# Patient Record
Sex: FEMALE | Race: WHITE | Employment: OTHER | URBAN - METROPOLITAN AREA
[De-identification: names, ages, dates, MRNs, and addresses within clinical notes are randomized per-mention and may not be internally consistent; named-entity substitution may affect disease eponyms.]

---

## 2017-01-06 ENCOUNTER — HOSPITAL ENCOUNTER (EMERGENCY)
Age: 82
Discharge: HOME | End: 2017-01-06
Attending: FAMILY MEDICINE

## 2017-01-06 DIAGNOSIS — R49.0 HOARSENESS OF VOICE: Primary | ICD-10-CM

## 2017-01-06 DIAGNOSIS — J06.9 ACUTE UPPER RESPIRATORY INFECTION: ICD-10-CM

## 2017-01-06 RX ORDER — LORATADINE 10 MG/1
10 TABLET ORAL DAILY
Qty: 15 TAB | Refills: 0 | Status: SHIPPED | OUTPATIENT
Start: 2017-01-06 | End: 2017-09-06

## 2017-01-06 NOTE — DISCHARGE INSTRUCTIONS
Hoarseness: Care Instructions  Your Care Instructions  Many things can cause your voice to become rough, raspy, or hard to hear. Having a cold or a sinus infection, talking too loudly or yelling, smoking, or breathing dry air can cause a hoarse voice. You also can have voice problems from pollution and allergies. Sometimes, acid from your stomach can back up into your throat--called acid reflux--and change your voice. In some cases, a problem with the voice box, or larynx, causes hoarseness. Rest and home care may be all you need if you have a hoarse voice. Follow-up care is a key part of your treatment and safety. Be sure to make and go to all appointments, and call your doctor if you are having problems. Its also a good idea to know your test results and keep a list of the medicines you take. How can you care for yourself at home? · Follow your doctor's advice about how much to talk. Use email or write notes when you can to rest your voice. · If your doctor prescribed antibiotics, take them as directed. Do not stop taking them just because you feel better. You need to take the full course of antibiotics. · Talk to your doctor about treatment for allergies if you do not already treat them. · Follow your treatment plan for acid reflux (if you have the condition): ¨ Take your medicines exactly as prescribed. Call your doctor if you think you are having a problem with your medicine. ¨ Limit or stop eating foods that make your acid reflux worse. These may include tomatoes, spicy foods, and chocolate. ¨ Limit or stop drinking alcohol and drinks that have caffeine, such as coffee, tea, and lizzie. ¨ Raise the head of your bed a few inches. Place a brick or a foam wedge under the mattress at the head of the bed. This will help keep stomach acid out of your throat at night. · When you do talk, do not whisper. It can be hard on your voice. · Use a vaporizer or humidifier to add moisture to your bedroom. Follow the directions for cleaning the machine. · Drink plenty of water to keep your throat moist. If you have kidney, heart, or liver disease and have to limit fluids, talk with your doctor before you increase the amount of fluids you drink. · Do not smoke. Smoking can make your voice raspy and can increase your risk of throat cancer. If you need help quitting, talk to your doctor about stop-smoking programs and medicines. These can increase your chances of quitting for good. · To keep your voice from getting hoarse in the future, try not to talk loudly or shout, such as at sports events. When should you call for help? Call your doctor now or seek immediate medical care if:  · You have trouble breathing. · You cough up blood. · You have trouble swallowing. Watch closely for changes in your health, and be sure to contact your doctor if:  · Your voice is still hoarse after several days of home care. · You have hoarseness for more than 3 weeks after getting over a cold or the flu. Where can you learn more? Go to http://radha-alex.info/. Enter P454 in the search box to learn more about \"Hoarseness: Care Instructions. \"  Current as of: July 29, 2016  Content Version: 11.1  © 7627-3460 [employer]. Care instructions adapted under license by [employer] (which disclaims liability or warranty for this information). If you have questions about a medical condition or this instruction, always ask your healthcare professional. [place] [address] any warranty or liability for your use of this information.

## 2017-01-06 NOTE — UC PROVIDER NOTE
Patient is a 80 y.o. female presenting with hoarse voice. The history is provided by the patient. Hoarse    This is a new problem. The current episode started 3 to 5 hours ago. The problem has not changed since onset. There has been no fever. Pertinent negatives include no congestion, no drooling, no ear discharge, no ear pain, no headaches, no plugged ear sensation, no shortness of breath, no stridor, no swollen glands, no trouble swallowing, no stiff neck and no cough. She has had no exposure to strep. She has tried nothing for the symptoms. Past Medical History   Diagnosis Date    HTN (hypertension) 5/27/2011    Hyperlipidemia 12/1/2011    Hypertension     OAB (overactive bladder) 2/15/2013    Other ill-defined conditions(799.89)      hyperlipidemia    Other ill-defined conditions(799.89)      bladder problems    Other ill-defined conditions(799.89)      back pain        Past Surgical History   Procedure Laterality Date    Hx appendectomy      Hx dilation and curettage       x 2    Hx tonsillectomy      Hx corneal transplant      Hx cataract removal           Family History   Problem Relation Age of Onset    Cancer Father      throat (smoker)    Heart Disease Mother      Age 52    Arthritis-osteo Paternal Aunt         Social History     Social History    Marital status:      Spouse name: N/A    Number of children: N/A    Years of education: N/A     Occupational History    Not on file. Social History Main Topics    Smoking status: Never Smoker    Smokeless tobacco: Not on file    Alcohol use No    Drug use: No    Sexual activity: Not on file     Other Topics Concern    Not on file     Social History Narrative                ALLERGIES: Codeine and Demerol (pf) [meperidine (pf)]    Review of Systems   HENT: Positive for hoarse voice. Negative for congestion, drooling, ear discharge, ear pain and trouble swallowing.     Respiratory: Negative for cough, shortness of breath and stridor. Neurological: Negative for headaches. There were no vitals filed for this visit. Physical Exam   Constitutional: No distress. HENT:   Right Ear: Tympanic membrane and ear canal normal.   Left Ear: Tympanic membrane and ear canal normal.   Nose: Nose normal.   Mouth/Throat: No oropharyngeal exudate, posterior oropharyngeal edema or posterior oropharyngeal erythema. Eyes: Conjunctivae are normal. Right eye exhibits no discharge. Left eye exhibits no discharge. Neck: Neck supple. Pulmonary/Chest: Effort normal and breath sounds normal. No respiratory distress. She has no wheezes. She has no rales. Lymphadenopathy:     She has no cervical adenopathy. Skin: No rash noted. Nursing note and vitals reviewed. MDM     Differential Diagnosis; Clinical Impression; Plan:     CLINICAL IMPRESSION:  Hoarseness of voice  (primary encounter diagnosis)  Acute upper respiratory infection      DDX    Plan:    Reassured.  No treatment  claritin 10 mg once with Flonase   Amount and/or Complexity of Data Reviewed:    Review and summarize past medical records:  Yes  Risk of Significant Complications, Morbidity, and/or Mortality:   Presenting problems:  Low  Management options:  Low      Procedures

## 2017-01-08 ENCOUNTER — HOSPITAL ENCOUNTER (EMERGENCY)
Age: 82
Discharge: HOME | End: 2017-01-08
Attending: EMERGENCY MEDICINE | Admitting: EMERGENCY MEDICINE
Payer: MEDICARE

## 2017-01-08 VITALS
SYSTOLIC BLOOD PRESSURE: 159 MMHG | HEIGHT: 69 IN | TEMPERATURE: 97.8 F | BODY MASS INDEX: 21.62 KG/M2 | DIASTOLIC BLOOD PRESSURE: 72 MMHG | HEART RATE: 66 BPM | WEIGHT: 146 LBS | OXYGEN SATURATION: 96 % | RESPIRATION RATE: 14 BRPM

## 2017-01-08 DIAGNOSIS — S09.90XA MINOR HEAD INJURY, INITIAL ENCOUNTER: Primary | ICD-10-CM

## 2017-01-08 DIAGNOSIS — S01.01XA SCALP LACERATION, INITIAL ENCOUNTER: ICD-10-CM
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
## 2017-01-08 DIAGNOSIS — S00.03XA SCALP HEMATOMA, INITIAL ENCOUNTER: ICD-10-CM

## 2017-01-08 PROCEDURE — 99283 EMERGENCY DEPT VISIT LOW MDM: CPT

## 2017-01-08 PROCEDURE — 74011000250 HC RX REV CODE- 250: Performed by: EMERGENCY MEDICINE

## 2017-01-08 PROCEDURE — 75810000293 HC SIMP/SUPERF WND  RPR

## 2017-01-08 PROCEDURE — 77030008460 HC STPLR SKN PRECIS 3M -A

## 2017-01-08 RX ORDER — BACITRACIN 500 UNIT/G
1 PACKET (EA) TOPICAL
Status: COMPLETED | OUTPATIENT
Start: 2017-01-08 | End: 2017-01-08

## 2017-01-08 RX ORDER — LIDOCAINE HYDROCHLORIDE AND EPINEPHRINE 10; 10 MG/ML; UG/ML
1.5 INJECTION, SOLUTION INFILTRATION; PERINEURAL ONCE
Status: COMPLETED | OUTPATIENT
Start: 2017-01-08 | End: 2017-01-08

## 2017-01-08 RX ADMIN — BACITRACIN ZINC 1 PACKET: 500 OINTMENT TOPICAL at 15:43

## 2017-01-08 RX ADMIN — LIDOCAINE HYDROCHLORIDE,EPINEPHRINE BITARTRATE 15 MG: 10; .01 INJECTION, SOLUTION INFILTRATION; PERINEURAL at 14:27

## 2017-01-08 NOTE — DISCHARGE INSTRUCTIONS
Head Injury: Care Instructions  Your Care Instructions  Most injuries to the head are minor. Bumps, cuts, and scrapes on the head and face usually heal well and can be treated the same as injuries to other parts of the body. Although it's rare, once in a while a more serious problem shows up after you are home. So it's good to be on the lookout for symptoms for a day or two. Follow-up care is a key part of your treatment and safety. Be sure to make and go to all appointments, and call your doctor if you are having problems. It's also a good idea to know your test results and keep a list of the medicines you take. How can you care for yourself at home? · Follow your doctor's instructions. He or she will tell you if you need someone to watch you closely for the next 24 hours or longer. · Take it easy for the next few days or more if you are not feeling well. · Ask your doctor when it's okay for you to go back to activities like driving a car, riding a bike, or operating machinery. When should you call for help? Call 911 anytime you think you may need emergency care. For example, call if:  · You have a seizure. · You passed out (lost consciousness). · You are confused or can't stay awake. Call your doctor now or seek immediate medical care if:  · You have new or worse vomiting. · You feel less alert. · You have new weakness or numbness in any part of your body. Watch closely for changes in your health, and be sure to contact your doctor if:  · You do not get better as expected. · You have new symptoms, such as headaches, trouble concentrating, or changes in mood. Where can you learn more? Go to http://radha-alex.info/. Enter O434 in the search box to learn more about \"Head Injury: Care Instructions. \"  Current as of: September 7, 2016  Content Version: 11.1  © 9207-9325 [employer].  Care instructions adapted under license by [email or web address] (which disclaims liability or warranty for this information). If you have questions about a medical condition or this instruction, always ask your healthcare professional. [address] any warranty or liability for your use of this information. Contusion: Care Instructions  Your Care Instructions  Contusion is the medical term for a bruise. It is the result of a direct blow or an impact, such as a fall. Contusions are common sports injuries. Most people think of a bruise as a black-and-blue spot. This happens when small blood vessels get torn and leak blood under the skin. But bones, muscles, and organs can also get bruised. This may damage deep tissues but not cause a bruise you can see. The doctor will do a physical exam to find the location of your contusion. You may also have tests to make sure you do not have a more serious injury, such as a broken bone or nerve damage. These may include X-rays or other imaging tests like a CT scan or MRI. Deep-tissue contusions may cause pain and swelling. But if there is no serious damage, they will often get better in a few weeks with home treatment. The doctor has checked you carefully, but problems can develop later. If you notice any problems or new symptoms, get medical treatment right away. Follow-up care is a key part of your treatment and safety. Be sure to make and go to all appointments, and call your doctor if you are having problems. It's also a good idea to know your test results and keep a list of the medicines you take. How can you care for yourself at home? · Put ice or a cold pack on the sore area for 10 to 20 minutes at a time to stop swelling. Put a thin cloth between the ice pack and your skin. · Be safe with medicines. Read and follow all instructions on the label. ¨ If the doctor gave you a prescription medicine for pain, take it as prescribed.   ¨ If you are not taking a prescription pain medicine, ask your doctor if you can take an over-the-counter medicine. · If you can, prop up the sore area on pillows as much as possible for the next few days. Try to keep the sore area above the level of your heart. When should you call for help? Call your doctor now or seek immediate medical care if:  · Your pain gets worse. · You have new or worse swelling. · You have tingling, weakness, or numbness in the area near the contusion. · The area near the contusion is cold or pale. Watch closely for changes in your health, and be sure to contact your doctor if:  · You do not get better as expected. Where can you learn more? Go to [employer]  Enter [id number] in the search box to learn more about \"Contusion: Care Instructions. \"   © 4203-2965 [employer]. Care instructions adapted under license by [employer] (which disclaims liability or warranty for this information). This care instruction is for use with your licensed healthcare professional. If you have questions about a medical condition or this instruction, always ask your healthcare professional. [address] any warranty or liability for your use of this information. Content Version: 27.7.998195; Current as of: May 22, 2015             Cuts Closed With Staples: Care Instructions  Your Care Instructions  A cut can happen anywhere on your body. The doctor used staples to close the cut. Staples easily and quickly close a cut, which helps the cut heal.  Sometimes a cut can injure tendons, blood vessels, or nerves. If the cut went deep and through the skin, the doctor may have put in a layer of stitches below the staples. The deeper layer of stitches brings the deep part of the cut together. These stitches will dissolve and don't need to be removed. The staples in the upper layer are what you see on the cut. You may have a bandage. You will need to have the staples removed, usually in 7 to 14 days.   The doctor has checked you carefully, but problems can develop later. If you notice any problems or new symptoms, get medical treatment right away. Follow-up care is a key part of your treatment and safety. Be sure to make and go to all appointments, and call your doctor if you are having problems. It's also a good idea to know your test results and keep a list of the medicines you take. How can you care for yourself at home? · Keep the cut dry for the first 24 to 48 hours. After this, you can shower if your doctor okays it. Pat the cut dry. · Don't soak the cut, such as in a bathtub. Your doctor will tell you when it's safe to get the cut wet. · If your doctor told you how to care for your cut, follow your doctor's instructions. If you did not get instructions, follow this general advice:  ¨ After the first 24 to 48 hours, wash around the cut with clean water 2 times a day. Don't use hydrogen peroxide or alcohol, which can slow healing. ¨ You may cover the cut with a thin layer of petroleum jelly, such as Vaseline, and a nonstick bandage. ¨ Apply more petroleum jelly and replace the bandage as needed. · Avoid any activity that could cause your cut to reopen. · Do not remove the staples on your own. Your doctor will tell you when to come back to have the staples removed. · Take pain medicines exactly as directed. ¨ If the doctor gave you a prescription medicine for pain, take it as prescribed. ¨ If you are not taking a prescription pain medicine, ask your doctor if you can take an over-the-counter medicine. When should you call for help? Call your doctor now or seek immediate medical care if:  · You have new pain, or your pain gets worse. · The skin near the cut is cold or pale or changes color. · You have tingling, weakness, or numbness near the cut. · The cut starts to bleed, and blood soaks through the bandage. Oozing small amounts of blood is normal.  · You have trouble moving the area near the cut.   · You have symptoms of infection, such as:  ¨ Increased pain, swelling, warmth, or redness around the cut. ¨ Red streaks leading from the cut. ¨ Pus draining from the cut. ¨ A fever. Watch closely for changes in your health, and be sure to contact your doctor if:  · You do not get better as expected. Where can you learn more? Go to http://radha-alex.info/. Enter L446 in the search box to learn more about \"Cuts Closed With Staples: Care Instructions. \"  Current as of: May 27, 2016  Content Version: 11.1  © 1331-2515 [employer]. Care instructions adapted under license by [email or web address] (which disclaims liability or warranty for this information). If you have questions about a medical condition or this instruction, always ask your healthcare professional. [address] any warranty or liability for your use of this information.

## 2017-01-08 NOTE — ED TRIAGE NOTES
Triage Note:  Pt arrived via EMS from home. Pt was attempting to sheryl a bird out of her house when it flew in the [facility] and she tripped on the bathtub and hit her head on the back of the bathtub wall. Pt denies any LOC. Pt with small laceration to back of head.

## 2017-01-08 NOTE — ED PROVIDER NOTES
HPI Comments: 80 y.o. female with past medical history significant for hypertension, hyperlipidemia, and HTN  who presents via EMS for evaluation after GLF. Per Pt she was chasing a bird out of her house and tripped and fell on the edge of her bathtub. Pt states that she hit her head but denies any LOC. Bleeding now controlled to occipital laceration. Pt also denies any HA, nausea, vomiting, neck pain, trouble breathing, back pain, cp, numbness, weakness, or any other pain. PT states that she takes aspirin every day. Her tetanus is UTD. There are no other acute medical concerns at this time. SHX:  . Last tetanus 2010. Nonsmoker. PCP: Denver Grad, MD    Note written by Elza Giraldo, as dictated by Romain Burroughs MD 2:46 PM      The history is provided by the patient and the EMS personnel. No  was used. Past Medical History:   Diagnosis Date    HTN (hypertension) 5/27/2011    Hyperlipidemia 12/1/2011    Hypertension     OAB (overactive bladder) 2/15/2013    Other ill-defined conditions(799.89)      hyperlipidemia    Other ill-defined conditions(799.89)      bladder problems    Other ill-defined conditions(799.89)      back pain       Past Surgical History:   Procedure Laterality Date    Hx appendectomy      Hx dilation and curettage       x 2    Hx tonsillectomy      Hx corneal transplant      Hx cataract removal           Family History:   Problem Relation Age of Onset    Cancer Father      throat (smoker)    Heart Disease Mother      Age 52    Arthritis-osteo Paternal Aunt        Social History     Social History    Marital status:      Spouse name: N/A    Number of children: N/A    Years of education: N/A     Occupational History    Not on file.      Social History Main Topics    Smoking status: Never Smoker    Smokeless tobacco: Not on file    Alcohol use No    Drug use: No    Sexual activity: Not on file     Other Topics Concern    Not on file     Social History Narrative         ALLERGIES: Codeine and Demerol (pf) [meperidine (pf)]    Review of Systems   Respiratory: Negative for shortness of breath. Gastrointestinal: Negative for diarrhea, nausea and vomiting. Musculoskeletal: Negative for neck pain. Skin: Positive for wound. Neurological: Negative for weakness, numbness and headaches. All other systems reviewed and are negative. Vitals:    01/08/17 1410   BP: 159/72   Pulse: 66   Resp: 14   Temp: 97.8 °F (36.6 °C)   SpO2: 96%   Weight: 66.2 kg (146 lb)   Height: 5' 9\" (1.753 m)            Physical Exam   Constitutional: She is oriented to person, place, and time. She appears well-developed and well-nourished. HENT:   Head: Normocephalic. Mouth/Throat: Oropharynx is clear and moist. No oropharyngeal exudate. 2 cm hematoma with 1 cm mildly gaping laceration to occipital area. Minimally tender. Bleeding controlled. Eyes: Conjunctivae are normal. Pupils are equal, round, and reactive to light. Right eye exhibits no discharge. Left eye exhibits no discharge. No scleral icterus. Neck: Normal range of motion. Neck supple. nontender c-spine with painless full rom   Cardiovascular: Normal rate, regular rhythm and normal heart sounds. Exam reveals no gallop and no friction rub. No murmur heard. Pulmonary/Chest: Effort normal and breath sounds normal. No respiratory distress. She has no wheezes. She has no rales. Abdominal: Soft. Bowel sounds are normal. She exhibits no distension. There is no tenderness. There is no guarding. Musculoskeletal: Normal range of motion. She exhibits no edema or tenderness. Lymphadenopathy:     She has no cervical adenopathy. Neurological: She is alert and oriented to person, place, and time. No cranial nerve deficit. Coordination normal.   Skin: Skin is warm and dry. No rash noted. No pallor. Nursing note and vitals reviewed.        MDM  Number of Diagnoses or Management Options  Diagnosis management comments: 55-year-old female here status post ground level fall with mildly gaping laceration to her occipital area about 1 cm in length. Denies any loss of consciousness, neck pain, back pain, headache, nausea, vomiting, other pain. Last tetanus in 2010 and relatively minor lacerations to within 10 years and will not update her tetanus. Low clinical suspicion for clinically significant closed head injury such as intracranial hemorrhage. Patient without any closed head injury symptoms at this time. Have advised her of symptoms to return to the emergency room promptly should they occur including nausea, vomiting, headache, trouble walking, sleepiness or other symptoms of concern. will staple laceration. ED Course       Wound Repair  Date/Time: 1/8/2017 3:33 PM  Performed by: attendingPreparation: skin prepped with Shur-Clens  Time out: Immediately prior to the procedure a time out was called to verify the correct patient, procedure, equipment, staff and marking as appropriate. .  Location details: scalp  Wound length:2.5 cm or less  Anesthesia: local infiltration    Anesthesia:  Anesthesia: local infiltration  Local Anesthetic: lidocaine 1% with epinephrine   Anesthetic total: 2.5 mL  Foreign bodies: no foreign bodies  Irrigation solution: shur clens. Debridement: none  Skin closure: staples  Number of sutures: 2 (2 staples)  Approximation: close  Dressing: antibiotic ointment  Patient tolerance: Patient tolerated the procedure well with no immediate complications  My total time at bedside, performing this procedure was 1-15 minutes. 3:37 PM  Patient's results have been reviewed with them.   Patient and/or family have verbally conveyed their understanding and agreement of the patient's signs, symptoms, diagnosis, treatment and prognosis and additionally agree to follow up as recommended or return to the Emergency Room should their condition change prior to follow-up. Discharge instructions have also been provided to the patient with some educational information regarding their diagnosis as well a list of reasons why they would want to return to the ER prior to their follow-up appointment should their condition change.

## 2017-09-06 ENCOUNTER — APPOINTMENT (OUTPATIENT)
Dept: CT IMAGING | Age: 82
End: 2017-09-06
Attending: EMERGENCY MEDICINE
Payer: MEDICARE

## 2017-09-06 ENCOUNTER — HOSPITAL ENCOUNTER (EMERGENCY)
Age: 82
Discharge: HOME | End: 2017-09-07
Attending: EMERGENCY MEDICINE
Payer: MEDICARE

## 2017-09-06 DIAGNOSIS — R19.7 DIARRHEA, UNSPECIFIED TYPE: ICD-10-CM

## 2017-09-06 DIAGNOSIS — R10.84 ABDOMINAL PAIN, GENERALIZED: ICD-10-CM

## 2017-09-06 DIAGNOSIS — R11.2 NON-INTRACTABLE VOMITING WITH NAUSEA, UNSPECIFIED VOMITING TYPE: Primary | ICD-10-CM

## 2017-09-06 DIAGNOSIS — K57.30 SIGMOID DIVERTICULOSIS: ICD-10-CM

## 2017-09-06 LAB
ALBUMIN SERPL-MCNC: 3.8 G/DL (ref 3.5–5)
ALBUMIN/GLOB SERPL: 1.1 {RATIO} (ref 1.1–2.2)
ALP SERPL-CCNC: 89 U/L (ref 45–117)
ALT SERPL-CCNC: 23 U/L (ref 12–78)
ANION GAP SERPL CALC-SCNC: 8 MMOL/L (ref 5–15)
APPEARANCE UR: ABNORMAL
AST SERPL-CCNC: 29 U/L (ref 15–37)
BACTERIA URNS QL MICRO: NEGATIVE /HPF
BASOPHILS # BLD: 0 K/UL (ref 0–0.1)
BASOPHILS NFR BLD: 0 % (ref 0–1)
BILIRUB SERPL-MCNC: 0.7 MG/DL (ref 0.2–1)
BILIRUB UR QL: NEGATIVE
BUN SERPL-MCNC: 14 MG/DL (ref 6–20)
BUN/CREAT SERPL: 16 (ref 12–20)
CALCIUM SERPL-MCNC: 9 MG/DL (ref 8.5–10.1)
CHLORIDE SERPL-SCNC: 105 MMOL/L (ref 97–108)
CO2 SERPL-SCNC: 27 MMOL/L (ref 21–32)
COLOR UR: ABNORMAL
CREAT SERPL-MCNC: 0.89 MG/DL (ref 0.55–1.02)
EOSINOPHIL # BLD: 0.5 K/UL (ref 0–0.4)
EOSINOPHIL NFR BLD: 5 % (ref 0–7)
EPITH CASTS URNS QL MICRO: ABNORMAL /LPF
ERYTHROCYTE [DISTWIDTH] IN BLOOD BY AUTOMATED COUNT: 12.4 % (ref 11.5–14.5)
GLOBULIN SER CALC-MCNC: 3.5 G/DL (ref 2–4)
GLUCOSE SERPL-MCNC: 141 MG/DL (ref 65–100)
GLUCOSE UR STRIP.AUTO-MCNC: NEGATIVE MG/DL
HCT VFR BLD AUTO: 39.6 % (ref 35–47)
HGB BLD-MCNC: 13.4 G/DL (ref 11.5–16)
HGB UR QL STRIP: NEGATIVE
HYALINE CASTS URNS QL MICRO: ABNORMAL /LPF (ref 0–5)
KETONES UR QL STRIP.AUTO: NEGATIVE MG/DL
LACTATE SERPL-SCNC: 1.7 MMOL/L (ref 0.4–2)
LEUKOCYTE ESTERASE UR QL STRIP.AUTO: NEGATIVE
LYMPHOCYTES # BLD: 3.8 K/UL (ref 0.8–3.5)
LYMPHOCYTES NFR BLD: 41 % (ref 12–49)
MCH RBC QN AUTO: 30 PG (ref 26–34)
MCHC RBC AUTO-ENTMCNC: 33.8 G/DL (ref 30–36.5)
MCV RBC AUTO: 88.8 FL (ref 80–99)
MONOCYTES # BLD: 0.8 K/UL (ref 0–1)
MONOCYTES NFR BLD: 9 % (ref 5–13)
NEUTS SEG # BLD: 4.1 K/UL (ref 1.8–8)
NEUTS SEG NFR BLD: 45 % (ref 32–75)
NITRITE UR QL STRIP.AUTO: NEGATIVE
PH UR STRIP: 7.5 [PH] (ref 5–8)
PLATELET # BLD AUTO: 176 K/UL (ref 150–400)
POTASSIUM SERPL-SCNC: 3.2 MMOL/L (ref 3.5–5.1)
PROT SERPL-MCNC: 7.3 G/DL (ref 6.4–8.2)
PROT UR STRIP-MCNC: NEGATIVE MG/DL
RBC # BLD AUTO: 4.46 M/UL (ref 3.8–5.2)
RBC #/AREA URNS HPF: ABNORMAL /HPF (ref 0–5)
SODIUM SERPL-SCNC: 140 MMOL/L (ref 136–145)
SP GR UR REFRACTOMETRY: 1.01 (ref 1–1.03)
UA: UC IF INDICATED,UAUC: ABNORMAL
UROBILINOGEN UR QL STRIP.AUTO: 0.2 EU/DL (ref 0.2–1)
WBC # BLD AUTO: 9.2 K/UL (ref 3.6–11)
WBC URNS QL MICRO: ABNORMAL /HPF (ref 0–4)

## 2017-09-06 PROCEDURE — 74011250636 HC RX REV CODE- 250/636: Performed by: EMERGENCY MEDICINE

## 2017-09-06 PROCEDURE — 94762 N-INVAS EAR/PLS OXIMTRY CONT: CPT

## 2017-09-06 PROCEDURE — 96374 THER/PROPH/DIAG INJ IV PUSH: CPT

## 2017-09-06 PROCEDURE — 93005 ELECTROCARDIOGRAM TRACING: CPT

## 2017-09-06 PROCEDURE — 74177 CT ABD & PELVIS W/CONTRAST: CPT

## 2017-09-06 PROCEDURE — 83605 ASSAY OF LACTIC ACID: CPT | Performed by: EMERGENCY MEDICINE

## 2017-09-06 PROCEDURE — 74011636320 HC RX REV CODE- 636/320: Performed by: EMERGENCY MEDICINE

## 2017-09-06 PROCEDURE — 99285 EMERGENCY DEPT VISIT HI MDM: CPT

## 2017-09-06 PROCEDURE — 85025 COMPLETE CBC W/AUTO DIFF WBC: CPT | Performed by: EMERGENCY MEDICINE

## 2017-09-06 PROCEDURE — 81001 URINALYSIS AUTO W/SCOPE: CPT | Performed by: EMERGENCY MEDICINE

## 2017-09-06 PROCEDURE — 80053 COMPREHEN METABOLIC PANEL: CPT | Performed by: EMERGENCY MEDICINE

## 2017-09-06 PROCEDURE — 36415 COLL VENOUS BLD VENIPUNCTURE: CPT | Performed by: EMERGENCY MEDICINE

## 2017-09-06 PROCEDURE — 96361 HYDRATE IV INFUSION ADD-ON: CPT

## 2017-09-06 RX ORDER — ONDANSETRON 2 MG/ML
4 INJECTION INTRAMUSCULAR; INTRAVENOUS
Status: COMPLETED | OUTPATIENT
Start: 2017-09-06 | End: 2017-09-06

## 2017-09-06 RX ORDER — SODIUM CHLORIDE 9 MG/ML
50 INJECTION, SOLUTION INTRAVENOUS
Status: COMPLETED | OUTPATIENT
Start: 2017-09-06 | End: 2017-09-07

## 2017-09-06 RX ORDER — SODIUM CHLORIDE 0.9 % (FLUSH) 0.9 %
10 SYRINGE (ML) INJECTION
Status: COMPLETED | OUTPATIENT
Start: 2017-09-06 | End: 2017-09-06

## 2017-09-06 RX ADMIN — IOPAMIDOL 100 ML: 755 INJECTION, SOLUTION INTRAVENOUS at 23:49

## 2017-09-06 RX ADMIN — SODIUM CHLORIDE 50 ML/HR: 900 INJECTION, SOLUTION INTRAVENOUS at 23:49

## 2017-09-06 RX ADMIN — Medication 10 ML: at 23:49

## 2017-09-06 RX ADMIN — SODIUM CHLORIDE 1000 ML: 900 INJECTION, SOLUTION INTRAVENOUS at 22:08

## 2017-09-06 RX ADMIN — ONDANSETRON 4 MG: 2 INJECTION INTRAMUSCULAR; INTRAVENOUS at 22:06

## 2017-09-06 NOTE — LETTER
[facility] EMERGENCY DEPT 
[address] [place] [address] [address] [id number] [phone] Work/School Note Date: 9/6/2017 To Whom It May concern: 
 
Itzel Her was seen and treated today in the emergency room by the following provider(s): 
Attending Provider: Sneha García MD. And accompanied by her Daughter, Ms. Jeff Boykin. Please excuse Ms. Yap on 9/7/17 Sincerely, 
 
 
 
 
Sneha García MD

## 2017-09-07 VITALS
WEIGHT: 150 LBS | DIASTOLIC BLOOD PRESSURE: 55 MMHG | BODY MASS INDEX: 22.22 KG/M2 | TEMPERATURE: 97.5 F | RESPIRATION RATE: 20 BRPM | OXYGEN SATURATION: 94 % | SYSTOLIC BLOOD PRESSURE: 145 MMHG | HEIGHT: 69 IN | HEART RATE: 64 BPM

## 2017-09-07 RX ORDER — ONDANSETRON 4 MG/1
4 TABLET, ORALLY DISINTEGRATING ORAL
Qty: 10 TAB | Refills: 0 | Status: SHIPPED | OUTPATIENT
Start: 2017-09-07 | End: 2017-12-12 | Stop reason: ALTCHOICE

## 2017-09-07 NOTE — ED NOTES
Assumed care of patient. Patient placed in position of comfort. Call bell in reach. Patient arrives to ED via EMS after she became ill during dinner. She complains of nausea, vomiting, and diarrhea. She recently started Aricept and is concerned this is related to medication. BG was 143 en route, VSS.

## 2017-09-07 NOTE — ED NOTES
Patient assisted to bathroom at this time, patient voided 500 mL; urine sample obtained and sent to lab. Patient positioned comfortably back in bed, on monitor x3, call bell within reach.

## 2017-09-07 NOTE — ED NOTES
Patient assisted to bathroom at this time. Patient voided and placed back in bed, comfortable position, call bell within reach.

## 2017-09-07 NOTE — ED NOTES
Dr. Alma Saucedo at bedside to provide discharge paperwork. Vital signs stable. Pt in no apparent distress at this time. Mental status at baseline. Patient wheeled to waiting room with discharge paperwork in hand. Accompanied by RN and daughter.

## 2017-09-07 NOTE — ED PROVIDER NOTES
HPI Comments: Fidel Hood is a 80 y.o. female, pmhx HTN, who presents via EMS to the ED c/o sudden onset diffuse abd pain with associated nausea, vomiting, and diarrhea, while attempting to eat dinner this evening. Pt notes and intermittent hx of diarrhea over the last several weeks. She states she has had multiple episodes of dark colored stools, but notes she has been taking Pepto Bismol. Family reports the pt has been evaluated by her PCP, but was not treated for anything. She states she began taking Aricept, ~5 days ago, but expresses concern the medication is causing her current sxs. Pt notes she stopped taking the Aricept yesterday. EMS reports  en route to the ED. Pt denies any recent medications PTA this evening. She specifically denies any recent fevers, chills, CP, SOB, urinary sxs, hematochezia or headache. PCP: Jose Alfredo Sainz MD    PMHx: Significant for HTN, HLD  PSHx: Significant for appendicitis, D&C, tonsillectomy, corneal transplant  Social Hx: -tobacco, -EtOH, -Illicit Drugs    There are no other complaints, changes, or physical findings at this time. The history is provided by the patient and the EMS personnel.         Past Medical History:   Diagnosis Date    HTN (hypertension) 5/27/2011    Hyperlipidemia 12/1/2011    Hypertension     OAB (overactive bladder) 2/15/2013    Other ill-defined conditions     hyperlipidemia    Other ill-defined conditions     bladder problems    Other ill-defined conditions     back pain       Past Surgical History:   Procedure Laterality Date    HX APPENDECTOMY      HX CATARACT REMOVAL      HX CORNEAL TRANSPLANT      HX DILATION AND CURETTAGE      x 2    HX TONSILLECTOMY           Family History:   Problem Relation Age of Onset    Cancer Father      throat (smoker)    Heart Disease Mother      Age 52    Arthritis-osteo Paternal Aunt        Social History     Social History    Marital status:      Spouse name: N/A    Number of children: N/A    Years of education: N/A     Occupational History    Not on file. Social History Main Topics    Smoking status: Never Smoker    Smokeless tobacco: Never Used    Alcohol use No    Drug use: No    Sexual activity: Not on file     Other Topics Concern    Not on file     Social History Narrative         ALLERGIES: Codeine and Demerol (pf) [meperidine (pf)]    Review of Systems   Constitutional: Negative. Negative for fever. Eyes: Negative. Respiratory: Negative. Negative for shortness of breath. Cardiovascular: Negative for chest pain and palpitations. Gastrointestinal: Positive for abdominal pain, diarrhea, nausea and vomiting.        +melena  No hematochezia   Endocrine: Negative. Genitourinary: Negative. Negative for difficulty urinating, dysuria, frequency and hematuria. Musculoskeletal: Negative. Skin: Negative. Allergic/Immunologic: Negative. Neurological: Negative. Psychiatric/Behavioral: Negative for suicidal ideas. All other systems reviewed and are negative. Vitals:    09/06/17 2307 09/06/17 2308 09/06/17 2315 09/06/17 2330   BP: 158/59  146/63 135/59   Pulse:  68 61 63   Resp:  17 17 18   Temp:       SpO2:  97% 98% 98%   Weight:       Height:                Physical Exam   Constitutional: She is oriented to person, place, and time. She appears well-developed and well-nourished. No distress. HENT:   Head: Normocephalic and atraumatic. Nose: Nose normal.   Eyes: Conjunctivae and EOM are normal. No scleral icterus. Neck: Normal range of motion. No tracheal deviation present. Cardiovascular: Normal rate, regular rhythm, normal heart sounds and intact distal pulses. Exam reveals no friction rub. No murmur heard. Pulmonary/Chest: Effort normal and breath sounds normal. No stridor. No respiratory distress. She has no wheezes. She has no rales. Abdominal: Soft. Bowel sounds are normal. She exhibits no distension. There is no tenderness. There is no rebound. Musculoskeletal: Normal range of motion. She exhibits no tenderness. Neurological: She is alert and oriented to person, place, and time. No cranial nerve deficit. Skin: Skin is warm and dry. No rash noted. She is not diaphoretic. Psychiatric: She has a normal mood and affect. Her speech is normal and behavior is normal. Judgment and thought content normal. Cognition and memory are normal.   Nursing note and vitals reviewed. MDM  Number of Diagnoses or Management Options  Abdominal pain, generalized:   Diarrhea, unspecified type:   Non-intractable vomiting with nausea, unspecified vomiting type:   Sigmoid diverticulosis:   Diagnosis management comments: DDX:  Uti, constipation, obstruction, diverticulosis/litis, electrolyte abnormality    Plan:  Labs, ivf, antiemetic, ekg, ct scan    Impression:  N/v/d, diverticulosis       Amount and/or Complexity of Data Reviewed  Clinical lab tests: reviewed and ordered  Tests in the radiology section of CPT®: reviewed and ordered  Tests in the medicine section of CPT®: ordered and reviewed  Obtain history from someone other than the patient: yes (EMS)  Review and summarize past medical records: yes  Independent visualization of images, tracings, or specimens: yes    Patient Progress  Patient progress: stable        Procedures      I reviewed our electronic medical record system for any past medical records that were available that may contribute to the patients current condition, the nursing notes and and vital signs from today's visit    Nursing notes will be reviewed as they become available in realtime while the pt has been in the ED. Bre Frost MD      EKG interpretation 2100: sinus teresa, nl Axis, rate 52; , QRS 82, QTc 431; no acute ischemia; Bre Frost MD    I personally reviewed pt's imaging. Official read by radiology listed below. Bre Frost MD    PROGRESS NOTE:  10:38 PM  Pt reevaluated.  Pt states her nausea is improved following her dose of Zofran. Will continue to monitor. Written by Will Pepper, ED Scribe, as dictated by Barbie Dick MD     12:46 AM  Progress note:  Pt noted to be feeling better, ready for discharge. Discussed lab and imaging findings with pt and/or family. Will follow up as instructed. All questions have been answered, pt voiced understanding and agreement with plan. If narcotics were prescribed, pt was advised not to drive or operate heavy machinery. If abx were prescribed, pt advised that diarrhea and rash are possible side effects of the medications. Specific return precautions provided as well as instructions to return to the ED should sx worsen at any time. Barbie Dick MD    LABORATORY TESTS:  Recent Results (from the past 12 hour(s))   EKG, 12 LEAD, INITIAL    Collection Time: 09/06/17  9:00 PM   Result Value Ref Range    Ventricular Rate 52 BPM    Atrial Rate 52 BPM    P-R Interval 182 ms    QRS Duration 82 ms    Q-T Interval 464 ms    QTC Calculation (Bezet) 431 ms    Calculated P Axis 67 degrees    Calculated R Axis -2 degrees    Calculated T Axis 27 degrees    Diagnosis       Sinus bradycardia  Otherwise normal ECG  When compared with ECG of 12-AUG-2016 06:45,  No significant change was found     CBC WITH AUTOMATED DIFF    Collection Time: 09/06/17  9:08 PM   Result Value Ref Range    WBC 9.2 3.6 - 11.0 K/uL    RBC 4.46 3.80 - 5.20 M/uL    HGB 13.4 11.5 - 16.0 g/dL    HCT 39.6 35.0 - 47.0 %    MCV 88.8 80.0 - 99.0 FL    MCH 30.0 26.0 - 34.0 PG    MCHC 33.8 30.0 - 36.5 g/dL    RDW 12.4 11.5 - 14.5 %    PLATELET 508 537 - 356 K/uL    NEUTROPHILS 45 32 - 75 %    LYMPHOCYTES 41 12 - 49 %    MONOCYTES 9 5 - 13 %    EOSINOPHILS 5 0 - 7 %    BASOPHILS 0 0 - 1 %    ABS. NEUTROPHILS 4.1 1.8 - 8.0 K/UL    ABS. LYMPHOCYTES 3.8 (H) 0.8 - 3.5 K/UL    ABS. MONOCYTES 0.8 0.0 - 1.0 K/UL    ABS. EOSINOPHILS 0.5 (H) 0.0 - 0.4 K/UL    ABS.  BASOPHILS 0.0 0.0 - 0.1 K/UL   METABOLIC PANEL, COMPREHENSIVE    Collection Time: 09/06/17  9:08 PM   Result Value Ref Range    Sodium 140 136 - 145 mmol/L    Potassium 3.2 (L) 3.5 - 5.1 mmol/L    Chloride 105 97 - 108 mmol/L    CO2 27 21 - 32 mmol/L    Anion gap 8 5 - 15 mmol/L    Glucose 141 (H) 65 - 100 mg/dL    BUN 14 6 - 20 MG/DL    Creatinine 0.89 0.55 - 1.02 MG/DL    BUN/Creatinine ratio 16 12 - 20      GFR est AA >60 >60 ml/min/1.73m2    GFR est non-AA >60 >60 ml/min/1.73m2    Calcium 9.0 8.5 - 10.1 MG/DL    Bilirubin, total 0.7 0.2 - 1.0 MG/DL    ALT (SGPT) 23 12 - 78 U/L    AST (SGOT) 29 15 - 37 U/L    Alk. phosphatase 89 45 - 117 U/L    Protein, total 7.3 6.4 - 8.2 g/dL    Albumin 3.8 3.5 - 5.0 g/dL    Globulin 3.5 2.0 - 4.0 g/dL    A-G Ratio 1.1 1.1 - 2.2     URINALYSIS W/ REFLEX CULTURE    Collection Time: 09/06/17  9:08 PM   Result Value Ref Range    Color YELLOW/STRAW      Appearance CLOUDY (A) CLEAR      Specific gravity 1.011 1.003 - 1.030      pH (UA) 7.5 5.0 - 8.0      Protein NEGATIVE  NEG mg/dL    Glucose NEGATIVE  NEG mg/dL    Ketone NEGATIVE  NEG mg/dL    Bilirubin NEGATIVE  NEG      Blood NEGATIVE  NEG      Urobilinogen 0.2 0.2 - 1.0 EU/dL    Nitrites NEGATIVE  NEG      Leukocyte Esterase NEGATIVE  NEG      WBC 0-4 0 - 4 /hpf    RBC 0-5 0 - 5 /hpf    Epithelial cells FEW FEW /lpf    Bacteria NEGATIVE  NEG /hpf    UA:UC IF INDICATED CULTURE NOT INDICATED BY UA RESULT CNI      Hyaline cast 2-5 0 - 5 /lpf   LACTIC ACID    Collection Time: 09/06/17  9:23 PM   Result Value Ref Range    Lactic acid 1.7 0.4 - 2.0 MMOL/L       IMAGING RESULTS:     CT Results  (Last 48 hours)               09/07/17 0000  CT ABD PELV W CONT Final result    Impression:  IMPRESSION: No acute process in the abdomen and pelvis. Narrative:  CT ABDOMEN AND PELVIS WITH CONTRAST. 9/7/2017 12:00 AM        INDICATION: Nausea, vomiting, diarrhea, abdominal pain. COMPARISON: None.        TECHNIQUE: CT of the abdomen and pelvis was performed after the administration   of 100 cc IV Isovue-370. CT dose reduction was achieved through use of a   standardized protocol tailored for this examination and automatic exposure   control for dose modulation. FINDINGS:   Abdomen: Calcified granulomas in the right lower lobe, and numerous hepatic and   splenic calcifications, are evidence of old granulomatous disease. 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 Incidental note is made of a large   gallstone and a tiny nonobstructing right interpolar renal calculus. The heart   size is normal. The distal esophagus, stomach, duodenum, adrenals, and left   kidney are normal.       Pelvis: Sigmoid diverticulosis is mild. The small bowel, ileocecal junction,   colon, and bladder are otherwise normal. The appendix is not visualized; no   pericecal inflammatory process. No free air or fluid, and no abdominopelvic   lymphadenopathy. The patient is osteopenic. Moderate levoconvexity is centered around L1-L2. MEDICATIONS GIVEN:  Medications   sodium chloride 0.9 % bolus infusion 1,000 mL (1,000 mL IntraVENous New Bag 9/6/17 2208)   ondansetron (ZOFRAN) injection 4 mg (4 mg IntraVENous Given 9/6/17 2206)   sodium chloride (NS) flush 10 mL (10 mL IntraVENous Given 9/6/17 2349)   iopamidol (ISOVUE-370) 76 % injection 100 mL (100 mL IntraVENous Given 9/6/17 2349)   0.9% sodium chloride infusion (50 mL/hr IntraVENous New Bag 9/6/17 2349)       IMPRESSION:  1. Non-intractable vomiting with nausea, unspecified vomiting type    2. Abdominal pain, generalized    3. Diarrhea, unspecified type    4. Sigmoid diverticulosis        PLAN:  1. Current Discharge Medication List      START taking these medications    Details   ondansetron (ZOFRAN ODT) 4 mg disintegrating tablet Take 1 Tab by mouth every eight (8) hours as needed for Nausea. Qty: 10 Tab, Refills: 0           2.    Follow-up Information     Follow up With Details Comments Contact Jeanette Arevalo MD Schedule an appointment as soon as possible for a visit in 2 days  [address] [facility] [phone]          Return to ED if worse     DISCHARGE NOTE:  12:46 AM  The patient's results have been reviewed with family and/or caregiver. They verbally convey their understanding and agreement of the patient's signs, symptoms, diagnosis, treatment, and prognosis. They additionally agree to follow up as recommended in the discharge instructions or to return to the Emergency Room should the patient's condition change prior to their follow-up appointment. The family and/or caregiver verbally agrees with the care-plan and all of their questions have been answered. The discharge instructions have also been provided to the them along with educational information regarding the patient's diagnosis and a list of reasons why the patient would want to return to the ER prior to their follow-up appointment should their condition change. Written by Sheyla Pang, ROCÍO Scribe, as dictated by Bert Back MD.         This note is prepared by Sheyla Pang, acting as Scribe for MD Bert Gonzalez MD : The scribe's documentation has been prepared under my direction and personally reviewed by me in its entirety. I confirm that the note above accurately reflects all work, treatment, procedures, and medical decision making performed by me. This note will not be viewable in [address]

## 2017-09-07 NOTE — DISCHARGE INSTRUCTIONS
Abdominal Pain: Care Instructions  Your Care Instructions    Abdominal pain has many possible causes. Some aren't serious and get better on their own in a few days. Others need more testing and treatment. If your pain continues or gets worse, you need to be rechecked and may need more tests to find out what is wrong. You may need surgery to correct the problem. Don't ignore new symptoms, such as fever, nausea and vomiting, urination problems, pain that gets worse, and dizziness. These may be signs of a more serious problem. Your doctor may have recommended a follow-up visit in the next 8 to 12 hours. If you are not getting better, you may need more tests or treatment. The doctor has checked you carefully, but problems can develop later. If you notice any problems or new symptoms, get medical treatment right away. Follow-up care is a key part of your treatment and safety. Be sure to make and go to all appointments, and call your doctor if you are having problems. It's also a good idea to know your test results and keep a list of the medicines you take. How can you care for yourself at home? · Rest until you feel better. · To prevent dehydration, drink plenty of fluids, enough so that your urine is light yellow or clear like water. Choose water and other caffeine-free clear liquids until you feel better. If you have kidney, heart, or liver disease and have to limit fluids, talk with your doctor before you increase the amount of fluids you drink. · If your stomach is upset, eat mild foods, such as rice, dry toast or crackers, bananas, and applesauce. Try eating several small meals instead of two or three large ones. · Wait until 48 hours after all symptoms have gone away before you have spicy foods, alcohol, and drinks that contain caffeine. · Do not eat foods that are high in fat. · Avoid anti-inflammatory medicines such as aspirin, ibuprofen (Advil, Motrin), and naproxen (Aleve).  These can cause stomach upset. Talk to your doctor if you take daily aspirin for another health problem. When should you call for help? Call 911 anytime you think you may need emergency care. For example, call if:  · You passed out (lost consciousness). · You pass maroon or very bloody stools. · You vomit blood or what looks like coffee grounds. · You have new, severe belly pain. Call your doctor now or seek immediate medical care if:  · Your pain gets worse, especially if it becomes focused in one area of your belly. · You have a new or higher fever. · Your stools are black and look like tar, or they have streaks of blood. · You have unexpected vaginal bleeding. · You have symptoms of a urinary tract infection. These may include:  ¨ Pain when you urinate. ¨ Urinating more often than usual.  ¨ Blood in your urine. · You are dizzy or lightheaded, or you feel like you may faint. Watch closely for changes in your health, and be sure to contact your doctor if:  · You are not getting better after 1 day (24 hours). Where can you learn more? Go to http://radha"[employer]"alex.info/. Enter O053 in the search box to learn more about \"Abdominal Pain: Care Instructions. \"  Current as of: March 20, 2017  Content Version: 11.3  © 0915-3976 [employer]. Care instructions adapted under license by [employer] (which disclaims liability or warranty for this information). If you have questions about a medical condition or this instruction, always ask your healthcare professional. [place] [address] any warranty or liability for your use of this information. Diarrhea: Care Instructions  Your Care Instructions    Diarrhea is loose, watery stools (bowel movements). The exact cause is often hard to find. Sometimes diarrhea is your body's way of getting rid of what caused an upset stomach. Viruses, food poisoning, and many medicines can cause diarrhea.  Some people get diarrhea in response to emotional stress, anxiety, or certain foods. Almost everyone has diarrhea now and then. It usually isn't serious, and your stools will return to normal soon. The important thing to do is replace the fluids you have lost, so you can prevent dehydration. The doctor has checked you carefully, but problems can develop later. If you notice any problems or new symptoms, get medical treatment right away. Follow-up care is a key part of your treatment and safety. Be sure to make and go to all appointments, and call your doctor if you are having problems. It's also a good idea to know your test results and keep a list of the medicines you take. How can you care for yourself at home? · Watch for signs of dehydration, which means your body has lost too much water. Dehydration is a serious condition and should be treated right away. Signs of dehydration are:  ¨ Increasing thirst and dry eyes and mouth. ¨ Feeling faint or lightheaded. ¨ Darker urine, and a smaller amount of urine than normal.  · To prevent dehydration, drink plenty of fluids, enough so that your urine is light yellow or clear like water. Choose water and other caffeine-free clear liquids until you feel better. If you have kidney, heart, or liver disease and have to limit fluids, talk with your doctor before you increase the amount of fluids you drink. · Begin eating small amounts of mild foods the next day, if you feel like it. ¨ Try yogurt that has live cultures of Lactobacillus. (Check the label.)  ¨ Avoid spicy foods, fruits, alcohol, and caffeine until 48 hours after all symptoms are gone. ¨ Avoid chewing gum that contains sorbitol. ¨ Avoid dairy products (except for yogurt with Lactobacillus) while you have diarrhea and for 3 days after symptoms are gone. · The doctor may recommend that you take over-the-counter medicine, such as loperamide (Imodium), if you still have diarrhea after 6 hours.  Read and follow all instructions on the label. Do not use this medicine if you have bloody diarrhea, a high fever, or other signs of serious illness. Call your doctor if you think you are having a problem with your medicine. When should you call for help? Call 911 anytime you think you may need emergency care. For example, call if:  · You passed out (lost consciousness). · Your stools are maroon or very bloody. Call your doctor now or seek immediate medical care if:  · You are dizzy or lightheaded, or you feel like you may faint. · Your stools are black and look like tar, or they have streaks of blood. · You have new or worse belly pain. · You have symptoms of dehydration, such as:  ¨ Dry eyes and a dry mouth. ¨ Passing only a little dark urine. ¨ Feeling thirstier than usual.  · You have a new or higher fever. Watch closely for changes in your health, and be sure to contact your doctor if:  · Your diarrhea is getting worse. · You see pus in the diarrhea. · You are not getting better after 2 days (48 hours). Where can you learn more? Go to http://radha"[employer]alex.info/. Enter F109 in the search box to learn more about \"Diarrhea: Care Instructions. \"  Current as of: March 20, 2017  Content Version: 11.3  © 5234-0907 [employer]. Care instructions adapted under license by [employer] (which disclaims liability or warranty for this information). If you have questions about a medical condition or this instruction, always ask your healthcare professional. [place] [address] any warranty or liability for your use of this information. Nausea and Vomiting: Care Instructions  Your Care Instructions    When you are nauseated, you may feel weak and sweaty and notice a lot of saliva in your mouth. Nausea often leads to vomiting. Most of the time you do not need to worry about nausea and vomiting, but they can be signs of other illnesses.   Two common causes of nausea and vomiting are stomach flu and food poisoning. Nausea and vomiting from viral stomach flu will usually start to improve within 24 hours. Nausea and vomiting from food poisoning may last from 12 to 48 hours. The doctor has checked you carefully, but problems can develop later. If you notice any problems or new symptoms, get medical treatment right away. Follow-up care is a key part of your treatment and safety. Be sure to make and go to all appointments, and call your doctor if you are having problems. It's also a good idea to know your test results and keep a list of the medicines you take. How can you care for yourself at home? · To prevent dehydration, drink plenty of fluids, enough so that your urine is light yellow or clear like water. Choose water and other caffeine-free clear liquids until you feel better. If you have kidney, heart, or liver disease and have to limit fluids, talk with your doctor before you increase the amount of fluids you drink. · Rest in bed until you feel better. · When you are able to eat, try clear soups, mild foods, and liquids until all symptoms are gone for 12 to 48 hours. Other good choices include dry toast, crackers, cooked cereal, and gelatin dessert, such as Jell-O. When should you call for help? Call 911 anytime you think you may need emergency care. For example, call if:  · You passed out (lost consciousness). Call your doctor now or seek immediate medical care if:  · You have symptoms of dehydration, such as:  ¨ Dry eyes and a dry mouth. ¨ Passing only a little dark urine. ¨ Feeling thirstier than usual.  · You have new or worsening belly pain. · You have a new or higher fever. · You vomit blood or what looks like coffee grounds. Watch closely for changes in your health, and be sure to contact your doctor if:  · You have ongoing nausea and vomiting. · Your vomiting is getting worse. · Your vomiting lasts longer than 2 days. · You are not getting better as expected.   Where can you learn more? Go to http://radha-alex.info/. Enter 25 [id number] in the search box to learn more about \"Nausea and Vomiting: Care Instructions. \"  Current as of: March 20, 2017  Content Version: 11.3  © 4764-7036 [employer]. Care instructions adapted under license by "[employer] (which disclaims liability or warranty for this information). If you have questions about a medical condition or this instruction, always ask your healthcare professional. [address] any warranty or liability for your use of this information. Diverticulosis: Care Instructions  Your Care Instructions  In diverticulosis, pouches called diverticula form in the wall of the large intestine (colon). The pouches do not cause any pain or other symptoms. Most people who have diverticulosis do not know they have it. But the pouches sometimes bleed, and if they become infected, they can cause pain and other symptoms. When this happens, it is called diverticulitis. Diverticula form when pressure pushes the wall of the colon outward at certain weak points. A diet that is too low in fiber can cause diverticula. Follow-up care is a key part of your treatment and safety. Be sure to make and go to all appointments, and call your doctor if you are having problems. It's also a good idea to know your test results and keep a list of the medicines you take. How can you care for yourself at home? · Include fruits, leafy green vegetables, beans, and whole grains in your diet each day. These foods are high in fiber. · Take a fiber supplement, such as Citrucel or Metamucil, every day if needed. Read and follow all instructions on the label. · Drink plenty of fluids, enough so that your urine is light yellow or clear like water. If you have kidney, heart, or liver disease and have to limit fluids, talk with your doctor before you increase the amount of fluids you drink.   · Get at least 27 minutes of exercise on most days of the week. Walking is a good choice. You also may want to do other activities, such as running, swimming, cycling, or playing tennis or team sports. · Cut out foods that cause gas, pain, or other symptoms. When should you call for help? Call your doctor now or seek immediate medical care if:  · You have belly pain. · You pass maroon or very bloody stools. · You have a fever. · You have nausea and vomiting. · You have unusual changes in your bowel movements or abdominal swelling. · You have burning pain when you urinate. · You have abnormal vaginal discharge. · You have shoulder pain. · You have cramping pain that does not get better when you have a bowel movement or pass gas. · You pass gas or stool from your urethra while urinating. Watch closely for changes in your health, and be sure to contact your doctor if you have any problems. Where can you learn more? Go to http://radha-alex.info/. Enter H173 in the search box to learn more about \"Diverticulosis: Care Instructions. \"  Current as of: August 9, 2016  Content Version: 11.3  © 4734-5220 [employer] Care instructions adapted under license by [employer] (which disclaims liability or warranty for this information). If you have questions about a medical condition or this instruction, always ask your healthcare professional. [address] any warranty or liability for your use of this information.

## 2017-09-07 NOTE — ED TRIAGE NOTES
Patient arrives via EMS after she became ill during dinner. She complains of nausea, vomiting, and diarrhea. She recently started Aricept and is concerned this is related to medication. BG was 143 en route, VSS.

## 2017-09-08 LAB
ATRIAL RATE: 52 BPM
CALCULATED P AXIS, ECG09: 67 DEGREES
CALCULATED R AXIS, ECG10: -2 DEGREES
CALCULATED T AXIS, ECG11: 27 DEGREES
DIAGNOSIS, 93000: NORMAL
P-R INTERVAL, ECG05: 182 MS
Q-T INTERVAL, ECG07: 464 MS
QRS DURATION, ECG06: 82 MS
QTC CALCULATION (BEZET), ECG08: 431 MS
VENTRICULAR RATE, ECG03: 52 BPM

## 2017-09-13 ENCOUNTER — HOSPITAL ENCOUNTER (EMERGENCY)
Age: 82
Discharge: HOME | End: 2017-09-13
Attending: FAMILY MEDICINE

## 2017-09-13 VITALS
DIASTOLIC BLOOD PRESSURE: 69 MMHG | HEIGHT: 66 IN | RESPIRATION RATE: 20 BRPM | SYSTOLIC BLOOD PRESSURE: 161 MMHG | TEMPERATURE: 98.2 F | BODY MASS INDEX: 23.42 KG/M2 | WEIGHT: 145.7 LBS | OXYGEN SATURATION: 96 % | HEART RATE: 59 BPM

## 2017-09-13 DIAGNOSIS — K62.5 ANAL BLEEDING: Primary | ICD-10-CM

## 2017-09-13 LAB
BILIRUB UR QL: ABNORMAL
GLUCOSE UR QL STRIP.AUTO: NEGATIVE MG/DL
KETONES UR-MCNC: 15 MG/DL
LEUKOCYTE ESTERASE UR QL STRIP: ABNORMAL
NITRITE UR QL: NEGATIVE
PH UR: 7 [PH] (ref 5–8)
PROT UR QL: 30 MG/DL
RBC # UR STRIP: ABNORMAL /UL
SP GR UR: 1.02 (ref 1–1.03)
UROBILINOGEN UR QL: 0.2 EU/DL (ref 0.2–1)

## 2017-09-13 RX ORDER — HYDROCORTISONE 25 MG/G
CREAM TOPICAL
Qty: 30 G | Refills: 0 | Status: SHIPPED | OUTPATIENT
Start: 2017-09-13 | End: 2017-12-12 | Stop reason: ALTCHOICE

## 2017-09-13 NOTE — UC PROVIDER NOTE
Patient is a 80 y.o. female presenting with anal bleeding. The history is provided by the patient. Rectal Bleeding    This is a new problem. The current episode started 2 days ago. The stool is described as blood tinged and pellet like. Associated symptoms include diarrhea (had 2 weeks before) and constipation (x 2-3 days). Pertinent negatives include no abdominal pain, no dysuria, no abdominal distention, no nausea, no back pain and no vomiting. Treatments tried: manual disimpactant. Past Medical History:   Diagnosis Date    HTN (hypertension) 5/27/2011    Hyperlipidemia 12/1/2011    Hypertension     OAB (overactive bladder) 2/15/2013    Other ill-defined conditions     hyperlipidemia    Other ill-defined conditions     bladder problems    Other ill-defined conditions     back pain        Past Surgical History:   Procedure Laterality Date    HX APPENDECTOMY      HX CATARACT REMOVAL      HX CORNEAL TRANSPLANT      HX DILATION AND CURETTAGE      x 2    HX TONSILLECTOMY           Family History   Problem Relation Age of Onset    Cancer Father      throat (smoker)    Heart Disease Mother      Age 52    Arthritis-osteo Paternal Aunt         Social History     Social History    Marital status:      Spouse name: N/A    Number of children: N/A    Years of education: N/A     Occupational History    Not on file. Social History Main Topics    Smoking status: Never Smoker    Smokeless tobacco: Never Used    Alcohol use No    Drug use: No    Sexual activity: Not on file     Other Topics Concern    Not on file     Social History Narrative                ALLERGIES: Codeine and Demerol (pf) [meperidine (pf)]    Review of Systems   Gastrointestinal: Positive for anal bleeding, constipation (x 2-3 days), diarrhea (had 2 weeks before) and rectal pain. Negative for abdominal distention, abdominal pain, nausea and vomiting. Genitourinary: Negative for dysuria.    Musculoskeletal: Negative for back pain. All other systems reviewed and are negative. Vitals:    09/13/17 1110   BP: 161/69   Pulse: (!) 59   Resp: 20   Temp: 98.2 °F (36.8 °C)   SpO2: 96%   Weight: 66.1 kg (145 lb 11.2 oz)   Height: 5' 6\" (1.676 m)       Physical Exam   Constitutional: No distress. HENT:   Mouth/Throat: No oropharyngeal exudate. Eyes: No scleral icterus. Abdominal: Soft. Bowel sounds are normal. She exhibits no distension and no mass. There is no tenderness. There is no rebound and no guarding. Genitourinary: Rectal exam shows external hemorrhoid (residual skin tag). Rectal exam shows no internal hemorrhoid, no fissure, no mass, no tenderness and anal tone normal.   Skin: No rash noted. Nursing note and vitals reviewed. MDM     Differential Diagnosis; Clinical Impression; Plan:     CLINICAL IMPRESSION:  Anal bleeding  (primary encounter diagnosis)      DDX      Plan:    Reassured- sitz bath-    Use Anusol cream   Use colac e and miralax powder as needed  Amount and/or Complexity of Data Reviewed:    Review and summarize past medical records:  Yes  Risk of Significant Complications, Morbidity, and/or Mortality:   Presenting problems: Moderate  Management options:   Moderate  Progress:   Patient progress:  Stable      Procedures

## 2017-09-13 NOTE — DISCHARGE INSTRUCTIONS
Rectal Bleeding: Care Instructions  Your Care Instructions  Rectal bleeding in small amounts is common. You may see red spotting on toilet paper or drops of blood in the toilet. Rectal bleeding has many possible causes, from something as minor as hemorrhoids to something as serious as colon cancer. You may need more tests to find the cause of your bleeding. Follow-up care is a key part of your treatment and safety. Be sure to make and go to all appointments, and call your doctor if you are having problems. Its also a good idea to know your test results and keep a list of the medicines you take. How can you care for yourself at home? · Avoid aspirin and other nonsteroidal anti-inflammatory drugs (NSAIDs), such as ibuprofen (Advil, Motrin) and naproxen (Aleve). They can cause you to bleed more. Ask your doctor if you can take acetaminophen (Tylenol). Read and follow all instructions on the label. · Use a stool softener that contains bran or psyllium. You can save money by buying bran or psyllium (available in bulk at most health food stores) and sprinkling it on foods or stirring it into fruit juice. You can also use a product such as Metamucil or Citrucel. · Take your medicines exactly as directed. Call your doctor if you think you are having a problem with your medicine. When should you call for help? Call 911 anytime you think you may need emergency care. For example, call if:  · You passed out (lost consciousness). · You pass maroon or very bloody stools. · You vomit blood or what looks like coffee grounds. Call your doctor now or seek immediate medical care if:  · You have severe belly pain. · You have increased bleeding. · You are dizzy or lightheaded, or you feel like you may faint. · Your stools are black and tarlike. Watch closely for changes in your health, and be sure to contact your doctor if:  · You lose weight and do not know why. · You feel tired all the time.   · Your bleeding does not decrease after 2 days. Where can you learn more? Go to http://radha-alex.info/. Enter H828 in the search box to learn more about \"Rectal Bleeding: Care Instructions. \"  Current as of: August 9, 2016  Content Version: 11.3  © 4560-3627 [employer]. Care instructions adapted under license by [employer] (which disclaims liability or warranty for this information). If you have questions about a medical condition or this instruction, always ask your healthcare professional. [address] any warranty or liability for your use of this information.

## 2017-09-27 ENCOUNTER — OFFICE VISIT (OUTPATIENT)
Dept: FAMILY MEDICINE CLINIC | Age: 82
End: 2017-09-27

## 2017-09-27 VITALS
WEIGHT: 146.5 LBS | OXYGEN SATURATION: 96 % | BODY MASS INDEX: 23.54 KG/M2 | HEIGHT: 66 IN | RESPIRATION RATE: 19 BRPM | TEMPERATURE: 95.9 F | SYSTOLIC BLOOD PRESSURE: 144 MMHG | HEART RATE: 53 BPM | DIASTOLIC BLOOD PRESSURE: 68 MMHG

## 2017-09-27 DIAGNOSIS — Z00.00 MEDICARE ANNUAL WELLNESS VISIT, INITIAL: Primary | ICD-10-CM

## 2017-09-27 DIAGNOSIS — N32.81 OAB (OVERACTIVE BLADDER): ICD-10-CM

## 2017-09-27 DIAGNOSIS — K59.00 CONSTIPATION, UNSPECIFIED CONSTIPATION TYPE: ICD-10-CM

## 2017-09-27 DIAGNOSIS — R73.9 HIGH BLOOD SUGAR: ICD-10-CM

## 2017-09-27 DIAGNOSIS — I10 ESSENTIAL HYPERTENSION: ICD-10-CM

## 2017-09-27 DIAGNOSIS — Z23 ENCOUNTER FOR IMMUNIZATION: ICD-10-CM

## 2017-09-27 DIAGNOSIS — Z78.0 POSTMENOPAUSAL: ICD-10-CM

## 2017-09-27 DIAGNOSIS — E78.00 PURE HYPERCHOLESTEROLEMIA: ICD-10-CM

## 2017-09-27 NOTE — PROGRESS NOTES
Skylar Fishman is a 80 y.o. female  Chief Complaint   Patient presents with   [facility]    Annual Wellness Visit     1. Have you been to the ER, urgent care clinic since your last visit? Hospitalized since your last visit? Yes, Constipation 9/13/2017    2. Have you seen or consulted any other health care providers outside of the [address] [place] [address] since your last visit? Include any pap smears or colon screening.  No

## 2017-09-27 NOTE — ACP (ADVANCE CARE PLANNING)
Patient does not have an [address] [place] [address]. [facility]. I discussed the basics of Advanced Care Planning with patient, including what the document does, purpose of a health care proxy and how to execute ACP. Patient was given \"Your Right to Decide\" brochure, contact information for office nurse navigator and will schedule an appointment to discuss ACP.

## 2017-09-27 NOTE — MR AVS SNAPSHOT
Visit Information Date & Time Provider Department Dept. Phone Encounter #  
 9/27/2017  3:20 PM Ronny Abdullahi NP [facility] [phone] [id number] Your Appointments 10/4/2017  1:30 PM  
ESTABLISHED PATIENT with MD Verona Trevino Ace, [place], [address] ([facility]) Appt Note: 2 month f/u  
 [address] [facility] [address] [id number]  
[phone]  
  
   
 [address] [facility] [address] Upcoming Health Maintenance Date Due  
 MEDICARE YEARLY EXAM 7/3/1997 Pneumococcal 65+ Low/Medium Risk (2 of 2 - PPSV23) 5/27/2012 INFLUENZA AGE 9 TO ADULT 8/1/2017 GLAUCOMA SCREENING Q2Y 12/31/2017 DTaP/Tdap/Td series (2 - Td) 5/27/2020 Allergies as of 9/27/2017  Review Complete On: 9/27/2017 By: Nicole Almaraz LPN Severity Noted Reaction Type Reactions Codeine  11/23/2010    Nausea and Vomiting Demerol (Pf) [Meperidine (Pf)]  12/01/2011    Nausea and Vomiting Current Immunizations  Reviewed on 9/27/2017 Name Date Pneumococcal Conjugate (PCV-13) 9/27/2017 TDAP Vaccine 5/27/2010 ZZZ-RETIRED (DO NOT USE) Pneumococcal Vaccine (Unspecified Type) 5/27/2007 Reviewed by Rachelle Oneil LPN on 2/44/8457 at  5:45 PM  
You Were Diagnosed With   
  
 Codes Comments Medicare annual wellness visit, initial    -  Primary ICD-10-CM: Z00.00 ICD-9-CM: V70.0 Essential hypertension     ICD-10-CM: I10 
ICD-9-CM: 401.9 Pure hypercholesterolemia     ICD-10-CM: E78.00 ICD-9-CM: 272.0   
 OAB (overactive bladder)     ICD-10-CM: N32.81 ICD-9-CM: 596.51 High blood sugar     ICD-10-CM: R73.9 ICD-9-CM: 790.29 Postmenopausal     ICD-10-CM: Z78.0 ICD-9-CM: V49.81 Constipation, unspecified constipation type     ICD-10-CM: K59.00 ICD-9-CM: 564.00 Encounter for immunization     ICD-10-CM: P49 ICD-9-CM: V03.89 Vitals BP Pulse Temp Resp Height(growth percentile) Weight(growth percentile) 144/68 (BP 1 Location: Left arm, BP Patient Position: Sitting) (!) 53 95.9 °F (35.5 °C) (Oral) 19 5' 6\" (1.676 m) 146 lb 8 oz (66.5 kg) SpO2 BMI OB Status Smoking Status 96% 23.65 kg/m2 Postmenopausal Never Smoker Vitals History BMI and BSA Data Body Mass Index Body Surface Area  
 23.65 kg/m 2 1.76 m 2 Preferred Pharmacy Pharmacy Name Phone [id number] [address] [address] [address] [phone] Your Updated Medication List  
  
   
This list is accurate as of: 9/27/17  5:48 PM.  Always use your most recent med list. amLODIPine 10 mg tablet Commonly known as:  Christian Levi TAKE 1 TABLET BY MOUTH EVERY MORNING  
  
 aspirin 325 mg tablet Commonly known as:  ASPIRIN Take 325 mg by mouth daily. atorvastatin 20 mg tablet Commonly known as:  LIPITOR  
TAKE 1 TABLET BY MOUTH DAILY  
  
 benazepril 40 mg tablet Commonly known as:  LOTENSIN  
TAKE 1 TABLET BY MOUTH EVERY DAY  
  
 fluticasone 50 mcg/actuation nasal spray Commonly known as:  Star Serve 2 Sprays by Both Nostrils route daily. Glucosamine &Chondroit-MV-Min3 [id number] mg Tab Take  by mouth. hydrocortisone 2.5 % rectal cream  
Commonly known as:  ANUSOL-HC Insert  into rectum four (4) times daily as needed (anal pain/ bleeding). metoprolol tartrate 100 mg IR tablet Commonly known as:  LOPRESSOR  
TAKE 1/2 TABLET BY MOUTH TWICE DAILY  
  
 ondansetron 4 mg disintegrating tablet Commonly known as:  ZOFRAN ODT Take 1 Tab by mouth every eight (8) hours as needed for Nausea. pneumococcal 13 oscar conj dip 0.5 mL Syrg injection Commonly known as:  PREVNAR-13  
0.5 mL by IntraMUSCular route once for 1 dose. VITAMIN D3 1,000 unit tablet Generic drug:  cholecalciferol Take  by mouth daily. Prescriptions Sent to Pharmacy Refills  
 pneumococcal 13 oscar conj dip (PREVNAR-13) 0.5 mL syrg injection 0 Si.5 mL by IntraMUSCular route once for 1 dose. Class: Normal  
 Pharmacy: [address] [place] [address] , [address] [place] [address] Ph #: [phone] Route: IntraMUSCular We Performed the Following HEMOGLOBIN A1C WITH EAG [05985 CPT(R)] LIPID PANEL [77664 CPT(R)] MICROALBUMIN, UR, RAND W/ MICROALBUMIN/CREA RATIO [id number] CPT(R)] PNEUMOCOCCAL CONJ VACCINE 13 VALENT IM [id number] CPT(R)] [place] IMMUNIZ ADMIN,1 SINGLE/COMB VAC/TOXOID [id number] CPT(R)] To-Do List   
 2017 Imaging:  DEXA BONE DENSITY STUDY AXIAL Patient Instructions 1) Bowel regimen Drink at least 64 oz a day. Exercise regularly. Good fiber diet High-Fiber Diet: Care Instructions Your Care Instructions A high-fiber diet may help you relieve constipation and feel less bloated. Your doctor and dietitian will help you make a high-fiber eating plan based on your personal needs. The plan will include the things you like to eat. It will also make sure that you get 30 grams of fiber a day. Before you make changes to the way you eat, be sure to talk with your doctor or dietitian. Follow-up care is a key part of your treatment and safety. Be sure to make and go to all appointments, and call your doctor if you are having problems. It's also a good idea to know your test results and keep a list of the medicines you take. How can you care for yourself at home? · You can increase how much fiber you get if you eat more of certain foods. These foods include: ¨ Whole-grain breads and cereals. ¨ Fruits, such as pears, apples, and peaches. Eat the skins and peels if you can. ¨ Vegetables, such as broccoli, cabbage, spinach, carrots, asparagus, and squash. ¨ Starchy vegetables. These include potatoes with skins, kidney beans, and lima beans. · Take a fiber supplement every day if your doctor recommends it. Examples are Benefiber, Citrucel, FiberCon, and Metamucil. Ask your doctor how much to take. · Drink plenty of fluids, enough so that your urine is light yellow or clear like water. If you have kidney, heart, or liver disease and have to limit fluids, talk with your doctor before you increase the amount of fluids you drink. · Get some exercise every day. Exercise helps stool move through the colon. It also helps prevent constipation. · Keep a food diary. Try to notice and write down what foods cause gas, pain, or other symptoms. Then you can avoid these foods. Where can you learn more? Go to http://radha-alex.info/. Enter S393 in the search box to learn more about \"High-Fiber Diet: Care Instructions. \" Current as of: December 15, 2016 Content Version: 11.3 © 3294-5551 [employer]. Care instructions adapted under license by [employer] (which disclaims liability or warranty for this information). If you have questions about a medical condition or this instruction, always ask your healthcare professional. [place] [address] any warranty or liability for your use of this information. 2) Advanced Medical Care plan - please bring a copy to a [facility] facility so it can be scanned into our system 3) Shingles vaccine, Prevnar 13 vaccine 4) The following blood work was ordered today. Once the tests are completed, you will receive a letter, email or phone call with the results. If you have not heard from us within 10-14 days, please call the office for the results. Hemoglobin A1c is a 3 month average of your blood sugar and used as a marker of your diabetes control. A normal value is less than 5.7%. Total Cholesterol is the total number of cholesterol particles in your blood, which includes triglycerides, HDL and LDL.   A small amount of cholesterol is needed for the body's cells, hormones, and metabolism. Goal is less than 200. Triglycerides are the short term fats in your blood which are used for energy. Goal is less than 150. High Density Lipids (HDL) is the \"good\" cholesterol in your blood. HDL helps remove bad cholesterol from your blood. Goal is more than 40. Low Density Lipids (LDL) is the \"bad\" cholesterol in your blood. LDL can stick to your arteries, raising the risk for heart attack and stroke. Goal is less than 150 Urine Analysis for Microalbumin/creatinine ratio is a marker of the amount of protein in your urine. Certain health conditions, such as diabetes and hypertension, can injury kidney function. A normal value is less than 30. Schedule of Personalized Health Plan The best way to stay healthy is to live a healthy lifestyle. A healthy lifestyle includes regular exercise, eating a well-balanced diet, keeping a healthy weight and not smoking. Regular physical exams and screening tests are another important way to take care of yourself. Preventive exams provided by health care providers can find health problems early when treatment works best and can keep you from getting certain diseases or illnesses. Preventive services include exams, lab tests, screening tests, shots, and learning information to help you take care of your own health. The CDC recommends pneumonia vaccines for anyone 72 years and older. These vaccines are usually only needed once in a lifetime unless your healthcare provider decides differently. The 2 pneumonia shots available presently are PCV 13 (Prevnar 13) and PPSV23 (Pneumovax 23). Adults 72 years or older who have not previously received PCV13, should receive a dose of PCV13 first, followed 1 year later by a dose of PPSV23. All people over 65 should have a yearly flu vaccine. People over 65 are at medium to high risk for Hepatitis B.  Hepatitis B is transmitted through body fluids with a common source being sexual activity or IV drug use. Three shots are needed for complete protection. The CDC recommends the herpes zoster (shingles) vaccine for all adults 61 and older, regardless if a prior episode of zoster has been reported. In addition to your physical exam, some screening tests are recommended: 
 
Osteoporosis screening -There are no signs or symptoms of osteoporosis (weakening of bones). You might not know you have the disease until you break a bone. Thats why its so important to get a bone density test to measure your bone strength. Bone mass measurement is taken with a Dexa scan and is recommended every two years after 72years old or if you have certain risk factors, such as personal history of vertebral fracture or chronic steroid medication use. Diabetes Mellitus screening is recommended every year. This is a blood test, called a hemoglobin A1c, which measures the average blood sugar over a 3 month period. Glaucoma is an eye disease caused by high pressure in the eye. An eye exam is recommended every year. Cardiovascular screening tests that check your cholesterol and other blood fat (lipid) levels are recommended every five years or yearly if you are on medications for cardiovascular disease. Colorectal Cancer screening tests help to find pre-cancerous polyps (growths in the colon) so they can be removed before they turn into cancer. Screening tests are recommended starting at age 48 or earlier if you have a certain risk factors, such as a family history of colon cancer. Breast Cancer screening is done with a mammogram, a low dose x-ray that looks at breast tissue. It is recommended by the [address] that women 54 years and older get a mammogram every 2 years. After the age of 76, recommendations are based on life expectancy.  
 
Cervical Cancer screening is done by a PAP smear during a pelvic exam. The [employer] of Obstetricians and Gynecologists states that screening can be discontinued after 72years old if the person has had adequate negative prior screening results and no abnormal history (abnormal = CIN2 or higher level). Here is a list of your current Health Maintenance items including a date when each one is due next: 
Health Maintenance Topic Date Due  MEDICARE YEARLY EXAM  07/03/1997  Pneumococcal 65+ Low/Medium Risk (2 of 2 - PPSV23) 05/27/2012  INFLUENZA AGE 9 TO ADULT  08/01/2017  GLAUCOMA SCREENING Q2Y  12/31/2017  
 DTaP/Tdap/Td series (2 - Td) 05/27/2020  
 OSTEOPOROSIS SCREENING (DEXA)  Addressed  ZOSTER VACCINE AGE 60>  Completed You do not have an [address] on file. Introducing [facility]! Rossana Toro introduces inZair patient portal. Now you can access parts of your medical record, email your doctor's office, and request medication refills online. 1. In your internet browser, go to [email or web address] 2. Click on the First Time User? Click Here link in the Sign In box. You will see the New Member Sign Up page. 3. Enter your inZair Access Code exactly as it appears below. You will not need to use this code after youve completed the sign-up process. If you do not sign up before the expiration date, you must request a new code. · inZair Access Code: [email or web address] Expires: 11/14/2017  3:03 PM 
 
4. Enter the last four digits of your Social Security Number (xxxx) and Date of Birth (mm/dd/yyyy) as indicated and click Submit. You will be taken to the next sign-up page. 5. Create a [employer] ID. This will be your inZair login ID and cannot be changed, so think of one that is secure and easy to remember. 6. Create a [employer] password. You can change your password at any time. 7. Enter your Password Reset Question and Answer. This can be used at a later time if you forget your password. 8. Enter your e-mail address. You will receive e-mail notification when new information is available in [address] 9. Click Sign Up. You can now view and download portions of your medical record. 10. Click the Download Summary menu link to download a portable copy of your medical information. If you have questions, please visit the Frequently Asked Questions section of the [employer] website. Remember, [employer] is NOT to be used for urgent needs. For medical emergencies, dial 911. Now available from your iPhone and Android! Please provide this summary of care documentation to your next provider. Your primary care clinician is listed as Nikolai Ruiz. If you have any questions after today's visit, please call [phone].

## 2017-09-27 NOTE — PATIENT INSTRUCTIONS
1) Bowel regimen  Drink at least 64 oz a day. Exercise regularly. Good fiber diet      High-Fiber Diet: Care Instructions  Your Care Instructions  A high-fiber diet may help you relieve constipation and feel less bloated. Your doctor and dietitian will help you make a high-fiber eating plan based on your personal needs. The plan will include the things you like to eat. It will also make sure that you get 30 grams of fiber a day. Before you make changes to the way you eat, be sure to talk with your doctor or dietitian. Follow-up care is a key part of your treatment and safety. Be sure to make and go to all appointments, and call your doctor if you are having problems. It's also a good idea to know your test results and keep a list of the medicines you take. How can you care for yourself at home? · You can increase how much fiber you get if you eat more of certain foods. These foods include:  ¨ Whole-grain breads and cereals. ¨ Fruits, such as pears, apples, and peaches. Eat the skins and peels if you can. ¨ Vegetables, such as broccoli, cabbage, spinach, carrots, asparagus, and squash. ¨ Starchy vegetables. These include potatoes with skins, kidney beans, and lima beans. · Take a fiber supplement every day if your doctor recommends it. Examples are Benefiber, Citrucel, FiberCon, and Metamucil. Ask your doctor how much to take. · Drink plenty of fluids, enough so that your urine is light yellow or clear like water. If you have kidney, heart, or liver disease and have to limit fluids, talk with your doctor before you increase the amount of fluids you drink. · Get some exercise every day. Exercise helps stool move through the colon. It also helps prevent constipation. · Keep a food diary. Try to notice and write down what foods cause gas, pain, or other symptoms. Then you can avoid these foods. Where can you learn more? Go to http://bishop.info/.   Enter E472 in the search box to learn more about \"High-Fiber Diet: Care Instructions. \"  Current as of: December 15, 2016  Content Version: 11.3  © 2759-2119 [employer]. Care instructions adapted under license by [employer] (which disclaims liability or warranty for this information). If you have questions about a medical condition or this instruction, always ask your healthcare professional. [address] any warranty or liability for your use of this information. 2) Advanced Medical Care plan - please bring a copy to a [facility] so it can be scanned into our system    3) Shingles vaccine, Prevnar 13 vaccine  4) The following blood work was ordered today. Once the tests are completed, you will receive a letter, email or phone call with the results. If you have not heard from us within 10-14 days, please call the office for the results. Hemoglobin A1c is a 3 month average of your blood sugar and used as a marker of your diabetes control. A normal value is less than 5.7%. Total Cholesterol is the total number of cholesterol particles in your blood, which includes triglycerides, HDL and LDL. A small amount of cholesterol is needed for the body's cells, hormones, and metabolism. Goal is less than 200. Triglycerides are the short term fats in your blood which are used for energy. Goal is less than 150. High Density Lipids (HDL) is the \"good\" cholesterol in your blood. HDL helps remove bad cholesterol from your blood. Goal is more than 40. Low Density Lipids (LDL) is the \"bad\" cholesterol in your blood. LDL can stick to your arteries, raising the risk for heart attack and stroke. Goal is less than 150    Urine Analysis for Microalbumin/creatinine ratio is a marker of the amount of protein in your urine. Certain health conditions, such as diabetes and hypertension, can injury kidney function. A normal value is less than 30.         Schedule of Personalized Health Plan    The best way to stay healthy is to live a healthy lifestyle. A healthy lifestyle includes regular exercise, eating a well-balanced diet, keeping a healthy weight and not smoking. Regular physical exams and screening tests are another important way to take care of yourself. Preventive exams provided by health care providers can find health problems early when treatment works best and can keep you from getting certain diseases or illnesses. Preventive services include exams, lab tests, screening tests, shots, and learning information to help you take care of your own health. The CDC recommends pneumonia vaccines for anyone 72 years and older. These vaccines are usually only needed once in a lifetime unless your healthcare provider decides differently. The 2 pneumonia shots available presently are PCV 13 (Prevnar 13) and PPSV23 (Pneumovax 23). Adults 72 years or older who have not previously received PCV13, should receive a dose of PCV13 first, followed 1 year later by a dose of PPSV23. All people over 65 should have a yearly flu vaccine. People over 65 are at medium to high risk for Hepatitis B. Hepatitis B is transmitted through body fluids with a common source being sexual activity or IV drug use. Three shots are needed for complete protection. The CDC recommends the herpes zoster (shingles) vaccine for all adults 61 and older, regardless if a prior episode of zoster has been reported. In addition to your physical exam, some screening tests are recommended:    Osteoporosis screening -There are no signs or symptoms of osteoporosis (weakening of bones). You might not know you have the disease until you break a bone. Thats why its so important to get a bone density test to measure your bone strength.  Bone mass measurement is taken with a Dexa scan and is recommended every two years after 72years old or if you have certain risk factors, such as personal history of vertebral fracture or chronic steroid medication use. Diabetes Mellitus screening is recommended every year. This is a blood test, called a hemoglobin A1c, which measures the average blood sugar over a 3 month period. Glaucoma is an eye disease caused by high pressure in the eye. An eye exam is recommended every year. Cardiovascular screening tests that check your cholesterol and other blood fat (lipid) levels are recommended every five years or yearly if you are on medications for cardiovascular disease. Colorectal Cancer screening tests help to find pre-cancerous polyps (growths in the colon) so they can be removed before they turn into cancer. Screening tests are recommended starting at age 48 or earlier if you have a certain risk factors, such as a family history of colon cancer. Breast Cancer screening is done with a mammogram, a low dose x-ray that looks at breast tissue. It is recommended by the [address] that women 54 years and older get a mammogram every 2 years. After the age of 76, recommendations are based on life expectancy. Cervical Cancer screening is done by a PAP smear during a pelvic exam.  The [employer] College of Obstetricians and Gynecologists states that screening can be discontinued after 72years old if the person has had adequate negative prior screening results and no abnormal history (abnormal = CIN2 or higher level). Here is a list of your current Health Maintenance items including a date when each one is due next:  Health Maintenance   Topic Date Due    Pneumococcal 65+ Low/Medium Risk (2 of 2 - PPSV23) 05/27/2012    INFLUENZA AGE 9 TO ADULT  08/01/2017    GLAUCOMA SCREENING Q2Y  12/31/2017    MEDICARE YEARLY EXAM  09/28/2018    DTaP/Tdap/Td series (2 - Td) 05/27/2020    OSTEOPOROSIS SCREENING (DEXA)  Addressed    ZOSTER VACCINE AGE 60>  Completed       You do not have an [address] on file.

## 2017-09-27 NOTE — PROGRESS NOTES
Assessment/Plan:    1. Medicare annual wellness visit, initial  -discussed healthy living, diet and daily exercise; taking multivitamin  - labs today: lipids, microalbumin (CBC, CMP drawn during ED visit 9/13/17)  -shingles vaccine; Prevnar 13    2. Essential hypertension  -BP - 141/63;  current therapy working, no changes in meds  -Pt unsure if she is still taking HCTZ, will check at home and let us know    3. Pure hypercholesterolemia  - LIPID PANEL  - MICROALBUMIN, UR, RAND W/ MICROALBUMIN/CREA RATIO    4. OAB (overactive bladder)  -9/13/17 - urine analysis showed trace blood, leuks;  -pt had s/s at time, but not now. Discussed abx when symptomatic, but if no sx, usually not tx    5. High blood sugar  -CBC 9/13/17 glucose = 141  - HEMOGLOBIN A1C WITH EAG    6. Postmenopausal  - DEXA BONE DENSITY STUDY AXIAL; Future    7. Constipation, unspecified constipation type  -9/13/17 - POC fecal blood = negative  -increase daily fiber and water intake, daily exercise                      Date of visit: 9/27/2017       This is an Initial to [employer] Visit. This initial visit is performed after the first 12 months of effective date of Medicare Part B enrollment and patient has not had a Medicare Annual Wellness visit yet. Initial visit is only performed once. I have reviewed the patient's medical history in detail and updated the computerized patient record. Kinza Henry is a 80 y.o. female   History obtained from: spouse and the patient.   Patient lives: independent home    History     Patient Active Problem List   Diagnosis Code    HTN (hypertension) I10    Hyperlipidemia E78.5    OAB (overactive bladder) N32.81    Hypokalemia E87.6     Past Medical History:   Diagnosis Date    HTN (hypertension) 5/27/2011    Hyperlipidemia 12/1/2011    Hypertension     OAB (overactive bladder) 2/15/2013    Other ill-defined conditions     hyperlipidemia    Other ill-defined conditions     bladder problems    Other ill-defined conditions     back pain      Past Surgical History:   Procedure Laterality Date    HX APPENDECTOMY      HX CATARACT REMOVAL      HX CORNEAL TRANSPLANT      HX DILATION AND CURETTAGE      x 2    HX TONSILLECTOMY       Allergies   Allergen Reactions    Codeine Nausea and Vomiting    Demerol (Pf) [Meperidine (Pf)] Nausea and Vomiting     Current Outpatient Prescriptions   Medication Sig Dispense Refill    metoprolol tartrate (LOPRESSOR) 100 mg IR tablet TAKE 1/2 TABLET BY MOUTH TWICE DAILY 90 Tab 3    amLODIPine (NORVASC) 10 mg tablet TAKE 1 TABLET BY MOUTH EVERY MORNING 90 Tab 4    benazepril (LOTENSIN) 40 mg tablet TAKE 1 TABLET BY MOUTH EVERY DAY 90 Tab 3    atorvastatin (LIPITOR) 20 mg tablet TAKE 1 TABLET BY MOUTH DAILY 90 Tab 3    Glucosamine &Chondroit-MV-Min3 880-775-63-0.5 mg tab Take  by mouth.  cholecalciferol, vitamin d3, (VITAMIN D) 1,000 unit tablet Take  by mouth daily.  aspirin (ASPIRIN) 325 mg tablet Take 325 mg by mouth daily.  hydrocortisone (ANUSOL-HC) 2.5 % rectal cream Insert  into rectum four (4) times daily as needed (anal pain/ bleeding). 30 g 0    ondansetron (ZOFRAN ODT) 4 mg disintegrating tablet Take 1 Tab by mouth every eight (8) hours as needed for Nausea. 10 Tab 0    fluticasone (FLONASE) 50 mcg/actuation nasal spray 2 Sprays by Both Nostrils route daily. 1 Bottle 0     Family History   Problem Relation Age of Onset    Cancer Father      throat (smoker)    Heart Disease Mother      Age 52    Arthritis-osteo Paternal Aunt      Social History   Substance Use Topics    Smoking status: Never Smoker    Smokeless tobacco: Never Used    Alcohol use No       Specialists/Care Team   Shreyas Diana has established care with the following healthcare providers:  Patient Care Team:  Antoinette Menjivar NP as PCP - General (Nurse Practitioner)    Demographics   female  80 y.o.     Health Risk Assessment     General Health Questions -During the past 4 weeks: How would you rate your health in general? Good  How much have you been bothered by bodily pain? not  Has your physical and emotional health limited your social activities with family or friends? no    Emotional Health Questions   -Do you have a history of depression, anxiety, or emotional problems? no  -Over the past 2 weeks, have you felt down, depressed or hopeless? no  -Over the past 2 weeks, have you felt little interest or pleasure in doing things? no  PHQ over the last two weeks 9/27/2017   Little interest or pleasure in doing things Not at all   Feeling down, depressed or hopeless Not at all   Total Score PHQ 2 0     Hearing Loss   Have you noticed any hearing difficulties? Yes, slightly    Health Habits   Please describe your diet habits: overall healthy  Do you get 5 servings of fruits or vegetables daily? yes  Do you exercise regularly? no    Do you smoke? no     Quit Date:   PP/D:    Alcohol Risk Factor Screening:   Do you drink alcohol? none  On any occasion during the past 3 months, have you had more than 3 drinks containing alcohol? Do you average more than 7 drinks per week? Activities of Daily Living and Functional Status   -Do you need help with eating, walking, dressing, bathing, toileting, the phone, transportation, shopping, preparing meals, housework, laundry, or medications:  Yes - transportation  -Do you need help managing money? no  -In the past four weeks, was someone available to help you if you needed and wanted help with anything? yes  -Are you confident are you that you can control and manage most of your health problems? yes  -Have you been given information to help you keep track of your medications? yes  -How often do you have trouble taking your medications as prescribed? never    Fall Risk and Home Safety   Have you fallen 2 or more times in the past year?  No 1x  Have you been bothered by feeling dizzy when standing up? never  Does your home have good lighting, grab bars in the bathroom, handrails on the stairs, good lighting and no fall hazards such as throw rugs on floor? no  Do you have smoke detectors and check them regularly? yes  Do you have difficulties driving a car? no  Do you always fasten your seat belt when you are in a car? yes  Fall Risk Assessment:    Fall Risk Assessment, last 12 mths 9/27/2017   Able to walk? Yes   Fall in past 12 months? Yes   Fall with injury? Yes   Number of falls in past 12 months 1   Fall Risk Score 2       Abuse Screen   Patient is not abused    Evaluation of Cognitive Function   Mood/affect:  happy  Orientation: Person, Place and Time  Appearance: age appropriate  Family member/caregiver input: spouse  Physical Examination     Vitals:    09/27/17 1528 09/27/17 1541   BP: 141/63 144/68   Pulse: (!) 53    Resp: 19    Temp: 95.9 °F (35.5 °C)    TempSrc: Oral    SpO2: 96%    Weight: 146 lb 8 oz (66.5 kg)    Height: 5' 6\" (1.676 m)      Body mass index is 23.65 kg/(m^2). No exam data present  Was the patient's timed Up & Go test unsteady or longer than 30 seconds?  no    Advice/Referrals/Counseling (as indicated)   Education and counseling provided for any problems identified above: ACP, shingles,     Preventive Services     (Preventive care checklist to be included in patient instructions)  Discussed today Done Previously Not Needed    X   Glaucoma screening     X Colorectal cancer screening (colonscopy)     X Osteoporosis Screening (DEXA scan)     X Breast cancer Screening (Mammogram)     X Cervical cancer Screening (Pap smear)     X Prostate cancer Screening   X   Cardiovascular Screening (Lipid panel)   X   Diabetes screening test (Hgb A1c)     X Abdominal ultrasound for AAA     X Low-dose CT for lung cancer screening   X   Flu vaccine     X Hepatitis B vaccine (if at risk)   X 5/27/07  Pneumococcal 23  Prevnar 13    5/27/10  Tdap vaccine   X   Shingles vaccine     X Screening for Hepatitis C (b [id number]) Discussion of Advance Directive     Patient was offered the opportunity to discuss advance care planning:  yes     Does patient have an Advance Directive:  no   If no, did you provide information on Caring Connections? yes     HPI  -1 fall this year - chasing bird in house, fell in shower and hit head - needed staples in head  Noticing more forgetfulness    BM schedule change - no blood in stools, diarrhea came often, not real loose; diarrhea when urination  Will take a pepto bismol - possible gastroenteritis    9/13/17 - passed out - went to ED and was dehydrated; had some blood in stool/constipation    Exercise - not really, just working around the house    Takes a vit D supplement    Review of Systems:  - Constitutional Symptoms: no fevers, chills, weight loss  - Eyes: no blurry vision or double vision  - Cardiovascular: no chest pain or palpitations  - Respiratory: no cough or shortness of breath  - Gastrointestinal: no dysphagia or abdominal pain  - Musculoskeletal: no joint pains or weakness  - Integumentary: no rashes  - Neurological: no numbness, tingling, or headaches  - Psychiatric: no depression or anxiety    PAIN: No complaints of pain today. GENERAL: Pt is in no acute distress. Non-toxic. Well nourished. Well developed. Appropriately groomed. HEAD:  Normocephalic. Atraumatic. Non tender sinuses x 4. EYE: PERRLA. EOMs intact. Sclera anicteric without injection. No drainage or discharge. EARS: Hearing intact bilaterally. External ear canals normal without evidence of blood or swelling. Bilateral TM's intact, pearly grey with landmarks visible. No erythema or effusion. NOSE: Patent. Nasal turbinates pink. No polyps noted. No erythema. No discharge. MOUTH: mucous membranes pink and moist. Posterior pharynx normal with cobblestone appearance. No erythema, white exudate or obstruction. NECK: supple. Midline trachea. No carotid bruits noted bilaterally. No thyromegaly noted.   RESP: Breath sounds are symmetrical bilaterally. Unlabored without SOB. Speaking in full sentences. Clear to auscultation bilaterally anteriorly and posteriorly. No wheezes. No rales or rhonchi. CV: normal rate. Regular rhythm. S1, S2 audible. No murmur noted. No rubs, clicks or gallops noted. ABDOMEN: Flat without bulges or pulsations. Soft and nondistended. No tenderness on palpation. No masses or organomegaly. No rebound, rigidity or guarding. Bowel sounds normal x 4 quadrants. BACK: No visible deformities or curvature. Full ROM. No pain on palpation of the spinous processes in the cervical, thoracic, lumbar, sacral regions. No CVA tenderness. GYN: deferred  NEURO:  awake, alert and oriented to person, place, and time and event. Cranial nerves II through XII intact. Clear speech. Muscle strength is +5/5 x 4 extremities. Sensation is intact to light touch bilaterally. Steady gait. MUSC:  Intact x 4 extremities. Full ROM x 4 extremities. No pain with movement. HEME/LYMPH: peripheral pulses palpable 2+ x 4 extremities. No peripheral edema is noted. No cervical adenopathy noted. SKIN: Skin is warm and dry. Turgor is normal. No petechiae, no purpura, no rash. No cyanosis. No mottling, jaundice or pallor. Raised circular papule 2cm on right wrist at base of thumb; 3cm SK  midline back  PSYCH: appropriate behavior, dress and thought processes. Good eye contact. Clear and coherent speech. Full affect. Good insight.

## 2017-10-07 LAB
ALBUMIN/CREAT UR: 5.1 MG/G CREAT (ref 0–30)
CHOLEST SERPL-MCNC: 129 MG/DL (ref 100–199)
CREAT UR-MCNC: 83.7 MG/DL
EST. AVERAGE GLUCOSE BLD GHB EST-MCNC: 117 MG/DL
HBA1C MFR BLD: 5.7 % (ref 4.8–5.6)
HDLC SERPL-MCNC: 51 MG/DL
INTERPRETATION, 910389: NORMAL
LDLC SERPL CALC-MCNC: 64 MG/DL (ref 0–99)
MICROALBUMIN UR-MCNC: 4.3 UG/ML
TRIGL SERPL-MCNC: 72 MG/DL (ref 0–149)
VLDLC SERPL CALC-MCNC: 14 MG/DL (ref 5–40)

## 2017-10-10 NOTE — PROGRESS NOTES
Please call and let pt know her A1c is now in prediabetes range. Work on diet and exercise. All other lab values in normal range.   Letter sent with results

## 2017-10-11 ENCOUNTER — TELEPHONE (OUTPATIENT)
Dept: FAMILY MEDICINE CLINIC | Age: 82
End: 2017-10-11

## 2017-10-13 ENCOUNTER — HOSPITAL ENCOUNTER (OUTPATIENT)
Dept: MAMMOGRAPHY | Age: 82
Discharge: HOME | End: 2017-10-13
Attending: NURSE PRACTITIONER
Payer: MEDICARE

## 2017-10-13 DIAGNOSIS — Z00.00 MEDICARE ANNUAL WELLNESS VISIT, INITIAL: ICD-10-CM

## 2017-10-13 DIAGNOSIS — Z78.0 POSTMENOPAUSAL: ICD-10-CM

## 2017-10-13 PROCEDURE — 77080 DXA BONE DENSITY AXIAL: CPT

## 2017-10-15 NOTE — PROGRESS NOTES
P/c -DEXA shows osteopenia; VIt D 1000IU and Ca 1200mg daily recommended along with weight bearing exercise. Repeat scan in 2-3 years. Mail result letter.

## 2017-10-16 ENCOUNTER — TELEPHONE (OUTPATIENT)
Dept: FAMILY MEDICINE CLINIC | Age: 82
End: 2017-10-16

## 2017-10-16 NOTE — TELEPHONE ENCOUNTER
Spoke with patient and updated to results of bone density and supplements to take. She is aware she will be receiving a result note in the mail as well. Jay Smith

## 2017-11-21 RX ORDER — HYDROCHLOROTHIAZIDE 12.5 MG/1
12.5 CAPSULE ORAL DAILY
COMMUNITY
End: 2017-12-12 | Stop reason: ALTCHOICE

## 2017-12-07 ENCOUNTER — TELEPHONE (OUTPATIENT)
Dept: FAMILY MEDICINE CLINIC | Age: 82
End: 2017-12-07

## 2017-12-07 DIAGNOSIS — Z12.83 SCREENING FOR SKIN CANCER: Primary | ICD-10-CM

## 2017-12-08 NOTE — TELEPHONE ENCOUNTER
----- Message from Kimberly Course sent at 12/8/2017 11:39 AM EST -----  Regarding: AZRA Stoll/Telephone  Flash Avalos pt.'s daughter would like to discuss her mother's medications. The best contact number is [phone].

## 2017-12-12 ENCOUNTER — OFFICE VISIT (OUTPATIENT)
Dept: FAMILY MEDICINE CLINIC | Age: 82
End: 2017-12-12

## 2017-12-12 VITALS
TEMPERATURE: 97.6 F | WEIGHT: 143.2 LBS | HEART RATE: 53 BPM | RESPIRATION RATE: 18 BRPM | HEIGHT: 66 IN | BODY MASS INDEX: 23.01 KG/M2 | DIASTOLIC BLOOD PRESSURE: 64 MMHG | OXYGEN SATURATION: 85 % | SYSTOLIC BLOOD PRESSURE: 137 MMHG

## 2017-12-12 DIAGNOSIS — K62.5 RECTAL BLEEDING: Primary | ICD-10-CM

## 2017-12-12 NOTE — MR AVS SNAPSHOT
Visit Information Date & Time Provider Department Dept. Phone Encounter #  
 12/12/2017  2:00 PM Wanda Whitt NP [facility] [phone] Upcoming Health Maintenance Date Due Influenza Age 5 to Adult 8/1/2017 GLAUCOMA SCREENING Q2Y 12/31/2017 MEDICARE YEARLY EXAM 9/28/2018 DTaP/Tdap/Td series (2 - Td) 5/27/2020 Allergies as of 12/12/2017  Review Complete On: 12/12/2017 By: Rebecca King LPN Severity Noted Reaction Type Reactions Codeine  11/23/2010    Nausea and Vomiting Demerol (Pf) [Meperidine (Pf)]  12/01/2011    Nausea and Vomiting Current Immunizations  Reviewed on 9/27/2017 Name Date Pneumococcal Conjugate (PCV-13) 9/27/2017 TDAP Vaccine 5/27/2010 ZZZ-RETIRED (DO NOT USE) Pneumococcal Vaccine (Unspecified Type) 5/27/2007 Not reviewed this visit You Were Diagnosed With   
  
 Codes Comments Rectal bleeding    -  Primary ICD-10-CM: K62.5 ICD-9-CM: 569.3 Vitals BP Pulse Temp Resp Height(growth percentile) Weight(growth percentile)  
 137/64 (BP 1 Location: Right arm, BP Patient Position: Sitting) (!) 53 97.6 °F (36.4 °C) (Oral) 18 5' 6\" (1.676 m) 143 lb 3.2 oz (65 kg) SpO2 BMI OB Status Smoking Status (!) 85% 23.11 kg/m2 Postmenopausal Never Smoker BMI and BSA Data Body Mass Index Body Surface Area  
 23.11 kg/m 2 1.74 m 2 Preferred Pharmacy Pharmacy Name Phone [id number], [id number] [place] [address] [phone] Your Updated Medication List  
  
   
This list is accurate as of: 12/12/17  3:31 PM.  Always use your most recent med list. amLODIPine 10 mg tablet Commonly known as:  Cristina Kristyn TAKE 1 TABLET BY MOUTH EVERY MORNING  
  
 aspirin 325 mg tablet Commonly known as:  ASPIRIN Take 325 mg by mouth daily. atorvastatin 20 mg tablet Commonly known as:  LIPITOR TAKE 1 TABLET BY MOUTH DAILY  
  
 benazepril 40 mg tablet Commonly known as:  LOTENSIN  
TAKE 1 TABLET BY MOUTH EVERY DAY Glucosamine &Chondroit-MV-Min3 235-434-67-0.5 mg Tab Take  by mouth.  
  
 metoprolol tartrate 100 mg IR tablet Commonly known as:  LOPRESSOR  
TAKE 1/2 TABLET BY MOUTH TWICE DAILY  
  
 VITAMIN D3 1,000 unit tablet Generic drug:  cholecalciferol Take  by mouth daily. We Performed the Following REFERRAL TO GASTROENTEROLOGY [LQW35 Custom] Comments:  
 Please evaluate and treat for rectal bleeding Referral Information Referral ID Referred By Referred To  
  
 [id number] KYLIE, [address]   
   [facility] [address] [place], [address] [facility] Visits Status Start Date End Date 1 New Request 12/12/17 12/12/18 If your referral has a status of pending review or denied, additional information will be sent to support the outcome of this decision. Patient Instructions 1) Referral for gastroenterologist - Dr. Charles Mccormick - for evaluation of rectal bleeding. Continue to eat high fiber diet an drink at least 64 oz of water a day. Exercise after eating will also help with digestion. Introducing [facility]! Iván Acevedo introduces [employer] patient portal. Now you can access parts of your medical record, email your doctor's office, and request medication refills online. 1. In your internet browser, go to [email or web address] 2. Click on the First Time User? Click Here link in the Sign In box. You will see the New Member Sign Up page. 3. Enter your [employer] Access Code exactly as it appears below. You will not need to use this code after youve completed the sign-up process. If you do not sign up before the expiration date, you must request a new code. · [employer] Access Code: [email or web address] Expires: 3/12/2018  3:30 PM 
 
 4. Enter the last four digits of your Social Security Number (xxxx) and Date of Birth (mm/dd/yyyy) as indicated and click Submit. You will be taken to the next sign-up page. 5. Create a [employer] ID. This will be your [employer] login ID and cannot be changed, so think of one that is secure and easy to remember. 6. Create a [employer] password. You can change your password at any time. 7. Enter your Password Reset Question and Answer. This can be used at a later time if you forget your password. 8. Enter your e-mail address. You will receive e-mail notification when new information is available in [address] 9. Click Sign Up. You can now view and download portions of your medical record. 10. Click the Download Summary menu link to download a portable copy of your medical information. If you have questions, please visit the Frequently Asked Questions section of the [employer] website. Remember, [employer] is NOT to be used for urgent needs. For medical emergencies, dial 911. Now available from your iPhone and Android! Please provide this summary of care documentation to your next provider. Your primary care clinician is listed as Merlin Hunt. If you have any questions after today's visit, please call [phone].

## 2017-12-12 NOTE — PROGRESS NOTES
S: Dilcia Garcia is a 80 y.o. female who presents for medication check. Accompanied to clinic by  and daughter, Som Head. Assessment/Plan:  1. Rectal bleeding  -went to UC in 9/2017 for rectal bleeding; working on high fiber diet, still has blood on tp  -last colonoscopy more than 10 years   -discussed colonoscopy vs Cologard  -referral to Dr. Martínez Moore (GI)    2.  Medication Review  -went through meds and updated everything    RTC pending results of GI visit     HPI:  Medications:  Was on aricept for 1 week -1st week in Sept, but stopped taking it when she went to ED 9/6/17 for diarrhea, n/v (felt aricept caused the sx)  was given rx zofran -= she took for 30 days bc she didn't realize it was prn  Pt requesting Kellen Velazquez not be on her medication, explained pharmacy would let me know on renewal, or she can when she receives notice and I will refill    Rectal bleeding  9/13/17 when to  for rectal bleeding - told her to eat high fiber diet,  Drinking more water daily   Has seen some blood on tp  BM daily, softer than normal today  Went to [facility] Sept for rectal bleeding - told to increase fiber and hydrate  Nutrition: eating overall healthy   Hasn't had a colonoscopy for past 10 years - when she had one, she was told to come back in 10 years  Bleeding worries her  Discussed colonoscopy vs cologard - pt agrees to go to GI and will decide which test to have based on GI's recommendation    Review of Systems:  - Constitutional Symptoms: no fevers or chills  - Cardiovascular: no chest pain or palpitations  - Respiratory: no cough or shortness of breath  - Gastrointestinal: no dysphagia or abdominal pain  - Psychiatric: no depression or anxiety    PHQ over the last two weeks 12/12/2017   Little interest or pleasure in doing things Not at all   Feeling down, depressed or hopeless Not at all   Total Score PHQ 2 0       I reviewed the following:  Current Outpatient Prescriptions   Medication Sig Dispense Refill    benazepril (LOTENSIN) 40 mg tablet TAKE 1 TABLET BY MOUTH EVERY DAY 90 Tab 3    atorvastatin (LIPITOR) 20 mg tablet TAKE 1 TABLET BY MOUTH DAILY 90 Tab 3    metoprolol tartrate (LOPRESSOR) 100 mg IR tablet TAKE 1/2 TABLET BY MOUTH TWICE DAILY 90 Tab 3    amLODIPine (NORVASC) 10 mg tablet TAKE 1 TABLET BY MOUTH EVERY MORNING 90 Tab 4    Glucosamine &Chondroit-MV-Min3 231-905-71-0.5 mg tab Take  by mouth.  cholecalciferol, vitamin d3, (VITAMIN D) 1,000 unit tablet Take  by mouth daily.  aspirin (ASPIRIN) 325 mg tablet Take 325 mg by mouth daily. Past Medical History:   Diagnosis Date    HTN (hypertension) 5/27/2011    Hyperlipidemia 12/1/2011    Hypertension     OAB (overactive bladder) 2/15/2013    Other ill-defined conditions(799.89)     hyperlipidemia    Other ill-defined conditions(799.89)     bladder problems    Other ill-defined conditions(799.89)     back pain       Allergies   Allergen Reactions    Codeine Nausea and Vomiting    Demerol (Pf) [Meperidine (Pf)] Nausea and Vomiting       O: VS:   Visit Vitals    /64 (BP 1 Location: Right arm, BP Patient Position: Sitting)    Pulse (!) 53    Temp 97.6 °F (36.4 °C) (Oral)    Resp 18    Ht 5' 6\" (1.676 m)    Wt 143 lb 3.2 oz (65 kg)    SpO2 (!) 85%    BMI 23.11 kg/m2      Data Review:  9/6/17 ED notes (n/v, diarrhea)  9/13/17 UC notes (rectal bleeding)  O2 = 85%, pt denies sx of dizziness, lightheaded, fatigue, SOB; will have RN retake    GENERAL: Nkechi Piña is in no acute distress. Non-toxic. Well nourished. Well developed. Appropriately groomed. HEAD:  Normocephalic. Atraumatic. EYE: PERRLA. RESP: Breath sounds are symmetrical bilaterally. Unlabored without SOB. Speaking in full sentences. Clear to auscultation bilaterally anteriorly and posteriorly. No wheezes. No rales or rhonchi. CV: normal rate. Regular rhythm. S1, S2 audible. No murmur noted. No rubs, clicks or gallops noted.   ABDOMEN: Flat without bulges or pulsations. Soft and nondistended. No tenderness on palpation. No masses or organomegaly. No rebound, rigidity or guarding  PSYCH: appropriate behavior, dress and thought processes. Good eye contact. Clear and coherent speech. Full affect. Good insight.   _______________________________________________________________  I spent >30 minutes face to face with patient with >50% of time spent in counseling and coordinating care  Patient education was done. Advised on nutrition, physical activity, bowel health. Counseling included discussion of diagnosis, differentials, treatment options, prescribed treatment, warning signs and follow up. Medication risks/benefits,interactions and alternatives discussed with patient.      Patient verbalized understanding and agreed to plan of care. Patient was given an after visit summary which included current diagnoses, medications and vital signs.     Follow up with GI for rectal bleeding

## 2017-12-12 NOTE — PATIENT INSTRUCTIONS
1) Referral for gastroenterologist - Dr. Bunny Odell - for evaluation of rectal bleeding. Continue to eat high fiber diet an drink at least 64 oz of water a day. Exercise after eating will also help with digestion.

## 2017-12-12 NOTE — PROGRESS NOTES
Chief Complaint   Patient presents with    Medication Evaluation       Reviewed record in preparation for visit and have obtained necessary documentation. Identified pt with two pt identifiers(name and ). Chief Complaint   Patient presents with    Medication Evaluation       Vitals:    17 1409   BP: 137/64   Pulse: (!) 53   Resp: 18   Temp: 97.6 °F (36.4 °C)   TempSrc: Oral   SpO2: (!) 85%   Weight: 143 lb 3.2 oz (65 kg)   Height: 5' 6\" (1.676 m)   PainSc:   0 - No pain       Coordination of Care Questionnaire:  :     1) Have you been to an emergency room, urgent care clinic since your last visit? no   Hospitalized since your last visit? no             2) Have you seen or consulted any other health care providers outside of [address] [place] [address] since your last visit? no  (Include any pap smears or colon screenings in this section.)    3) In the event something were to happen to you and you were unable to speak on your behalf, do you have an Advance Directive/ Living Will in place stating your wishes? NO    Do you have an Advance Directive on file? no    4) Are you interested in receiving information on Advance Directives? NO    Patient is accompanied by daughter I have received verbal consent from Keiko Nguyen to discuss any/all medical information while they are present in the room.

## 2017-12-13 RX ORDER — ACETAMINOPHEN 160 MG/5ML
200 SUSPENSION, ORAL (FINAL DOSE FORM) ORAL DAILY
COMMUNITY
End: 2018-11-08 | Stop reason: SDUPTHER

## 2018-01-10 ENCOUNTER — PATIENT OUTREACH (OUTPATIENT)
Dept: FAMILY MEDICINE CLINIC | Age: 83
End: 2018-01-10

## 2018-01-11 ENCOUNTER — DOCUMENTATION ONLY (OUTPATIENT)
Dept: FAMILY MEDICINE CLINIC | Age: 83
End: 2018-01-11

## 2018-03-12 ENCOUNTER — HOSPITAL ENCOUNTER (OUTPATIENT)
Age: 83
Setting detail: OUTPATIENT SURGERY
Discharge: HOME | End: 2018-03-12
Attending: INTERNAL MEDICINE | Admitting: INTERNAL MEDICINE
Payer: MEDICARE

## 2018-03-12 ENCOUNTER — ANESTHESIA EVENT (OUTPATIENT)
Dept: ENDOSCOPY | Age: 83
End: 2018-03-12
Payer: MEDICARE

## 2018-03-12 ENCOUNTER — ANESTHESIA (OUTPATIENT)
Dept: ENDOSCOPY | Age: 83
End: 2018-03-12
Payer: MEDICARE

## 2018-03-12 VITALS
SYSTOLIC BLOOD PRESSURE: 146 MMHG | HEIGHT: 64 IN | RESPIRATION RATE: 11 BRPM | TEMPERATURE: 97.7 F | OXYGEN SATURATION: 100 % | HEART RATE: 63 BPM | BODY MASS INDEX: 23.81 KG/M2 | DIASTOLIC BLOOD PRESSURE: 67 MMHG | WEIGHT: 139.44 LBS

## 2018-03-12 PROCEDURE — 76060000031 HC ANESTHESIA FIRST 0.5 HR: Performed by: INTERNAL MEDICINE

## 2018-03-12 PROCEDURE — 74011000250 HC RX REV CODE- 250

## 2018-03-12 PROCEDURE — 74011250636 HC RX REV CODE- 250/636

## 2018-03-12 PROCEDURE — 76040000019: Performed by: INTERNAL MEDICINE

## 2018-03-12 PROCEDURE — 74011250636 HC RX REV CODE- 250/636: Performed by: INTERNAL MEDICINE

## 2018-03-12 RX ORDER — SODIUM CHLORIDE 9 MG/ML
75 INJECTION, SOLUTION INTRAVENOUS CONTINUOUS
Status: DISCONTINUED | OUTPATIENT
Start: 2018-03-12 | End: 2018-03-12 | Stop reason: HOSPADM

## 2018-03-12 RX ORDER — PROPOFOL 10 MG/ML
INJECTION, EMULSION INTRAVENOUS AS NEEDED
Status: DISCONTINUED | OUTPATIENT
Start: 2018-03-12 | End: 2018-03-12 | Stop reason: HOSPADM

## 2018-03-12 RX ORDER — FLUMAZENIL 0.1 MG/ML
0.2 INJECTION INTRAVENOUS
Status: DISCONTINUED | OUTPATIENT
Start: 2018-03-12 | End: 2018-03-12 | Stop reason: HOSPADM

## 2018-03-12 RX ORDER — EPINEPHRINE 0.1 MG/ML
1 INJECTION INTRACARDIAC; INTRAVENOUS
Status: DISCONTINUED | OUTPATIENT
Start: 2018-03-12 | End: 2018-03-12 | Stop reason: HOSPADM

## 2018-03-12 RX ORDER — LIDOCAINE HYDROCHLORIDE 20 MG/ML
INJECTION, SOLUTION EPIDURAL; INFILTRATION; INTRACAUDAL; PERINEURAL AS NEEDED
Status: DISCONTINUED | OUTPATIENT
Start: 2018-03-12 | End: 2018-03-12 | Stop reason: HOSPADM

## 2018-03-12 RX ORDER — MIDAZOLAM HYDROCHLORIDE 1 MG/ML
.25-5 INJECTION, SOLUTION INTRAMUSCULAR; INTRAVENOUS
Status: DISCONTINUED | OUTPATIENT
Start: 2018-03-12 | End: 2018-03-12 | Stop reason: HOSPADM

## 2018-03-12 RX ORDER — SODIUM CHLORIDE 0.9 % (FLUSH) 0.9 %
5-10 SYRINGE (ML) INJECTION EVERY 8 HOURS
Status: DISCONTINUED | OUTPATIENT
Start: 2018-03-12 | End: 2018-03-12 | Stop reason: HOSPADM

## 2018-03-12 RX ORDER — DEXTROMETHORPHAN/PSEUDOEPHED 2.5-7.5/.8
1.2 DROPS ORAL
Status: DISCONTINUED | OUTPATIENT
Start: 2018-03-12 | End: 2018-03-12 | Stop reason: HOSPADM

## 2018-03-12 RX ORDER — ATROPINE SULFATE 0.1 MG/ML
0.5 INJECTION INTRAVENOUS
Status: DISCONTINUED | OUTPATIENT
Start: 2018-03-12 | End: 2018-03-12 | Stop reason: HOSPADM

## 2018-03-12 RX ORDER — NALOXONE HYDROCHLORIDE 0.4 MG/ML
0.4 INJECTION, SOLUTION INTRAMUSCULAR; INTRAVENOUS; SUBCUTANEOUS
Status: DISCONTINUED | OUTPATIENT
Start: 2018-03-12 | End: 2018-03-12 | Stop reason: HOSPADM

## 2018-03-12 RX ORDER — FENTANYL CITRATE 50 UG/ML
25 INJECTION, SOLUTION INTRAMUSCULAR; INTRAVENOUS
Status: DISCONTINUED | OUTPATIENT
Start: 2018-03-12 | End: 2018-03-12 | Stop reason: HOSPADM

## 2018-03-12 RX ORDER — SODIUM CHLORIDE 0.9 % (FLUSH) 0.9 %
5-10 SYRINGE (ML) INJECTION AS NEEDED
Status: DISCONTINUED | OUTPATIENT
Start: 2018-03-12 | End: 2018-03-12 | Stop reason: HOSPADM

## 2018-03-12 RX ADMIN — PROPOFOL 120 MG: 10 INJECTION, EMULSION INTRAVENOUS at 09:20

## 2018-03-12 RX ADMIN — LIDOCAINE HYDROCHLORIDE 40 MG: 20 INJECTION, SOLUTION EPIDURAL; INFILTRATION; INTRACAUDAL; PERINEURAL at 09:09

## 2018-03-12 RX ADMIN — SODIUM CHLORIDE 75 ML/HR: 900 INJECTION, SOLUTION INTRAVENOUS at 09:01

## 2018-03-12 NOTE — PERIOP NOTES
Akiko Conner  7/3/1932  [id number]    Situation:  Verbal report received from: Francy Beltran RN  Procedure: Procedure(s):  COLONOSCOPY    Background:    Preoperative diagnosis: gi bleed  Postoperative diagnosis: diverticulosis; internal hemorrhoids    :  Dr. Nidia Delaney  Assistant(s): Endoscopy Technician-1: Meli Steele  Endoscopy RN-1: Zuri Acevedo    Specimens: * No specimens in log *  H. Pylori  no    Assessment:  Intra-procedure medications   Anesthesia gave intra-procedure sedation and medications, see anesthesia flow sheet yes    Intravenous fluids: NS@ KVO     Vital signs stable     Abdominal assessment: round and soft     Recommendation:  Discharge patient per MD order.     Family   Permission to share finding with family or friend yes

## 2018-03-12 NOTE — ANESTHESIA POSTPROCEDURE EVALUATION
Post-Anesthesia Evaluation and Assessment    Patient: Ventura Henderson MRN: [id number]  SSN: [id number]    YOB: 1932  Age: 80 y.o. Sex: female       Cardiovascular Function/Vital Signs  Visit Vitals    /67    Pulse 63    Temp 36.5 °C (97.7 °F)    Resp 11    Ht 5' 4\" (1.626 m)    Wt 63.2 kg (139 lb 7 oz)    SpO2 100%    Breastfeeding No    BMI 23.93 kg/m2       Patient is status post total IV anesthesia anesthesia for Procedure(s):  COLONOSCOPY. Nausea/Vomiting: None    Postoperative hydration reviewed and adequate. Pain:  Pain Scale 1: Numeric (0 - 10) (03/12/18 4929)  Pain Intensity 1: 0 (03/12/18 3295)   Managed    Neurological Status: At baseline    Mental Status and Level of Consciousness: Arousable    Pulmonary Status:   O2 Device: Room air (03/12/18 0952)   Adequate oxygenation and airway patent    Complications related to anesthesia: None    Post-anesthesia assessment completed.  No concerns    Signed By: Little Wong DO     March 12, 2018

## 2018-03-12 NOTE — IP AVS SNAPSHOT
[address] [facility]. 
[phone] Patient: Andre Nevarez MRN: [id number] QHK:[birth year] About your hospitalization You were admitted on:  March 12, 2018 You last received care in the:  [facility] ENDOSCOPY You were discharged on:  March 12, 2018 Why you were hospitalized Your primary diagnosis was:  Not on File Follow-up Information None Discharge Orders None A check toyin indicates which time of day the medication should be taken. My Medications CONTINUE taking these medications Instructions Each Dose to Equal  
 Morning Noon Evening Bedtime ADULT MULTI PLUS OMEGA-3 PO Your last dose was: Your next dose is: Take  by mouth. amLODIPine 10 mg tablet Commonly known as:  Stoney De La Rosa Your last dose was: Your next dose is: TAKE 1 TABLET BY MOUTH EVERY MORNING  
     
   
   
   
  
 aspirin 325 mg tablet Commonly known as:  ASPIRIN Your last dose was: Your next dose is: Take 325 mg by mouth daily. 325 mg  
    
   
   
   
  
 atorvastatin 20 mg tablet Commonly known as:  LIPITOR Your last dose was: Your next dose is: TAKE 1 TABLET BY MOUTH DAILY  
     
   
   
   
  
 benazepril 40 mg tablet Commonly known as:  LOTENSIN Your last dose was: Your next dose is: TAKE 1 TABLET BY MOUTH EVERY DAY  
     
   
   
   
  
 CALCIUM 600 + D 600-125 mg-unit Tab Generic drug:  calcium-cholecalciferol (d3) Your last dose was: Your next dose is: Take  by mouth two (2) times a day. CO Q-10 200 mg capsule Generic drug:  coenzyme Q-10 Your last dose was: Your next dose is: Take  by mouth daily.   
     
   
   
   
  
 GLUCOSAMINE 1500 COMPLEX PO  
   
 Your last dose was: Your next dose is: Take 1 Tab by mouth daily. 1 Tab  
    
   
   
   
  
 metoprolol tartrate 100 mg IR tablet Commonly known as:  LOPRESSOR Your last dose was: Your next dose is: TAKE 1/2 TABLET BY MOUTH TWICE DAILY  
     
   
   
   
  
 VITAMIN D3 1,000 unit tablet Generic drug:  cholecalciferol Your last dose was: Your next dose is: Take  by mouth daily. Discharge Instructions Ute Eisenberg [id number] 
7/3/1932 COLON DISCHARGE INSTRUCTIONS Discomfort: 
Redness at IV site- apply warm compress to area; if redness or soreness persist- contact your physician There may be a slight amount of blood passed from the rectum Gaseous discomfort- walking, belching will help relieve any discomfort You may not operate a vehicle for 12 hours You may not engage in an occupation involving machinery or appliances for rest of today You may not drink alcoholic beverages for at least 12 hours Avoid making any critical decisions for at least 24 hour DIET: 
 High fiber diet.  however -  remember your colon is empty and a heavy meal will produce gas. Avoid these foods:  vegetables, fried / greasy foods, carbonated drinks for today MEDICATION: 
 
 
  
ACTIVITY: 
You may not resume your normal daily activities until tomorrow AM; it is recommended that you spend the remainder of the day resting -  avoid any strenuous activity. CALL M.D. ANY SIGN OF: Increasing pain, nausea, vomiting Abdominal distension (swelling) New increased bleeding (oral or rectal) Fever (chills) IMPRESSION: 
-Sigmoid diverticulosis 
-Small internal hemorrhoids 
-No masses, polyps, or inflammation noted Follow-up Instructions: 
 Call Dr. Pablo Sam if questions arise regarding your procedure Telephone # [phone] No further colonoscopy indicated MD [facility] Activation Thank you for requesting access to "[employer]. Please follow the instructions below to securely access and download your online medical record. "[employer] allows you to send messages to your doctor, view your test results, renew your prescriptions, schedule appointments, and more. How Do I Sign Up? 1. In your internet browser, go to [email or web address] 
2. Click on the First Time User? Click Here link in the Sign In box. You will be redirect to the New Member Sign Up page. 3. Enter your "[employer] Access Code exactly as it appears below. You will not need to use this code after youve completed the sign-up process. If you do not sign up before the expiration date, you must request a new code. "[employer] Access Code: Activation code not generated Current "[employer] Status: Active (This is the date your "[employer] access code will ) 4. Enter the last four digits of your Social Security Number (xxxx) and Date of Birth (mm/dd/yyyy) as indicated and click Submit. You will be taken to the next sign-up page. 5. Create a "[employer] ID. This will be your "[employer] login ID and cannot be changed, so think of one that is secure and easy to remember. 6. Create a "[employer] password. You can change your password at any time. 7. Enter your Password Reset Question and Answer. This can be used at a later time if you forget your password. 8. Enter your e-mail address. You will receive e-mail notification when new information is available in [address] 9. Click Sign Up. You can now view and download portions of your medical record. 10. Click the Download Summary menu link to download a portable copy of your medical information. Additional Information If you have questions, please visit the Frequently Asked Questions section of the "[employer] website at [email or web address] Remember, "[employer] is NOT to be used for urgent needs. For medical emergencies, dial 911. Introducing \[facility] [place] [facility]\"" & HEALTH SERVICES! Dear Christy Hamilton: Thank you for requesting a [employer] account. Our records indicate that you already have an active [employer] account. You can access your account anytime at [email or web address] Did you know that you can access your hospital and ER discharge instructions at any time in [employer]? You can also review all of your test results from your hospital stay or ER visit. Additional Information If you have questions, please visit the Frequently Asked Questions section of the [employer] website at [email or web address] Remember, [employer] is NOT to be used for urgent needs. For medical emergencies, dial 911. Now available from your iPhone and Android! Providers Seen During Your Hospitalization Provider Specialty Primary office phone Sal Mason MD Gastroenterology [phone] Your Primary Care Physician (PCP) Primary Care Physician Office Phone Office Fax [phone] [facility] [phone] You are allergic to the following Allergen Reactions Codeine Nausea and Vomiting Demerol (Pf) (Meperidine (Pf)) Nausea and Vomiting Recent Documentation Height Weight Breastfeeding? BMI OB Status Smoking Status 1.626 m 63.2 kg No 23.93 kg/m2 Postmenopausal Never Smoker Emergency Contacts Name Discharge Info Relation Home Work Mobile Júniorcris Guerrero DISCHARGE CAREGIVER [3] Spouse [3]   [phone] DimpleCelsoSupriya DISCHARGE CAREGIVER [3] Child [2] [phone] Dina Yap DISCHARGE CAREGIVER [3] Child [2] [phone] Patient Belongings The following personal items are in your possession at time of discharge: 
  Dental Appliances: None  Visual Aid: Glasses, With patient Please provide this summary of care documentation to your next provider.  
  
  
 
  
Signatures-by signing, you are acknowledging that this After Visit Summary has been reviewed with you and you have received a copy. Patient Signature:  ____________________________________________________________ Date:  ____________________________________________________________  
  
Lawerence Morgan Provider Signature:  ____________________________________________________________ Date:  ____________________________________________________________

## 2018-03-12 NOTE — PROCEDURES
NAME:  Graig Mcburney   :   7/3/1932   MRN:   [id number]     Date/Time:  3/12/2018 9:24 AM    Colonoscopy Operative Report    Procedure Type:   Colonoscopy --diagnostic     Indications:     Lower GI bleeding  Pre-operative Diagnosis: see indication above  Post-operative Diagnosis:  See findings below  :  Jewels Rehman MD  Referring Provider: -Layne Boudreaux, NP    Exam:  Airway: clear, no airway problems anticipated  Heart: RRR, without gallops or rubs  Lungs: clear bilaterally without wheezes, crackles, or rhonchi  Abdomen: soft, nontender, nondistended, bowel sounds present  Mental Status: awake, alert and oriented to person, place and time    Sedation:  MAC anesthesia Propofol 120mg IV  Procedure Details:  After informed consent was obtained with all risks and benefits of procedure explained and preoperative exam completed, the patient was taken to the endoscopy suite and placed in the left lateral decubitus position. Upon sequential sedation as per above, a digital rectal exam was performed demonstrating internal hemorrhoids. The Olympus videocolonoscope  was inserted in the rectum and carefully advanced to the cecum, which was identified by the ileocecal valve and appendiceal orifice. The quality of preparation was adequate. The colonoscope was slowly withdrawn with careful evaluation between folds. Retroflexion in the rectum was completed demonstrating internal hemorrhoids. Findings:     -Sigmoid diverticulosis  -Small internal hemorrhoids  -No masses, polyps, or inflammation noted    Specimen Removed:  None  Complications: None. EBL:  None. Impression:    -Sigmoid diverticulosis  -Small internal hemorrhoids  -No masses, polyps, or inflammation noted    Recommendations: - High fiber diet. Resume normal medication(s). No further colonoscopy indicated. Discharge Disposition:  Home in the company of a  when able to ambulate.       Jewels Rehman MD

## 2018-03-12 NOTE — H&P
Gastroenterology Outpatient History and Physical    Patient: Autumn Caro    Physician: Treesita Randle MD    Chief Complaint: GI bleeding with change in bowel habit  History of Present Illness: 81yo F with GI bleeding with change in bowel habit. Most c/w constipation. History:  Past Medical History:   Diagnosis Date    HTN (hypertension) 5/27/2011    Hyperlipidemia 12/1/2011    Hypertension     OAB (overactive bladder) 2/15/2013    Other ill-defined conditions(799.89)     hyperlipidemia    Other ill-defined conditions(799.89)     bladder problems    Other ill-defined conditions(799.89)     back pain      Past Surgical History:   Procedure Laterality Date    HX APPENDECTOMY  1948    HX CATARACT REMOVAL Left     HX CORNEAL TRANSPLANT Left     HX DILATION AND CURETTAGE      x 2    HX TONSILLECTOMY  1938      Social History     Social History    Marital status:      Spouse name: N/A    Number of children: N/A    Years of education: N/A     Social History Main Topics    Smoking status: Never Smoker    Smokeless tobacco: Never Used    Alcohol use No    Drug use: No    Sexual activity: Not Asked     Other Topics Concern    None     Social History Narrative      Family History   Problem Relation Age of Onset    Cancer Father      throat (smoker); mouth and gums    Heart Disease Mother      Age 52    Clotting Disorder Mother     Arthritis-osteo Paternal Aunt       Patient Active Problem List   Diagnosis Code    HTN (hypertension) I10    Hyperlipidemia E78.5    OAB (overactive bladder) N32.81    Hypokalemia E87.6       Allergies: Allergies   Allergen Reactions    Codeine Nausea and Vomiting    Demerol (Pf) [Meperidine (Pf)] Nausea and Vomiting     Medications:   Prior to Admission medications    Medication Sig Start Date End Date Taking? Authorizing Provider   GLUC SWAN/CHONDRO SWAN A/VIT C/MN (GLUCOSAMINE 1500 COMPLEX PO) Take 1 Tab by mouth daily.    Yes Historical Provider calcium-cholecalciferol, d3, (CALCIUM 600 + D) 600-125 mg-unit tab Take  by mouth two (2) times a day. Yes Historical Provider   MV-MIN/FA/D3/OM-3/DHA/EPA/FISH (ADULT MULTI PLUS OMEGA-3 PO) Take  by mouth. Yes Historical Provider   coenzyme Q-10 (CO Q-10) 200 mg capsule Take  by mouth daily. Yes Historical Provider   benazepril (LOTENSIN) 40 mg tablet TAKE 1 TABLET BY MOUTH EVERY DAY 11/23/17  Yes EVETTE Bernard MD   atorvastatin (LIPITOR) 20 mg tablet TAKE 1 TABLET BY MOUTH DAILY 10/27/17  Yes EVETTE Bernard MD   metoprolol tartrate (LOPRESSOR) 100 mg IR tablet TAKE 1/2 TABLET BY MOUTH TWICE DAILY 6/30/17  Yes EVETTE Bernard MD   amLODIPine (NORVASC) 10 mg tablet TAKE 1 TABLET BY MOUTH EVERY MORNING 4/21/17  Yes EVETTE Bernard MD   cholecalciferol, vitamin d3, (VITAMIN D) 1,000 unit tablet Take  by mouth daily. Yes Dl Hand MD   aspirin (ASPIRIN) 325 mg tablet Take 325 mg by mouth daily. Yes Dl Hand MD     Physical Exam:   Vital Signs: Blood pressure 140/65, pulse 64, temperature 97.8 °F (36.6 °C), resp. rate 18, height 5' 4\" (1.626 m), weight 63.2 kg (139 lb 7 oz), SpO2 98 %, not currently breastfeeding.   General: well developed, well nourished   HEENT: unremarkable   Heart: regular rhythm no mumur    Lungs: clear   Abdominal:  benign   Neurological: unremarkable   Extremities: no edema     Findings/Diagnosis: GI bleeding with change in bowel habit  Plan of Care/Planned Procedure: Colonoscopy with conscious/deep sedation    Signed:  Mauricio Guzman MD 3/12/2018

## 2018-03-12 NOTE — ANESTHESIA PREPROCEDURE EVALUATION
Anesthetic History   No history of anesthetic complications            Review of Systems / Medical History  Patient summary reviewed, nursing notes reviewed and pertinent labs reviewed    Pulmonary  Within defined limits                 Neuro/Psych   Within defined limits           Cardiovascular    Hypertension              Exercise tolerance: >4 METS     GI/Hepatic/Renal  Within defined limits              Endo/Other  Within defined limits           Other Findings   Comments: GI bleed           Physical Exam    Airway  Mallampati: II  TM Distance: 4 - 6 cm  Neck ROM: normal range of motion   Mouth opening: Normal     Cardiovascular  Regular rate and rhythm,  S1 and S2 normal,  no murmur, click, rub, or gallop             Dental  No notable dental hx       Pulmonary  Breath sounds clear to auscultation               Abdominal  GI exam deferred       Other Findings            Anesthetic Plan    ASA: 2  Anesthesia type: general and total IV anesthesia          Induction: Intravenous  Anesthetic plan and risks discussed with: Patient

## 2018-05-14 ENCOUNTER — OFFICE VISIT (OUTPATIENT)
Dept: FAMILY MEDICINE CLINIC | Age: 83
End: 2018-05-14

## 2018-05-14 VITALS
HEIGHT: 64 IN | RESPIRATION RATE: 18 BRPM | HEART RATE: 52 BPM | OXYGEN SATURATION: 95 % | TEMPERATURE: 99.1 F | DIASTOLIC BLOOD PRESSURE: 58 MMHG | BODY MASS INDEX: 24.11 KG/M2 | WEIGHT: 141.2 LBS | SYSTOLIC BLOOD PRESSURE: 139 MMHG

## 2018-05-14 DIAGNOSIS — K59.00 CONSTIPATION, UNSPECIFIED CONSTIPATION TYPE: Primary | ICD-10-CM

## 2018-05-14 NOTE — PATIENT INSTRUCTIONS
1)   Shingles Vaccine - Shingrex  Herpes Zoster (Shingles)     Herpes zoster (shingles) is a painful rash caused by the same virus that causes chickenpox. After an episode of chickenpox, the virus retreats to cells of the nervous system, where it can reside quietly for decades. However, later in life, the varicella-zoster virus can become active again. When it reactivates, it causes shingles. Shingles can affect people of all ages. It is particularly common in adults over age 48 years. It is also more common in individuals of all ages with conditions that weaken the immune system. Pain is usually the first sign of shingles. Other symptoms include: fever, chills, headache, numbness, tingling and/or burning pain and a skin rash. The rash can appear anywhere on your body, but most commonly on the torso. It is usually on only 1 side of your body, in a band or beltlike pattern. Prevention:  The CDC recommends everyone over the age of 61 receive the shingles vaccine. This is recommended for people who have already had a shingles outbreak as it could prevent future occurrences of the disease. According to the CDC, it is also recommended for people born before [birth year] in the [facility] who have not had varicella (chicken pox) as they are considered immune. [facility] is a vaccine indicated for prevention of herpes zoster (shingles) in adults aged 48 years and older and is the preferred vaccine for preventing shingles and related complications    The Center for Disease Control and Prevention recommended Shingrix for the following:  - healthy adults aged 48 years and older to prevent shingles and related complications  - adults who previously received the current shingles vaccine (Zostavax®) to prevent shingles and related complications    Two doses (0.5 mL each) are needed for full efficacy. The vaccines are administered intramuscularly, with the second one administered 2-6 months after the initial vaccine.      2) Make sure you are drinking plenty of water (64oz) and high fiber (such as beans, broccoli, fruit, rice, whole wheat bread. Canned fruit has sugar in packing and can cause loose stools.)     Constipation is a common problem that is caused by different factors. Constipation means it is hard to have a bowel movement. Your stool could be too hard, too small, hard to get out, or happening fewer than 3 times per week. Diet and exercise play important roles in intestinal health. Medicines, poor diet and some diseases can cause constipation. For constipation  Try the following medications in the order outlined. MUSH = softener; PUSH = laxative    Step 1) miralax (PUSH) 1 capful daily. Can increase to two capfuls a day    If no improvement, add... Step 2) colace/docusate (MUSH) 200 mg once a day    If no improvement, add... Step 3) senna (PUSH) (senokot) 8.5mg tablets. Take 1-2 every night. GOAL: one soft bowel movement daily    Some things you can do to help prevent constipation: To help maintain regular bowel movements try ONE of these suggestions:  Acai berry tablet daily  Or   1/2 cup pear or prune juice daily  Or   2 tablespoons Milk of Magnesia daily Or  2 tablespoons Miralax daily    Drink at least 8-10 glasses (64 oz) of water per day. Avoid caffeine as this can cause  dehydration which can contribute to constipation    Increase fiber in your diet, making sure you are getting the recommended fluid intake. It is recommended to have 20-35 mg of fiber per day. Fruits and vegetables are good sources of fiber. Some examples include: dates (13.5mg) apple (3.5mg); banana (2.5mg), pear (4.5mg); broccoli (5mg), carrots (4.5mg), peas (7mg). Bulk-forming laxatives, such as Metamucil, FiberCon, Citrucel, can be used to help increase water absorption in intestines and fecal bulk. Follow directions on package for dosing instructions.     A combination of senna (laxative) and docusate (stool softener) can help if you have not had a bowel movement in a few days. This can be found over the counter and is taken at night. Exercise, especially after meals, helps increase gastrointestinal movements and prevent constipation. When you feel the need to go to the bathroom, go, don't hold it. Your colon is most motile after a meal, so try and defecate after meals. Signs to watch for include: blood in toilet or toilet paper after a bowel movement, fever, weight loss, feeling weak. If you experience any of these symptoms, please make an appointment with your healthcare provider. 3) Call Dr. Priscilla Elaine office (ph: [phone]) and request colonoscopy report  Your colonoscopy showed diverticulosis. Diverticulosis: Care Instructions  Your Care Instructions  In diverticulosis, pouches called diverticula form in the wall of the large intestine (colon). The pouches do not cause any pain or other symptoms. Most people who have diverticulosis do not know they have it. But the pouches sometimes bleed, and if they become infected, they can cause pain and other symptoms. When this happens, it is called diverticulitis. Diverticula form when pressure pushes the wall of the colon outward at certain weak points. A diet that is too low in fiber can cause diverticula. Follow-up care is a key part of your treatment and safety. Be sure to make and go to all appointments, and call your doctor if you are having problems. It's also a good idea to know your test results and keep a list of the medicines you take. How can you care for yourself at home? · Include fruits, leafy green vegetables, beans, and whole grains in your diet each day. These foods are high in fiber. · Take a fiber supplement, such as Citrucel or Metamucil, every day if needed. Read and follow all instructions on the label. · Drink plenty of fluids, enough so that your urine is light yellow or clear like water.  If you have kidney, heart, or liver disease and have to limit fluids, talk with your doctor before you increase the amount of fluids you drink. · Get at least 30 minutes of exercise on most days of the week. Walking is a good choice. You also may want to do other activities, such as running, swimming, cycling, or playing tennis or team sports. · Cut out foods that cause gas, pain, or other symptoms. When should you call for help? Call your doctor now or seek immediate medical care if:  ? · You have belly pain. ? · You pass maroon or very bloody stools. ? · You have a fever. ? · You have nausea and vomiting. ? · You have unusual changes in your bowel movements or abdominal swelling. ? · You have burning pain when you urinate. ? · You have abnormal vaginal discharge. ? · You have shoulder pain. ? · You have cramping pain that does not get better when you have a bowel movement or pass gas. ? · You pass gas or stool from your urethra while urinating. ? Watch closely for changes in your health, and be sure to contact your doctor if you have any problems. Where can you learn more? Go to http://radha-alex.info/. Enter H959 in the search box to learn more about \"Diverticulosis: Care Instructions. \"  Current as of: May 12, 2017  Content Version: 11.4  © 9745-6702 [employer]. Care instructions adapted under license by [employer] (which disclaims liability or warranty for this information). If you have questions about a medical condition or this instruction, always ask your healthcare professional. [place] [address] any warranty or liability for your use of this information.

## 2018-05-14 NOTE — PROGRESS NOTES
S: Itzel Her is a 80 y.o. female who presents for diarrhea     Assessment/Plan: 1. Constipation/ Diarrhea  -reviewed findings of colonoscopy - no polyps, finding of diverticulosis, internal hemorrhoid   -instructions for supportive care given for constipation       HPI:  Off and on for past 3 weeks   BM this am - not diarrhea - well formed, brown    Frequency: daily - more often hard than soft;   hasn't been runny - like liquid  No blood present  No increase in flatulence  No N/V, no Fever/chills  No Weakness/Fatigue  No weight loss - gained 2 lbs since March 2018  No recent abx  Water intake - doesn't drink 64 oz of water daily, but tries     Social History:  Nutrition:  Discussed healthy GI diet - high fiber and good water intake  Physical: discussed walking after eating, getting daily exercise to help GI   Social: lives with wife  Recent travel: [place], [place] - to granddaughter wedding    Review of Systems:  - Cardiovascular:  no palpitations, or pain radiating to arm  - Respiratory: no cough or shortness of breath  - Musculoskeletal: no joint pains or weakness  - Neurological: no headaches    I reviewed the following:  Past Medical History:   Diagnosis Date    HTN (hypertension) 5/27/2011    Hyperlipidemia 12/1/2011    Hypertension     OAB (overactive bladder) 2/15/2013    Other ill-defined conditions(799.89)     hyperlipidemia    Other ill-defined conditions(799.89)     bladder problems    Other ill-defined conditions(799.89)     back pain       Current Outpatient Prescriptions   Medication Sig Dispense Refill    GLUC SWAN/CHONDRO SWAN A/VIT C/MN (GLUCOSAMINE 1500 COMPLEX PO) Take 1 Tab by mouth daily.  calcium-cholecalciferol, d3, (CALCIUM 600 + D) 600-125 mg-unit tab Take  by mouth two (2) times a day.  MV-MIN/FA/D3/OM-3/DHA/EPA/FISH (ADULT MULTI PLUS OMEGA-3 PO) Take  by mouth.  coenzyme Q-10 (CO Q-10) 200 mg capsule Take  by mouth daily.       benazepril (LOTENSIN) 40 mg tablet TAKE 1 TABLET BY MOUTH EVERY DAY 90 Tab 3    atorvastatin (LIPITOR) 20 mg tablet TAKE 1 TABLET BY MOUTH DAILY 90 Tab 3    metoprolol tartrate (LOPRESSOR) 100 mg IR tablet TAKE 1/2 TABLET BY MOUTH TWICE DAILY 90 Tab 3    amLODIPine (NORVASC) 10 mg tablet TAKE 1 TABLET BY MOUTH EVERY MORNING 90 Tab 4    cholecalciferol, vitamin d3, (VITAMIN D) 1,000 unit tablet Take  by mouth daily.  aspirin (ASPIRIN) 325 mg tablet Take 325 mg by mouth daily. Allergies   Allergen Reactions    Codeine Nausea and Vomiting    Demerol (Pf) [Meperidine (Pf)] Nausea and Vomiting        O: VS:   Visit Vitals    /58 (BP 1 Location: Left arm, BP Patient Position: Sitting)    Pulse (!) 52    Temp 99.1 °F (37.3 °C) (Temporal)    Resp 18    Ht 5' 4\" (1.626 m)    Wt 141 lb 3.2 oz (64 kg)    SpO2 95%    BMI 24.24 kg/m2       PAIN: No complaints of pain today. GENERAL: Vern La is in no acute distress. Non-toxic. Well nourished. Well developed. Appropriately groomed. RESP: Breath sounds are symmetrical bilaterally. Unlabored without SOB. Speaking in full sentences. Clear to auscultation bilaterally anteriorly and posteriorly. No wheezes. No rales or rhonchi. CV: normal rate. Regular rhythm. S1, S2 audible. No murmur noted. No rubs, clicks or gallops noted. ABDOMEN: Flat without bulges or pulsations. Soft and nondistended. No tenderness on palpation. No masses or organomegaly. No rebound, rigidity or guarding. Bowel sounds normal x 4 quadrants. SKIN Skin is warm and dry. Turgor is normal. No petechiae, no purpura, no rash. No cyanosis. No jaundice or pallor. ____________________________________________________________________  Patient education was done. Counseling included discussion of diagnosis, differentials, treatment options, prescribed treatment, warning signs and follow up. Medication risks/benefits,  interactions and alternatives discussed with patient.  Advised on nutrition, physical activity, weight management, tobacco, alcohol and safety     Patient verbalized understanding and agreed to plan of care. Patient was given an after visit summary which included current diagnoses, medications and vital signs. Follow up if symptoms progress.

## 2018-05-14 NOTE — PROGRESS NOTES
Jose Dillon  Identified pt with two pt identifiers(name and ). Chief Complaint   Patient presents with    Annual Exam     Rm 10 Pt c/o diarrhea, seasonal allergies: inquiry nose drops perhaps saline     Sleep Problem     most nights only sleep a few hours and not feeling rested        1. Have you been to the ER, urgent care clinic since your last visit? Hospitalized since your last visit? no    2. Have you seen or consulted any other health care providers outside of the [address] [place] [address] since your last visit? Include any pap smears or colon screening. no    Today's provider has been notified of reason for visit, vitals and flowsheets obtained on patients.      Patient received paperwork for advance directive during previous visit but has not completed at this time     Reviewed record In preparation for visit, huddled with provider and have obtained necessary documentation      Health Maintenance Due   Topic    GLAUCOMA SCREENING Q2Y        Wt Readings from Last 3 Encounters:   18 141 lb 3.2 oz (64 kg)   18 139 lb 7 oz (63.2 kg)   17 143 lb 3.2 oz (65 kg)     Temp Readings from Last 3 Encounters:   18 99.1 °F (37.3 °C) (Temporal)   18 97.7 °F (36.5 °C)   17 97.6 °F (36.4 °C) (Oral)     BP Readings from Last 3 Encounters:   18 139/58   18 146/67   17 137/64     Pulse Readings from Last 3 Encounters:   18 (!) 52   18 63   17 (!) 53     Vitals:    18 1600   BP: 139/58   Pulse: (!) 52   Resp: 18   Temp: 99.1 °F (37.3 °C)   TempSrc: Temporal   SpO2: 95%   Weight: 141 lb 3.2 oz (64 kg)   Height: 5' 4\" (1.626 m)   PainSc:   0 - No pain         Learning Assessment:  :     Learning Assessment 2017   PRIMARY LEARNER Patient   HIGHEST LEVEL OF EDUCATION - PRIMARY LEARNER  SOME COLLEGE   BARRIERS PRIMARY LEARNER NONE   PRIMARY LANGUAGE ENGLISH   LEARNER PREFERENCE PRIMARY DEMONSTRATION   ANSWERED BY Becca Song RELATIONSHIP SELF       Depression Screening:  :     PHQ over the last two weeks 5/14/2018   Little interest or pleasure in doing things Not at all   Feeling down, depressed or hopeless Not at all   Total Score PHQ 2 0       Fall Risk Assessment:  :     Fall Risk Assessment, last 12 mths 9/27/2017   Able to walk? Yes   Fall in past 12 months? Yes   Fall with injury? Yes   Number of falls in past 12 months 1   Fall Risk Score 2       Abuse Screening:  :     Abuse Screening Questionnaire 9/27/2017   Do you ever feel afraid of your partner? N   Are you in a relationship with someone who physically or mentally threatens you? N   Is it safe for you to go home? Y       ADL Screening:  :     ADL Assessment 5/14/2018   Feeding yourself No Help Needed   Getting from bed to chair No Help Needed   Getting dressed No Help Needed   Bathing or showering No Help Needed   Walk across the room (includes cane/walker) No Help Needed   Using the telphone No Help Needed   Taking your medications No Help Needed   Preparing meals No Help Needed   Managing money (expenses/bills) No Help Needed   Moderately strenuous housework (laundry) No Help Needed   Shopping for personal items (toiletries/medicines) No Help Needed   Shopping for groceries No Help Needed   Driving No Help Needed   Climbing a flight of stairs No Help Needed   Getting to places beyond walking distances No Help Needed                 Medication reconciliation up to date and corrected with patient at this time.

## 2018-05-14 NOTE — MR AVS SNAPSHOT
[facility] 
 
 
 [address] [facility] [address] 
[address] [place] [address] 
[phone] Patient: Kindra Villafana MRN: [id number] PNX:[id number] Visit Information Date & Time Provider Department Dept. Phone Encounter #  
 5/14/2018  3:40 PM Alex Dewey, [address] [facility] [phone] [id number] Upcoming Health Maintenance Date Due  
 GLAUCOMA SCREENING Q2Y 12/31/2017 Influenza Age 5 to Adult 8/1/2018 MEDICARE YEARLY EXAM 9/28/2018 DTaP/Tdap/Td series (2 - Td) 5/27/2020 Allergies as of 5/14/2018  Review Complete On: 5/14/2018 By: Christopher Vance LPN Severity Noted Reaction Type Reactions Codeine  11/23/2010    Nausea and Vomiting Demerol (Pf) [Meperidine (Pf)]  12/01/2011    Nausea and Vomiting Current Immunizations  Reviewed on 5/14/2018 Name Date Pneumococcal Conjugate (PCV-13) 9/27/2017 TDAP Vaccine 5/27/2010 ZZZ-RETIRED (DO NOT USE) Pneumococcal Vaccine (Unspecified Type) 5/27/2007 Reviewed by Christopher Vance LPN on 2/54/1898 at  3:59 PM  
You Were Diagnosed With   
  
 Codes Comments Diarrhea, unspecified type    -  Primary ICD-10-CM: R19.7 ICD-9-CM: 787.91 Vitals BP Pulse Temp Resp Height(growth percentile) 139/58 (BP 1 Location: Left arm, BP Patient Position: Sitting) (!) 52 99.1 °F (37.3 °C) (Temporal) 18 5' 4\" (1.626 m) Weight(growth percentile) SpO2 BMI OB Status Smoking Status 141 lb 3.2 oz (64 kg) 95% 24.24 kg/m2 Postmenopausal Never Smoker BMI and BSA Data Body Mass Index Body Surface Area  
 24.24 kg/m 2 1.7 m 2 Preferred Pharmacy Pharmacy Name Phone [id number], [place] [address] [address] [phone] Your Updated Medication List  
  
   
This list is accurate as of 5/14/18  4:36 PM.  Always use your most recent med list.  
  
  
  
  
 ADULT MULTI PLUS OMEGA-3 PO  
 Take  by mouth. amLODIPine 10 mg tablet Commonly known as:  Senia Pall TAKE 1 TABLET BY MOUTH EVERY MORNING  
  
 aspirin 325 mg tablet Commonly known as:  ASPIRIN Take 325 mg by mouth daily. atorvastatin 20 mg tablet Commonly known as:  LIPITOR  
TAKE 1 TABLET BY MOUTH DAILY  
  
 benazepril 40 mg tablet Commonly known as:  LOTENSIN  
TAKE 1 TABLET BY MOUTH EVERY DAY  
  
 CALCIUM 600 + D 600-125 mg-unit Tab Generic drug:  calcium-cholecalciferol (d3) Take  by mouth two (2) times a day. CO Q-10 200 mg capsule Generic drug:  coenzyme Q-10 Take  by mouth daily. GLUCOSAMINE 1500 COMPLEX PO Take 1 Tab by mouth daily. metoprolol tartrate 100 mg IR tablet Commonly known as:  LOPRESSOR  
TAKE 1/2 TABLET BY MOUTH TWICE DAILY  
  
 VITAMIN D3 1,000 unit tablet Generic drug:  cholecalciferol Take  by mouth daily. Patient Instructions 1) Shingles Vaccine - Shingrex Herpes Zoster (Shingles) Herpes zoster (shingles) is a painful rash caused by the same virus that causes chickenpox. After an episode of chickenpox, the virus retreats to cells of the nervous system, where it can reside quietly for decades. However, later in life, the varicella-zoster virus can become active again. When it reactivates, it causes shingles. Shingles can affect people of all ages. It is particularly common in adults over age 48 years. It is also more common in individuals of all ages with conditions that weaken the immune system. Pain is usually the first sign of shingles. Other symptoms include: fever, chills, headache, numbness, tingling and/or burning pain and a skin rash. The rash can appear anywhere on your body, but most commonly on the torso. It is usually on only 1 side of your body, in a band or beltlike pattern. Prevention: The CDC recommends everyone over the age of 61 receive the shingles vaccine. This is recommended for people who have already had a shingles outbreak as it could prevent future occurrences of the disease. According to the CDC, it is also recommended for people born before [birth year] in the [address] [facility] who have not had varicella (chicken pox) as they are considered immune. [facility] is a vaccine indicated for prevention of herpes zoster (shingles) in adults aged 48 years and older and is the preferred vaccine for preventing shingles and related complications The Center for Disease Control and Prevention recommended Shingrix for the following: 
- healthy adults aged 48 years and older to prevent shingles and related complications 
- adults who previously received the current shingles vaccine (Zostavax®) to prevent shingles and related complications Two doses (0.5 mL each) are needed for full efficacy. The vaccines are administered intramuscularly, with the second one administered 2-6 months after the initial vaccine. 2) Make sure you are drinking plenty of water (64oz) and high fiber (such as beans, broccoli, fruit, rice, whole wheat bread. Canned fruit has sugar in packing and can cause loose stools.) Constipation is a common problem that is caused by different factors. Constipation means it is hard to have a bowel movement. Your stool could be too hard, too small, hard to get out, or happening fewer than 3 times per week. Diet and exercise play important roles in intestinal health. Medicines, poor diet and some diseases can cause constipation. For constipation Try the following medications in the order outlined. MUSH = softener; PUSH = laxative Step 1) miralax (PUSH) 1 capful daily. Can increase to two capfuls a day If no improvement, add... Step 2) colace/docusate (MUSH) 200 mg once a day If no improvement, add... Step 3) senna (PUSH) (senokot) 8.5mg tablets. Take 1-2 every night. GOAL: one soft bowel movement daily Some things you can do to help prevent constipation: To help maintain regular bowel movements try ONE of these suggestions: 
Acai berry tablet daily  Or  
1/2 cup pear or prune juice daily  Or  
2 tablespoons Milk of Magnesia daily Or 
2 tablespoons Miralax daily Drink at least 8-10 glasses (64 oz) of water per day. Avoid caffeine as this can cause  dehydration which can contribute to constipation Increase fiber in your diet, making sure you are getting the recommended fluid intake. It is recommended to have 20-35 mg of fiber per day. Fruits and vegetables are good sources of fiber. Some examples include: dates (13.5mg) apple (3.5mg); banana (2.5mg), pear (4.5mg); broccoli (5mg), carrots (4.5mg), peas (7mg). Bulk-forming laxatives, such as Metamucil, FiberCon, Citrucel, can be used to help increase water absorption in intestines and fecal bulk. Follow directions on package for dosing instructions. A combination of senna (laxative) and docusate (stool softener) can help if you have not had a bowel movement in a few days. This can be found over the counter and is taken at night. Exercise, especially after meals, helps increase gastrointestinal movements and prevent constipation. When you feel the need to go to the bathroom, go, don't hold it. Your colon is most motile after a meal, so try and defecate after meals. Signs to watch for include: blood in toilet or toilet paper after a bowel movement, fever, weight loss, feeling weak. If you experience any of these symptoms, please make an appointment with your healthcare provider. 3) Call Dr. Frantz Torres office (ph: [phone]) and request colonoscopy report Your colonoscopy showed diverticulosis. Diverticulosis: Care Instructions Your Care Instructions In diverticulosis, pouches called diverticula form in the wall of the large intestine (colon).  The pouches do not cause any pain or other symptoms. Most people who have diverticulosis do not know they have it. But the pouches sometimes bleed, and if they become infected, they can cause pain and other symptoms. When this happens, it is called diverticulitis. Diverticula form when pressure pushes the wall of the colon outward at certain weak points. A diet that is too low in fiber can cause diverticula. Follow-up care is a key part of your treatment and safety. Be sure to make and go to all appointments, and call your doctor if you are having problems. It's also a good idea to know your test results and keep a list of the medicines you take. How can you care for yourself at home? · Include fruits, leafy green vegetables, beans, and whole grains in your diet each day. These foods are high in fiber. · Take a fiber supplement, such as Citrucel or Metamucil, every day if needed. Read and follow all instructions on the label. · Drink plenty of fluids, enough so that your urine is light yellow or clear like water. If you have kidney, heart, or liver disease and have to limit fluids, talk with your doctor before you increase the amount of fluids you drink. · Get at least 30 minutes of exercise on most days of the week. Walking is a good choice. You also may want to do other activities, such as running, swimming, cycling, or playing tennis or team sports. · Cut out foods that cause gas, pain, or other symptoms. When should you call for help? Call your doctor now or seek immediate medical care if: 
? · You have belly pain. ? · You pass maroon or very bloody stools. ? · You have a fever. ? · You have nausea and vomiting. ? · You have unusual changes in your bowel movements or abdominal swelling. ? · You have burning pain when you urinate. ? · You have abnormal vaginal discharge. ? · You have shoulder pain. ? · You have cramping pain that does not get better when you have a bowel movement or pass gas. ? · You pass gas or stool from your urethra while urinating. ? Watch closely for changes in your health, and be sure to contact your doctor if you have any problems. Where can you learn more? Go to http://radha-alex.info/. Enter A536 in the search box to learn more about \"Diverticulosis: Care Instructions. \" Current as of: May 12, 2017 Content Version: 11.4 © 5744-8469 [employer]. Care instructions adapted under license by [employer] (which disclaims liability or warranty for this information). If you have questions about a medical condition or this instruction, always ask your healthcare professional. [address] any warranty or liability for your use of this information. Introducing [facility]! Dear Cole Jay: Thank you for requesting a sendwithus account. Our records indicate that you already have an active sendwithus account. You can access your account anytime at [email or web address] Did you know that you can access your hospital and ER discharge instructions at any time in sendwithus? You can also review all of your test results from your hospital stay or ER visit. Additional Information If you have questions, please visit the Frequently Asked Questions section of the sendwithus website at [email or web address] Remember, sendwithus is NOT to be used for urgent needs. For medical emergencies, dial 911. Now available from your iPhone and Android! Please provide this summary of care documentation to your next provider. Your primary care clinician is listed as Ray Wade. If you have any questions after today's visit, please call [phone].

## 2018-07-12 ENCOUNTER — APPOINTMENT (OUTPATIENT)
Dept: GENERAL RADIOLOGY | Age: 83
DRG: 470 | End: 2018-07-12
Attending: STUDENT IN AN ORGANIZED HEALTH CARE EDUCATION/TRAINING PROGRAM
Payer: MEDICARE

## 2018-07-12 ENCOUNTER — HOSPITAL ENCOUNTER (INPATIENT)
Age: 83
LOS: 4 days | Discharge: NURSING, REHAB OR LONG-TERM CARE | DRG: 470 | End: 2018-07-16
Attending: EMERGENCY MEDICINE | Admitting: HOSPITALIST
Payer: MEDICARE

## 2018-07-12 ENCOUNTER — ANESTHESIA (OUTPATIENT)
Dept: SURGERY | Age: 83
DRG: 470 | End: 2018-07-12
Payer: MEDICARE

## 2018-07-12 ENCOUNTER — ANESTHESIA EVENT (OUTPATIENT)
Dept: SURGERY | Age: 83
DRG: 470 | End: 2018-07-12
Payer: MEDICARE

## 2018-07-12 DIAGNOSIS — S72.001D CLOSED FRACTURE OF RIGHT HIP WITH ROUTINE HEALING, SUBSEQUENT ENCOUNTER: ICD-10-CM

## 2018-07-12 DIAGNOSIS — I10 ESSENTIAL HYPERTENSION: ICD-10-CM

## 2018-07-12 DIAGNOSIS — S72.001A CLOSED FRACTURE OF NECK OF RIGHT FEMUR, INITIAL ENCOUNTER (HCC): Primary | ICD-10-CM

## 2018-07-12 LAB
ABO + RH BLD: NORMAL
ALBUMIN SERPL-MCNC: 4.1 G/DL (ref 3.5–5)
ALBUMIN/GLOB SERPL: 1.1 {RATIO} (ref 1.1–2.2)
ALP SERPL-CCNC: 79 U/L (ref 45–117)
ALT SERPL-CCNC: 28 U/L (ref 12–78)
ANION GAP SERPL CALC-SCNC: 7 MMOL/L (ref 5–15)
APPEARANCE UR: CLEAR
AST SERPL-CCNC: 31 U/L (ref 15–37)
ATRIAL RATE: 55 BPM
BASOPHILS # BLD: 0 K/UL (ref 0–0.1)
BASOPHILS NFR BLD: 0 % (ref 0–1)
BILIRUB SERPL-MCNC: 0.7 MG/DL (ref 0.2–1)
BILIRUB UR QL: NEGATIVE
BLOOD GROUP ANTIBODIES SERPL: NORMAL
BUN SERPL-MCNC: 15 MG/DL (ref 6–20)
BUN/CREAT SERPL: 18 (ref 12–20)
CALCIUM SERPL-MCNC: 9.3 MG/DL (ref 8.5–10.1)
CALCULATED P AXIS, ECG09: 57 DEGREES
CALCULATED R AXIS, ECG10: -2 DEGREES
CALCULATED T AXIS, ECG11: 78 DEGREES
CHLORIDE SERPL-SCNC: 103 MMOL/L (ref 97–108)
CO2 SERPL-SCNC: 30 MMOL/L (ref 21–32)
COLOR UR: NORMAL
CREAT SERPL-MCNC: 0.82 MG/DL (ref 0.55–1.02)
DIAGNOSIS, 93000: NORMAL
DIFFERENTIAL METHOD BLD: ABNORMAL
EOSINOPHIL # BLD: 0.1 K/UL (ref 0–0.4)
EOSINOPHIL NFR BLD: 0 % (ref 0–7)
ERYTHROCYTE [DISTWIDTH] IN BLOOD BY AUTOMATED COUNT: 11.9 % (ref 11.5–14.5)
GLOBULIN SER CALC-MCNC: 3.7 G/DL (ref 2–4)
GLUCOSE SERPL-MCNC: 119 MG/DL (ref 65–100)
GLUCOSE UR STRIP.AUTO-MCNC: NEGATIVE MG/DL
HCT VFR BLD AUTO: 40.5 % (ref 35–47)
HGB BLD-MCNC: 13.8 G/DL (ref 11.5–16)
HGB UR QL STRIP: NEGATIVE
IMM GRANULOCYTES # BLD: 0.1 K/UL (ref 0–0.04)
IMM GRANULOCYTES NFR BLD AUTO: 1 % (ref 0–0.5)
INR PPP: 1.1 (ref 0.9–1.1)
KETONES UR QL STRIP.AUTO: NEGATIVE MG/DL
LEUKOCYTE ESTERASE UR QL STRIP.AUTO: NEGATIVE
LYMPHOCYTES # BLD: 0.8 K/UL (ref 0.8–3.5)
LYMPHOCYTES NFR BLD: 7 % (ref 12–49)
MAGNESIUM SERPL-MCNC: 2 MG/DL (ref 1.6–2.4)
MCH RBC QN AUTO: 31 PG (ref 26–34)
MCHC RBC AUTO-ENTMCNC: 34.1 G/DL (ref 30–36.5)
MCV RBC AUTO: 91 FL (ref 80–99)
MONOCYTES # BLD: 0.6 K/UL (ref 0–1)
MONOCYTES NFR BLD: 5 % (ref 5–13)
NEUTS SEG # BLD: 10.8 K/UL (ref 1.8–8)
NEUTS SEG NFR BLD: 87 % (ref 32–75)
NITRITE UR QL STRIP.AUTO: NEGATIVE
NRBC # BLD: 0 K/UL (ref 0–0.01)
NRBC BLD-RTO: 0 PER 100 WBC
P-R INTERVAL, ECG05: 182 MS
PH UR STRIP: 7.5 [PH] (ref 5–8)
PLATELET # BLD AUTO: 144 K/UL (ref 150–400)
PMV BLD AUTO: 10.4 FL (ref 8.9–12.9)
POTASSIUM SERPL-SCNC: 3.2 MMOL/L (ref 3.5–5.1)
PROT SERPL-MCNC: 7.8 G/DL (ref 6.4–8.2)
PROT UR STRIP-MCNC: NEGATIVE MG/DL
PROTHROMBIN TIME: 10.7 SEC (ref 9–11.1)
Q-T INTERVAL, ECG07: 464 MS
QRS DURATION, ECG06: 98 MS
QTC CALCULATION (BEZET), ECG08: 443 MS
RBC # BLD AUTO: 4.45 M/UL (ref 3.8–5.2)
SODIUM SERPL-SCNC: 140 MMOL/L (ref 136–145)
SP GR UR REFRACTOMETRY: 1.01 (ref 1–1.03)
SPECIMEN EXP DATE BLD: NORMAL
TSH SERPL DL<=0.05 MIU/L-ACNC: 2.49 UIU/ML (ref 0.36–3.74)
UROBILINOGEN UR QL STRIP.AUTO: 0.2 EU/DL (ref 0.2–1)
VENTRICULAR RATE, ECG03: 55 BPM
WBC # BLD AUTO: 12.4 K/UL (ref 3.6–11)

## 2018-07-12 PROCEDURE — 77030018836 HC SOL IRR NACL ICUM -A: Performed by: ORTHOPAEDIC SURGERY

## 2018-07-12 PROCEDURE — 77030028907 HC WRP KNEE WO BGS SOLM -B

## 2018-07-12 PROCEDURE — 99285 EMERGENCY DEPT VISIT HI MDM: CPT

## 2018-07-12 PROCEDURE — 36415 COLL VENOUS BLD VENIPUNCTURE: CPT

## 2018-07-12 PROCEDURE — 77030033138 HC SUT PGA STRATFX J&J -B: Performed by: ORTHOPAEDIC SURGERY

## 2018-07-12 PROCEDURE — 77030032490 HC SLV COMPR SCD KNE COVD -B: Performed by: ORTHOPAEDIC SURGERY

## 2018-07-12 PROCEDURE — 74011000258 HC RX REV CODE- 258: Performed by: ORTHOPAEDIC SURGERY

## 2018-07-12 PROCEDURE — 74011000258 HC RX REV CODE- 258

## 2018-07-12 PROCEDURE — 74011250637 HC RX REV CODE- 250/637: Performed by: ORTHOPAEDIC SURGERY

## 2018-07-12 PROCEDURE — C1776 JOINT DEVICE (IMPLANTABLE): HCPCS | Performed by: ORTHOPAEDIC SURGERY

## 2018-07-12 PROCEDURE — 86901 BLOOD TYPING SEROLOGIC RH(D): CPT

## 2018-07-12 PROCEDURE — 74011250636 HC RX REV CODE- 250/636: Performed by: ORTHOPAEDIC SURGERY

## 2018-07-12 PROCEDURE — 77030018723 HC ELCTRD BLD COVD -A: Performed by: ORTHOPAEDIC SURGERY

## 2018-07-12 PROCEDURE — 74011250637 HC RX REV CODE- 250/637: Performed by: STUDENT IN AN ORGANIZED HEALTH CARE EDUCATION/TRAINING PROGRAM

## 2018-07-12 PROCEDURE — 73502 X-RAY EXAM HIP UNI 2-3 VIEWS: CPT

## 2018-07-12 PROCEDURE — 74011250636 HC RX REV CODE- 250/636: Performed by: PHYSICIAN ASSISTANT

## 2018-07-12 PROCEDURE — 83735 ASSAY OF MAGNESIUM: CPT | Performed by: HOSPITALIST

## 2018-07-12 PROCEDURE — 77030020061 HC IV BLD WRMR ADMIN SET 3M -B: Performed by: ANESTHESIOLOGY

## 2018-07-12 PROCEDURE — 77030006835 HC BLD SAW SAG STRY -B: Performed by: ORTHOPAEDIC SURGERY

## 2018-07-12 PROCEDURE — 74011250636 HC RX REV CODE- 250/636

## 2018-07-12 PROCEDURE — 77030033067 HC SUT PDO STRATFX SPIR J&J -B: Performed by: ORTHOPAEDIC SURGERY

## 2018-07-12 PROCEDURE — 77030018846 HC SOL IRR STRL H20 ICUM -A: Performed by: ORTHOPAEDIC SURGERY

## 2018-07-12 PROCEDURE — 71045 X-RAY EXAM CHEST 1 VIEW: CPT

## 2018-07-12 PROCEDURE — 77030018547 HC SUT ETHBND1 J&J -B: Performed by: ORTHOPAEDIC SURGERY

## 2018-07-12 PROCEDURE — 96374 THER/PROPH/DIAG INJ IV PUSH: CPT

## 2018-07-12 PROCEDURE — 74011000250 HC RX REV CODE- 250: Performed by: ORTHOPAEDIC SURGERY

## 2018-07-12 PROCEDURE — 80053 COMPREHEN METABOLIC PANEL: CPT

## 2018-07-12 PROCEDURE — 76010000153 HC OR TIME 1.5 TO 2 HR: Performed by: ORTHOPAEDIC SURGERY

## 2018-07-12 PROCEDURE — 77030013708 HC HNDPC SUC IRR PULS STRY –B: Performed by: ORTHOPAEDIC SURGERY

## 2018-07-12 PROCEDURE — 0SRR03A REPLACEMENT OF RIGHT HIP JOINT, FEMORAL SURFACE WITH CERAMIC SYNTHETIC SUBSTITUTE, UNCEMENTED, OPEN APPROACH: ICD-10-PCS | Performed by: ORTHOPAEDIC SURGERY

## 2018-07-12 PROCEDURE — 77030020788: Performed by: ORTHOPAEDIC SURGERY

## 2018-07-12 PROCEDURE — 81003 URINALYSIS AUTO W/O SCOPE: CPT

## 2018-07-12 PROCEDURE — 77030013079 HC BLNKT BAIR HGGR 3M -A: Performed by: ANESTHESIOLOGY

## 2018-07-12 PROCEDURE — 77030003666 HC NDL SPINAL BD -A: Performed by: ORTHOPAEDIC SURGERY

## 2018-07-12 PROCEDURE — 74011000250 HC RX REV CODE- 250

## 2018-07-12 PROCEDURE — 77030011264 HC ELECTRD BLD EXT COVD -A: Performed by: ORTHOPAEDIC SURGERY

## 2018-07-12 PROCEDURE — 77030003029 HC SUT VCRL J&J -B: Performed by: ORTHOPAEDIC SURGERY

## 2018-07-12 PROCEDURE — 85610 PROTHROMBIN TIME: CPT

## 2018-07-12 PROCEDURE — 93005 ELECTROCARDIOGRAM TRACING: CPT

## 2018-07-12 PROCEDURE — 77030020847 HC STATLOK BARD -A

## 2018-07-12 PROCEDURE — 74011000272 HC RX REV CODE- 272: Performed by: ORTHOPAEDIC SURGERY

## 2018-07-12 PROCEDURE — 85025 COMPLETE CBC W/AUTO DIFF WBC: CPT

## 2018-07-12 PROCEDURE — 77030011640 HC PAD GRND REM COVD -A: Performed by: ORTHOPAEDIC SURGERY

## 2018-07-12 PROCEDURE — 76210000016 HC OR PH I REC 1 TO 1.5 HR: Performed by: ORTHOPAEDIC SURGERY

## 2018-07-12 PROCEDURE — 77030003028 HC SUT VCRL J&J -A: Performed by: ORTHOPAEDIC SURGERY

## 2018-07-12 PROCEDURE — 77030008684 HC TU ET CUF COVD -B: Performed by: ANESTHESIOLOGY

## 2018-07-12 PROCEDURE — 73552 X-RAY EXAM OF FEMUR 2/>: CPT

## 2018-07-12 PROCEDURE — 77030026438 HC STYL ET INTUB CARD -A: Performed by: ANESTHESIOLOGY

## 2018-07-12 PROCEDURE — 84443 ASSAY THYROID STIM HORMONE: CPT | Performed by: HOSPITALIST

## 2018-07-12 PROCEDURE — 77030019908 HC STETH ESOPH SIMS -A: Performed by: ANESTHESIOLOGY

## 2018-07-12 PROCEDURE — 77030003890 HC BIT DRL TWST MCRA -A: Performed by: ORTHOPAEDIC SURGERY

## 2018-07-12 PROCEDURE — 65270000029 HC RM PRIVATE

## 2018-07-12 PROCEDURE — 76060000034 HC ANESTHESIA 1.5 TO 2 HR: Performed by: ORTHOPAEDIC SURGERY

## 2018-07-12 PROCEDURE — 77030038020 HC MANFLD NEPTUNE STRY -B: Performed by: ORTHOPAEDIC SURGERY

## 2018-07-12 DEVICE — STEM FEM PRSS FT HIP UNI/BI PLR
Type: IMPLANTABLE DEVICE | Site: HIP | Status: NON-FUNCTIONAL
Removed: 2019-01-21

## 2018-07-12 DEVICE — HEAD FEM LFRIC V40 28MM STD NK --
Type: IMPLANTABLE DEVICE | Site: HIP | Status: NON-FUNCTIONAL
Removed: 2019-01-21

## 2018-07-12 DEVICE — 132 DEGREE NECK ANGLE HIP STEM
Type: IMPLANTABLE DEVICE | Site: HIP | Status: NON-FUNCTIONAL
Brand: ACCOLADE
Removed: 2019-01-21

## 2018-07-12 RX ORDER — SODIUM CHLORIDE 0.9 % (FLUSH) 0.9 %
5-10 SYRINGE (ML) INJECTION AS NEEDED
Status: DISCONTINUED | OUTPATIENT
Start: 2018-07-12 | End: 2018-07-12

## 2018-07-12 RX ORDER — POTASSIUM CHLORIDE 750 MG/1
40 TABLET, FILM COATED, EXTENDED RELEASE ORAL
Status: DISCONTINUED | OUTPATIENT
Start: 2018-07-12 | End: 2018-07-12

## 2018-07-12 RX ORDER — METOPROLOL TARTRATE 50 MG/1
100 TABLET ORAL ONCE
Status: COMPLETED | OUTPATIENT
Start: 2018-07-12 | End: 2018-07-12

## 2018-07-12 RX ORDER — FERROUS SULFATE, DRIED 160(50) MG
1 TABLET, EXTENDED RELEASE ORAL DAILY
Status: DISCONTINUED | OUTPATIENT
Start: 2018-07-13 | End: 2018-07-12

## 2018-07-12 RX ORDER — SODIUM CHLORIDE 0.9 % (FLUSH) 0.9 %
5-10 SYRINGE (ML) INJECTION AS NEEDED
Status: DISCONTINUED | OUTPATIENT
Start: 2018-07-12 | End: 2018-07-16 | Stop reason: HOSPADM

## 2018-07-12 RX ORDER — AMLODIPINE BESYLATE 5 MG/1
10 TABLET ORAL DAILY
Status: DISCONTINUED | OUTPATIENT
Start: 2018-07-13 | End: 2018-07-16 | Stop reason: HOSPADM

## 2018-07-12 RX ORDER — LIDOCAINE HYDROCHLORIDE 20 MG/ML
INJECTION, SOLUTION EPIDURAL; INFILTRATION; INTRACAUDAL; PERINEURAL AS NEEDED
Status: DISCONTINUED | OUTPATIENT
Start: 2018-07-12 | End: 2018-07-12 | Stop reason: HOSPADM

## 2018-07-12 RX ORDER — OXYCODONE HYDROCHLORIDE 5 MG/1
5 TABLET ORAL
Status: DISCONTINUED | OUTPATIENT
Start: 2018-07-12 | End: 2018-07-16 | Stop reason: HOSPADM

## 2018-07-12 RX ORDER — UBIDECARENONE 50 MG
200 CAPSULE ORAL DAILY
Status: DISCONTINUED | OUTPATIENT
Start: 2018-07-13 | End: 2018-07-16 | Stop reason: HOSPADM

## 2018-07-12 RX ORDER — CHOLECALCIFEROL (VITAMIN D3) 125 MCG
200 CAPSULE ORAL DAILY
Status: DISCONTINUED | OUTPATIENT
Start: 2018-07-13 | End: 2018-07-12

## 2018-07-12 RX ORDER — HYDROCODONE BITARTRATE AND ACETAMINOPHEN 5; 325 MG/1; MG/1
1 TABLET ORAL AS NEEDED
Status: DISCONTINUED | OUTPATIENT
Start: 2018-07-12 | End: 2018-07-12 | Stop reason: HOSPADM

## 2018-07-12 RX ORDER — ONDANSETRON 2 MG/ML
4 INJECTION INTRAMUSCULAR; INTRAVENOUS
Status: DISPENSED | OUTPATIENT
Start: 2018-07-12 | End: 2018-07-13

## 2018-07-12 RX ORDER — DEXAMETHASONE SODIUM PHOSPHATE 100 MG/10ML
INJECTION INTRAMUSCULAR; INTRAVENOUS AS NEEDED
Status: DISCONTINUED | OUTPATIENT
Start: 2018-07-12 | End: 2018-07-12 | Stop reason: HOSPADM

## 2018-07-12 RX ORDER — SODIUM CHLORIDE 9 MG/ML
75 INJECTION, SOLUTION INTRAVENOUS CONTINUOUS
Status: DISCONTINUED | OUTPATIENT
Start: 2018-07-12 | End: 2018-07-12

## 2018-07-12 RX ORDER — OXYCODONE HYDROCHLORIDE 5 MG/1
5 TABLET ORAL
Status: DISCONTINUED | OUTPATIENT
Start: 2018-07-12 | End: 2018-07-12

## 2018-07-12 RX ORDER — NALOXONE HYDROCHLORIDE 0.4 MG/ML
0.4 INJECTION, SOLUTION INTRAMUSCULAR; INTRAVENOUS; SUBCUTANEOUS AS NEEDED
Status: DISCONTINUED | OUTPATIENT
Start: 2018-07-12 | End: 2018-07-12

## 2018-07-12 RX ORDER — SODIUM CHLORIDE 0.9 % (FLUSH) 0.9 %
5-10 SYRINGE (ML) INJECTION EVERY 8 HOURS
Status: DISCONTINUED | OUTPATIENT
Start: 2018-07-13 | End: 2018-07-16 | Stop reason: HOSPADM

## 2018-07-12 RX ORDER — SODIUM CHLORIDE, SODIUM LACTATE, POTASSIUM CHLORIDE, CALCIUM CHLORIDE 600; 310; 30; 20 MG/100ML; MG/100ML; MG/100ML; MG/100ML
100 INJECTION, SOLUTION INTRAVENOUS CONTINUOUS
Status: DISCONTINUED | OUTPATIENT
Start: 2018-07-12 | End: 2018-07-12 | Stop reason: HOSPADM

## 2018-07-12 RX ORDER — SODIUM CHLORIDE 0.9 % (FLUSH) 0.9 %
5-10 SYRINGE (ML) INJECTION EVERY 8 HOURS
Status: DISCONTINUED | OUTPATIENT
Start: 2018-07-12 | End: 2018-07-12

## 2018-07-12 RX ORDER — POLYETHYLENE GLYCOL 3350 17 G/17G
17 POWDER, FOR SOLUTION ORAL DAILY
Status: DISCONTINUED | OUTPATIENT
Start: 2018-07-13 | End: 2018-07-16 | Stop reason: HOSPADM

## 2018-07-12 RX ORDER — BENAZEPRIL HYDROCHLORIDE 10 MG/1
40 TABLET ORAL DAILY
Status: DISCONTINUED | OUTPATIENT
Start: 2018-07-13 | End: 2018-07-16 | Stop reason: HOSPADM

## 2018-07-12 RX ORDER — FAMOTIDINE 20 MG/1
20 TABLET, FILM COATED ORAL 2 TIMES DAILY
Status: DISCONTINUED | OUTPATIENT
Start: 2018-07-13 | End: 2018-07-16 | Stop reason: HOSPADM

## 2018-07-12 RX ORDER — FACIAL-BODY WIPES
10 EACH TOPICAL DAILY PRN
Status: DISCONTINUED | OUTPATIENT
Start: 2018-07-14 | End: 2018-07-16 | Stop reason: HOSPADM

## 2018-07-12 RX ORDER — CEFAZOLIN SODIUM/WATER 2 G/20 ML
2 SYRINGE (ML) INTRAVENOUS ONCE
Status: DISCONTINUED | OUTPATIENT
Start: 2018-07-12 | End: 2018-07-12 | Stop reason: HOSPADM

## 2018-07-12 RX ORDER — FENTANYL CITRATE 50 UG/ML
INJECTION, SOLUTION INTRAMUSCULAR; INTRAVENOUS AS NEEDED
Status: DISCONTINUED | OUTPATIENT
Start: 2018-07-12 | End: 2018-07-12 | Stop reason: HOSPADM

## 2018-07-12 RX ORDER — MIDAZOLAM HYDROCHLORIDE 1 MG/ML
0.5 INJECTION, SOLUTION INTRAMUSCULAR; INTRAVENOUS
Status: DISCONTINUED | OUTPATIENT
Start: 2018-07-12 | End: 2018-07-12 | Stop reason: HOSPADM

## 2018-07-12 RX ORDER — CEFAZOLIN SODIUM/WATER 2 G/20 ML
2 SYRINGE (ML) INTRAVENOUS EVERY 8 HOURS
Status: DISCONTINUED | OUTPATIENT
Start: 2018-07-12 | End: 2018-07-12

## 2018-07-12 RX ORDER — GLYCOPYRROLATE 0.2 MG/ML
INJECTION INTRAMUSCULAR; INTRAVENOUS AS NEEDED
Status: DISCONTINUED | OUTPATIENT
Start: 2018-07-12 | End: 2018-07-12 | Stop reason: HOSPADM

## 2018-07-12 RX ORDER — ONDANSETRON 2 MG/ML
4 INJECTION INTRAMUSCULAR; INTRAVENOUS AS NEEDED
Status: DISCONTINUED | OUTPATIENT
Start: 2018-07-12 | End: 2018-07-12 | Stop reason: HOSPADM

## 2018-07-12 RX ORDER — PROPOFOL 10 MG/ML
INJECTION, EMULSION INTRAVENOUS
Status: DISCONTINUED | OUTPATIENT
Start: 2018-07-12 | End: 2018-07-12 | Stop reason: HOSPADM

## 2018-07-12 RX ORDER — IBUPROFEN 400 MG/1
400 TABLET ORAL
Status: COMPLETED | OUTPATIENT
Start: 2018-07-12 | End: 2018-07-12

## 2018-07-12 RX ORDER — SUCCINYLCHOLINE CHLORIDE 20 MG/ML
INJECTION INTRAMUSCULAR; INTRAVENOUS AS NEEDED
Status: DISCONTINUED | OUTPATIENT
Start: 2018-07-12 | End: 2018-07-12 | Stop reason: HOSPADM

## 2018-07-12 RX ORDER — CEFAZOLIN SODIUM/WATER 2 G/20 ML
2 SYRINGE (ML) INTRAVENOUS EVERY 8 HOURS
Status: COMPLETED | OUTPATIENT
Start: 2018-07-13 | End: 2018-07-13

## 2018-07-12 RX ORDER — NEOSTIGMINE METHYLSULFATE 1 MG/ML
INJECTION INTRAVENOUS AS NEEDED
Status: DISCONTINUED | OUTPATIENT
Start: 2018-07-12 | End: 2018-07-12 | Stop reason: HOSPADM

## 2018-07-12 RX ORDER — KETOROLAC TROMETHAMINE 30 MG/ML
15 INJECTION, SOLUTION INTRAMUSCULAR; INTRAVENOUS EVERY 6 HOURS
Status: COMPLETED | OUTPATIENT
Start: 2018-07-12 | End: 2018-07-13

## 2018-07-12 RX ORDER — HYDROMORPHONE HYDROCHLORIDE 1 MG/ML
0.5 INJECTION, SOLUTION INTRAMUSCULAR; INTRAVENOUS; SUBCUTANEOUS
Status: DISCONTINUED | OUTPATIENT
Start: 2018-07-12 | End: 2018-07-12 | Stop reason: HOSPADM

## 2018-07-12 RX ORDER — FERROUS SULFATE, DRIED 160(50) MG
1 TABLET, EXTENDED RELEASE ORAL
Status: DISCONTINUED | OUTPATIENT
Start: 2018-07-13 | End: 2018-07-16 | Stop reason: HOSPADM

## 2018-07-12 RX ORDER — OXYCODONE HYDROCHLORIDE 5 MG/1
2.5 TABLET ORAL
Status: DISCONTINUED | OUTPATIENT
Start: 2018-07-12 | End: 2018-07-16 | Stop reason: HOSPADM

## 2018-07-12 RX ORDER — SODIUM CHLORIDE 9 MG/ML
125 INJECTION, SOLUTION INTRAVENOUS CONTINUOUS
Status: DISPENSED | OUTPATIENT
Start: 2018-07-12 | End: 2018-07-13

## 2018-07-12 RX ORDER — ROCURONIUM BROMIDE 10 MG/ML
INJECTION, SOLUTION INTRAVENOUS AS NEEDED
Status: DISCONTINUED | OUTPATIENT
Start: 2018-07-12 | End: 2018-07-12 | Stop reason: HOSPADM

## 2018-07-12 RX ORDER — AMLODIPINE BESYLATE 5 MG/1
10 TABLET ORAL DAILY
Status: DISCONTINUED | OUTPATIENT
Start: 2018-07-12 | End: 2018-07-12

## 2018-07-12 RX ORDER — MELATONIN
1000 DAILY
Status: DISCONTINUED | OUTPATIENT
Start: 2018-07-13 | End: 2018-07-16 | Stop reason: HOSPADM

## 2018-07-12 RX ORDER — ACETAMINOPHEN 325 MG/1
650 TABLET ORAL EVERY 6 HOURS
Status: DISCONTINUED | OUTPATIENT
Start: 2018-07-12 | End: 2018-07-12

## 2018-07-12 RX ORDER — OXYCODONE HYDROCHLORIDE 5 MG/1
2.5 TABLET ORAL
Status: DISCONTINUED | OUTPATIENT
Start: 2018-07-12 | End: 2018-07-12

## 2018-07-12 RX ORDER — ASPIRIN 325 MG
325 TABLET, DELAYED RELEASE (ENTERIC COATED) ORAL 2 TIMES DAILY
Status: DISCONTINUED | OUTPATIENT
Start: 2018-07-12 | End: 2018-07-16 | Stop reason: HOSPADM

## 2018-07-12 RX ORDER — ONDANSETRON 2 MG/ML
INJECTION INTRAMUSCULAR; INTRAVENOUS AS NEEDED
Status: DISCONTINUED | OUTPATIENT
Start: 2018-07-12 | End: 2018-07-12 | Stop reason: HOSPADM

## 2018-07-12 RX ORDER — FENTANYL CITRATE 50 UG/ML
25 INJECTION, SOLUTION INTRAMUSCULAR; INTRAVENOUS
Status: DISCONTINUED | OUTPATIENT
Start: 2018-07-12 | End: 2018-07-12 | Stop reason: HOSPADM

## 2018-07-12 RX ORDER — METOPROLOL TARTRATE 25 MG/1
25 TABLET, FILM COATED ORAL EVERY 12 HOURS
Status: DISCONTINUED | OUTPATIENT
Start: 2018-07-12 | End: 2018-07-12

## 2018-07-12 RX ORDER — MIDAZOLAM HYDROCHLORIDE 1 MG/ML
1 INJECTION, SOLUTION INTRAMUSCULAR; INTRAVENOUS AS NEEDED
Status: DISCONTINUED | OUTPATIENT
Start: 2018-07-12 | End: 2018-07-12 | Stop reason: HOSPADM

## 2018-07-12 RX ORDER — AMOXICILLIN 250 MG
2 CAPSULE ORAL 2 TIMES DAILY
Status: DISCONTINUED | OUTPATIENT
Start: 2018-07-12 | End: 2018-07-12

## 2018-07-12 RX ORDER — ONDANSETRON 2 MG/ML
4 INJECTION INTRAMUSCULAR; INTRAVENOUS
Status: DISCONTINUED | OUTPATIENT
Start: 2018-07-12 | End: 2018-07-12

## 2018-07-12 RX ORDER — DIPHENHYDRAMINE HYDROCHLORIDE 50 MG/ML
12.5 INJECTION, SOLUTION INTRAMUSCULAR; INTRAVENOUS AS NEEDED
Status: DISCONTINUED | OUTPATIENT
Start: 2018-07-12 | End: 2018-07-12 | Stop reason: HOSPADM

## 2018-07-12 RX ORDER — HYDROXYZINE HYDROCHLORIDE 10 MG/1
10 TABLET, FILM COATED ORAL
Status: DISCONTINUED | OUTPATIENT
Start: 2018-07-12 | End: 2018-07-16 | Stop reason: HOSPADM

## 2018-07-12 RX ORDER — NALOXONE HYDROCHLORIDE 0.4 MG/ML
0.4 INJECTION, SOLUTION INTRAMUSCULAR; INTRAVENOUS; SUBCUTANEOUS AS NEEDED
Status: DISCONTINUED | OUTPATIENT
Start: 2018-07-12 | End: 2018-07-16 | Stop reason: HOSPADM

## 2018-07-12 RX ORDER — TRANEXAMIC ACID 100 MG/ML
INJECTION, SOLUTION INTRAVENOUS
Status: COMPLETED
Start: 2018-07-12 | End: 2018-07-12

## 2018-07-12 RX ORDER — AMLODIPINE BESYLATE 5 MG/1
5 TABLET ORAL DAILY
Status: DISCONTINUED | OUTPATIENT
Start: 2018-07-12 | End: 2018-07-12

## 2018-07-12 RX ORDER — METOPROLOL TARTRATE 25 MG/1
25 TABLET, FILM COATED ORAL EVERY 12 HOURS
Status: DISCONTINUED | OUTPATIENT
Start: 2018-07-13 | End: 2018-07-16 | Stop reason: HOSPADM

## 2018-07-12 RX ORDER — ATORVASTATIN CALCIUM 20 MG/1
20 TABLET, FILM COATED ORAL DAILY
Status: DISCONTINUED | OUTPATIENT
Start: 2018-07-13 | End: 2018-07-12

## 2018-07-12 RX ORDER — MORPHINE SULFATE 2 MG/ML
1 INJECTION, SOLUTION INTRAMUSCULAR; INTRAVENOUS
Status: ACTIVE | OUTPATIENT
Start: 2018-07-12 | End: 2018-07-13

## 2018-07-12 RX ORDER — HYDROMORPHONE HYDROCHLORIDE 2 MG/ML
INJECTION, SOLUTION INTRAMUSCULAR; INTRAVENOUS; SUBCUTANEOUS AS NEEDED
Status: DISCONTINUED | OUTPATIENT
Start: 2018-07-12 | End: 2018-07-12 | Stop reason: HOSPADM

## 2018-07-12 RX ORDER — ACETAMINOPHEN 500 MG
1000 TABLET ORAL ONCE
Status: COMPLETED | OUTPATIENT
Start: 2018-07-12 | End: 2018-07-12

## 2018-07-12 RX ORDER — CEFAZOLIN SODIUM IN 0.9 % NACL 2 G/100 ML
PLASTIC BAG, INJECTION (ML) INTRAVENOUS AS NEEDED
Status: DISCONTINUED | OUTPATIENT
Start: 2018-07-12 | End: 2018-07-12 | Stop reason: HOSPADM

## 2018-07-12 RX ORDER — PROPOFOL 10 MG/ML
INJECTION, EMULSION INTRAVENOUS AS NEEDED
Status: DISCONTINUED | OUTPATIENT
Start: 2018-07-12 | End: 2018-07-12 | Stop reason: HOSPADM

## 2018-07-12 RX ORDER — ONDANSETRON 4 MG/1
4 TABLET, ORALLY DISINTEGRATING ORAL
Status: DISCONTINUED | OUTPATIENT
Start: 2018-07-12 | End: 2018-07-16 | Stop reason: HOSPADM

## 2018-07-12 RX ORDER — ATORVASTATIN CALCIUM 20 MG/1
20 TABLET, FILM COATED ORAL
Status: DISCONTINUED | OUTPATIENT
Start: 2018-07-12 | End: 2018-07-16 | Stop reason: HOSPADM

## 2018-07-12 RX ORDER — SODIUM CHLORIDE, SODIUM LACTATE, POTASSIUM CHLORIDE, CALCIUM CHLORIDE 600; 310; 30; 20 MG/100ML; MG/100ML; MG/100ML; MG/100ML
INJECTION, SOLUTION INTRAVENOUS
Status: DISCONTINUED | OUTPATIENT
Start: 2018-07-12 | End: 2018-07-12 | Stop reason: HOSPADM

## 2018-07-12 RX ORDER — FENTANYL CITRATE 50 UG/ML
50 INJECTION, SOLUTION INTRAMUSCULAR; INTRAVENOUS AS NEEDED
Status: DISCONTINUED | OUTPATIENT
Start: 2018-07-12 | End: 2018-07-12 | Stop reason: HOSPADM

## 2018-07-12 RX ORDER — AMOXICILLIN 250 MG
1 CAPSULE ORAL 2 TIMES DAILY
Status: DISCONTINUED | OUTPATIENT
Start: 2018-07-12 | End: 2018-07-16 | Stop reason: HOSPADM

## 2018-07-12 RX ORDER — LIDOCAINE HYDROCHLORIDE 10 MG/ML
0.1 INJECTION, SOLUTION EPIDURAL; INFILTRATION; INTRACAUDAL; PERINEURAL AS NEEDED
Status: DISCONTINUED | OUTPATIENT
Start: 2018-07-12 | End: 2018-07-12 | Stop reason: HOSPADM

## 2018-07-12 RX ORDER — ACETAMINOPHEN 325 MG/1
650 TABLET ORAL EVERY 6 HOURS
Status: DISCONTINUED | OUTPATIENT
Start: 2018-07-12 | End: 2018-07-16 | Stop reason: HOSPADM

## 2018-07-12 RX ADMIN — KETOROLAC TROMETHAMINE 15 MG: 30 INJECTION, SOLUTION INTRAMUSCULAR at 19:44

## 2018-07-12 RX ADMIN — SODIUM CHLORIDE 75 ML/HR: 0.9 INJECTION, SOLUTION INTRAVENOUS at 08:26

## 2018-07-12 RX ADMIN — ROCURONIUM BROMIDE 10 MG: 10 INJECTION, SOLUTION INTRAVENOUS at 15:57

## 2018-07-12 RX ADMIN — FENTANYL CITRATE 100 MCG: 50 INJECTION, SOLUTION INTRAMUSCULAR; INTRAVENOUS at 15:42

## 2018-07-12 RX ADMIN — LIDOCAINE HYDROCHLORIDE 20 MG: 20 INJECTION, SOLUTION EPIDURAL; INFILTRATION; INTRACAUDAL; PERINEURAL at 15:42

## 2018-07-12 RX ADMIN — GLYCOPYRROLATE 0.4 MG: 0.2 INJECTION INTRAMUSCULAR; INTRAVENOUS at 17:08

## 2018-07-12 RX ADMIN — METOPROLOL TARTRATE 100 MG: 50 TABLET ORAL at 19:43

## 2018-07-12 RX ADMIN — PROPOFOL 75 MCG/KG/MIN: 10 INJECTION, EMULSION INTRAVENOUS at 15:46

## 2018-07-12 RX ADMIN — IBUPROFEN 400 MG: 400 TABLET ORAL at 08:57

## 2018-07-12 RX ADMIN — PROPOFOL 150 MG: 10 INJECTION, EMULSION INTRAVENOUS at 15:42

## 2018-07-12 RX ADMIN — ONDANSETRON 4 MG: 2 INJECTION INTRAMUSCULAR; INTRAVENOUS at 15:49

## 2018-07-12 RX ADMIN — HYDROMORPHONE HYDROCHLORIDE 0.2 MG: 2 INJECTION, SOLUTION INTRAMUSCULAR; INTRAVENOUS; SUBCUTANEOUS at 16:23

## 2018-07-12 RX ADMIN — LIDOCAINE HYDROCHLORIDE 40 MG: 20 INJECTION, SOLUTION EPIDURAL; INFILTRATION; INTRACAUDAL; PERINEURAL at 16:12

## 2018-07-12 RX ADMIN — LIDOCAINE HYDROCHLORIDE 40 MG: 20 INJECTION, SOLUTION EPIDURAL; INFILTRATION; INTRACAUDAL; PERINEURAL at 16:23

## 2018-07-12 RX ADMIN — ASPIRIN 325 MG: 325 TABLET, DELAYED RELEASE ORAL at 19:44

## 2018-07-12 RX ADMIN — TRANEXAMIC ACID 1000 MG: 100 INJECTION, SOLUTION INTRAVENOUS at 18:23

## 2018-07-12 RX ADMIN — SUCCINYLCHOLINE CHLORIDE 140 MG: 20 INJECTION INTRAMUSCULAR; INTRAVENOUS at 15:42

## 2018-07-12 RX ADMIN — SODIUM CHLORIDE, SODIUM LACTATE, POTASSIUM CHLORIDE, CALCIUM CHLORIDE: 600; 310; 30; 20 INJECTION, SOLUTION INTRAVENOUS at 15:40

## 2018-07-12 RX ADMIN — SODIUM CHLORIDE 125 ML/HR: 900 INJECTION, SOLUTION INTRAVENOUS at 18:15

## 2018-07-12 RX ADMIN — ROCURONIUM BROMIDE 10 MG: 10 INJECTION, SOLUTION INTRAVENOUS at 15:42

## 2018-07-12 RX ADMIN — ACETAMINOPHEN 1000 MG: 500 TABLET, FILM COATED ORAL at 08:57

## 2018-07-12 RX ADMIN — ROCURONIUM BROMIDE 10 MG: 10 INJECTION, SOLUTION INTRAVENOUS at 16:33

## 2018-07-12 RX ADMIN — DEXAMETHASONE SODIUM PHOSPHATE 5 MG: 100 INJECTION INTRAMUSCULAR; INTRAVENOUS at 15:49

## 2018-07-12 RX ADMIN — HYDROMORPHONE HYDROCHLORIDE 0.4 MG: 2 INJECTION, SOLUTION INTRAMUSCULAR; INTRAVENOUS; SUBCUTANEOUS at 16:04

## 2018-07-12 RX ADMIN — Medication 2 G: at 15:48

## 2018-07-12 RX ADMIN — NEOSTIGMINE METHYLSULFATE 2 MG: 1 INJECTION INTRAVENOUS at 17:08

## 2018-07-12 NOTE — ED NOTES
Pt. Provided with mouth moistureizer at this time. Pt. In position of comfort with call bell in reach. Pt. And family updated on plan of care.

## 2018-07-12 NOTE — PERIOP NOTES
Family updated at 1800 by Keny Barakat. Information provided to the patient's daughter, Declan Robert. There were no questions or concerns expressed at the time of the update.

## 2018-07-12 NOTE — H&P
Hospitalist Admission Note NAME: Sandra Collado :  7/3/1932 MRN:  [id number] Date/Time:  2018 9:20 AM 
 
Patient PCP: Dilcia Garza NP 
______________________________________________________________________ Assessment & Plan: 
Right hip fracture, POA 
--due to mechanical fall. Ambulate independently baseline without frequent fall but notice some recent unsteadiness. --Pre-op cardiac risk assessment:  Pt evaluated using revised cardiac risk index and is felt to be low cardiovascular risk for intermediate risk surgery with a 0.4 to 1% risk for major complications based on these criteria. This risk has been discussed with the patient and family and pt wishes to proceed. Plan for surgery without further cardiac testing if this risk is acceptable per surgery and anesthesia. Further risk reduction will involve medical management of other comorbid conditions in the perioperative period. Sinus bradycardia HR 55 on EKG 
--on metoprolol. Will monitor on tele x 48 hours. Hold this morning's metoprolol dose (last took at 7pm last night). Will plan to resume postop but reduce dose from 50mg to 25mg bid. --check tsh Hypokalemia 3.2 
--Kdur 40meq x 1. Check mag HTN  
--/63. Continue amlodipine this AM reduce dose to 5mg preop to avoid hypotension. After surgery, plan to resume home dose norvasc, resume benazepril, metoprolol if HR 55 or higher and not hypotensive. Hyperlipidemia 
--continue lipitor postop Osteopenia 
--resume calcium+D postop Some memory loss not yet diagnosed with dementia --patient still driving, oriented x 3, conversant during interview Body mass index is 21.03 kg/(m^2). Code:  Discussed, full DVT prophylaxis: SCD Surrogate decision maker:   (has some dementia) and 2 daughters Subjective: CHIEF COMPLAINT:  Right hip pain HISTORY OF PRESENT ILLNESS:    
Sandra Collado is a 80 y.o.    female who fell at 4:30am when got up from bed, trying to put on slippers in the dark and lost balance and fell and had right hip pain. Did not pass out; no dizziness and SOB. Last year had problem with diarrhea but resolved. Voice raspy for a few months, no dysphagia or odynophagia. Mother had a blood clot in bowel with gangrenous bowel after hysterectomy and hemorrhoidectomy. No personal hx of DVT or PE. We were asked to admit for work up and evaluation of the above problems. Past Medical History:  
Diagnosis Date  
 HTN (hypertension) 5/27/2011  Hyperlipidemia 12/1/2011  Hypertension  OAB (overactive bladder) 2/15/2013  Other ill-defined conditions(799.89)   
 hyperlipidemia  Other ill-defined conditions(799.89)   
 bladder problems  Other ill-defined conditions(799.89)   
 back pain Past Surgical History:  
Procedure Laterality Date  COLONOSCOPY N/A 3/12/2018 COL[facility]COPY performed by Vern Carlos MD at [facility] ENDOSCOPY  COLONOSCOPY,DIAGNOSTIC  3/12/2018 [address]  HX CATARACT REMOVAL Left  HX CORNEAL TRANSPLANT Left  HX DILATION AND CURETTAGE    
 x 2  
 HX TONSILLECTOMY  1938 Social History Substance Use Topics  Smoking status: Never Smoker  Smokeless tobacco: Never Used  Alcohol use No  
 , ambulate independently Family History Problem Relation Age of Onset  Cancer Father   
  throat (smoker); mouth and gums  Heart Disease Mother Age 52  Clotting Disorder Mother  Arthritis-osteo Paternal Aunt Allergies Allergen Reactions  Codeine Nausea and Vomiting  Demerol (Pf) [Meperidine (Pf)] Nausea and Vomiting Prior to Admission medications Medication Sig Start Date End Date Taking?  Authorizing Provider  
amLODIPine (NORVASC) 10 mg tablet TAKE 1 TABLET BY MOUTH EVERY MORNING 7/12/18  Yes EVETTE Weir MD  
gluc walters/chondro walters A/vit C/Mn (GLUCOSAMINE 1500 COMPLEX PO) Take 1 Tab by mouth daily. Yes Historical Provider  
metoprolol tartrate (LOPRESSOR) 100 mg IR tablet TAKE 1/2 TABLET BY MOUTH TWICE DAILY 6/19/18  Yes Ana Laura Verduzco MD  
calcium-cholecalciferol, d3, (CALCIUM 600 + D) 600-125 mg-unit tab Take 1 Tab by mouth two (2) times a day. Yes Historical Provider MV-MIN/FA/D3/OM-3/DHA/EPA/FISH (ADULT MULTI PLUS OMEGA-3 PO) Take 2 Caps by mouth daily. Patient takes 2 gummies daily   Yes Historical Provider  
coenzyme Q-10 (CO Q-10) 200 mg capsule Take 200 mg by mouth daily. Yes Historical Provider  
benazepril (LOTENSIN) 40 mg tablet TAKE 1 TABLET BY MOUTH EVERY DAY 11/23/17  Yes EVETTE Paige MD  
atorvastatin (LIPITOR) 20 mg tablet TAKE 1 TABLET BY MOUTH DAILY 10/27/17  Yes EVETTE Paige MD  
cholecalciferol, vitamin d3, (VITAMIN D) 1,000 unit tablet Take 1,000 Units by mouth daily. Yes Dl Hand MD  
aspirin (ASPIRIN) 325 mg tablet Take 325 mg by mouth every evening. Yes Dl Hand MD  
 
REVIEW OF SYSTEMS:  POSITIVE= Bold. Negative = normal text General:  fever, chills, sweats, generalized weakness, weight loss/gain, loss of appetite Eyes:  blurred vision, eye pain, loss of vision, diplopia Ear Nose and Throat:  rhinorrhea, pharyngitis Respiratory:   cough, sputum production, SOB, wheezing, KRAFT, pleuritic pain 
Cardiology:  chest pain, palpitations, orthopnea, PND, edema, syncope Gastrointestinal:  abdominal pain, N/V, dysphagia, diarrhea, constipation, bleeding Genitourinary:  frequency, urgency, dysuria, hematuria, incontinence Muskuloskeletal :  arthralgia, myalgia Hematology:  easy bruising, bleeding, lymphadenopathy Dermatological:  rash, ulceration, pruritis Endocrine:  hot flashes or polydipsia Neurological:  headache, dizziness, confusion, focal weakness, paresthesia, memory loss, gait disturbance Psychological: anxiety, depression, agitation Objective: VITALS:   
Visit Vitals  /63  Pulse (!) 55  Temp 97.8 °F (36.6 °C)  Resp 14  Ht 5' 9\" (1.753 m)  Wt 64.6 kg (142 lb 6.7 oz)  SpO2 96%  BMI 21.03 kg/m2 Temp (24hrs), Av.8 °F (36.6 °C), Min:97.8 °F (36.6 °C), Max:97.8 °F (36.6 °C) Body mass index is 21.03 kg/(m^2). PHYSICAL EXAM: 
 
General:    Alert, cooperative, no distress, appears stated age. HEENT: Atraumatic, anicteric sclerae, pink conjunctivae No oral ulcers, mucosa moist, throat clear. Hearing intact. Neck:  Supple, symmetrical,  thyroid: non tender Lungs:   Clear to auscultation bilaterally. No Wheezing or Rhonchi. No rales. Chest wall:  No tenderness  No Accessory muscle use. Heart:   Regular  rhythm, bradycardic No  murmur   No gallop. No edema. Abdomen:   Soft, non-tender. Not distended. Bowel sounds normal. No masses Extremities: No cyanosis. No clubbing. Right leg shorter than left Skin:     Not pale Not Jaundiced  No rashes Psych:  Good insight. Not depressed. Not anxious or agitated. Neurologic: EOMs intact. No facial asymmetry. No aphasia or slurred speech. Alert and oriented X 3. Peripheral pulse: Bilateral, DP, 2+ Capillary refill:  normal 
 
IMAGING RESULTS: 
 []       I have personally reviewed the actual   []     CXR  []     CT scan CXR: 
CT : 
EKG: 
 ________________________________________________________________________ Care Plan discussed with: 
  Comments Patient Family RN Care Manager Consultant:     
________________________________________________________________________ Prophylaxis: 
GI pepcid DVT SCD  
________________________________________________________________________ Recommended Disposition:  
Home with Family HH/PT/OT/RN   
SNF/LTC y  
[facility]   
________________________________________________________________________ Code Status: 
Full Code y  
DNR/DNI   
________________________________________________________________________ TOTAL TIME:  60 minutes   Comments  
 y Reviewed previous records ______________________________________________________________________ Tana Echevarria MD 
 
 
Procedures: see electronic medical records for all procedures/Xrays and details which were not copied into this note but were reviewed prior to creation of Plan. LAB DATA REVIEWED:   
Recent Results (from the past 24 hour(s)) EKG, 12 LEAD, INITIAL Collection Time: 07/12/18  7:31 AM  
Result Value Ref Range Ventricular Rate 55 BPM  
 Atrial Rate 55 BPM  
 P-R Interval 182 ms QRS Duration 98 ms Q-T Interval 464 ms QTC Calculation (Bezet) 443 ms Calculated P Axis 57 degrees Calculated R Axis -2 degrees Calculated T Axis 78 degrees Diagnosis Sinus bradycardia Nonspecific ST and T wave abnormality When compared with ECG of 06-SEP-2017 21:00, No significant change was found CBC WITH AUTOMATED DIFF Collection Time: 07/12/18  8:47 AM  
Result Value Ref Range WBC 12.4 (H) 3.6 - 11.0 K/uL  
 RBC 4.45 3.80 - 5.20 M/uL  
 HGB 13.8 11.5 - 16.0 g/dL HCT 40.5 35.0 - 47.0 % MCV 91.0 80.0 - 99.0 FL  
 MCH 31.0 26.0 - 34.0 PG  
 MCHC 34.1 30.0 - 36.5 g/dL  
 RDW 11.9 11.5 - 14.5 % PLATELET 195 (L) 990 - 400 K/uL MPV 10.4 8.9 - 12.9 FL  
 NRBC 0.0 0  WBC ABSOLUTE NRBC 0.00 0.00 - 0.01 K/uL NEUTROPHILS 87 (H) 32 - 75 % LYMPHOCYTES 7 (L) 12 - 49 % MONOCYTES 5 5 - 13 % EOSINOPHILS 0 0 - 7 % BASOPHILS 0 0 - 1 % IMMATURE GRANULOCYTES 1 (H) 0.0 - 0.5 % ABS. NEUTROPHILS 10.8 (H) 1.8 - 8.0 K/UL  
 ABS. LYMPHOCYTES 0.8 0.8 - 3.5 K/UL  
 ABS. MONOCYTES 0.6 0.0 - 1.0 K/UL  
 ABS. EOSINOPHILS 0.1 0.0 - 0.4 K/UL  
 ABS. BASOPHILS 0.0 0.0 - 0.1 K/UL  
 ABS. IMM. GRANS. 0.1 (H) 0.00 - 0.04 K/UL  
 DF AUTOMATED PROTHROMBIN TIME + INR Collection Time: 07/12/18  8:47 AM  
Result Value Ref Range INR 1.1 0.9 - 1.1 Prothrombin time 10.7 9.0 - 11.1 sec

## 2018-07-12 NOTE — PROGRESS NOTES
Pharmacy Clarification of the Prior to Admission Medication Regimen Retrospective to the Admission Medication Reconciliation    The patient was interviewed regarding clarification of the prior to admission medication regimen. Patient's family was present in room and obtained permission from patient to discuss drug regimen with visitor(s) present. Patient was questioned regarding use of any other inhalers, topical products, over the counter medications, herbal medications, vitamin products or ophthalmic/nasal/otic medication use. Information Obtained From: Rx Query, patient, and medication list    Recommendations/Findings: The following amendments were made to the patient's active medication list on file at [facility]:     1) Additions: None      2) Removals: None      3) Changes:   calcium-cholecalciferol, d3, (CALCIUM 600 + D) 600-125 mg-unit tab (Old regimen: take by mouth BID /New regimen: 1 tab BID)   cholecalciferol, vitamin d3, (VITAMIN D) 1,000 unit tablet (Old regimen: take by mouth daily /New regimen: 1,000 units daily)   MV-MIN/FA/D3/OM-3/DHA/EPA/FISH (ADULT MULTI PLUS OMEGA-3 PO) (Old regimen: take by mouth /New regimen: 2 tab daily)    4) Pertinent Pharmacy Findings: None       PTA medication list was corrected to the following:     Prior to Admission Medications   Prescriptions Last Dose Informant Patient Reported? Taking? MV-MIN/FA/D3/OM-3/DHA/EPA/FISH (ADULT MULTI PLUS OMEGA-3 PO) 7/11/2018 at Unknown time Other Yes Yes   Sig: Take 2 Caps by mouth daily. Patient takes 2 gummies daily   amLODIPine (NORVASC) 10 mg tablet 7/11/2018 at Unknown time Other No Yes   Sig: TAKE 1 TABLET BY MOUTH EVERY MORNING   aspirin (ASPIRIN) 325 mg tablet 7/12/2018 at Unknown time Other Yes Yes   Sig: Take 325 mg by mouth every evening.    atorvastatin (LIPITOR) 20 mg tablet 7/12/2018 at Unknown time Other No Yes   Sig: TAKE 1 TABLET BY MOUTH DAILY   benazepril (LOTENSIN) 40 mg tablet 7/11/2018 at Unknown time Other No Yes   Sig: TAKE 1 TABLET BY MOUTH EVERY DAY   calcium-cholecalciferol, d3, (CALCIUM 600 + D) 600-125 mg-unit tab 7/11/2018 at Unknown time Other Yes Yes   Sig: Take 1 Tab by mouth two (2) times a day. cholecalciferol, vitamin d3, (VITAMIN D) 1,000 unit tablet 7/11/2018 at Unknown time Other Yes Yes   Sig: Take 1,000 Units by mouth daily. coenzyme Q-10 (CO Q-10) 200 mg capsule 7/11/2018 at Unknown time Other Yes Yes   Sig: Take 200 mg by mouth daily. gluc walters/chondro walters A/vit C/Mn (GLUCOSAMINE 1500 COMPLEX PO) 7/11/2018 at Unknown time Other Yes Yes   Sig: Take 1 Tab by mouth daily.    metoprolol tartrate (LOPRESSOR) 100 mg IR tablet 7/11/2018 at Unknown time Other No Yes   Sig: TAKE 1/2 TABLET BY MOUTH TWICE DAILY      Facility-Administered Medications: None          Thank you,  Diego Townsend CPhT  Medication History Pharmacy Technician

## 2018-07-12 NOTE — BRIEF OP NOTE
BRIEF OPERATIVE NOTE    Date of Procedure: 7/12/2018   Preoperative Diagnosis: Fractured Right Hip  Postoperative Diagnosis: Fractured Right Hip    Procedure(s):  Right HIP HEMIARTHROPLASTY  Surgeon(s) and Role:     * Odalys Darby MD - Primary         Surgical Assistant:      Surgical Staff:  Circ-1: Sonia Carrizales RN  Circ-Relief: Asha Barajas RN  Scrub Tech-1: [place] Kathya Deal  Surg Asst-1: Sonja Bradford  Event Time In   Incision Start 1612   Incision Close 1717     Anesthesia: General   Estimated Blood Loss: 200cc  Specimens: * No specimens in log *   Findings: as above   Complications: none  Implants:   Implant Name Type Inv.  Item Serial No.  Lot No. LRB No. Used Action   HEAD FEM LFRIC V40 28MM STD NK --  - SNA  HEAD FEM LFRIC V40 28MM STD NK --  NA [employer] HOW [id number] Right 1 Implanted   UHR Universal Head Bipolar Component   NA  [id number] Right 1 Implanted   STEM FEM SZ 4 [id number] -- ACCOLADE II V40 - SN/A   STEM FEM SZ 4 [id number] -- ACCOLADE II V40 N/A [employer] HOW [id number] Right 1 Implanted

## 2018-07-12 NOTE — ANESTHESIA POSTPROCEDURE EVALUATION
Post-Anesthesia Evaluation and Assessment    Patient: Jesusita Reid MRN: [id number]  SSN: [id number]    YOB: 1932  Age: 80 y.o. Sex: female       Cardiovascular Function/Vital Signs  Visit Vitals    /51    Pulse (!) 55    Temp 36.5 °C (97.7 °F)    Resp 9    Ht 5' 9\" (1.753 m)    Wt 64.6 kg (142 lb 6.7 oz)    SpO2 99%    BMI 21.03 kg/m2       Patient is status post general, total IV anesthesia anesthesia for Procedure(s):  Right HIP HEMIARTHROPLASTY. Nausea/Vomiting: None    Postoperative hydration reviewed and adequate. Pain:  Pain Scale 1: Numeric (0 - 10) (07/12/18 1729)  Pain Intensity 1: 0 (07/12/18 1729)   Managed    Neurological Status:   Neuro (WDL): Exceptions to WDL (07/12/18 1729)  Neuro  Neurologic State: Alert;Drowsy; Eyes open to voice (07/12/18 1729)  Orientation Level: Oriented to person;Oriented to place;Oriented to situation (07/12/18 1729)  Cognition: Follows commands (07/12/18 1729)  Speech: Clear (07/12/18 1729)  LUE Motor Response: Purposeful (07/12/18 1729)  LLE Motor Response: Purposeful (07/12/18 1729)  RUE Motor Response: Purposeful (07/12/18 1729)  RLE Motor Response: Purposeful (07/12/18 1729)   At baseline    Mental Status and Level of Consciousness: Arousable    Pulmonary Status:   O2 Device: Nasal cannula (07/12/18 1731)   Adequate oxygenation and airway patent    Complications related to anesthesia: None    Post-anesthesia assessment completed.  No concerns    Signed By: Aria Ballesteros MD     July 12, 2018

## 2018-07-12 NOTE — PERIOP NOTES
Handoff Report from Operating Room to PACU    Report received from Nelly Smith CRNA and Trisha Amezcua RN regarding Jesusita Reid. Surgeon(s):  Alyssa Morgan MD  And Procedure(s) (LRB):  Right HIP HEMIARTHROPLASTY (Right)  confirmed   with allergies, drains and dressings discussed. Anesthesia type, drugs, patient history, complications, estimated blood loss, vital signs, intake and output, and last pain medication, lines, reversal medications and temperature were reviewed.

## 2018-07-12 NOTE — PROGRESS NOTES
Primary Nurse Kirk Posey RN and Mima Louie RN performed a dual skin assessment on this patient No impairment noted  Lee score is 18

## 2018-07-12 NOTE — PERIOP NOTES
TRANSFER - OUT REPORT:    Verbal report given to Maria Del Carmen Earl RN on Nilda Zacarias  being transferred to Ortho Telemetry Unit for routine progression of care       Report consisted of patients Situation, Background, Assessment and   Recommendations(SBAR). Information from the following report(s) SBAR, Kardex, Procedure Summary, Intake/Output, MAR, Accordion, Recent Results and Cardiac Rhythm sinus rhythm/sinus bradycardia was reviewed with the receiving nurse. Opportunity for questions and clarification was provided.       Patient transported with:   Monitor  Registered Nurse  Tech   Patient chart

## 2018-07-12 NOTE — PROGRESS NOTES
Bedside and Verbal shift change report given to [address] (oncoming nurse) by Priscilla Salgado RN (offgoing nurse). Report included the following information SBAR, Kardex and ED Summary. Pt currently off floor for surgery.

## 2018-07-12 NOTE — ED PROVIDER NOTES
EMERGENCY DEPARTMENT HISTORY AND PHYSICAL EXAM      Date: 7/12/2018  Patient Name: Jorge Cheema    History of Presenting Illness     Chief Complaint   Patient presents with    Hip Pain       History Provided By: Patient    HPI: Jorge Cheema, 80 y.o. female with PMHx significant for HTN, HLD, who presents via EMS to the ED with cc of R hip pain. Pt experienced a mechanical GLF this morning around 4:30AM while trying to walk to the bathroom. She tripped while trying to put on her slippers, fell and hit her R hip against furniture. She was unable to bear weight after the event, and spent nearly an hour trying to get to the bathroom with her 's help. She denies LOC or hitting her head. Denies pain in other locations. Denies hx of previous fractures or hx of osteoporosis, but does take vitamin D and calcium supplementation. She does not want any opioids for pain at the moment. Has not eaten anything since last night. PCP: Sylvie Sinha NP    Current Facility-Administered Medications   Medication Dose Route Frequency Provider Last Rate Last Dose    acetaminophen (TYLENOL) tablet 1,000 mg  1,000 mg Oral ONCE J Luis Tao MD        ibuprofen (MOTRIN) tablet 400 mg  400 mg Oral NOW J Luis Tao MD         Current Outpatient Prescriptions   Medication Sig Dispense Refill    amLODIPine (NORVASC) 10 mg tablet TAKE 1 TABLET BY MOUTH EVERY MORNING 90 Tab 4    metoprolol tartrate (LOPRESSOR) 100 mg IR tablet TAKE 1/2 TABLET BY MOUTH TWICE DAILY 90 Tab 3    GLUC SWAN/CHONDRO SWAN A/VIT C/MN (GLUCOSAMINE 1500 COMPLEX PO) Take 1 Tab by mouth daily.  calcium-cholecalciferol, d3, (CALCIUM 600 + D) 600-125 mg-unit tab Take  by mouth two (2) times a day.  MV-MIN/FA/D3/OM-3/DHA/EPA/FISH (ADULT MULTI PLUS OMEGA-3 PO) Take  by mouth.  coenzyme Q-10 (CO Q-10) 200 mg capsule Take  by mouth daily.       benazepril (LOTENSIN) 40 mg tablet TAKE 1 TABLET BY MOUTH EVERY DAY 90 Tab 3    atorvastatin (LIPITOR) 20 mg tablet TAKE 1 TABLET BY MOUTH DAILY 90 Tab 3    cholecalciferol, vitamin d3, (VITAMIN D) 1,000 unit tablet Take  by mouth daily.  aspirin (ASPIRIN) 325 mg tablet Take 325 mg by mouth daily. Past History     Past Medical History:  Past Medical History:   Diagnosis Date    HTN (hypertension) 5/27/2011    Hyperlipidemia 12/1/2011    Hypertension     OAB (overactive bladder) 2/15/2013    Other ill-defined conditions(799.89)     hyperlipidemia    Other ill-defined conditions(799.89)     bladder problems    Other ill-defined conditions(799.89)     back pain       Past Surgical History:  Past Surgical History:   Procedure Laterality Date    COLONOSCOPY N/A 3/12/2018    COLONOSCOPY performed by Normie Dakins, MD at [facility] ENDOSCOPY    COLONOSCOPY,DIAGNOSTIC  3/12/2018         HX APPENDECTOMY  1948    HX CATARACT REMOVAL Left     HX CORNEAL TRANSPLANT Left     HX DILATION AND CURETTAGE      x 2    HX TONSILLECTOMY  1938       Family History:  Family History   Problem Relation Age of Onset    Cancer Father      throat (smoker); mouth and gums    Heart Disease Mother      Age 52   [address] [facility] Clotting Disorder Mother     Arthritis-osteo Paternal Aunt        Social History:  Social History   Substance Use Topics    Smoking status: Never Smoker    Smokeless tobacco: Never Used    Alcohol use No       Allergies: Allergies   Allergen Reactions    Codeine Nausea and Vomiting    Demerol (Pf) [Meperidine (Pf)] Nausea and Vomiting         Review of Systems   Review of Systems   Constitutional: Negative for chills, diaphoresis and fever. HENT: Negative for sore throat and voice change. Eyes: Negative for pain and visual disturbance. Respiratory: Negative for cough and shortness of breath. Cardiovascular: Negative for chest pain and leg swelling. Gastrointestinal: Negative for abdominal pain, diarrhea, nausea and vomiting. Genitourinary: Negative for dysuria.    Musculoskeletal: Positive for joint swelling. Negative for myalgias and neck stiffness. Skin: Negative for color change and rash. Neurological: Negative for dizziness, syncope, weakness and headaches. Psychiatric/Behavioral: Negative for behavioral problems and decreased concentration. Physical Exam   Physical Exam   Constitutional: She is oriented to person, place, and time. She appears well-developed and well-nourished. No distress. HENT:   Head: Normocephalic. Left Ear: External ear normal.   Eyes: Conjunctivae and EOM are normal.   Neck: Normal range of motion. Neck supple. Cardiovascular: Normal rate, regular rhythm and normal heart sounds. 2+ DP pulses bilaterally   Pulmonary/Chest: Effort normal and breath sounds normal. No respiratory distress. She has no wheezes. Abdominal: Soft. Bowel sounds are normal. She exhibits no distension. There is no tenderness. Musculoskeletal: She exhibits deformity. RLE is shortened relative to LLE. Externally rotated. Painful to palpation near the femoral head of her R hip. Pain with minimal range of motion of R hip. Neurological: She is alert and oriented to person, place, and time. No cranial nerve deficit. RLE sensation intact distally with light touch   Skin: Skin is warm and dry. No rash noted. She is not diaphoretic. Psychiatric: She has a normal mood and affect.  Her behavior is normal.       Diagnostic Study Results     Labs -     Recent Results (from the past 12 hour(s))   EKG, 12 LEAD, INITIAL    Collection Time: 07/12/18  7:31 AM   Result Value Ref Range    Ventricular Rate 55 BPM    Atrial Rate 55 BPM    P-R Interval 182 ms    QRS Duration 98 ms    Q-T Interval 464 ms    QTC Calculation (Bezet) 443 ms    Calculated P Axis 57 degrees    Calculated R Axis -2 degrees    Calculated T Axis 78 degrees    Diagnosis       Sinus bradycardia  Nonspecific ST and T wave abnormality  When compared with ECG of 06-SEP-2017 21:00,  No significant change was found     CBC WITH AUTOMATED DIFF    Collection Time: 07/12/18  8:47 AM   Result Value Ref Range    WBC 12.4 (H) 3.6 - 11.0 K/uL    RBC 4.45 3.80 - 5.20 M/uL    HGB 13.8 11.5 - 16.0 g/dL    HCT 40.5 35.0 - 47.0 %    MCV 91.0 80.0 - 99.0 FL    MCH 31.0 26.0 - 34.0 PG    MCHC 34.1 30.0 - 36.5 g/dL    RDW 11.9 11.5 - 14.5 %    PLATELET 378 (L) 988 - 400 K/uL    MPV 10.4 8.9 - 12.9 FL    NRBC 0.0 0  WBC    ABSOLUTE NRBC 0.00 0.00 - 0.01 K/uL    NEUTROPHILS 87 (H) 32 - 75 %    LYMPHOCYTES 7 (L) 12 - 49 %    MONOCYTES 5 5 - 13 %    EOSINOPHILS 0 0 - 7 %    BASOPHILS 0 0 - 1 %    IMMATURE GRANULOCYTES 1 (H) 0.0 - 0.5 %    ABS. NEUTROPHILS 10.8 (H) 1.8 - 8.0 K/UL    ABS. LYMPHOCYTES 0.8 0.8 - 3.5 K/UL    ABS. MONOCYTES 0.6 0.0 - 1.0 K/UL    ABS. EOSINOPHILS 0.1 0.0 - 0.4 K/UL    ABS. BASOPHILS 0.0 0.0 - 0.1 K/UL    ABS. IMM. GRANS. 0.1 (H) 0.00 - 0.04 K/UL    DF AUTOMATED     PROTHROMBIN TIME + INR    Collection Time: 07/12/18  8:47 AM   Result Value Ref Range    INR 1.1 0.9 - 1.1      Prothrombin time 10.7 9.0 - 11.1 sec       Radiologic Studies -   XR CHEST SNGL V   Final Result      XR HIP RT W OR WO PELV 2-3 VWS   Final Result      XR FEMUR RT 2 VS   Final Result        CT Results  (Last 48 hours)    None        CXR Results  (Last 48 hours)               07/12/18 0816  XR CHEST SNGL V Final result    Impression:  IMPRESSION: No acute cardiopulmonary process. Narrative:  EXAM:  XR CHEST SNGL V       INDICATION:   Pre-Op       COMPARISON: Chest radiograph 8/12/2016. FINDINGS: AP radiograph of the chest was obtained. No evidence of focal consolidation. Unchanged calcified granuloma in the right   lower lobe. No pleural effusion or pneumothorax. Heart size is normal.   Calcifications of the thoracic aorta. No acute osseous abnormalities. Medical Decision Making   I am the first provider for this patient.     I reviewed the vital signs, available nursing notes, past medical history, past surgical history, family history and social history. Vital Signs-Reviewed the patient's vital signs. Patient Vitals for the past 12 hrs:   Temp Pulse Resp BP SpO2   07/12/18 0708 97.8 °F (36.6 °C) (!) 55 14 161/63 96 %       EKG interpretation: (Preliminary) 7:31  Rhythm: sinus bradycardia; and regular . Rate (approx.): 55 bpm; Axis: normal; UT interval: normal; QRS interval: normal ; ST/T wave: non-specific ST changes. Written by Roxanna Gottron, ED Scribe, as dictated by Vicki Fofana DO. Records Reviewed: Nursing Notes and Old Medical Records    Provider Notes (Medical Decision Making):   Differential Dx: Femur frx, hip dislocation, contusion    80 y.o. female with PMHx significant for HTN, HLD, who presents via EMS to the ED with cc of R hip pain. She experienced a ground level fall this morning with no associated LOC, injury to the head, or other injuries. Her RLE is shortened and externally rotated, with pain to palpation and ROM - suggestive of hip frx or dislocation. Will obtain XR and likely consult orthopedics. Will control pain - she is requesting not to give narcotics at this time - will try Tylenol and ibuprofen right now. Will obtain basic labs in preparation for OR. ED Course:   Initial assessment performed. The patients presenting problems have been discussed, and they are in agreement with the care plan formulated and outlined with them. I have encouraged them to ask questions as they arise throughout their visit. 9:12 AM XR with R femoral neck frx. Pt will be admitted to hospitalist service, orthopedics already consulted and evaluated. CONSULT NOTE:   9:14 AM  Jes Van MD spoke with LIZA Bañuelos  Specialty: Orthpedics  Discussed pt's hx, disposition, and available diagnostic and imaging results. Reviewed care plans. Consultant agrees with plans as outlined. Disposition:  Admitted to hospitalists    PLAN:  1.  Admission for R femoral neck frx  Current Discharge Medication List        2. Follow-up Information     None        Return to ED if worse     Diagnosis     Clinical Impression:   1. Closed fracture of neck of right femur, initial encounter [facility])              Attending Attestation:    I personally performed a history and physical examination of the patient and discussed the management with the resident. I have found the following on physical exam:    General: NAD  HEENT: EOMI, non-icteric sclera  Chest: RRR, no m/r/g  Lungs: CTAB  Abd: nt, nd, soft, +bs  Ext: no peripheral edema, no cyanosis, +2 peripheral pulses. RLE is shortened and externally rotated  Skin: no rashes or lesions  Neuro: no focal deficits      I have reviewed the resident's note and agree with the resident's findings, including all diagnostic interpretations, treatment and plan of care, except as documented below. I was present during the key portions of separately billed procedures.     Sonia Mojica, DO

## 2018-07-12 NOTE — ED NOTES
TRANSFER - OUT REPORT:    Verbal report given to Maria Lasren (name) on Meryle Gum  being transferred to Ortho (unit) for routine progression of care       Report consisted of patients Situation, Background, Assessment and   Recommendations(SBAR). Information from the following report(s) SBAR, Kardex, ED Summary, [place] and Recent Results was reviewed with the receiving nurse. Lines:   Peripheral IV 07/12/18 Left Antecubital (Active)   Site Assessment Clean, dry, & intact 7/12/2018  8:57 AM   Phlebitis Assessment 0 7/12/2018  8:57 AM   Infiltration Assessment 0 7/12/2018  8:57 AM   Dressing Status Clean, dry, & intact 7/12/2018  8:57 AM   Dressing Type Tape;Transparent 7/12/2018  8:57 AM   Hub Color/Line Status Pink;Flushed 7/12/2018  8:57 AM   Action Taken Blood drawn 7/12/2018  8:57 AM        Opportunity for questions and clarification was provided.

## 2018-07-12 NOTE — OP NOTES
[address] REPORT    Pérez Oliver  MR#: [id number]  : 1932  ACCOUNT #: [de-identified]   DATE OF SERVICE: 2018    PREOPERATIVE DIAGNOSIS:   Right hip femoral neck fracture. POSTOPERATIVE:  Right hip femoral neck fracture. PROCEDURE PERFORMED:  Right hip hemiarthroplasty. SURGEON:  Twyla Mathews MD    ANESTHESIA:  General.    COMPLICATIONS:  None. ESTIMATED BLOOD LOSS:  200 mL. SPECIMENS REMOVED:  None. IMPLANTS:  See note    ASSISTANT:  None. INDICATIONS:  This is an 42-year-old female who fell sustaining a right hip fracture. She was admitted and cleared by the medicine service. The risks, benefits and alternatives of hemiarthroplasty were explained in detail and she agreed to proceed. TECHNIQUE:  The patient was taken to the operating room, placed on the table. General anesthetic was administered. Ancef was given preoperatively per protocol and the patient was placed under general anesthesia and placed in the lateral decubitus, secured with a hip positioner with the right side up. The right hip was prepped and draped free in the usual sterile fashion. Timeout was called. Following timeout, a standard posterior approach incision was carried out and the fascia was split and secured with a Charnley retractor. The medius and minimus were elevated off the posterior capsule, then a capsulotomy was carried out incorporating the short external rotators in one flap. Femoral neck was exposed and a neck cut was made into the head and fragment was retrieved and measured at a size 50. The femur was then sequentially broached up to accept a size 4 broach, which was trialled in place with a standard length neck, 55 bipolar head with excellent stability and good leg length reapproximation. The hip was dislocated, again.   The trial component was removed and all bone surfaces were copiously irrigated and then a final size 4 Accolade II stem was press fit into position with a 50 bipolar head with a standard neck length. The hip was stable again with good leg length reapproximation. The remaining 3 L of pulsatile lavage was irrigated through the joint. Capsule was then closed back to the trochanter using several #5 Ethibond through drill holes in the bone. The fascia was closed with interrupted #1 Vicryl, the subcutaneous with 2-0 Vicryl and the skin with staples. The wound was injected deep with 0.5% Marcaine with epinephrine, Toradol, morphine and superficially. A sterile dressing was then applied followed by an abduction pillow. The patient tolerated the procedure well and was stable to recovery room.       Cherylene Guernsey, MD RDW / NEO  D: 07/12/2018 17:54     T: 07/12/2018 19:23  JOB #: [id number]

## 2018-07-12 NOTE — IP AVS SNAPSHOT
[address] [facility] 
[phone] Patient: Isaías Galvan MRN: [id number] LEC:2/0/[birth year]01 About your hospitalization You were admitted on:  July 12, 2018 You last received care in the:  [facility] ORTHOPEDICS You were discharged on:  July 16, 2018 Why you were hospitalized Your primary diagnosis was:  Closed Right Hip Fracture (Hcc) Your diagnoses also included:  Hypertension, Bradycardia Follow-up Information Follow up With Details Comments Contact Info Lior Samuel NP   [address] [facility] 
[address] [facility] [address] 
[phone] Rachel Loza MD Schedule an appointment as soon as possible for a visit in 3 weeks  . [address] [facility] 
[phone] Discharge Orders None A check toyin indicates which time of day the medication should be taken. My Medications START taking these medications Instructions Each Dose to Equal  
 Morning Noon Evening Bedtime  
 acetaminophen 325 mg tablet Commonly known as:  TYLENOL Your last dose was: Your next dose is: Take 2 Tabs by mouth every six (6) hours as needed for Pain. 650 mg  
    
   
   
   
  
 aspirin delayed-release 325 mg tablet Replaces:  aspirin 325 mg tablet Your last dose was: Your next dose is: Take 1 Tab by mouth two (2) times a day. 325 mg  
    
   
   
   
  
 famotidine 20 mg tablet Commonly known as:  PEPCID Your last dose was: Your next dose is: Take 1 Tab by mouth two (2) times a day. 20 mg  
    
   
   
   
  
 * oxyCODONE IR 5 mg immediate release tablet Commonly known as:  Cyril Moran Your last dose was: Your next dose is: Take 1 Tab by mouth every four (4) hours as needed. Max Daily Amount: 30 mg.  
 5 mg * oxyCODONE IR 5 mg immediate release tablet Commonly known as:  Fallon Adair Your last dose was: Your next dose is: Take 0.5 Tabs by mouth every four (4) hours as needed. Max Daily Amount: 15 mg.  
 2.5 mg  
    
   
   
   
  
 polyethylene glycol 17 gram packet Commonly known as:  Nesha Look Your last dose was: Your next dose is: Take 1 Packet by mouth daily. 17 g  
    
   
   
   
  
 senna-docusate 8.6-50 mg per tablet Commonly known as:  Shital Georges Your last dose was: Your next dose is: Take 1 Tab by mouth two (2) times a day. 1 Tab * Notice: This list has 2 medication(s) that are the same as other medications prescribed for you. Read the directions carefully, and ask your doctor or other care provider to review them with you. CHANGE how you take these medications Instructions Each Dose to Equal  
 Morning Noon Evening Bedtime  
 metoprolol tartrate 25 mg tablet Commonly known as:  LOPRESSOR What changed:  See the new instructions. Your last dose was: Your next dose is: Take 1 Tab by mouth every twelve (12) hours. Hold for SBP less than 120 or HR less than 65  
 25 mg CONTINUE taking these medications Instructions Each Dose to Equal  
 Morning Noon Evening Bedtime ADULT MULTI PLUS OMEGA-3 PO Your last dose was: Your next dose is: Take 2 Caps by mouth daily. Patient takes 2 gummies daily 2 Cap  
    
   
   
   
  
 amLODIPine 10 mg tablet Commonly known as:  Josie Darting Your last dose was: Your next dose is: TAKE 1 TABLET BY MOUTH EVERY MORNING  
     
   
   
   
  
 atorvastatin 20 mg tablet Commonly known as:  LIPITOR Your last dose was: Your next dose is: TAKE 1 TABLET BY MOUTH DAILY  
     
   
   
   
  
 benazepril 40 mg tablet Commonly known as:  LOTENSIN Your last dose was: Your next dose is: TAKE 1 TABLET BY MOUTH EVERY DAY  
     
   
   
   
  
 CALCIUM 600 + D 600-125 mg-unit Tab Generic drug:  calcium-cholecalciferol (d3) Your last dose was: Your next dose is: Take 1 Tab by mouth two (2) times a day. 1 Tab  
    
   
   
   
  
 CO Q-10 200 mg capsule Generic drug:  coenzyme Q-10 Your last dose was: Your next dose is: Take 200 mg by mouth daily. 200 mg GLUCOSAMINE 1500 COMPLEX PO Your last dose was: Your next dose is: Take 1 Tab by mouth daily. 1 Tab VITAMIN D3 1,000 unit tablet Generic drug:  cholecalciferol Your last dose was: Your next dose is: Take 1,000 Units by mouth daily. 1000 Units STOP taking these medications   
 aspirin 325 mg tablet Commonly known as:  ASPIRIN Replaced by:  aspirin delayed-release 325 mg tablet Where to Get Your Medications These medications were sent to [facility]/pharmacy #[id number]- [place], [facility] [address] AT [address] [place] [address] [address] [place] [address] Phone:  [phone]  
  acetaminophen 325 mg tablet  
 famotidine 20 mg tablet  
 metoprolol tartrate 25 mg tablet  
 polyethylene glycol 17 gram packet  
 senna-docusate 8.6-50 mg per tablet Information on where to get these meds will be given to you by the nurse or doctor. ! Ask your nurse or doctor about these medications  
  aspirin delayed-release 325 mg tablet  
 oxyCODONE IR 5 mg immediate release tablet  
 oxyCODONE IR 5 mg immediate release tablet Opioid Education  Prescription Opioids: What You Need to Know: 
 
 
NAME: Bridgette Mata :  7/3/1932 MRN:  [id number] Date/Time:  2018 9:10 AM 
 
ADMIT DATE: 2018 DISCHARGE DATE: 2018 Attending Physician: Micaela Astudillo MD 
 
DISCHARGE DIAGNOSIS: 
Right hip fracture, POA 
S/P Hemiarthroplasty Sinus bradycardia HR 55 on EKG, resolved wnd to Metoprolol, dose adjusted Hypokalemia 3.2 HTN  
Hyperlipidemia Osteopenia Mild Cognitive deficiency Some memory loss not yet diagnosed with dementia Medications: Per above medication reconciliation. Pain Management: per above medications Recommended diet: Cardiac Diet Recommended activity: PT/OT Eval and Treat Wound care: See surgical/procedure care instructions Indwelling devices:  None Supplemental Oxygen: None Required Lab work: Per SNF routine Glucose management:  None Code status: Full Outside physician follow up: Follow-up Information Follow up With Details Comments Contact Info Shala Wheeler NP   [address] [facility] [address] [facility] [address] 
[phone] Cara Bellamy MD Schedule an appointment as soon as possible for a visit in 3 weeks  [address] [place] [address] [address] [facility] 
[phone] Skilled nursing facility/ SNF MD responsible for above on discharge. Information obtained by : 
I understand that if any problems occur once I am at home I am to contact my physician. I understand and acknowledge receipt of the instructions indicated above. Physician's or R.N.'s Signature                                                                  Date/Time Patient or Repres Morelia Mei Surgery: Hip Fracture Repair Surgeon:   Tani Contreras MD 
Surgery Date:  7/12/2018 ? To relieve pain: ? Use ice/gel packs. ? Put the ice pack directly over the wound, or anywhere you are hurting or swollen. ? To control pain and swelling, keep ice on regularly, especially after physical activity. ? The packs should stay cold for 3-4 hours. When it is not cold anymore, rotate with the packs in the freezer. ? Elevate your leg. This will also keep swelling down. ? Rest for at least 20 minutes between activity or exercises. ? To keep track of your pain medications, write down what you take and when you take it. ? The last dose of pain medication you got in the hospital was:    
Medication Dose Date & Time ? Choose your medications based on the pain scale below: ? To keep your pain under control, take Tylenol every 6 hours for 14 days - even if you feel like you dont need it. ? For mild to moderate pain (1-6 on pain scale), take one pain pill every 3-4 hours as needed. ? For severe pain (7-10 on pain scale), take two pain pills every 3-4 hours as needed. ? To prevent nausea, take your pain medications with food. Pain Scale ? As your pain lessens: 
 
? Slowly start taking less pain medication. You may do this by waiting longer between doses or by taking smaller doses. ? Stop using the pain medications as soon as you no longer need it, usually in 2-3 weeks. ? Aspirin ? To prevent blood clots, you will need to take Aspirin 325 mg twice a day for 30 days. ? To prevent stomach upset or bleeding: ? Take Pepcid 20 mg twice a day, or a similar home medication, while you are taking a blood thinner. ? Do not take non-steroidal anti-inflammatory (NSAID) medications (Ibuprofen, Advil, Motrin, Naproxen, etc.) OPSITE (Honeycomb dressing) ? Keep your clear, waterproof dressing in place for 5-7 days after your leave the hospital. 
  
? If you are still having drainage, you will need to change your dressing in 5-7 days. You will be given one extra dressing to use at home. ? If there is no more drainage from the wound, you may leave it open to the air. OPSITE DRESSING INSTRUCTIONS PRINEO DRESSING INSTRUCTIONS ? Celi Ngos is a type of skin glue and mesh that is keeping your wound together. ? Leave this covering alone. Do not peel it off.  
 
? Do not apply oils or lotions over it. ? You may shower with this dressing but do not soak it. Gently pat your wound dry when you get out of the shower. ? DO NOTs: 
? Do not rub your surgical wound ? Do not put lotion or oils on your wound. ? Do not take a tub bath or go swimming until your doctor says it is ok. 
 
? You may shower with this dressing over your wound. ? After showering pat the dressing dry. ? If you have staples a home health nurse will remove them in about 10 days. ? To increase and promote healing: 
 
? Stop Smoking (or at least cut back on 
     Smoking). ? Eat a well-balanced diet (high in protein 
     and vitamin C). ? If you have a poor appetite, drink Ensure, Glucerna, or CarnationInstant Breakfast for the next 30 days. ? If you are diabetic, you should control your blood  
      sugars to prevent infection and help your wound  
      to heal. 
 
 
? To prevent constipation, stay active & drink plenty of fluid. ? While using pain medications, you should also take stool softeners and laxatives, such as Pericolace and Miralax. ? If you are having too many bowel movements, then you may need  to stop taking the laxatives. ? You should have a bowel movement 3-4 days after surgery and then at least every other day while on pain medication. ? To improve your recovery, you must be active! 
 
? WEIGHT BEARING ? As Tolerated = You can put as much weight on your leg as you can tolerate while walking. ? THERAPY ? If you go to a rehab facility, physical therapy (PT) will need to work with you daily, and sometimes twice a day to teach you how to: 
 
? Get in and out of the bed ? Walk (gait training) and climb stairs ? Do exercises and gain strength ? Use a walker, crutches or cane ? You may also need to have occupational therapy (OT) work with you to help you practice: 
 
? Getting on and off the toilet ? Self-care (brushing teeth, eating, bathing, etc) ? If you go directly home, home health physical therapy will come the day after you leave the hospital.  They will visit about 4 times over the next couple of weeks to teach you how to get out of bed, to safely walk in your home, and to do your exercises. ? NO DRIVING until your surgeon tells you it is ok. 
 
? You can return to work when cleared by a physician. ? Please call your physician immediately if you have: 
 
? Constant bleeding from your wound. ? Increasing redness or swelling around your wound (Some warmth, bruising and swelling is normal). ? Change in wound drainage (increase in amount, color, or bad smell). ? Change in mental status (unusual behavior) ? Temperature over 101.5 degrees Fahrenheit ? Increased pain, swelling, or redness in the calf (back of your lower leg), thigh, ankle or foot. ? Emergency: CALL 911 if you have: 
? Shortness of breath ? Chest pain when you cough or taking a deep breath ? Please call your surgeons office at [facility] for a follow up appointment. ? You should call as soon as you get home or the next day after discharge. Ask to make an appointment in 2 weeks. OPSITE (Honeycomb dressing) ? Keep your clear, waterproof dressing in place for 5-7 days after your leave the hospital. 
  
? If you are still having drainage, you will need to change your dressing in 5-7 days. You will be given one extra dressing to use at home. ? If there is no more drainage from the wound, you may leave it open to the air. OPSITE DRESSING INSTRUCTIONS PRINEO DRESSING INSTRUCTIONS ? Edwina Hedley is a type of skin glue and mesh that is keeping your wound together. ? Leave this covering alone. Do not peel it off.  
 
? Do not apply oils or lotions over it. ? You may shower with this dressing but do not soak it. Gently pat your wound dry when you get out of the shower. ? DO NOTs: 
? Do not rub your surgical wound ? Do not put lotion or oils on your wound. ? Do not take a tub bath or go swimming until your doctor says it is ok. 
 
? You may shower with this dressing over your wound. ? After showering pat the dressing dry. ? If you have staples a home health nurse will remove them in about 10 days. ? To increase and promote healing: 
 
? Stop Smoking (or at least cut back on 
     Smoking). ? Eat a well-balanced diet (high in protein 
     and vitamin C). ? If you have a poor appetite, drink Ensure, Glucerna, or CarnationInstant Breakfast for the next 30 days. ? If you are diabetic, you should control your blood  
      sugars to prevent infection and help your wound  
      to heal. 
 
 
? To prevent constipation, stay active & drink plenty of fluid. ? While using pain medications, you should also take stool softeners and laxatives, such as Pericolace and Miralax. ? If you are having too many bowel movements, then you may need  to stop taking the laxatives. ? You should have a bowel movement 3-4 days after surgery and then at least every other day while on pain medication. ? To improve your recovery, you must be active! 
 
? WEIGHT BEARING ? {[facility] BLANK LIST:20061::\"As Tolerated = You can put as much weight on your leg as you can tolerate while walking\",\"Toe-Touch or Partial = You can let your toes touch the ground but may not put all of your weight on that leg\",\"None = you may not put any weight or pressure through that leg\"}. ? THERAPY ? If you go to a rehab facility, physical therapy (PT) will need to work with you daily, and sometimes twice a day to teach you how to: 
 
? Get in and out of the bed ? Walk (gait training) and climb stairs ? Do exercises and gain strength ? Use a walker, crutches or cane ? You may also need to have occupational therapy (OT) work with you to help you practice: 
 
? Getting on and off the toilet ? Self-care (brushing teeth, eating, bathing, etc) ? If you go directly home, home health physical therapy will come the day after you leave the hospital.  They will visit about 4 times over the next couple of weeks to teach you how to get out of bed, to safely walk in your home, and to do your exercises. ? NO DRIVING until your surgeon tells you it is ok. 
 
? You can return to work when cleared by a physician. ? Please call your physician immediately if you have: 
 
? Constant bleeding from your wound. ? Increasing redness or swelling around your wound (Some warmth, bruising and swelling is normal). ? Change in wound drainage (increase in amount, color, or bad smell). ? Change in mental status (unusual behavior) ? Temperature over 101.5 degrees Fahrenheit ? Increased pain, swelling, or redness in the calf (back of your lower leg), thigh, ankle or foot. ? Emergency: CALL 911 if you have: 
? Shortness of breath ? Chest pain when you cough or taking a deep breath ? Please call your surgeons office at {[facility] BLANK LIST:20061::\"[facility] [phone]\",\"[facility] [phone]\",\" [phone]\"} for a follow up appointment. ? You should call as soon as you get home or the next day after discharge. Ask to make an appointment in 2 weeks. [employer] Announcement We are excited to announce that we are making your provider's discharge notes available to you in [employer]. You will see these notes when they are completed and signed by the physician that discharged you from your recent hospital stay. If you have any questions or concerns about any information you see in [employer], please call the Health Information Department where you were seen or reach out to your Primary Care Provider for more information about your plan of care. Introducing [facility] & HEALTH SERVICES! Dear Mango Rodriguez: Thank you for requesting a [employer] account. Our records indicate that you already have an active [employer] account. You can access your account anytime at [email or web address] Did you know that you can access your hospital and ER discharge instructions at any time in [employer]? You can also review all of your test results from your hospital stay or ER visit. Additional Information If you have questions, please visit the Frequently Asked Questions section of the [employer] website at [email or web address] Remember, [employer]te is NOT to be used for urgent needs. For medical emergencies, dial 911. Now available from your iPhone and Android! Introducing Alireza Martínez As a [employer] patient, I wanted to make you aware of our electronic visit tool called Alireza Martínez. [employer] 24/7 allows you to connect within minutes with a medical provider 24 hours a day, seven days a week via a mobile device or tablet or logging into a secure website from your computer.   You can access [facility] 24/7 from anywhere in the [place]. A virtual visit might be right for you when you have a simple condition and feel like you just dont want to get out of bed, or cant get away from work for an appointment, when your regular [employer] provider is not available (evenings, weekends or holidays), or when youre out of town and need minor care. Electronic visits cost only $49 and if the [employer] 24/7 provider determines a prescription is needed to treat your condition, one can be electronically transmitted to a nearby pharmacy*. Please take a moment to enroll today if you have not already done so. The enrollment process is free and takes just a few minutes. To enroll, please download the [employer] 24/7 helder to your tablet or phone, or visit [email or web address] to enroll on your computer. And, as an [address] [place] [address] patient with a [employer] account, the results of your visits will be scanned into your electronic medical record and your primary care provider will be able to view the scanned results. We urge you to continue to see your regular [employer] provider for your ongoing medical care. And while your primary care provider may not be the one available when you seek a Ailreza Ohailisyfin virtual visit, the peace of mind you get from getting a real diagnosis real time can be priceless. For more information on Alireza Ohaijose, view our Frequently Asked Questions (FAQs) at [email or web address]. Sincerely, 
 
Mika Goldstein MD 
Chief Medical Officer Ivy Sagr[facility] *:  certain medications cannot be prescribed via Alireza Ohaijose Providers Seen During Your Hospitalization Provider Specialty Primary office phone Caden Steen DO Emergency Medicine [phone] Kole Jefferson MD Internal Medicine [phone] Your Primary Care Physician (PCP) Primary Care Physician Office Phone Office Fax Walt 6, [address] [facility] [phone] You are allergic to the following Allergen Reactions Codeine Nausea and Vomiting Demerol (Pf) (Meperidine (Pf)) Nausea and Vomiting Recent Documentation Height Weight BMI OB Status Smoking Status 1.753 m 64.2 kg 20.9 kg/m2 Postmenopausal Never Smoker Emergency Contacts Name Discharge Info Relation Home Work Mobile Newsome Ill DISCHARGE CAREGIVER [3] Spouse [3]   [phone] Supriya Musa DISCHARGE CAREGIVER [3] Child [2] [phone] Dina Yap DISCHARGE CAREGIVER [3] Child [2] [phone] Patient Belongings The following personal items are in your possession at time of discharge: 
  Dental Appliances: None  Visual Aid: Glasses, With patient          Jewelry: None  Clothing: None    Other Valuables: None  Personal Items Sent to Safe: na 
 
  
  
 Please provide this summary of care documentation to your next provider. Signatures-by signing, you are acknowledging that this After Visit Summary has been reviewed with you and you have received a copy. Patient Signature:  ____________________________________________________________ Date:  ____________________________________________________________  
  
[facility] Provider Signature:  ____________________________________________________________ Date:  ____________________________________________________________

## 2018-07-12 NOTE — CONSULTS
ORTHOPAEDIC CONSULT NOTE    Subjective:     Date of Consultation:  July 12, 2018      Martin Perez is a 80 y.o. female who is being seen for right hip fracture. Pt reports GLF early this morning. Ambulates at baseline unassisted, community ambulator. Pt. Denies head trauma/LOC during injury. CO right hip pain and inability to weight bear on the RLE. Lives with  at home, daughters x2 at bedside. Patient Active Problem List    Diagnosis Date Noted    Hypokalemia 02/28/2015    OAB (overactive bladder) 02/15/2013    Hyperlipidemia 12/01/2011    HTN (hypertension) 05/27/2011     Family History   Problem Relation Age of Onset    Cancer Father      throat (smoker); mouth and gums    Heart Disease Mother      Age 52    Clotting Disorder Mother     Arthritis-osteo Paternal Aunt       Social History   Substance Use Topics    Smoking status: Never Smoker    Smokeless tobacco: Never Used    Alcohol use No     Past Medical History:   Diagnosis Date    HTN (hypertension) 5/27/2011    Hyperlipidemia 12/1/2011    Hypertension     OAB (overactive bladder) 2/15/2013    Other ill-defined conditions(799.89)     hyperlipidemia    Other ill-defined conditions(799.89)     bladder problems    Other ill-defined conditions(799.89)     back pain      Past Surgical History:   Procedure Laterality Date    COLONOSCOPY N/A 3/12/2018    COLONOSCOPY performed by Mortimer Peaches, MD at [facility]  3/12/2018         HX APPENDECTOMY  1948    HX CATARACT REMOVAL Left     HX CORNEAL TRANSPLANT Left     HX DILATION AND CURETTAGE      x 2    HX TONSILLECTOMY  1938      Prior to Admission medications    Medication Sig Start Date End Date Taking?  Authorizing Provider   amLODIPine (NORVASC) 10 mg tablet TAKE 1 TABLET BY MOUTH EVERY MORNING 7/12/18   C Rodriguez Henderson MD   metoprolol tartrate (LOPRESSOR) 100 mg IR tablet TAKE 1/2 TABLET BY MOUTH TWICE DAILY 6/19/18   C Rodriguez Henderson MD   GLUC SWAN/CHONDRO SWAN A/VIT C/MN (GLUCOSAMINE 1500 COMPLEX PO) Take 1 Tab by mouth daily. Historical Provider   calcium-cholecalciferol, d3, (CALCIUM 600 + D) 600-125 mg-unit tab Take  by mouth two (2) times a day. Historical Provider   MV-MIN/FA/D3/OM-3/DHA/EPA/FISH (ADULT MULTI PLUS OMEGA-3 PO) Take  by mouth. Historical Provider   coenzyme Q-10 (CO Q-10) 200 mg capsule Take  by mouth daily. Historical Provider   benazepril (LOTENSIN) 40 mg tablet TAKE 1 TABLET BY MOUTH EVERY DAY 11/23/17   C Abbie Arizmendi MD   atorvastatin (LIPITOR) 20 mg tablet TAKE 1 TABLET BY MOUTH DAILY 10/27/17   C Abbie Arizmendi MD   cholecalciferol, vitamin d3, (VITAMIN D) 1,000 unit tablet Take  by mouth daily. Dl Hand MD   aspirin (ASPIRIN) 325 mg tablet Take 325 mg by mouth daily. Dl Hand MD     Current Facility-Administered Medications   Medication Dose Route Frequency    acetaminophen (TYLENOL) tablet 1,000 mg  1,000 mg Oral ONCE    ibuprofen (MOTRIN) tablet 400 mg  400 mg Oral NOW     Current Outpatient Prescriptions   Medication Sig    amLODIPine (NORVASC) 10 mg tablet TAKE 1 TABLET BY MOUTH EVERY MORNING    metoprolol tartrate (LOPRESSOR) 100 mg IR tablet TAKE 1/2 TABLET BY MOUTH TWICE DAILY    GLUC SWAN/CHONDRO SWAN A/VIT C/MN (GLUCOSAMINE 1500 COMPLEX PO) Take 1 Tab by mouth daily.  calcium-cholecalciferol, d3, (CALCIUM 600 + D) 600-125 mg-unit tab Take  by mouth two (2) times a day.  MV-MIN/FA/D3/OM-3/DHA/EPA/FISH (ADULT MULTI PLUS OMEGA-3 PO) Take  by mouth.  coenzyme Q-10 (CO Q-10) 200 mg capsule Take  by mouth daily.  benazepril (LOTENSIN) 40 mg tablet TAKE 1 TABLET BY MOUTH EVERY DAY    atorvastatin (LIPITOR) 20 mg tablet TAKE 1 TABLET BY MOUTH DAILY    cholecalciferol, vitamin d3, (VITAMIN D) 1,000 unit tablet Take  by mouth daily.  aspirin (ASPIRIN) 325 mg tablet Take 325 mg by mouth daily.       Allergies   Allergen Reactions    Codeine Nausea and Vomiting    Demerol (Pf) [Meperidine (Pf)] Nausea and Vomiting        Review of Systems:  A comprehensive review of systems was negative except for that written in the HPI. Mental Status: no dementia    Objective:     Patient Vitals for the past 8 hrs:   BP Temp Pulse Resp SpO2 Height Weight   18 0708 161/63 97.8 °F (36.6 °C) (!) 55 14 96 % 5' 9\" (1.753 m) 64.6 kg (142 lb 6.7 oz)     Temp (24hrs), Av.8 °F (36.6 °C), Min:97.8 °F (36.6 °C), Max:97.8 °F (36.6 °C)      Gen: Well-developed,  in no acute distress   HEENT: Pink conjunctivae, hearing intact to voice, moist mucous membranes   Neck: Supple  Resp: No respiratory distress   Card: RRR, palpable distal pulse-equal bilaterally, birsk cap refill all distal digits   Abd: Soft, non-distended  Musc: RLE with subtle shortening and external rotations. Active motion of the ankle/toes. 5/5 MMT of the gastroc/ant tib/EHL/FHL. No sign of  LE VTE. Skin: No skin breakdown noted. Skin warm, pink, dry  Neuro: Cranial nerves are grossly intact, no focal motor weakness, follows commands appropriately   Psych: Good insight, oriented to person, place and time, alert    Imaging Review: XR HIP RT W OR WO PELV 2-3 VWS, XR FEMUR RT 2 VS  INDICATION:   GLF, hip pain. COMPARISON: CT abdomen pelvis 2017. FINDINGS: AP view of the pelvis, and 3 views of the right hip demonstrate a  mildly displaced subcapital femoral neck fracture with mild foreshortening and  varus angulation. Osteopenia. Mild degenerative disease of the hips. Degenerative changes of the SI joints and lower lumbar spine. 2 views of the right femur demonstrate no additional distal fracture. IMPRESSION:    1. Acute displaced right subcapital femoral neck fracture.     Labs:   Recent Results (from the past 24 hour(s))   EKG, 12 LEAD, INITIAL    Collection Time: 18  7:31 AM   Result Value Ref Range    Ventricular Rate 55 BPM    Atrial Rate 55 BPM    P-R Interval 182 ms    QRS Duration 98 ms    Q-T Interval 464 ms    QTC Calculation (Bezet) 443 ms    Calculated P Axis 57 degrees    Calculated R Axis -2 degrees    Calculated T Axis 78 degrees    Diagnosis       Sinus bradycardia  Nonspecific ST and T wave abnormality  When compared with ECG of 06-SEP-2017 21:00,  No significant change was found           Impression:     Patient Active Problem List    Diagnosis Date Noted    Hypokalemia 02/28/2015    OAB (overactive bladder) 02/15/2013    Hyperlipidemia 12/01/2011    HTN (hypertension) 05/27/2011     Active Problems:    * No active hospital problems. *      Plan:     -  right femoral neck fxr- plan on OR today 7/12 for hemiarthroplasty  -  Bedrest, NPO  -  Medicine for Pre-Operative Clearence/ Post-Operative management.     -  DVT Prophylaxis SCDs  -  D/C planning SNF/HH/Rehab       Dr. Arana aware and agrees with plan as above.         Priscilla Lobo PA-C  [employer]

## 2018-07-12 NOTE — ED NOTES
Bedside shift change report given to Alta Dillon RN (oncoming nurse) by Kenny Montoya RN (offgoing nurse). Report included the following information SBAR, Kardex, ED Summary, Intake/Output, MAR and Recent Results.

## 2018-07-12 NOTE — PERIOP NOTES
TRANSFER - IN REPORT:    Verbal report received from Radha Jenkins RN(name) on Meryle Gum  being received from ER Room 16(unit) for ordered procedure      Report consisted of patients Situation, Background, Assessment and   Recommendations(SBAR). Information from the following report(s) SBAR, Kardex, Intake/Output, MAR, Recent Results and Med Rec Status was reviewed with the receiving nurse. Opportunity for questions and clarification was provided. Assessment completed upon patients arrival to unit and care assumed.

## 2018-07-13 PROBLEM — R00.1 BRADYCARDIA: Status: ACTIVE | Noted: 2018-07-13

## 2018-07-13 LAB
ANION GAP SERPL CALC-SCNC: 9 MMOL/L (ref 5–15)
BUN SERPL-MCNC: 15 MG/DL (ref 6–20)
BUN/CREAT SERPL: 21 (ref 12–20)
CALCIUM SERPL-MCNC: 8.3 MG/DL (ref 8.5–10.1)
CHLORIDE SERPL-SCNC: 106 MMOL/L (ref 97–108)
CO2 SERPL-SCNC: 24 MMOL/L (ref 21–32)
CREAT SERPL-MCNC: 0.71 MG/DL (ref 0.55–1.02)
GLUCOSE SERPL-MCNC: 127 MG/DL (ref 65–100)
HGB BLD-MCNC: 12.3 G/DL (ref 11.5–16)
POTASSIUM SERPL-SCNC: 3.4 MMOL/L (ref 3.5–5.1)
SODIUM SERPL-SCNC: 139 MMOL/L (ref 136–145)

## 2018-07-13 PROCEDURE — 97110 THERAPEUTIC EXERCISES: CPT | Performed by: PHYSICAL THERAPIST

## 2018-07-13 PROCEDURE — 74011250637 HC RX REV CODE- 250/637: Performed by: HOSPITALIST

## 2018-07-13 PROCEDURE — 97116 GAIT TRAINING THERAPY: CPT | Performed by: PHYSICAL THERAPIST

## 2018-07-13 PROCEDURE — 65270000029 HC RM PRIVATE

## 2018-07-13 PROCEDURE — G8988 SELF CARE GOAL STATUS: HCPCS | Performed by: OCCUPATIONAL THERAPIST

## 2018-07-13 PROCEDURE — 77010033678 HC OXYGEN DAILY

## 2018-07-13 PROCEDURE — 74011250637 HC RX REV CODE- 250/637: Performed by: PHYSICIAN ASSISTANT

## 2018-07-13 PROCEDURE — 85018 HEMOGLOBIN: CPT | Performed by: ORTHOPAEDIC SURGERY

## 2018-07-13 PROCEDURE — 74011250637 HC RX REV CODE- 250/637: Performed by: ORTHOPAEDIC SURGERY

## 2018-07-13 PROCEDURE — 74011250636 HC RX REV CODE- 250/636: Performed by: ORTHOPAEDIC SURGERY

## 2018-07-13 PROCEDURE — G8979 MOBILITY GOAL STATUS: HCPCS | Performed by: PHYSICAL THERAPIST

## 2018-07-13 PROCEDURE — G8987 SELF CARE CURRENT STATUS: HCPCS | Performed by: OCCUPATIONAL THERAPIST

## 2018-07-13 PROCEDURE — 97166 OT EVAL MOD COMPLEX 45 MIN: CPT | Performed by: OCCUPATIONAL THERAPIST

## 2018-07-13 PROCEDURE — 36415 COLL VENOUS BLD VENIPUNCTURE: CPT | Performed by: ORTHOPAEDIC SURGERY

## 2018-07-13 PROCEDURE — 97161 PT EVAL LOW COMPLEX 20 MIN: CPT | Performed by: PHYSICAL THERAPIST

## 2018-07-13 PROCEDURE — G8978 MOBILITY CURRENT STATUS: HCPCS | Performed by: PHYSICAL THERAPIST

## 2018-07-13 PROCEDURE — 80048 BASIC METABOLIC PNL TOTAL CA: CPT | Performed by: ORTHOPAEDIC SURGERY

## 2018-07-13 PROCEDURE — 97535 SELF CARE MNGMENT TRAINING: CPT | Performed by: OCCUPATIONAL THERAPIST

## 2018-07-13 RX ADMIN — BENAZEPRIL HYDROCHLORIDE 40 MG: 10 TABLET, FILM COATED ORAL at 10:20

## 2018-07-13 RX ADMIN — Medication 2 G: at 01:04

## 2018-07-13 RX ADMIN — KETOROLAC TROMETHAMINE 15 MG: 30 INJECTION, SOLUTION INTRAMUSCULAR at 10:22

## 2018-07-13 RX ADMIN — METOPROLOL TARTRATE 25 MG: 25 TABLET ORAL at 10:20

## 2018-07-13 RX ADMIN — Medication 200 MG: at 10:24

## 2018-07-13 RX ADMIN — Medication 10 ML: at 17:52

## 2018-07-13 RX ADMIN — KETOROLAC TROMETHAMINE 15 MG: 30 INJECTION, SOLUTION INTRAMUSCULAR at 05:11

## 2018-07-13 RX ADMIN — ACETAMINOPHEN 650 MG: 325 TABLET ORAL at 10:21

## 2018-07-13 RX ADMIN — FAMOTIDINE 20 MG: 20 TABLET ORAL at 17:13

## 2018-07-13 RX ADMIN — ACETAMINOPHEN 650 MG: 325 TABLET ORAL at 23:07

## 2018-07-13 RX ADMIN — FAMOTIDINE 20 MG: 20 TABLET ORAL at 07:59

## 2018-07-13 RX ADMIN — ASPIRIN 325 MG: 325 TABLET, DELAYED RELEASE ORAL at 10:20

## 2018-07-13 RX ADMIN — ACETAMINOPHEN 650 MG: 325 TABLET ORAL at 17:12

## 2018-07-13 RX ADMIN — Medication 10 ML: at 23:07

## 2018-07-13 RX ADMIN — ONDANSETRON 4 MG: 2 INJECTION, SOLUTION INTRAMUSCULAR; INTRAVENOUS at 10:22

## 2018-07-13 RX ADMIN — ATORVASTATIN CALCIUM 20 MG: 20 TABLET, FILM COATED ORAL at 23:07

## 2018-07-13 RX ADMIN — ACETAMINOPHEN 650 MG: 325 TABLET ORAL at 01:01

## 2018-07-13 RX ADMIN — ACETAMINOPHEN 650 MG: 325 TABLET ORAL at 05:11

## 2018-07-13 RX ADMIN — CALCIUM CARBONATE 500 MG (1,250 MG)-VITAMIN D3 200 UNIT TABLET 1 TABLET: at 17:14

## 2018-07-13 RX ADMIN — CALCIUM CARBONATE 500 MG (1,250 MG)-VITAMIN D3 200 UNIT TABLET 1 TABLET: at 07:58

## 2018-07-13 RX ADMIN — SODIUM CHLORIDE 125 ML/HR: 900 INJECTION, SOLUTION INTRAVENOUS at 08:01

## 2018-07-13 RX ADMIN — ASPIRIN 325 MG: 325 TABLET, DELAYED RELEASE ORAL at 17:13

## 2018-07-13 RX ADMIN — KETOROLAC TROMETHAMINE 15 MG: 30 INJECTION, SOLUTION INTRAMUSCULAR at 01:04

## 2018-07-13 RX ADMIN — CALCIUM CARBONATE 500 MG (1,250 MG)-VITAMIN D3 200 UNIT TABLET 1 TABLET: at 12:42

## 2018-07-13 RX ADMIN — VITAMIN D, TAB 1000IU (100/BT) 1000 UNITS: 25 TAB at 10:20

## 2018-07-13 RX ADMIN — Medication 2 G: at 07:58

## 2018-07-13 RX ADMIN — METOPROLOL TARTRATE 25 MG: 25 TABLET ORAL at 23:07

## 2018-07-13 RX ADMIN — AMLODIPINE BESYLATE 10 MG: 5 TABLET ORAL at 10:20

## 2018-07-13 RX ADMIN — Medication 10 ML: at 05:12

## 2018-07-13 NOTE — PROGRESS NOTES
Problem: Mobility Impaired (Adult and Pediatric)  Goal: *Acute Goals and Plan of Care (Insert Text)  Physical Therapy Goals  Initiated 7/13/2018  1. Patient will move from supine to sit and sit to supine  in bed with independence within 7 day(s). 2.  Patient will transfer from bed to chair and chair to bed with supervision/set-up using the least restrictive device within 7 day(s). 3.  Patient will perform sit to stand with supervision/set-up within 7 day(s). 4.  Patient will ambulate with minimal assistance/contact guard assist for 250 feet with the least restrictive device within 7 day(s). 5.  Patient will ascend/descend 4 stairs with handrail(s) with minimal assistance/contact guard assist within 7 day(s). physical Therapy TREATMENT  Seen 1525 to 155      Patient: Reyna Mackenzie (89 y.o. female)  Date: 7/13/2018  Diagnosis: Fractured Right Hip  Closed right hip fracture (HCC)  Hypertension Closed right hip fracture (HCC)  Procedure(s) (LRB):  Right HIP HEMIARTHROPLASTY (Right) 1 Day Post-Op  Precautions: WBAT RLE, Total hip  Chart, physical therapy assessment, plan of care and goals were reviewed. ASSESSMENT:  Cleared to see for PT pm session. Did THR exercises. Reviewed the hip precautions. Min assist with bed mobility. Good sitting balance. Stood with min assist to RW. Stated that she needed to use the restroom. Ambulated to the bathroom with CGA/min assist and verbal cues for safety. Able to void, do her own hygiene. Stood at sink and washed hands. Then ambulated 150' with RW and CGA/min assist.  Up in chair at end of session. Ice packs to right hip. Wedge between LEs. Family in room during entire session. Continue to see cognitive/memoryissues. Needs SNF rehab at discharge.   Progression toward goals:  [x]      Improving appropriately and progressing toward goals  []      Improving slowly and progressing toward goals  []      Not making progress toward goals and plan of care will be adjusted     PLAN:  Patient continues to benefit from skilled intervention to address the above impairments. Continue treatment per established plan of care. Discharge Recommendations:  Skilled Nursing Facility  Further Equipment Recommendations for Discharge:  Defer to SNF rehab     SUBJECTIVE:   Patient stated It really doesn't hurt, I just had that one twinge when I was turning around in the hallway earlier.     OBJECTIVE DATA SUMMARY:   Functional Mobility Training:  Bed Mobility:  Supine to Sit: Minimum assistance  Scooting: Contact guard assistance; Additional time     Transfers:  Sit to Stand: Minimum assistance  Stand to Sit: Minimum assistance  Bed to Chair: Minimum assistance (with rolling walker)     Ambulation/Gait Training:  Distance (ft): 150 Feet (ft)  Assistive Device: Gait belt;Walker, rolling  Ambulation - Level of Assistance: Contact guard assistance;Minimal assistance  Gait Abnormalities: Decreased step clearance    Therapeutic Exercises:   Exercises performed per protocol. See morning treatment note for description. Pain:  Pain Scale 1: Numeric (0 - 10)  Pain Intensity 1: 0     Activity Tolerance: Tolerated PT session well. Please refer to the flowsheet for vital signs taken during this treatment.   After treatment:   [x] Patient left in no apparent distress sitting up in chair  [] Patient left in no apparent distress in bed  [x] Call bell left within reach  [x] Nursing notified  [x] Caregiver present  [] Bed alarm activated (not being used)    COMMUNICATION/COLLABORATION:   The patients plan of care was discussed with: Registered Nurse    David Snyder, PT  Time Calculation: 28 mins

## 2018-07-13 NOTE — PROGRESS NOTES
Bedside and Verbal shift change report given to April RN (oncoming nurse) by Nhi Grey RN (offgoing nurse). Report included the following information SBAR, Kardex, Procedure Summary, Intake/Output, MAR and Recent Results.

## 2018-07-13 NOTE — H&P
Hospitalist Progress Note NAME: Sommer Estrada :  7/3/1932 MRN:  [id number] Assessment / Plan: 
Right hip fracture, POA 
--due to mechanical fall. Ambulate independently baseline without frequent fall but notice some recent unsteadiness. For SNF, Discussed with  
 
  
Sinus bradycardia HR 55 on EKG 
--on metoprolol. Will monitor on tele x 48 hours. Hold this morning's metoprolol dose (last took at 7pm last night). Will plan to resume postop but reduce dose from 50mg to 25mg bid. --check tsh 
  
Hypokalemia 3.2 
--Kdur 40meq x 1. Check mag 
  
HTN  
--/63. Continue amlodipine this AM reduce dose to 5mg preop to avoid hypotension. After surgery, plan to resume home dose norvasc, resume benazepril, metoprolol if HR 55 or higher and not hypotensive. 
  
Hyperlipidemia 
--continue lipitor postop 
  
Osteopenia 
--resume calcium+D postop 
  
Some memory loss not yet diagnosed with dementia --patient still driving, oriented x 3, conversant during interview 
 
--Pre-op cardiac risk assessment:  Pt evaluated using revised cardiac risk index and is felt to be low cardiovascular risk for intermediate risk surgery with a 0.4 to 1% risk for major complications based on these criteria. This risk has been discussed with the patient and family and pt wishes to proceed. Plan for surgery without further cardiac testing if this risk is acceptable per surgery and anesthesia. 
  
Further risk reduction will involve medical management of other comorbid conditions in the perioperative period. 
  
Body mass index is 21.03 kg/(m^2). 
  
Code:  Discussed, full DVT prophylaxis: SCD Surrogate decision maker:   (has some dementia) and 2 daughters Subjective: Chief Complaint / Reason for Physician Visit F/u  NESTOR Valenzuela I walked to [facility] with PT Discussed with RN events overnight. Review of Systems: 
Symptom Y/N Comments  Symptom Y/N Comments Fever/Chills    Chest Pain Poor Appetite    Edema n   
Cough    Abdominal Pain n   
Sputum    Joint Pain SOB/KRAFT n   Pruritis/Rash Nausea/vomit    Tolerating PT/OT y Diarrhea n   Tolerating Diet y Constipation    Other Could NOT obtain due to:   
 
Objective: VITALS:  
Last 24hrs VS reviewed since prior progress note. Most recent are: 
Patient Vitals for the past 24 hrs: 
 Temp Pulse Resp BP SpO2  
07/13/18 1718 98.3 °F (36.8 °C) 61 20 142/57 98 % 07/13/18 1545 - (!) 53 - (!) 146/35 97 % 07/13/18 1524 - (!) 57 - 126/43 97 % 07/13/18 1154 98 °F (36.7 °C) (!) 54 16 136/56 95 % 07/13/18 1131 - (!) 57 - 136/71 94 % 07/13/18 1121 - 61 - 157/65 -  
07/13/18 1113 - 61 - 160/71 91 %  
07/13/18 1100 - (!) 55 - 154/68 -  
07/13/18 0753 98.6 °F (37 °C) (!) 53 16 140/61 95 % 07/13/18 0421 98.2 °F (36.8 °C) 84 16 140/66 -  
07/13/18 0015 98 °F (36.7 °C) (!) 56 16 142/78 98 % 07/12/18 2040 97.5 °F (36.4 °C) (!) 55 16 147/67 100 % 07/12/18 2010 97.6 °F (36.4 °C) 65 16 154/69 98 % 07/12/18 1943 98.7 °F (37.1 °C) 67 16 155/76 95 % 07/12/18 1910 98.7 °F (37.1 °C) 60 16 165/73 95 % 07/12/18 1840 97.7 °F (36.5 °C) (!) 59 16 183/73 95 % 07/12/18 1830 97.6 °F (36.4 °C) (!) 59 11 150/53 94 % 07/12/18 1815 - (!) 56 10 142/51 93 % 07/12/18 1800 - (!) 56 11 154/53 100 % 07/12/18 1745 - (!) 55 9 141/51 99 % 07/12/18 1740 - 60 10 151/49 99 % 07/12/18 1735 - 66 16 148/51 98 % 07/12/18 1731 97.7 °F (36.5 °C) 70 14 147/57 98 % 07/12/18 1730 - - - 147/57 - Intake/Output Summary (Last 24 hours) at 07/13/18 1723 Last data filed at 07/13/18 8891 Gross per 24 hour Intake          2535.41 ml Output              800 ml Net          1735.41 ml PHYSICAL EXAM: 
General: WD, WN. Alert, cooperative, no acute distress   
EENT:  EOMI. Anicteric sclerae. MMM Resp:  CTA bilaterally, no wheezing or rales. No accessory muscle use CV:  Regular  rhythm,  No edema GI:  Soft, Non distended, Non tender.  +Bowel sounds Neurologic:  Alert and oriented X 3, normal speech, Psych:   Good insight. Not anxious nor agitated Skin:  No rashes. No jaundice Reviewed most current lab test results and cultures  YES Reviewed most current radiology test results   YES Review and summation of old records today    NO Reviewed patient's current orders and MAR    YES 
PMH/SH reviewed - no change compared to H&P 
________________________________________________________________________ Care Plan discussed with: 
  Comments Patient y Family  y besidey RN Care Manager Consultant Multidiciplinary team rounds were held today with , nursing, pharmacist and clinical coordinator. Patient's plan of care was discussed; medications were reviewed and discharge planning was addressed. ________________________________________________________________________ Total NON critical care TIME:  20   Minutes Total CRITICAL CARE TIME Spent:   Minutes non procedure based Comments >50% of visit spent in counseling and coordination of care    
________________________________________________________________________ Lisa Pratt MD  
 
Procedures: see electronic medical records for all procedures/Xrays and details which were not copied into this note but were reviewed prior to creation of Plan. LABS: 
I reviewed today's most current labs and imaging studies. Pertinent labs include: 
Recent Labs  
   07/13/18 
 0512 07/12/18 
 0847 WBC   --   12.4* HGB  12.3  13.8 HCT   --   40.5 PLT   --   144* Recent Labs  
   07/13/18 
 0512  07/12/18 
 0519 NA  139  140  
K  3.4*  3.2*  
CL  106  103 CO2  24  30 GLU  127*  119* BUN  15  15 CREA  0.71  0.82 CA  8.3*  9.3 MG   --   2.0 ALB   --   4.1 TBILI   --   0.7 SGOT   --   31 ALT   --   28 INR   --   1.1 Signed: Lisa Pratt MD

## 2018-07-13 NOTE — PROGRESS NOTES
Cm met with pt, pt  and distant family relative Kamlesh Whitlock. Pt was alert and oriented and time but confused with location. Relative informed Cm that pt and pt  both have mild dementia and they do not want them home alone. Pt lives in a 1 story home with her . There are 2 steps to get inside and 3 to get downstairs tot he dining area where they spend the day. Pt is the primary . Pt has a shower stool built into the shower. Pt has not had HH or been to a SNF before. Pt pharmacy is the [facility] [phone]. Pt confirmed address and PCP. CM provided pt with list of local SNF, family requested additional time to look at [facility]. Care Management Interventions  PCP Verified by CM: Yes  Palliative Care Criteria Met (RRAT>21 & CHF Dx)?: No  Mode of Transport at Discharge:  Other (see comment) (Personal vehicle )  Transition of Care Consult (CM Consult): SNF  Physical Therapy Consult: Yes  Occupational Therapy Consult: No  Current Support Network: Lives with Spouse  Confirm Follow Up Transport: Family  Plan discussed with Pt/Family/Caregiver: Yes  Discharge Location  Discharge Placement: Skilled nursing facility      JACOB Hwang, [address] [facility]    [phone]

## 2018-07-13 NOTE — PROGRESS NOTES
Initial Nutrition Assessment:    INTERVENTIONS/RECOMMENDATIONS:   · Continue current diet  · Ensure enlive daily    ASSESSMENT:   Chart reviewed, medically noted for s/p right hip hemiarthroplasty and PMH shown below. We discussed the role that nutrition plays in healing after a fracture/surgery, specifically focusing on protein sources at each meal as well as nutrition supplements to help meet pt protein needs. She agreed to try ensure enlive. She complains of nausea this morning. She was eating well PTA with no significant weight loss. Past Medical History:   Diagnosis Date    HTN (hypertension) 5/27/2011    Hyperlipidemia 12/1/2011    Hypertension     OAB (overactive bladder) 2/15/2013    Other ill-defined conditions(799.89)     hyperlipidemia    Other ill-defined conditions(799.89)     bladder problems    Other ill-defined conditions(799.89)     back pain       Diet Order: Regular  % Eaten:  No data found.     Pertinent Medications: [x]Reviewed: [email or web address], os-alyssa, vit D3, CO Q-10, pepcid, miralax, senna-docusate,   Pertinent Labs: [x]Reviewed: K+ 3.4  Food Allergies: [x]NKFA  []Other   Last BM:   Edema:        []RUE   []LUE   []RLE   []LLE      Pressure Injury:      [] Stage I   [] Stage II   [] Stage III   [] Stage IV      Wt Readings from Last 30 Encounters:   07/12/18 64.6 kg (142 lb 6.7 oz)   05/14/18 64 kg (141 lb 3.2 oz)   03/12/18 63.2 kg (139 lb 7 oz)   12/12/17 65 kg (143 lb 3.2 oz)   09/27/17 66.5 kg (146 lb 8 oz)   09/13/17 66.1 kg (145 lb 11.2 oz)   09/08/17 68 kg (150 lb)   09/06/17 68 kg (150 lb)   08/16/17 67.1 kg (148 lb)   08/09/17 67.1 kg (148 lb)   01/16/17 66.2 kg (146 lb)   01/08/17 66.2 kg (146 lb)   12/08/16 65.3 kg (144 lb)   11/09/16 67.1 kg (148 lb)   08/12/16 67.8 kg (149 lb 6.4 oz)   08/11/16 66.7 kg (147 lb)   07/26/16 66 kg (145 lb 9.6 oz)   05/20/16 67.1 kg (148 lb)   05/05/16 67.1 kg (148 lb)   10/19/15 68 kg (150 lb)   09/04/15 68 kg (150 lb)   03/09/15 68 kg (150 lb) 02/27/15 70.3 kg (155 lb)   08/12/14 68 kg (150 lb)   03/21/14 68 kg (150 lb)   07/03/13 70.3 kg (155 lb)   05/21/13 71.2 kg (157 lb)   02/15/13 70.8 kg (156 lb)   11/02/12 71.2 kg (157 lb)   08/24/12 71.2 kg (157 lb)       Anthropometrics:   Height: 5' 9\" (175.3 cm) Weight: 64.6 kg (142 lb 6.7 oz)   IBW (%IBW):   ( ) UBW (%UBW):   (  %)   Last Weight Metrics:  Weight Loss Metrics 7/12/2018 5/14/2018 3/12/2018 12/12/2017 9/27/2017 9/13/2017 9/8/2017   Today's Wt 142 lb 6.7 oz 141 lb 3.2 oz 139 lb 7 oz 143 lb 3.2 oz 146 lb 8 oz 145 lb 11.2 oz 150 lb   BMI 21.03 kg/m2 24.24 kg/m2 23.93 kg/m2 23.11 kg/m2 23.65 kg/m2 23.52 kg/m2 22.15 kg/m2       BMI: Body mass index is 21.03 kg/(m^2). This BMI is indicative of:   []Underweight    [x]Normal    []Overweight    [] Obesity   [] Extreme Obesity (BMI>40)     Estimated Nutrition Needs (Based on):   1500 Kcals/day (BMR: 1150 x 1.3) , 65 g (1 g/kg) Protein  Carbohydrate: At Least 130 g/day  Fluids: 1500 mL/day (1ml/kcal) or per primary team    NUTRITION DIAGNOSES:   Problem:  Increased nutrient needs      Etiology: related to fracture healing      Signs/Symptoms: as evidenced by Right hip hemiarthroplasty      NUTRITION INTERVENTIONS:  Meals/Snacks: General/healthful diet   Supplements: Commercial supplement              GOAL:   consume >50% of meals and ONS in 3-5 days    LEARNING NEEDS (Diet, Food/Nutrient-Drug Interaction):    [] None Identified   [x] Identified and Education Provided/Documented   [] Identified and Pt declined/was not appropriate     Cultureal, Spiritism, OR Ethnic Dietary Needs:    [x] None Identified   [] Identified and Addressed     [x] Interdisciplinary Care Plan Reviewed/Documented    [x] Discharge Planning: General healthy diet with adequate kcal and protein to promote fracture healing      MONITORING /EVALUATION:      Food/Nutrient Intake Outcomes:  Total energy intake  Physical Signs/Symptoms Outcomes: Weight/weight change, Electrolyte and renal profile, GI    NUTRITION RISK:    [] High              [x] Moderate           []  Low  []  Minimal/Uncompromised    PT SEEN FOR:    [x]  MD Consult: []Calorie Count      []Diabetic Diet Education        []Diet Education     []Electrolyte Management     [x]General Nutrition Management and Supplements     []Management of Tube Feeding     []TPN Recommendations    []  RN Referral:  []MST score >=2     []Enteral/Parenteral Nutrition PTA     []Pregnant: Gestational DM or Multigestation     []Pressure Ulcer/Wound Care needs        []  Low BMI  []  LOS Referral       Dione Garcia RDN  Pager [phone]  Weekend Pager [phone]

## 2018-07-13 NOTE — PROGRESS NOTES
Po hip fracture. Up walking in the [place]. Pain managed  hgb stable    Patient Vitals for the past 24 hrs:   Temp Pulse Resp BP SpO2   07/13/18 1131 - - - 136/71 94 %   07/13/18 1121 - 61 - 157/65 -   07/13/18 1113 - 61 - 160/71 91 %   07/13/18 1100 - (!) 55 - 154/68 -   07/13/18 0753 98.6 °F (37 °C) (!) 53 16 140/61 95 %   07/13/18 0421 98.2 °F (36.8 °C) 84 16 140/66 -   07/13/18 0015 98 °F (36.7 °C) (!) 56 16 142/78 98 %   07/12/18 2040 97.5 °F (36.4 °C) (!) 55 16 147/67 100 %   07/12/18 2010 97.6 °F (36.4 °C) 65 16 154/69 98 %   07/12/18 1943 98.7 °F (37.1 °C) 67 16 155/76 95 %   07/12/18 1910 98.7 °F (37.1 °C) 60 16 165/73 95 %   07/12/18 1840 97.7 °F (36.5 °C) (!) 59 16 183/73 95 %   07/12/18 1830 97.6 °F (36.4 °C) (!) 59 11 150/53 94 %   07/12/18 1815 - (!) 56 10 142/51 93 %   07/12/18 1800 - (!) 56 11 154/53 100 %   07/12/18 1745 - (!) 55 9 141/51 99 %   07/12/18 1740 - 60 10 151/49 99 %   07/12/18 1735 - 66 16 148/51 98 %   07/12/18 1731 97.7 °F (36.5 °C) 70 14 147/57 98 %   07/12/18 1730 - - - 147/57 -   07/12/18 1345 98.4 °F (36.9 °C) 61 16 181/56 97 %   07/12/18 1213 98.1 °F (36.7 °C) 66 13 135/61 91 %       Dressing clean and dry\  Musculoskeletal: Mohini's sign negative in bilateral lower extremities. Calves soft, supple, non-tender upon palpation or with passive stretch.      Pt/ot  Oxy for pain  Asa for dvt

## 2018-07-13 NOTE — ROUTINE PROCESS
Bedside shift change report given to GILBERTO Garcia (oncoming nurse) by Camila Ortiz   (offgoing nurse). Report included the following information SBAR, Kardex, Procedure Summary, Intake/Output, MAR, Accordion and Recent Results.

## 2018-07-13 NOTE — PROGRESS NOTES
Problem: Mobility Impaired (Adult and Pediatric)  Goal: *Acute Goals and Plan of Care (Insert Text)  Physical Therapy Goals  Initiated 7/13/2018  1. Patient will move from supine to sit and sit to supine  in bed with independence within 7 day(s). 2.  Patient will transfer from bed to chair and chair to bed with supervision/set-up using the least restrictive device within 7 day(s). 3.  Patient will perform sit to stand with supervision/set-up within 7 day(s). 4.  Patient will ambulate with minimal assistance/contact guard assist for 250 feet with the least restrictive device within 7 day(s). 5.  Patient will ascend/descend 4 stairs with handrail(s) with minimal assistance/contact guard assist within 7 day(s). physical Therapy EVALUATION  Seen 1100 to 1137      Patient: Rosaura Villela (13 y.o. female)  Date: 7/13/2018  Primary Diagnosis: Fractured Right Hip  Closed right hip fracture (HCC)  Hypertension  Procedure(s) (LRB):  Right HIP HEMIARTHROPLASTY (Right) 1 Day Post-Op   Precautions:  WBAT RLE, Total hip    ASSESSMENT :  Based on the objective data described below, the patient presents with decreased functiional mobiltiy and gait skills s/p fall at home resulting in a right femoral neck fracture. Now with right hemiarthroplasty. Instructed in THR exercises and THR precautions. Denied any pain during session. Assisted to sitting. Good sitting balance. Walked to the bathroom. Able to void and do her own hygiene. Stood to sink and washed hands. Then ambualted 100' with RW and CGA/min assist and verbal cues. Did amazingly well. Pain is very well controlled. Up in bedside chair at end of session. Ice to right hip. Noted some cognitive/memory issues during session. Will need SNF rehab at discharge. .    Patient will benefit from skilled intervention to address the above impairments.   Patients rehabilitation potential is considered to be Good  Factors which may influence rehabilitation potential include:   []         None noted  [x]         Mental ability/status  []         Medical condition  [x]         Home/family situation and support systems  [x]         Safety awareness  []         Pain tolerance/management  []         Other:      PLAN :  Recommendations and Planned Interventions:  [x]           Bed Mobility Training             []    Neuromuscular Re-Education  [x]           Transfer Training                   []    Orthotic/Prosthetic Training  [x]           Gait Training                         []    Modalities  [x]           Therapeutic Exercises           []    Edema Management/Control  []           Therapeutic Activities            [x]    Patient and Family Training/Education  []           Other (comment):    Frequency/Duration: Patient will be followed by physical therapy  twice daily to address goals. Discharge Recommendations: [facility]  Further Equipment Recommendations for Discharge: defer to SNF rehab     SUBJECTIVE:   Patient stated It doesn't hurt.     OBJECTIVE DATA SUMMARY:   HISTORY:    Past Medical History:   Diagnosis Date    HTN (hypertension) 5/27/2011    Hyperlipidemia 12/1/2011    Hypertension     OAB (overactive bladder) 2/15/2013    Other ill-defined conditions(799.89)     hyperlipidemia    Other ill-defined conditions(799.89)     bladder problems    Other ill-defined conditions(799.89)     back pain     Past Surgical History:   Procedure Laterality Date    COLONOSCOPY N/A 3/12/2018    COLON[facility] performed [facility] Lily Garza MD at [facility]  3/12/2018         HX APPENDECTOMY  1948    HX CATARACT REMOVAL Left     HX CORNEAL TRANSPLANT Left     HX DILATION AND CURETTAGE      x 2    HX TONSILLECTOMY  1938     Prior Level of Function/Home Situation: independent ambulating without an assistive device  Personal factors and/or comorbidities impacting plan of care: memory loss    Home Situation  Home Environment: Private residence  # Steps to Enter: 1  One/Two Story Residence: One story (sunken den 2 steps)  Living Alone: No  Current DME Used/Available at Home: Shower chair (built in shower chair)  Tub or Shower Type: Shower    EXAMINATION/PRESENTATION/DECISION MAKING:   Critical Behavior:  Neurologic State: Alert  Orientation Level: Oriented X4  Cognition: Decreased attention/concentration, Decreased command following, Memory loss  Safety/Judgement: Fall prevention, Home safety (constant cues for THP)     Hearing: Auditory  Auditory Impairment: None     Skin:  Dressing on right hip C/D/I    Edema: mild right thigh    Range Of Motion:  AROM: Within functional limits  PROM: Within functional limits     Strength:    Strength: Generally decreased, functional     Tone & Sensation:   Tone: Normal  Sensation: Intact     Coordination:  Coordination: Within functional limits     Vision:   Tracking: Able to track stimulus in all quadrants w/o difficulty  Acuity: Impaired near vision; Impaired far vision (bifocal and uses magnifying glass for small print)  Functional Mobility:  Bed Mobility:  Supine to Sit: Minimum assistance  Scooting: Contact guard assistance; Additional time     Transfers:  Sit to Stand: Minimum assistance  Stand to Sit: Minimum assistance  Bed to Chair: Minimum assistance (with rolling walker)        Balance:   Sitting: Intact  Standing: Impaired  Standing - Static: Fair  Standing - Dynamic : Fair    Ambulation/Gait Training:  Distance (ft): 100 Feet (ft)  Assistive Device: Gait belt;Walker, rolling  Ambulation - Level of Assistance: Contact guard assistance;Minimal assistance  Gait Abnormalities: Decreased step clearance     Therapeutic Exercises:    Toe wiggles, ankle pumps and circles, heel slides, quad/gluteal sets and hip abduction/adduction x 10 reps with assist.    Functional Measure:    Elder Mobility Scale    9/20         EMS and G-code impairment scale:  Percentage of impairment CH  0% CI  1-19% CJ  20-39% CK  40-59% CL  60-79% CM  80-99% CN  100%   EMS Score 0-20 20 17-19 13-16 9-12 5-8 1-4 0      Scores under 10  generally these patients are dependent in mobility maneuvers; require help with  basic ADL, such as transfers, toileting and dressing. Scores between 10  13  generally these patients are borderline in terms of safe mobility and  independence in ADL i.e. they require some help with some mobility maneuvers. Scores over 14  Generally these patients are able to perform mobility maneuvers alone and safely  and are independent in basic ADL. G codes: In compliance with CMSs Claims Based Outcome Reporting, the following G-code set was chosen for this patient based on their primary functional limitation being treated: The outcome measure chosen to determine the severity of the functional limitation was the Elderly Mobility Scale Score with a score of 9/20 which was correlated with the impairment scale. ? Mobility - Walking and Moving Around:     - CURRENT STATUS: CK - 40%-59% impaired, limited or restricted    - GOAL STATUS: CJ - 20%-39% impaired, limited or restricted    - D/C STATUS:  ---------------To be determined---------------      Pain:  Right Hip: 0     Activity Tolerance: Tolerated PT evaluation/treatment session well. Please refer to the flowsheet for vital signs taken during this treatment. After treatment:   [x]         Patient left in no apparent distress sitting up in chair  []         Patient left in no apparent distress in bed  [x]         Call bell left within reach  [x]         Nursing notified  [x]         Caregiver present  [x]         Bed alarm activated    COMMUNICATION/EDUCATION:   The patients plan of care was discussed with: Occupational Therapist and Registered Nurse. [x]         Fall prevention education was provided and the patient/caregiver indicated understanding.   []         Patient/family have participated as able in goal setting and plan of care.  [x]         Patient/family agree to work toward stated goals and plan of care. []         Patient understands intent and goals of therapy, but is neutral about his/her participation. []         Patient is unable to participate in goal setting and plan of care.     Thank you for this referral.  Saritha Sierra, PT  Time Calculation: 37 mins

## 2018-07-13 NOTE — PROGRESS NOTES
Problem: Self Care Deficits Care Plan (Adult)  Goal: *Acute Goals and Plan of Care (Insert Text)  Occupational Therapy Goals:  Initiated 7/13/2018  1. Patient will perform grooming standing with supervision/set-up within 7 days. 2. Patient will perform toileting with supervision/set-up within 7 days. 3. Patient will perform lower body dressing with with hip kit adhering to precautions supervision/set-up within 7 days. 4. Patient will transfer from toilet with supervision/set-up using the least restrictive device and appropriate durable medical equipment within 7 days. Occupational Therapy EVALUATION  Patient: Sommer Estrada (82 y.o. female)  Date: 7/13/2018  Primary Diagnosis: Fractured Right Hip  Closed right hip fracture (HCC)  Hypertension  Procedure(s) (LRB):  Right HIP HEMIARTHROPLASTY (Right) 1 Day Post-Op   Precautions:   WBAT, Total hip    ASSESSMENT :  Based on the objective data described below, the patient presents with confusion and was unable to remember how many steps she has. Pt had a friend at bedside whom was able to provide more detailed info on pts home set up. Note that pt has a history of memory loss. Educated pt and her friend on THP and ADLs and issued handout. Issued and educated pt on hip kit components. Min assist overall for bed mobility and for ADL transfers with RW for support. Constant cues needed for THP during functional tasks and mobility. Pt is performing UB ADLs at a set up level and lower body ADLs at a min to max assist level. Pt will need SNF for rehab at discharge. Patient will benefit from skilled intervention to address the above impairments.   Patients rehabilitation potential is considered to be Good  Factors which may influence rehabilitation potential include:   []             None noted  [x]             Mental ability/status  []             Medical condition  []             Home/family situation and support systems  []             Safety awareness  []             Pain tolerance/management  []             Other:      PLAN :  Recommendations and Planned Interventions:  [x]               Self Care Training                  [x]        Therapeutic Activities  [x]               Functional Mobility Training    []        Cognitive Retraining  [x]               Therapeutic Exercises           []        Endurance Activities  [x]               Balance Training                   []        Neuromuscular Re-Education  []               Visual/Perceptual Training     [x]   Home Safety Training  [x]               Patient Education                 [x]        Family Training/Education  []               Other (comment):    Frequency/Duration: Patient will be followed by occupational therapy 5 times a week to address goals. Discharge Recommendations: Skilled Nursing Facility  Further Equipment Recommendations for Discharge: rolling walker     SUBJECTIVE:   Patient stated I am not sure.     OBJECTIVE DATA SUMMARY:   HISTORY:   Past Medical History:   Diagnosis Date    HTN (hypertension) 5/27/2011    Hyperlipidemia 12/1/2011    Hypertension     OAB (overactive bladder) 2/15/2013    Other ill-defined conditions(799.89)     hyperlipidemia    Other ill-defined conditions(799.89)     bladder problems    Other ill-defined conditions(799.89)     back pain     Past Surgical History:   Procedure Laterality Date    COLONOSCOPY N/A 3/12/2018    [facility]  3/12/2018         HX APPENDECTOMY  1948    HX CATARACT REMOVAL Left     HX CORNEAL TRANSPLANT Left     HX DILATION AND CURETTAGE      x 2    HX TONSILLECTOMY  1938       Prior Level of Function/Environment/Context: ambulated without assist devices; performed all ADLS and IADLs without assist  Expanded or extensive additional review of patient history:     Home Situation  Home Environment: Private residence  # Steps to Enter: 1  One/Two Story Residence: One story (sunken den 2 steps)  Living Alone: No  Current DME Used/Available at Home: Shower chair (built in shower chair)  Tub or Shower Type: Shower    Hand dominance: Right    EXAMINATION OF PERFORMANCE DEFICITS:  Cognitive/Behavioral Status:  Neurologic State: Alert  Orientation Level: Oriented X4  Cognition: Decreased attention/concentration;Decreased command following;Memory loss  Perception: Appears intact  Perseveration: Perseverates during conversation  Safety/Judgement: Fall prevention;Home safety (constant cues for THP)        Hearing: Auditory  Auditory Impairment: None    Vision/Perceptual:    Tracking: Able to track stimulus in all quadrants w/o difficulty                      Acuity: Impaired near vision; Impaired far vision (bifocal and uses magnifying glass for small print)         Range of Motion:    AROM: Within functional limits  PROM: Within functional limits                      Strength:    Strength: Generally decreased, functional                Coordination:  Coordination: Within functional limits  Fine Motor Skills-Upper: Left Intact; Right Intact    Gross Motor Skills-Upper: Left Intact; Right Intact    Tone & Sensation:    Tone: Normal  Sensation: Intact                      Balance:  Sitting: Intact  Standing: Impaired  Standing - Static: Fair  Standing - Dynamic : Fair    Functional Mobility and Transfers for ADLs:  Bed Mobility:  Supine to Sit: Minimum assistance  Scooting: Contact guard assistance; Additional time    Transfers:  Sit to Stand: Minimum assistance  Stand to Sit: Minimum assistance  Bed to Chair: Minimum assistance (with rolling walker)  Toilet Transfer : Minimum assistance    ADL Assessment:  Feeding: Independent    Oral Facial Hygiene/Grooming: Contact guard assistance (standing at sink)    Bathing:  Moderate assistance (LB)    Upper Body Dressing: Setup    Lower Body Dressing: Maximum assistance    Toileting: Minimum assistance                ADL Intervention and task modifications:    Dressing joint: Patient instructed to don/doff operative LE first/last.  Patient instructed to don all clothing while sitting prior to standing, doff all clothing to knees while standing, then sit to doff clothing off from knees to feet in order to facilitate fall prevention, pain management, and energy conservation.  Patient indicated understanding/recalled strategies with verbal and visual cues. Standing: Patient instructed to walk up to sink/counter top/surfaces, step into walker to increase safety of joint and fall prevention. Patient educated about hip anatomy verbally, educated to avoid internal rotation of RLE.  Instructed to apply concept to ADLs within the home (no reaching across body to L side, square off while using objects, slide objects along surfaces).  Patient instructed to increase amount of time standing, observe standing position during ADLs in order to increase even weight bearing through bilateral LEs in order to increase independence with ADLs.   Goal to be reached 30 days post - op, per orthopedic surgeon or per PT.  Patient indicated understanding. Issued hip kit and educated pt on all components; donned sock with sock aid (eduated not to rotate hip in) with min assist   Cognitive Retraining  Safety/Judgement: Fall prevention;Home safety (constant cues for THP)      Functional Measure:  Barthel Index:    Bathin  Bladder: 10  Bowels: 10  Groomin  Dressin  Feeding: 10  Mobility: 10  Stairs: 0  Toilet Use: 5  Transfer (Bed to Chair and Back): 10  Total: 65       Barthel and G-code impairment scale:  Percentage of impairment CH  0% CI  1-19% CJ  20-39% CK  40-59% CL  60-79% CM  80-99% CN  100%   Barthel Score 0-100 100 99-80 79-60 59-40 20-39 1-19   0   Barthel Score 0-20 20 17-19 13-16 9-12 5-8 1-4 0      The Barthel ADL Index: Guidelines  1. The index should be used as a record of what a patient does, not as a record of what a patient could do.   2. The main aim is to establish degree of independence from any help, physical or verbal, however minor and for whatever reason. 3. The need for supervision renders the patient not independent. 4. A patient's performance should be established using the best available evidence. Asking the patient, friends/relatives and nurses are the usual sources, but direct observation and common sense are also important. However direct testing is not needed. 5. Usually the patient's performance over the preceding 24-48 hours is important, but occasionally longer periods will be relevant. 6. Middle categories imply that the patient supplies over 50 per cent of the effort. 7. Use of aids to be independent is allowed. Garcia Nelson., Barthel, D.W. ([id number]). Functional evaluation: the Barthel Index. [address] [facility] (14)2. Daija Skaggs niles JARRELL Mireles, Hattie Saldaña., Cele Tse., [place], [address] [place] [address] (1999). Measuring the change indisability after inpatient rehabilitation; comparison of the responsiveness of the Barthel Index and Functional [place] Measure. Journal of Neurology, Neurosurgery, and Psychiatry, 66(4), 850-687. Gina Mendoza, N.J.A, PRASAD Ruano, & Cristian Rashid, M.A. (2004.) Assessment of post-stroke quality of life in cost-effectiveness studies: The usefulness of the Barthel Index and the EuroQoL-5D. Quality of Life Research, 13, 773-80         G codes: In compliance with CMSs Claims Based Outcome Reporting, the following G-code set was chosen for this patient based on their primary functional limitation being treated: The outcome measure chosen to determine the severity of the functional limitation was the barthel with a score of 65/100 which was correlated with the impairment scale. ?  Self Care:     - CURRENT STATUS: CJ - 20%-39% impaired, limited or restricted    - GOAL STATUS: CI - 1%-19% impaired, limited or restricted    - D/C STATUS:  ---------------To be determined--------------- Occupational Therapy Evaluation Charge Determination   History Examination Decision-Making   MEDIUM Complexity : Expanded review of history including physical, cognitive and psychosocial  history  MEDIUM Complexity : 3-5 performance deficits relating to physical, cognitive , or psychosocial skils that result in activity limitations and / or participation restrictions MEDIUM Complexity : Patient may present with comorbidities that affect occupational performnce. Miniml to moderate modification of tasks or assistance (eg, physical or verbal ) with assesment(s) is necessary to enable patient to complete evaluation       Based on the above components, the patient evaluation is determined to be of the following complexity level: MEDIUM  Pain:                    Activity Tolerance:     Please refer to the flowsheet for vital signs taken during this treatment. After treatment:   [x] Patient left in no apparent distress sitting up in chair  [] Patient left in no apparent distress in bed  [x] Call bell left within reach  [x] Nursing notified  [x] Caregiver present  [] Bed alarm activated    COMMUNICATION/EDUCATION:   The patients plan of care was discussed with: Physical Therapist, Registered Nurse and patient. [x] Home safety education was provided and the patient/caregiver indicated understanding. [x] Patient/family have participated as able in goal setting and plan of care. [x] Patient/family agree to work toward stated goals and plan of care. [] Patient understands intent and goals of therapy, but is neutral about his/her participation. [] Patient is unable to participate in goal setting and plan of care. This patients plan of care is appropriate for delegation to [facility].     Thank you for this referral.  Julianna Morgan, OTR/L  Time Calculation: 50 mins

## 2018-07-14 LAB — HGB BLD-MCNC: 10.4 G/DL (ref 11.5–16)

## 2018-07-14 PROCEDURE — 97116 GAIT TRAINING THERAPY: CPT

## 2018-07-14 PROCEDURE — 36415 COLL VENOUS BLD VENIPUNCTURE: CPT | Performed by: ORTHOPAEDIC SURGERY

## 2018-07-14 PROCEDURE — 74011250637 HC RX REV CODE- 250/637: Performed by: ORTHOPAEDIC SURGERY

## 2018-07-14 PROCEDURE — 74011250637 HC RX REV CODE- 250/637: Performed by: PHYSICIAN ASSISTANT

## 2018-07-14 PROCEDURE — 65270000029 HC RM PRIVATE

## 2018-07-14 PROCEDURE — 97530 THERAPEUTIC ACTIVITIES: CPT

## 2018-07-14 PROCEDURE — 74011250637 HC RX REV CODE- 250/637: Performed by: HOSPITALIST

## 2018-07-14 PROCEDURE — 85018 HEMOGLOBIN: CPT | Performed by: ORTHOPAEDIC SURGERY

## 2018-07-14 RX ADMIN — VITAMIN D, TAB 1000IU (100/BT) 1000 UNITS: 25 TAB at 08:18

## 2018-07-14 RX ADMIN — METOPROLOL TARTRATE 25 MG: 25 TABLET ORAL at 08:18

## 2018-07-14 RX ADMIN — Medication 10 ML: at 21:19

## 2018-07-14 RX ADMIN — ACETAMINOPHEN 650 MG: 325 TABLET ORAL at 05:16

## 2018-07-14 RX ADMIN — CALCIUM CARBONATE 500 MG (1,250 MG)-VITAMIN D3 200 UNIT TABLET 1 TABLET: at 11:58

## 2018-07-14 RX ADMIN — AMLODIPINE BESYLATE 10 MG: 5 TABLET ORAL at 08:18

## 2018-07-14 RX ADMIN — ASPIRIN 325 MG: 325 TABLET, DELAYED RELEASE ORAL at 17:02

## 2018-07-14 RX ADMIN — ASPIRIN 325 MG: 325 TABLET, DELAYED RELEASE ORAL at 08:18

## 2018-07-14 RX ADMIN — Medication 10 ML: at 05:17

## 2018-07-14 RX ADMIN — CALCIUM CARBONATE 500 MG (1,250 MG)-VITAMIN D3 200 UNIT TABLET 1 TABLET: at 08:18

## 2018-07-14 RX ADMIN — FAMOTIDINE 20 MG: 20 TABLET ORAL at 17:01

## 2018-07-14 RX ADMIN — ACETAMINOPHEN 650 MG: 325 TABLET ORAL at 17:01

## 2018-07-14 RX ADMIN — Medication 10 ML: at 14:25

## 2018-07-14 RX ADMIN — ACETAMINOPHEN 650 MG: 325 TABLET ORAL at 11:58

## 2018-07-14 RX ADMIN — DOCUSATE SODIUM AND SENNOSIDES 1 TABLET: 8.6; 5 TABLET, FILM COATED ORAL at 08:19

## 2018-07-14 RX ADMIN — FAMOTIDINE 20 MG: 20 TABLET ORAL at 08:18

## 2018-07-14 RX ADMIN — METOPROLOL TARTRATE 25 MG: 25 TABLET ORAL at 21:19

## 2018-07-14 RX ADMIN — BENAZEPRIL HYDROCHLORIDE 40 MG: 10 TABLET, FILM COATED ORAL at 08:18

## 2018-07-14 RX ADMIN — DOCUSATE SODIUM AND SENNOSIDES 1 TABLET: 8.6; 5 TABLET, FILM COATED ORAL at 17:01

## 2018-07-14 RX ADMIN — Medication 200 MG: at 08:27

## 2018-07-14 RX ADMIN — CALCIUM CARBONATE 500 MG (1,250 MG)-VITAMIN D3 200 UNIT TABLET 1 TABLET: at 17:01

## 2018-07-14 RX ADMIN — ATORVASTATIN CALCIUM 20 MG: 20 TABLET, FILM COATED ORAL at 21:19

## 2018-07-14 NOTE — ROUTINE PROCESS
Bedside shift change report given to [address] (oncoming nurse) by Camila Ortiz (offgoing nurse). Report included the following information SBAR, Kardex, Procedure Summary, Intake/Output, MAR, Accordion, Recent Results and Med Rec Status.

## 2018-07-14 NOTE — PROGRESS NOTES
Problem: Mobility Impaired (Adult and Pediatric)  Goal: *Acute Goals and Plan of Care (Insert Text)  Physical Therapy Goals  Initiated 7/13/2018  1. Patient will move from supine to sit and sit to supine  in bed with independence within 7 day(s). 2.  Patient will transfer from bed to chair and chair to bed with supervision/set-up using the least restrictive device within 7 day(s). 3.  Patient will perform sit to stand with supervision/set-up within 7 day(s). 4.  Patient will ambulate with minimal assistance/contact guard assist for 250 feet with the least restrictive device within 7 day(s). 5.  Patient will ascend/descend 4 stairs with handrail(s) with minimal assistance/contact guard assist within 7 day(s). physical Therapy TREATMENT  Patient: Morelia Mei (86 y.o. female)  Date: 7/14/2018  Diagnosis: Fractured Right Hip  Closed right hip fracture (HCC)  Hypertension Closed right hip fracture (HCC)  Procedure(s) (LRB):  Right HIP HEMIARTHROPLASTY (Right) 2 Days Post-Op  Precautions: WBAT, Total hip  Chart, physical therapy assessment, plan of care and goals were reviewed. ASSESSMENT:  Pt cleared by nurse to mobilize. Pt received in bed supine. Pt agreeable to therapy. Pt performed supine to sit at [facility] with additional time. Pt performed x2 sit to stand tranfers at min A/CGA. Pt requiring cueing for hand placement. Pt reported having to use restroom. Pt ambulated in to restroom at [facility]. Pt requiring cueing to  LLE with turning to prevent pivoting on LLE. Pt able to performed correctly. Pt ambulated 150ft with RW at [facility]. Pt requring instruction to tighten glutes when in stance to stabilize hip. Pt able to perform improving stance tolerance. Pt with very slow maximo but unable to maintain increased maximo. Pt continuing to improve mobility Pt left up in chair with nursing to get cleaned up.  Pt would benefit from SNF rehab to improved strength and safe mobility to improve independence before returning home. Progression toward goals:  [x]    Improving appropriately and progressing toward goals  []    Improving slowly and progressing toward goals  []    Not making progress toward goals and plan of care will be adjusted     PLAN:  Patient continues to benefit from skilled intervention to address the above impairments. Continue treatment per established plan of care. Discharge Recommendations:  Chris John  Further Equipment Recommendations for Discharge:  TBD by rehab     SUBJECTIVE:   Patient stated My hip really hasn't hurt much at all.     OBJECTIVE DATA SUMMARY:   Critical Behavior:  Neurologic State: Alert  Orientation Level: Oriented X4, Appropriate for age  Cognition: Appropriate decision making, Appropriate for age attention/concentration, Appropriate safety awareness, Follows commands  Safety/Judgement: Fall prevention, Home safety (constant cues for THP)  Functional Mobility Training:  Bed Mobility:     Supine to Sit: Contact guard assistance; Additional time     Scooting: Contact guard assistance; Additional time        Transfers:  Sit to Stand: Minimum assistance;Contact guard assistance  Stand to Sit: Contact guard assistance                             Balance:  Sitting: Intact  Standing: Intact; With support  Standing - Static: Good;Constant support  Standing - Dynamic : Fair  Ambulation/Gait Training:  Distance (ft): 150 Feet (ft)  Assistive Device: Gait belt;Walker, rolling  Ambulation - Level of Assistance: Contact guard assistance        Gait Abnormalities: Decreased step clearance; Antalgic        Base of Support: Narrowed  Stance: Right decreased  Speed/Kimberlyn: Slow  Step Length: Right shortened;Left shortened     Pain:  Pain Scale 1: Numeric (0 - 10)  Pain Intensity 1: 0              Activity Tolerance:   Pt moving well although very slowly. Pt still requiring x1 for assistance for safety.      After treatment:   [x]    Patient left in no apparent distress sitting up in chair  []    Patient left in no apparent distress in bed  [x]    Call bell left within reach  [x]    Nursing notified  []    Caregiver present  [x]    Bed alarm activated     COMMUNICATION/COLLABORATION:   The patients plan of care was discussed with: Registered Nurse    Leanne Honeycutt   Time Calculation: 26 mins

## 2018-07-14 NOTE — H&P
Hospitalist Progress Note NAME: Irving Mc :  7/3/1932 MRN:  [id number] Assessment / Plan: 
Right hip fracture, POA 
--due to mechanical fall. Ambulate independently baseline without frequent fall but notice some recent unsteadiness. For SNF, Discussed with  They have chosen [facility] and our [address] Will relay to CM 
 
  
Sinus bradycardia HR 55 on EKG 
--on metoprolol. Will monitor on tele x 48 hours. Hold this morning's metoprolol dose (last took at 7pm last night). Will plan to resume postop but reduce dose from 50mg to 25mg bid. --check tsh 
  
Hypokalemia 3.2 
--Kdur 40meq x 1. Check mag 
  
HTN  
--/63. Continue amlodipine this AM reduce dose to 5mg preop to avoid hypotension. After surgery, plan to resume home dose norvasc, resume benazepril, metoprolol if HR 55 or higher and not hypotensive. 
  
Hyperlipidemia 
--continue lipitor postop 
  
Osteopenia 
--resume calcium+D postop 
  
Some memory loss not yet diagnosed with dementia --patient still driving, oriented x 3, conversant during interview 
 
--Pre-op cardiac risk assessment:  Pt evaluated using revised cardiac risk index and is felt to be low cardiovascular risk for intermediate risk surgery with a 0.4 to 1% risk for major complications based on these criteria. This risk has been discussed with the patient and family and pt wishes to proceed. Plan for surgery without further cardiac testing if this risk is acceptable per surgery and anesthesia. 
  
Further risk reduction will involve medical management of other comorbid conditions in the perioperative period. 
  
Body mass index is 21.03 kg/(m^2). 
  
Code:  Discussed, full DVT prophylaxis: SCD Surrogate decision maker:   (has some dementia) and 2 daughters Subjective: Chief Complaint / Reason for Physician Visit F/u  ORIF 
7/15  My family wants me to go to [facility] or our [address] [facility] for SNF Yolis Benavides   I walked to Bathroom with PT Discussed with RN events overnight. Review of Systems: 
Symptom Y/N Comments  Symptom Y/N Comments Fever/Chills    Chest Pain Poor Appetite    Edema n   
Cough    Abdominal Pain n   
Sputum    Joint Pain SOB/KRAFT n   Pruritis/Rash Nausea/vomit    Tolerating PT/OT y Diarrhea n   Tolerating Diet y Constipation    Other Could NOT obtain due to:   
 
Objective: VITALS:  
Last 24hrs VS reviewed since prior progress note. Most recent are: 
Patient Vitals for the past 24 hrs: 
 Temp Pulse Resp BP SpO2  
07/14/18 1530 98.3 °F (36.8 °C) (!) 59 16 136/60 94 % 07/14/18 1202 98.6 °F (37 °C) (!) 53 17 (!) 127/39 92 % 07/14/18 0800 98.6 °F (37 °C) 65 16 140/61 95 % 07/14/18 0355 98.6 °F (37 °C) (!) 55 16 140/60 90 % 07/13/18 2302 97.2 °F (36.2 °C) 62 16 136/60 93 % 07/13/18 2014 98.6 °F (37 °C) 65 16 129/61 97 % 07/13/18 1718 98.3 °F (36.8 °C) 61 20 142/57 98 % Intake/Output Summary (Last 24 hours) at 07/14/18 1620 Last data filed at 07/13/18 2010 Gross per 24 hour Intake                0 ml Output              250 ml Net             -250 ml PHYSICAL EXAM: Observed patient walking in Hallways with PT then saw patient in her room General: WD, WN. Alert, cooperative, no acute distress   
EENT:  EOMI. Anicteric sclerae. MMM Resp:  CTA bilaterally, no wheezing or rales. No accessory muscle use CV:  Regular  rhythm,  No edema GI:  Soft, Non distended, Non tender.  +Bowel sounds Neurologic:  Alert and oriented X 3, normal speech, Psych:   Good insight. Not anxious nor agitated Skin:  No rashes. No jaundice Reviewed most current lab test results and cultures  YES Reviewed most current radiology test results   YES Review and summation of old records today    NO Reviewed patient's current orders and MAR    YES 
PMH/SH reviewed - no change compared to H&P 
________________________________________________________________________ Care Plan discussed with: 
  Comments Patient y Family RN  PT  
Care Manager Consultant Multidiciplinary team rounds were held today with , nursing, pharmacist and clinical coordinator. Patient's plan of care was discussed; medications were reviewed and discharge planning was addressed. ________________________________________________________________________ Total NON critical care TIME:  20   Minutes Total CRITICAL CARE TIME Spent:   Minutes non procedure based Comments >50% of visit spent in counseling and coordination of care    
________________________________________________________________________ Nicola Cruz MD  
 
Procedures: see electronic medical records for all procedures/Xrays and details which were not copied into this note but were reviewed prior to creation of Plan. LABS: 
I reviewed today's most current labs and imaging studies. Pertinent labs include: 
Recent Labs  
   07/14/18 0515 07/13/18 0512 07/12/18 
 0847 WBC   --    --   12.4* HGB  10.4*  12.3  13.8 HCT   --    --   40.5 PLT   --    --   144* Recent Labs  
   07/13/18 0512 07/12/18 
 2026 NA  139  140  
K  3.4*  3.2*  
CL  106  103 CO2  24  30 GLU  127*  119* BUN  15  15 CREA  0.71  0.82 CA  8.3*  9.3 MG   --   2.0 ALB   --   4.1 TBILI   --   0.7 SGOT   --   31 ALT   --   28 INR   --   1.1 Signed: Nicola Cruz MD

## 2018-07-14 NOTE — PROGRESS NOTES
Problem: Mobility Impaired (Adult and Pediatric)  Goal: *Acute Goals and Plan of Care (Insert Text)  Physical Therapy Goals  Initiated 7/13/2018  1. Patient will move from supine to sit and sit to supine  in bed with independence within 7 day(s). 2.  Patient will transfer from bed to chair and chair to bed with supervision/set-up using the least restrictive device within 7 day(s). 3.  Patient will perform sit to stand with supervision/set-up within 7 day(s). 4.  Patient will ambulate with minimal assistance/contact guard assist for 250 feet with the least restrictive device within 7 day(s). 5.  Patient will ascend/descend 4 stairs with handrail(s) with minimal assistance/contact guard assist within 7 day(s). physical Therapy TREATMENT  Patient: Ye Elena (54 y.o. female)  Date: 7/14/2018  Diagnosis: Fractured Right Hip  Closed right hip fracture (HCC)  Hypertension Closed right hip fracture (HCC)  Procedure(s) (LRB):  Right HIP HEMIARTHROPLASTY (Right) 2 Days Post-Op  Precautions: WBAT, Total hip  Chart, physical therapy assessment, plan of care and goals were reviewed. ASSESSMENT:  Pt cleared by nurse to mobilize. Pt received up in chair with family in room. Pt agreeable to therapy. Pt performed sit to stand transfer at [address] with cueing for hand placement. Pt ambulated 180ft with RW at [address]. Pt requiring cueing for step through gait and to relax shoulders. Pt able to correct with cueing. Pt with improved maximo with ambulation improving safety. Pt wanting to get back to bed after being up in chair all day. Pt performed sit to supine at min A for RLE. Pt with improved mobility safety this afternoon. Pt will benefit from SNF rehab to improve strength and safe mobility.    Progression toward goals:  [x]    Improving appropriately and progressing toward goals  []    Improving slowly and progressing toward goals  []    Not making progress toward goals and plan of care will be adjusted     PLAN:  Patient continues to benefit from skilled intervention to address the above impairments. Continue treatment per established plan of care. Discharge Recommendations:  Chris John  Further Equipment Recommendations for Discharge:  TBD by rehab     SUBJECTIVE:   Patient stated I have to try not to use my arms.     OBJECTIVE DATA SUMMARY:   Critical Behavior:  Neurologic State: Alert  Orientation Level: Oriented X4, Appropriate for age  Cognition: Appropriate decision making, Appropriate for age attention/concentration, Appropriate safety awareness, Follows commands  Safety/Judgement: Fall prevention, Home safety (constant cues for THP)  Functional Mobility Training:  Bed Mobility:     Supine to Sit: Contact guard assistance; Additional time  Sit to Supine: Minimum assistance  Scooting: Contact guard assistance; Additional time        Transfers:  Sit to Stand: Contact guard assistance  Stand to Sit: Contact guard assistance                             Balance:  Sitting: Intact  Standing: Intact; With support  Standing - Static: Good;Constant support  Standing - Dynamic : Fair  Ambulation/Gait Training:  Distance (ft): 180 Feet (ft)  Assistive Device: Gait belt;Walker, rolling  Ambulation - Level of Assistance: Contact guard assistance        Gait Abnormalities: Decreased step clearance        Base of Support: Narrowed  Stance: Right decreased  Speed/Kimberlyn: Pace decreased (<100 feet/min)  Step Length: Right shortened;Left shortened         Pain:  Pain Scale 1: Numeric (0 - 10)  Pain Intensity 1: 0              Activity Tolerance:   Pt with improved ambulation safety this afternoon   After treatment:   []    Patient left in no apparent distress sitting up in chair  [x]    Patient left in no apparent distress in bed  [x]    Call bell left within reach  [x]    Nursing notified  []    Caregiver present  [x]    Bed alarm activated    COMMUNICATION/COLLABORATION:   The patients plan of care was discussed with: Registered Nurse    Cristopher Mueller   Time Calculation: 19 mins

## 2018-07-15 PROCEDURE — 74011250637 HC RX REV CODE- 250/637: Performed by: HOSPITALIST

## 2018-07-15 PROCEDURE — 65270000029 HC RM PRIVATE

## 2018-07-15 PROCEDURE — 74011250637 HC RX REV CODE- 250/637: Performed by: PHYSICIAN ASSISTANT

## 2018-07-15 PROCEDURE — 74011250637 HC RX REV CODE- 250/637: Performed by: ORTHOPAEDIC SURGERY

## 2018-07-15 RX ORDER — OXYCODONE HYDROCHLORIDE 5 MG/1
5 TABLET ORAL
Qty: 30 TAB | Refills: 0 | Status: SHIPPED | OUTPATIENT
Start: 2018-07-15 | End: 2018-08-07 | Stop reason: ALTCHOICE

## 2018-07-15 RX ORDER — ASPIRIN 325 MG
325 TABLET, DELAYED RELEASE (ENTERIC COATED) ORAL 2 TIMES DAILY
Qty: 60 TAB | Refills: 0 | Status: SHIPPED | OUTPATIENT
Start: 2018-07-15 | End: 2018-09-17 | Stop reason: ALTCHOICE

## 2018-07-15 RX ADMIN — ACETAMINOPHEN 650 MG: 325 TABLET ORAL at 17:11

## 2018-07-15 RX ADMIN — Medication 200 MG: at 08:47

## 2018-07-15 RX ADMIN — ACETAMINOPHEN 650 MG: 325 TABLET ORAL at 05:42

## 2018-07-15 RX ADMIN — CALCIUM CARBONATE 500 MG (1,250 MG)-VITAMIN D3 200 UNIT TABLET 1 TABLET: at 17:11

## 2018-07-15 RX ADMIN — FAMOTIDINE 20 MG: 20 TABLET ORAL at 17:11

## 2018-07-15 RX ADMIN — CALCIUM CARBONATE 500 MG (1,250 MG)-VITAMIN D3 200 UNIT TABLET 1 TABLET: at 11:50

## 2018-07-15 RX ADMIN — ACETAMINOPHEN 650 MG: 325 TABLET ORAL at 23:18

## 2018-07-15 RX ADMIN — ACETAMINOPHEN 650 MG: 325 TABLET ORAL at 00:37

## 2018-07-15 RX ADMIN — VITAMIN D, TAB 1000IU (100/BT) 1000 UNITS: 25 TAB at 08:42

## 2018-07-15 RX ADMIN — ASPIRIN 325 MG: 325 TABLET, DELAYED RELEASE ORAL at 08:42

## 2018-07-15 RX ADMIN — FAMOTIDINE 20 MG: 20 TABLET ORAL at 08:42

## 2018-07-15 RX ADMIN — ASPIRIN 325 MG: 325 TABLET, DELAYED RELEASE ORAL at 17:11

## 2018-07-15 RX ADMIN — Medication 10 ML: at 14:00

## 2018-07-15 RX ADMIN — Medication 10 ML: at 05:42

## 2018-07-15 RX ADMIN — ATORVASTATIN CALCIUM 20 MG: 20 TABLET, FILM COATED ORAL at 22:39

## 2018-07-15 RX ADMIN — CALCIUM CARBONATE 500 MG (1,250 MG)-VITAMIN D3 200 UNIT TABLET 1 TABLET: at 08:42

## 2018-07-15 RX ADMIN — ACETAMINOPHEN 650 MG: 325 TABLET ORAL at 11:50

## 2018-07-15 RX ADMIN — DOCUSATE SODIUM AND SENNOSIDES 1 TABLET: 8.6; 5 TABLET, FILM COATED ORAL at 08:42

## 2018-07-15 RX ADMIN — Medication 10 ML: at 22:40

## 2018-07-15 RX ADMIN — AMLODIPINE BESYLATE 10 MG: 5 TABLET ORAL at 08:42

## 2018-07-15 RX ADMIN — DOCUSATE SODIUM AND SENNOSIDES 1 TABLET: 8.6; 5 TABLET, FILM COATED ORAL at 17:11

## 2018-07-15 RX ADMIN — METOPROLOL TARTRATE 25 MG: 25 TABLET ORAL at 22:40

## 2018-07-15 RX ADMIN — METOPROLOL TARTRATE 25 MG: 25 TABLET ORAL at 08:42

## 2018-07-15 RX ADMIN — BENAZEPRIL HYDROCHLORIDE 40 MG: 10 TABLET, FILM COATED ORAL at 08:43

## 2018-07-15 NOTE — PROGRESS NOTES
As per family preference CM send SNF refferals to  [facility] via [address] link and [facility] via [facility].    11.45 AM  As per request from [facility] (admission west \[facility] [place] [facility]") CM faxed pt's H&P,PT,OT and face sheet to [phone] for review. [facility] said she didn't have CC link pass code. 2.00 PM  [place] called CM and informed that pt is accepted to SNF however they are waiting for preauth from [place]. Notified MD and pt's daughter [phone]. Pt's family will transport pt upon discharge if it is appropriate.     Silvino Michael MSW  ED Case Manager   Ext [phone]

## 2018-07-15 NOTE — PROGRESS NOTES
Orthopedic NP Progress Note  Post Op day: 3 Days Post-Op    July 15, 2018 10:52 AM     Thompson Roldan    Attending Physician: Treatment Team: Attending Provider: Harvey Cordero MD; Consulting Provider: Tova Pandey MD; Consulting Provider: Junior Rowland; Care Manager: Patrizia Figueroa; Care Manager: Margo Betancourt     Vital Signs:    Patient Vitals for the past 8 hrs:   BP Temp Pulse Resp SpO2   07/15/18 0746 142/66 99 °F (37.2 °C) (!) 59 18 97 %   07/15/18 0400 154/65 98.2 °F (36.8 °C) (!) 58 18 94 %     BMI (calculated): 21 (07/12/18 0708)    Intake/Output:  07/15 0701 - 07/15 1900  In: 300 [P.O.:300]  Out: -   07/13 1901 - 07/15 0700  In: -   Out: 1150 [Urine:1150]    Pain Control:   Pain Assessment  Pain Scale 1: Numeric (0 - 10)  Pain Intensity 1: 0  Pain Location 1: Hip  Pain Orientation 1: Right  Pain Description 1: Aching  Pain Intervention(s) 1: Therapeutic presence    LAB:    Recent Labs      07/14/18   0515   HGB  10.4*     Lab Results   Component Value Date/Time    Sodium 139 07/13/2018 05:12 AM    Potassium 3.4 (L) 07/13/2018 05:12 AM    Chloride 106 07/13/2018 05:12 AM    CO2 24 07/13/2018 05:12 AM    Glucose 127 (H) 07/13/2018 05:12 AM    BUN 15 07/13/2018 05:12 AM    Creatinine 0.71 07/13/2018 05:12 AM    Calcium 8.3 (L) 07/13/2018 05:12 AM       Subjective:  Thompson Roldan is a 80 y.o. female s/p a  Procedure(s):  Right HIP HEMIARTHROPLASTY   Procedure(s):  Right HIP HEMIARTHROPLASTY. Tolerating diet. Pain is well managed      Objective: General: alert, cooperative, no distress. Neuro/Vascular: CNS Intact. Sensation stable. Brisk cap refill, + pulses UE/LE  Musculoskeletal:  +ROM UE/LE. Mohini's sign negative in bilateral lower extremities. Calves soft, supple, non-tender upon palpation or with passive stretch. Skin: Incision - clean, dry and intact. No significant erythema or swelling.     Dressing: clean, dry, and intact    Hyman - n  Drain - n       PT/OT:   Gait:  Gait  Base of Support: Narrowed  Speed/Kimberlyn: Pace decreased (<100 feet/min)  Step Length: Right shortened, Left shortened  Stance: Right decreased  Gait Abnormalities: Decreased step clearance  Ambulation - Level of Assistance: Contact guard assistance  Distance (ft): 180 Feet (ft)  Assistive Device: Gait belt, Walker, rolling                   Assessment:    s/p Procedure(s):  Right HIP HEMIARTHROPLASTY    Principal Problem:    Closed right hip fracture ([address] (7/12/2018)    Active Problems:    Hypertension (7/12/2018)      Bradycardia (7/13/2018)         Plan:     -  Continue PT/OT  -  Continue established methods of pain control  -  VTE Prophylaxes - TEDS &/or SCDs with ASA BID  -  GI Prophylaxes - pepcid        Discharge To:  SNF when accepted       Signed By: Risa Mendez NP    Orthopedic Nurse Practitioner

## 2018-07-15 NOTE — ROUTINE PROCESS
Bedside shift change report given to Yovany Cheng RN (oncoming nurse) by Blair Philippe RN (offgoing nurse). Report included the following information SBAR, MAR, Accordion, Recent Results and Med Rec Status.

## 2018-07-15 NOTE — ROUTINE PROCESS
Bedside shift change report given to GILBERTO Drake (oncoming nurse) by Camila Ortiz (offgoing nurse). Report included the following information SBAR, Kardex, Intake/Output, MAR, Accordion and Recent Results.

## 2018-07-15 NOTE — H&P
Hospitalist Progress Note NAME: Suhas Haley :  7/3/1932 MRN:  [id number] Assessment / Plan: 
Right hip fracture, POA 
--due to mechanical fall. Ambulate independently baseline without frequent fall but notice some recent unsteadiness. For SNF, Discussed with  They have chosen [facility] and our [facility]ie Leonard  have sent in referrals 
 
  
Sinus bradycardia HR 55 on EKG, resolved --on metoprolol. Will monitor on tele x 48 hours. Hold this morning's metoprolol dose (last took at 7pm last night). Will plan to resume postop but reduce dose from 50mg to 25mg bid. --check tsh 
  
Hypokalemia 3.2 
--Kdur 40meq x 1. Check mag 
  
HTN  
--/63. Continue amlodipine this AM reduce dose to 5mg preop to avoid hypotension. After surgery, plan to resume home dose norvasc, resume benazepril, metoprolol if HR 55 or higher and not hypotensive. 
  
Hyperlipidemia 
--continue lipitor postop 
  
Osteopenia 
--resume calcium+D postop 
  
Some memory loss not yet diagnosed with dementia --patient still driving, oriented x 3, conversant during interview 
 
--Pre-op cardiac risk assessment:  Pt evaluated using revised cardiac risk index and is felt to be low cardiovascular risk for intermediate risk surgery with a 0.4 to 1% risk for major complications based on these criteria. This risk has been discussed with the patient and family and pt wishes to proceed. Plan for surgery without further cardiac testing if this risk is acceptable per surgery and anesthesia. 
  
Further risk reduction will involve medical management of other comorbid conditions in the perioperative period. 
  
Body mass index is 21.03 kg/(m^2). 
  
Code:  Discussed, full DVT prophylaxis: SCD Surrogate decision maker:   (has some dementia) and 2 daughters Subjective: Chief Complaint / Reason for Physician Visit F/u  ORIF 
7/15  My family wants me to go to [facility] or our [address] SNF 
.  I walked to Bathroom with PT Discussed with RN events overnight. Review of Systems: 
Symptom Y/N Comments  Symptom Y/N Comments Fever/Chills    Chest Pain Poor Appetite    Edema n   
Cough    Abdominal Pain n   
Sputum    Joint Pain SOB/KRAFT n   Pruritis/Rash Nausea/vomit    Tolerating PT/OT y Diarrhea n   Tolerating Diet y Constipation    Other Could NOT obtain due to:   
 
Objective: VITALS:  
Last 24hrs VS reviewed since prior progress note. Most recent are: 
Patient Vitals for the past 24 hrs: 
 Temp Pulse Resp BP SpO2  
07/15/18 1213 98 °F (36.7 °C) 60 18 158/71 98 % 07/15/18 0746 99 °F (37.2 °C) (!) 59 18 142/66 97 % 07/15/18 0400 98.2 °F (36.8 °C) (!) 58 18 154/65 94 % 07/15/18 0018 98.8 °F (37.1 °C) (!) 59 16 157/68 -  
07/14/18 2015 97.9 °F (36.6 °C) 68 18 156/71 93 % 07/14/18 1530 98.3 °F (36.8 °C) (!) 59 16 136/60 94 % Intake/Output Summary (Last 24 hours) at 07/15/18 1400 Last data filed at 07/15/18 1251 Gross per 24 hour Intake              300 ml Output              950 ml Net             -650 ml PHYSICAL EXAM: Observed patient walking in Hallways with PT then saw patient in her room General: WD, WN. Alert, cooperative, no acute distress   
EENT:  EOMI. Anicteric sclerae. MMM Resp:  CTA bilaterally, no wheezing or rales. No accessory muscle use CV:  Regular  rhythm,  No edema GI:  Soft, Non distended, Non tender.  +Bowel sounds Neurologic:  Alert and oriented X 3, normal speech, Psych:   Good insight. Not anxious nor agitated Skin:  No rashes. No jaundice Reviewed most current lab test results and cultures  YES Reviewed most current radiology test results   YES Review and summation of old records today    NO Reviewed patient's current orders and MAR    YES 
PMH/SH reviewed - no change compared to H&P 
________________________________________________________________________ Care Plan discussed with: 
  Comments Patient y Family RN Care Manager Consultant Multidiciplinary team rounds were held today with , nursing, pharmacist and clinical coordinator. Patient's plan of care was discussed; medications were reviewed and discharge planning was addressed. ________________________________________________________________________ Total NON critical care TIME:  20   Minutes Total CRITICAL CARE TIME Spent:   Minutes non procedure based Comments >50% of visit spent in counseling and coordination of care    
________________________________________________________________________ Jeremiah Mohr MD  
 
Procedures: see electronic medical records for all procedures/Xrays and details which were not copied into this note but were reviewed prior to creation of Plan. LABS: 
I reviewed today's most current labs and imaging studies. Pertinent labs include: 
Recent Labs  
   07/14/18 
 0515  07/13/18 
 9191 HGB  10.4*  12.3 Recent Labs  
   07/13/18 
 0908 NA  139  
K  3.4*  
CL  106 CO2  24 GLU  127* BUN  15  
CREA  0.71 CA  8.3* Signed: Jeremiah Mohr MD

## 2018-07-16 VITALS
RESPIRATION RATE: 18 BRPM | DIASTOLIC BLOOD PRESSURE: 68 MMHG | HEART RATE: 77 BPM | HEIGHT: 69 IN | OXYGEN SATURATION: 97 % | BODY MASS INDEX: 20.96 KG/M2 | WEIGHT: 141.5 LBS | SYSTOLIC BLOOD PRESSURE: 143 MMHG | TEMPERATURE: 98.4 F

## 2018-07-16 PROCEDURE — 74011250637 HC RX REV CODE- 250/637: Performed by: HOSPITALIST

## 2018-07-16 PROCEDURE — 74011250637 HC RX REV CODE- 250/637: Performed by: ORTHOPAEDIC SURGERY

## 2018-07-16 RX ORDER — AMOXICILLIN 250 MG
1 CAPSULE ORAL 2 TIMES DAILY
Qty: 60 TAB | Refills: 0 | Status: SHIPPED | OUTPATIENT
Start: 2018-07-16 | End: 2018-09-17 | Stop reason: SDUPTHER

## 2018-07-16 RX ORDER — METOPROLOL TARTRATE 25 MG/1
25 TABLET, FILM COATED ORAL EVERY 12 HOURS
Qty: 60 TAB | Refills: 1 | Status: SHIPPED | OUTPATIENT
Start: 2018-07-16 | End: 2018-10-16 | Stop reason: SDUPTHER

## 2018-07-16 RX ORDER — POLYETHYLENE GLYCOL 3350 17 G/17G
17 POWDER, FOR SOLUTION ORAL DAILY
Qty: 30 PACKET | Refills: 0 | Status: SHIPPED | OUTPATIENT
Start: 2018-07-16 | End: 2018-11-08 | Stop reason: SDUPTHER

## 2018-07-16 RX ORDER — OXYCODONE HYDROCHLORIDE 5 MG/1
2.5 TABLET ORAL
Qty: 30 TAB | Refills: 0 | Status: SHIPPED | OUTPATIENT
Start: 2018-07-16 | End: 2018-08-07 | Stop reason: ALTCHOICE

## 2018-07-16 RX ORDER — FAMOTIDINE 20 MG/1
20 TABLET, FILM COATED ORAL 2 TIMES DAILY
Qty: 60 TAB | Refills: 0 | Status: SHIPPED | OUTPATIENT
Start: 2018-07-16 | End: 2018-09-17

## 2018-07-16 RX ORDER — ACETAMINOPHEN 325 MG/1
650 TABLET ORAL
Qty: 60 TAB | Refills: 1 | Status: SHIPPED | OUTPATIENT
Start: 2018-07-16 | End: 2018-11-15 | Stop reason: SDUPTHER

## 2018-07-16 RX ADMIN — BENAZEPRIL HYDROCHLORIDE 40 MG: 10 TABLET, FILM COATED ORAL at 10:39

## 2018-07-16 RX ADMIN — ASPIRIN 325 MG: 325 TABLET, DELAYED RELEASE ORAL at 10:39

## 2018-07-16 RX ADMIN — Medication 10 ML: at 05:41

## 2018-07-16 RX ADMIN — ACETAMINOPHEN 650 MG: 325 TABLET ORAL at 13:07

## 2018-07-16 RX ADMIN — CALCIUM CARBONATE 500 MG (1,250 MG)-VITAMIN D3 200 UNIT TABLET 1 TABLET: at 13:07

## 2018-07-16 RX ADMIN — AMLODIPINE BESYLATE 10 MG: 5 TABLET ORAL at 10:39

## 2018-07-16 RX ADMIN — VITAMIN D, TAB 1000IU (100/BT) 1000 UNITS: 25 TAB at 10:39

## 2018-07-16 RX ADMIN — METOPROLOL TARTRATE 25 MG: 25 TABLET ORAL at 10:39

## 2018-07-16 RX ADMIN — FAMOTIDINE 20 MG: 20 TABLET ORAL at 10:39

## 2018-07-16 RX ADMIN — DOCUSATE SODIUM AND SENNOSIDES 1 TABLET: 8.6; 5 TABLET, FILM COATED ORAL at 10:39

## 2018-07-16 RX ADMIN — POLYETHYLENE GLYCOL 3350 17 G: 17 POWDER, FOR SOLUTION ORAL at 10:39

## 2018-07-16 RX ADMIN — ACETAMINOPHEN 650 MG: 325 TABLET ORAL at 05:40

## 2018-07-16 RX ADMIN — CALCIUM CARBONATE 500 MG (1,250 MG)-VITAMIN D3 200 UNIT TABLET 1 TABLET: at 10:39

## 2018-07-16 NOTE — PROGRESS NOTES
*CM acknowledges discharge order*    CM called [address] [facility] re: authorization. CM left a voicemail with Bradford Teresa re: authorization and discharge plan. CM provided direct contact information. CM will continue to remain available for support, discharge planning as needed. 11:18 a.m.  CM called [facility] re: discharge and spoke with Bradford Teresa. Per Bradford Teresa, pt has been accepted and they have the authorization for pt to attend SNF. She will go to room 209B. Nursing to call report to  [phone]. Please send discharge summary, MAR and scripts with patient. CM sent referral to [facility] re: transportation. PCS on bedside chart. CM will continue to remain available for support, discharge planning as needed. 11:58 a.m.  CM received confirmation from [facility] for a 13:30 . CM informed pt, pt family and nursing staff. CM will continue to remain available for support, discharge planning as needed. Care Management Interventions  PCP Verified by CM: Yes  Palliative Care Criteria Met (RRAT>21 & CHF Dx)?: No  Mode of Transport at Discharge:  Other (see comment) (Personal vehicle )  Transition of Care Consult (CM Consult): SNF  Physical Therapy Consult: Yes  Occupational Therapy Consult: No  Current Support Network: Lives with Spouse  Confirm Follow Up Transport: Family  Plan discussed with Pt/Family/Caregiver: Yes  Discharge Location  Discharge Placement: Skilled nursing facility    JACOB Connors  [facility] Manager  [phone]

## 2018-07-16 NOTE — PROGRESS NOTES
9204 [facility] report to [place] LPN at [place], questions answered. AVS, MAR printed, IV removed. Provided callback number [phone].    [id number] Patient discharged via ambulance by [facility]

## 2018-07-16 NOTE — DISCHARGE SUMMARY
Hospitalist Discharge Summary     Patient ID:  Rosaura Villela  [id number]  80 y.o.  7/3/1932    PCP on record: Domi Mejia NP    Admit date: 7/12/2018  Discharge date and time: 7/16/2018      DISCHARGE DIAGNOSIS:    Right hip fracture, POA  S/P Hemiarthroplasty  Sinus bradycardia HR 55 on EKG, resolved wnd to Metoprolol, dose adjusted  Hypokalemia 3.2  HTN   Hyperlipidemia  Osteopenia  Mild Cognitive deficiency  Some memory loss not yet diagnosed with dementia        CONSULTATIONS:  IP CONSULT TO ORTHOPEDIC SURGERY    Excerpted HPI from H&P of Christa Flores MD:  Rosaura Villela is a 80 y.o.  female who fell at 4:30am when got up from bed, trying to put on slippers in the dark and lost balance and fell and had right hip pain. Did not pass out; no dizziness and SOB.      Last year had problem with diarrhea but resolved. Voice raspy for a few months, no dysphagia or odynophagia. Mother had a blood clot in bowel with gangrenous bowel after hysterectomy and hemorrhoidectomy. No personal hx of DVT or PE. We were asked to admit for work up and evaluation of the above problems. ______________________________________________________________________  DISCHARGE SUMMARY/HOSPITAL COURSE:  for full details see H&P, daily progress notes, labs, consult notes. Right hip fracture, POA  --due to mechanical fall.  Ambulate independently baseline without frequent fall but notice some recent unsteadiness. For SNF, Discussed with   They have chosen [facility] and our [address] [place] [address]  For snf Choices  CM have sent in referrals         Sinus bradycardia HR 55 on EKG, resolved wnd to Metoprolol, dose adjusted  --on metoprolol.  Will monitor on tele x 48 hours.  Hold this morning's metoprolol dose (last took at 7pm last night).  Will plan to resume postop but reduce dose from 50mg to 25mg bid.   --check tsh      Hypokalemia 3.2  --Kdur 40meq x 1.  Check mag      HTN   --/63.  Continue amlodipine this AM reduce dose to 5mg preop to avoid hypotension.  After surgery, plan to resume home dose norvasc, resume benazepril, metoprolol if HR 55 or higher and not hypotensive.      Hyperlipidemia  --continue lipitor postop      Osteopenia  --resume calcium+D postop      Mild Cognitive deficiency  Some memory loss not yet diagnosed with dementia  --patient still driving, oriented x 3, conversant during interview     --Pre-op cardiac risk assessment:  Pt evaluated using revised cardiac risk index and is felt to be low cardiovascular risk for intermediate risk surgery with a 0.4 to 1% risk for major complications based on these criteria.  This risk has been discussed with the patient and family and pt wishes to proceed. Plan for surgery without further cardiac testing if this risk is acceptable per surgery and anesthesia.      Further risk reduction will involve medical management of other comorbid conditions in the perioperative period.      Body mass index is 21.03 kg/(m^2).     Code:  Discussed, full  DVT prophylaxis: SCD  Surrogate decision maker: Brenda Mendoza (has some dementia) and 2 daughters      _______________________________________________________________________  Patient seen and examined by me on discharge day. Pertinent Findings:  Gen:    Not in distress  Chest: Clear lungs  CVS:   Regular rhythm. No edema  Abd:  Soft, not distended, not tender  Neuro:  Alert, Ox3  _______________________________________________________________________  DISCHARGE MEDICATIONS:   Current Discharge Medication List      START taking these medications    Details   !! metoprolol tartrate (LOPRESSOR) 25 mg tablet Take 1 Tab by mouth every twelve (12) hours. Hold for SBP less than 120 or HR less than 65  Qty: 60 Tab, Refills: 1    Associated Diagnoses: Essential hypertension      acetaminophen (TYLENOL) 325 mg tablet Take 2 Tabs by mouth every six (6) hours as needed for Pain.   Qty: 60 Tab, Refills: 1      famotidine (PEPCID) 20 mg tablet Take 1 Tab by mouth two (2) times a day. Qty: 60 Tab, Refills: 0      senna-docusate (PERICOLACE) 8.6-50 mg per tablet Take 1 Tab by mouth two (2) times a day. Qty: 60 Tab, Refills: 0      polyethylene glycol (MIRALAX) 17 gram packet Take 1 Packet by mouth daily. Qty: 30 Packet, Refills: 0      !! oxyCODONE IR (ROXICODONE) 5 mg immediate release tablet Take 0.5 Tabs by mouth every four (4) hours as needed. Max Daily Amount: 15 mg.  Qty: 30 Tab, Refills: 0    Associated Diagnoses: Closed fracture of right hip with routine healing, subsequent encounter      !! oxyCODONE IR (ROXICODONE) 5 mg immediate release tablet Take 1 Tab by mouth every four (4) hours as needed. Max Daily Amount: 30 mg.  Qty: 30 Tab, Refills: 0    Associated Diagnoses: Closed fracture of neck of right femur, initial encounter ([facility])      aspirin delayed-release 325 mg tablet Take 1 Tab by mouth two (2) times a day. Qty: 60 Tab, Refills: 0       !! - Potential duplicate medications found. Please discuss with provider. CONTINUE these medications which have NOT CHANGED    Details   amLODIPine (NORVASC) 10 mg tablet TAKE 1 TABLET BY MOUTH EVERY MORNING  Qty: 90 Tab, Refills: 4      gluc walters/chondro walters A/vit C/Mn (GLUCOSAMINE 1500 COMPLEX PO) Take 1 Tab by mouth daily. !! metoprolol tartrate (LOPRESSOR) 100 mg IR tablet TAKE 1/2 TABLET BY MOUTH TWICE DAILY  Qty: 90 Tab, Refills: 3      calcium-cholecalciferol, d3, (CALCIUM 600 + D) 600-125 mg-unit tab Take 1 Tab by mouth two (2) times a day. MV-MIN/FA/D3/OM-3/DHA/EPA/FISH (ADULT MULTI PLUS OMEGA-3 PO) Take 2 Caps by mouth daily. Patient takes 2 gummies daily      coenzyme Q-10 (CO Q-10) 200 mg capsule Take 200 mg by mouth daily.       benazepril (LOTENSIN) 40 mg tablet TAKE 1 TABLET BY MOUTH EVERY DAY  Qty: 90 Tab, Refills: 3      atorvastatin (LIPITOR) 20 mg tablet TAKE 1 TABLET BY MOUTH DAILY  Qty: 90 Tab, Refills: 3      cholecalciferol, vitamin d3, (VITAMIN D) 1,000 unit tablet Take 1,000 Units by mouth daily. !! - Potential duplicate medications found. Please discuss with provider. STOP taking these medications       aspirin (ASPIRIN) 325 mg tablet Comments:   Reason for Stopping:  DOSE IS INCREASED TO 2 X a day  For 2 weeks then go back to once daily              My Recommended Diet, Activity, Wound Care, and follow-up labs are listed in the patient's Discharge Insturctions which I have personally completed and reviewed.     ______________________________________________________________________    Risk of deterioration: Low    Condition at Discharge:  Stable  ______________________________________________________________________    Disposition  SNF/LTC  ______________________________________________________________________    Care Plan discussed with:   Patient, Family, RN, Care Manager, Consultant    ______________________________________________________________________    Code Status: Full Code  ______________________________________________________________________      Follow up with:   PCP : João Keyes NP  Follow-up Information     Follow up With Details [address] [facility]  Sandi [place] [address]      Marissa Tyler MD Schedule an appointment as soon as possible for a visit in 3 weeks  [address] [facility]  [address] [place] [address]  [facility]  [phone]                Total time in minutes spent coordinating this discharge (includes going over instructions, follow-up, prescriptions, and preparing report for sign off to her PCP) :  > 30 minutes    Signed:  Vladimir Gallardo MD

## 2018-07-16 NOTE — ROUTINE PROCESS
Bedside shift change report given to Len Hurst RN (oncoming nurse) by Markie Mccoy RN (offgoing nurse). Report included the following information SBAR, MAR, Accordion, Recent Results and Med Rec Status.

## 2018-07-16 NOTE — DISCHARGE INSTRUCTIONS
HOSPITALIST DISCHARGE INSTRUCTIONS    NAME: Geni Irwin   :  7/3/1932   MRN:  [id number]     Date/Time:  2018 9:10 AM    ADMIT DATE: 2018   DISCHARGE DATE: 2018     Attending Physician: Lisa Pratt MD    DISCHARGE DIAGNOSIS:  Right hip fracture, POA  S/P Hemiarthroplasty  Sinus bradycardia HR 55 on EKG, resolved wnd to Metoprolol, dose adjusted  Hypokalemia 3.2  HTN   Hyperlipidemia  Osteopenia  Mild Cognitive deficiency  Some memory loss not yet diagnosed with dementia        Medications: Per above medication reconciliation. Pain Management: per above medications    Recommended diet: Cardiac Diet    Recommended activity: PT/OT Eval and Treat    Wound care: See surgical/procedure care instructions    Indwelling devices:  None    Supplemental Oxygen: None    Required Lab work: Per SNF routine    Glucose management:  None    Code status: Full        Outside physician follow up: Follow-up Information     Follow up With Details Comments [address] [facility], [address] [facility]  [address]  [phone]      Magdi Valentin MD Schedule an appointment as soon as possible for a visit in 3 weeks  [address] [place] [address]  [phone]                 Skilled nursing facility/ SNF MD responsible for above on discharge. Information obtained by :  I understand that if any problems occur once I am at home I am to contact my physician. I understand and acknowledge receipt of the instructions indicated above.                                                                                                                                            Physician's or R.N.'s Signature                                                                  Date/Time                                                                                                                                              Patient or [facility]  Surgery: Hip Fracture Repair  Surgeon:   Khushi Page MD  Surgery Date:  7/12/2018     To relieve pain:   Use ice/gel packs.  Put the ice pack directly over the wound, or anywhere you are hurting or swollen.  To control pain and swelling, keep ice on regularly, especially after physical activity.  The packs should stay cold for 3-4 hours. When it is not cold anymore, rotate with the packs in the freezer.  Elevate your leg. This will also keep swelling down.  Rest for at least 20 minutes between activity or exercises.  To keep track of your pain medications, write down what you take and when you take it.  The last dose of pain medication you got in the hospital was:     Medication    Dose    Date & Time         Choose your medications based on the pain scale below:     To keep your pain under control, take Tylenol every 6 hours for 14 days - even if you feel like you dont need it.  For mild to moderate pain (1-6 on pain scale), take one pain pill every 3-4 hours as needed.  For severe pain (7-10 on pain scale), take two pain pills every 3-4 hours as needed.  To prevent nausea, take your pain medications with food. Pain Scale                 As your pain lessens:     Slowly start taking less pain medication. You may do this by waiting longer between doses or by taking smaller doses.  Stop using the pain medications as soon as you no longer need it, usually in 2-3 weeks.  Aspirin   To prevent blood clots, you will need to take Aspirin 325 mg twice a day for 30 days.  To prevent stomach upset or bleeding:   Take Pepcid 20 mg twice a day, or a similar home medication, while you are taking a blood thinner.    Do not take non-steroidal anti-inflammatory (NSAID) medications (Ibuprofen, Advil, Motrin, Naproxen, etc.) OPSITE (Honeycomb dressing)     Keep your clear, waterproof dressing in place for 5-7 days after your leave the hospital.      If you are still having drainage, you will need to change your dressing in 5-7 days. You will be given one extra dressing to use at home.  If there is no more drainage from the wound, you may leave it open to the air. OPSITE DRESSING INSTRUCTIONS                   You may shower with this dressing over your wound.  After showering pat the dressing dry.  If you have staples a home health nurse will remove them in about 10-14 days post-op.  To increase and promote healing:     Stop Smoking (or at least cut back on       Smoking).  Eat a well-balanced diet (high in protein       and vitamin C).  If you have a poor appetite, drink Ensure, Glucerna, or [place] Instant Breakfast for the next 30 days.  If you are diabetic, you should control your blood         sugars to prevent infection and help your wound         to heal.       To prevent constipation, stay active & drink plenty of fluid.  While using pain medications, you should also take stool softeners and laxatives, such as Pericolace and Miralax.  If you are having too many bowel movements, then you may need  to stop taking the laxatives.  You should have a bowel movement 3-4 days after surgery and then at least every other day while on pain medication.  To improve your recovery, you must be active!  WEIGHT BEARING   As Tolerated = You can put as much weight on your leg as you can tolerate while walking.          THERAPY   If you go to a rehab facility, physical therapy (PT) will need to work with you daily, and sometimes twice a day to teach you how to:     Get in and out of the bed   Walk (gait training) and climb stairs   Do exercises and gain strength   Use a walker, crutches or cane     You may also need to have occupational therapy (OT) work with you to help you practice:     Getting on and off the toilet   Self-care (brushing teeth, eating, bathing, etc)     If you go directly home, home health physical therapy will come the day after you leave the hospital.  They will visit about 4 times over the next couple of weeks to teach you how to get out of bed, to safely walk in your home, and to do your exercises.  NO DRIVING until your surgeon tells you it is ok.  You can return to work when cleared by a physician.  Please call your physician immediately if you have:     Constant bleeding from your wound.  Increasing redness or swelling around your wound (Some warmth, bruising and swelling is normal).  Change in wound drainage (increase in amount, color, or bad smell).  Change in mental status (unusual behavior)     Temperature over 101.5 degrees Fahrenheit     Increased pain, swelling, or redness in the calf (back of your lower leg), thigh, ankle or foot.  Emergency: CALL 911 if you have:   Shortness of breath   Chest pain when you cough or taking a deep breath     Please call your surgeons office at [place] [phone] for a follow up appointment.  You should call as soon as you get home or the next day after discharge. Ask to make an appointment in 2 weeks.

## 2018-07-23 ENCOUNTER — PATIENT OUTREACH (OUTPATIENT)
Dept: FAMILY MEDICINE CLINIC | Age: 83
End: 2018-07-23

## 2018-07-23 NOTE — PROGRESS NOTES
Transition of Care Coordination/Hospital to Post Acute Facility:     Date/Time:  2018 2:31 PM    Patient was admitted to [facility] on 18 and discharged on 18 for Right hip fracture, POA, S/P Hemiarthroplasty. Patient was transferred to  [facility]  for continuation of care. Inpatient RRAT score: 10    Top Challenges reviewed    - Spoke w/ SNF nurse- med reconciliation done    - SNF nurse advised of need for 2 week Ortho f/u. Ortho provider name & office # given. - Message left for SNF SW w/ NN name/tel #. Requested return call to discuss est LOS, D/C date/plan    - Spoke w/ pt's dtr Yasmeen Posey. Dtr stated family not aware if SNFTeam Meeting held as of yet. Pt has been @ SNF 7 days. Family not given any information re est LOS & d/c. Dtr verbalized concern pt forgetful @ times. Pt caregiver for spouse. Spouse dx w/ Prostate CA. Undergoing Radiation txs. Pt & spouse were each other's caregiver. Concern pt will not be able to safely manage that role. Discussed advocating for pt's safe d/c/transition back home. Discussed potential HH after SNF. Dtr w/ no particular preference. Advised [facility] an option. Dtr reported pt does have LTC insurance. Coverage for PCA after 90 days. Dtr to contact SNF. Request to meet w/ SW & confirm pt's Ortho appt scheduled. Method of communication with provider :chart routing     Nurse Navigator(NN) spoke with SNF nurse Christelle Pickard to provide introduction to self and explanation of the Nurse Navigator Role. Verified name and  as patient identifiers.      Discussed and reviewed  follow up appointments, medication reconciliation    ACP:   Does the patient have a current ACP (including DDNR):  no  Does the post acute facility have a copy of the patients ACP:  N/A    Medication(s):   New Medications at Discharge: metoprolol tartrate (LOPRESSOR) 25 mg tablet, acetaminophen (TYLENOL) 325 mg tablet, famotidine (PEPCID) 20 mg tablet, senna-docusate (PERICOLACE) 8.6-50 mg per tablet, polyethylene glycol (MIRALAX) 17 gram packet, oxyCODONE IR (ROXICODONE) 5 mg immediate release tablet, aspirin delayed-release 325 mg tablet. Changed Medications at Discharge: none  Discontinued Medications at Discharge: aspirin (ASPIRIN) 325 mg tablet. Reason for Stopping: DOSE IS INCREASED TO 2 X a day for 2 weeks then go back to once daily       PCP/Specialist follow up: No future appointments. Opportunity to ask questions was provided. Contact information was provided for future reference or further questions. Will continue to monitor. Goals Addressed      Transitions of Care- collaboration & care coordination to prevent complications post hospitalization. 7/23/18- spoke w/ SNF nurse. Med rec done. Informed of need to schedule 2 week Ortho f/u appt. Message left for SNF SW re est [facility] [place] date/plan. Spoke w/ dtr Kinsey Lassiter. Dtr verbalized concerns re pt's transition/ safe d/c back home. Encouraged to meet w/ SNF SW to discuss concerns. Plan- SNF to schedule Ortho 2 week f/u appt. Dtr to f/u w/ SNF to confirm appt scheduled. Dtr to f/u w/ SNF SW re pt's anticipated discharge & transition back home. Next NN f/u ~ 1 week w/ SNF & dtr-ID.

## 2018-07-23 NOTE — Clinical Note
Erenest Goodness- next time pt comes to office her med list will need to be updated re- 2 Lopressor oders, 2 Oxycocone orders, ASA order.

## 2018-07-27 ENCOUNTER — HOME HEALTH ADMISSION (OUTPATIENT)
Dept: HOME HEALTH SERVICES | Facility: HOME HEALTH | Age: 83
End: 2018-07-27
Payer: MEDICARE

## 2018-07-27 ENCOUNTER — PATIENT OUTREACH (OUTPATIENT)
Dept: FAMILY MEDICINE CLINIC | Age: 83
End: 2018-07-27

## 2018-07-29 ENCOUNTER — HOME CARE VISIT (OUTPATIENT)
Dept: SCHEDULING | Facility: HOME HEALTH | Age: 83
End: 2018-07-29
Payer: MEDICARE

## 2018-07-29 VITALS
BODY MASS INDEX: 25.11 KG/M2 | SYSTOLIC BLOOD PRESSURE: 136 MMHG | TEMPERATURE: 97.9 F | RESPIRATION RATE: 20 BRPM | DIASTOLIC BLOOD PRESSURE: 60 MMHG | OXYGEN SATURATION: 95 % | WEIGHT: 160 LBS | HEART RATE: 67 BPM | HEIGHT: 67 IN

## 2018-07-29 VITALS
DIASTOLIC BLOOD PRESSURE: 60 MMHG | SYSTOLIC BLOOD PRESSURE: 136 MMHG | HEART RATE: 67 BPM | RESPIRATION RATE: 16 BRPM | TEMPERATURE: 97.9 F | OXYGEN SATURATION: 95 %

## 2018-07-29 PROCEDURE — G0151 HHCP-SERV OF PT,EA 15 MIN: HCPCS

## 2018-07-29 PROCEDURE — 400013 HH SOC

## 2018-07-29 PROCEDURE — G0299 HHS/HOSPICE OF RN EA 15 MIN: HCPCS

## 2018-07-31 ENCOUNTER — PATIENT OUTREACH (OUTPATIENT)
Dept: FAMILY MEDICINE CLINIC | Age: 83
End: 2018-07-31

## 2018-07-31 NOTE — Clinical Note
Pt was @ [facility] CHUN [phone] 7/16/-7/28. If no records received have your nurse call & request d/c summary/orders/ med list. Pt seen by Ortho 7/31/18. If OV note not received have your nurse call [phone] to request. Thanks.

## 2018-08-01 ENCOUNTER — HOME CARE VISIT (OUTPATIENT)
Dept: HOME HEALTH SERVICES | Facility: HOME HEALTH | Age: 83
End: 2018-08-01
Payer: MEDICARE

## 2018-08-01 ENCOUNTER — HOME CARE VISIT (OUTPATIENT)
Dept: SCHEDULING | Facility: HOME HEALTH | Age: 83
End: 2018-08-01
Payer: MEDICARE

## 2018-08-01 VITALS
HEART RATE: 60 BPM | TEMPERATURE: 98.5 F | DIASTOLIC BLOOD PRESSURE: 62 MMHG | RESPIRATION RATE: 17 BRPM | OXYGEN SATURATION: 97 % | SYSTOLIC BLOOD PRESSURE: 120 MMHG

## 2018-08-01 PROCEDURE — G0299 HHS/HOSPICE OF RN EA 15 MIN: HCPCS

## 2018-08-01 PROCEDURE — G0157 HHC PT ASSISTANT EA 15: HCPCS

## 2018-08-01 NOTE — PROGRESS NOTES
NN F/U Progress Note Goals Addressed  Transitions of Care- collaboration & care coordination to prevent complications post hospitalization. 7/31/18- spoke w/ dtr Alexannamarie Stewart. Confirmed pt d/c'd from SNF on 7/28/18. Returned to her home w/ spouse. [facility] SN & PT SOC visits on 7/29/18. Med reconciliation done by [place] nurse. HH discussed w/ pt  & spouse consider using pill boxes. Dtr reported pt's spouse used to prep meds for himself & pt. Spouse now has some issues w/ cognitive. \"He's not the person who should be doing anyone's med. He doesn't want to relinquish that control\". NN discussed [address] [employer] home delivery of prepackaged meds for pts. Encouraged to visit website for more information. Discuss w/ NP Jerman @ pt's JOSHUA f/u. Dtr agreeable. Dtr reported pt ambulating in home w/ cane/walker. \"Throw rugs removed from home to prevent tripping/falling. Assistive devices in bathroom. Family prepare meals. Pt/spouse heat up in microwave. Dtr reported large family support system w/ children, step children, & son-in-laws. There's always someone to visits pt/spouse daily. 2 dtrs to be out of town in 2 weeks for family wedding. Step children to visit pt/spouse during their absence. Dtr reported pt had Ortho f/u today w/ Dr Salima Mccracken. Pt taken to appt by brother-in-law. Hasn't gotten update on visit yet. Dtr plans to accompany pt tp 8/3/18 PCP f/u appt. Plan-pt to attend appts as scheduled. Dtr to discuss [facility] home delivery & prepacking meds program w/ PCP. HH SN/PT to continue. Request for PCP nurse to obtain SNF record & 7/31/18 Ortho ov note if not received. NN to collaborate w/ pt's dtr, PCP, [place] staff, & other Care Team Members as needed for care coordination. Next NN f/u - ~ 21 days (NN on vacation 8/6-8/10, dtrs out of town 8/11-8/21)-ID. 7/27/18- call from dtr Ninfa Stewart. Reported pt scheduled for d/c from [facility] tomorrow 7/28/18.  Inquiring if [facility] would accept pt for [place] services. NN contacted [facility]. As per Director Mohsen Meléndez [phone] able to accept pt. SOC would be either 7/29/18 or 7/30/18 dependent when referral received. Plan- dtr contacted. Advised [facility] able to accept pt post SNF d/c. SNF [place] to submit referral. SNF d/c on 7/28/18 as per dtr. Reminded to request SNF fax d/c summary & med list to PCP office. Next NN f/u ~ 3 days w/ dtr-ID. 7/23/18- spoke w/ SNF nurse. Med rec done. Informed of need to schedule 2 week Ortho f/u appt. Message left for SNF SW re est LOS, DC date/plan. Spoke w/ dtalli Vick. Dtr verbalized concerns re pt's transition/ safe d/c back home. Encouraged to meet w/ SNF SW to discuss concerns. Plan- SNF to schedule Ortho 2 week f/u appt. Dtr to f/u w/ SNF to confirm appt scheduled. Dtr to f/u w/ SNF SW re pt's anticipated discharge & transition back home. Next NN f/u ~ 1 week w/ SNF & dtr-ID.

## 2018-08-02 ENCOUNTER — HOME CARE VISIT (OUTPATIENT)
Dept: SCHEDULING | Facility: HOME HEALTH | Age: 83
End: 2018-08-02
Payer: MEDICARE

## 2018-08-02 VITALS
DIASTOLIC BLOOD PRESSURE: 70 MMHG | RESPIRATION RATE: 18 BRPM | HEART RATE: 70 BPM | TEMPERATURE: 98 F | SYSTOLIC BLOOD PRESSURE: 128 MMHG | OXYGEN SATURATION: 98 %

## 2018-08-02 PROCEDURE — G0299 HHS/HOSPICE OF RN EA 15 MIN: HCPCS

## 2018-08-03 ENCOUNTER — HOME CARE VISIT (OUTPATIENT)
Dept: SCHEDULING | Facility: HOME HEALTH | Age: 83
End: 2018-08-03
Payer: MEDICARE

## 2018-08-03 VITALS
OXYGEN SATURATION: 96 % | RESPIRATION RATE: 16 BRPM | HEART RATE: 63 BPM | DIASTOLIC BLOOD PRESSURE: 68 MMHG | TEMPERATURE: 97.6 F | SYSTOLIC BLOOD PRESSURE: 146 MMHG

## 2018-08-03 PROCEDURE — G0157 HHC PT ASSISTANT EA 15: HCPCS

## 2018-08-03 PROCEDURE — G0156 HHCP-SVS OF AIDE,EA 15 MIN: HCPCS

## 2018-08-04 VITALS
DIASTOLIC BLOOD PRESSURE: 56 MMHG | OXYGEN SATURATION: 97 % | SYSTOLIC BLOOD PRESSURE: 120 MMHG | HEART RATE: 57 BPM | RESPIRATION RATE: 15 BRPM | TEMPERATURE: 98.3 F

## 2018-08-06 ENCOUNTER — HOME CARE VISIT (OUTPATIENT)
Dept: SCHEDULING | Facility: HOME HEALTH | Age: 83
End: 2018-08-06
Payer: MEDICARE

## 2018-08-06 VITALS
HEART RATE: 60 BPM | RESPIRATION RATE: 16 BRPM | DIASTOLIC BLOOD PRESSURE: 60 MMHG | SYSTOLIC BLOOD PRESSURE: 130 MMHG | OXYGEN SATURATION: 98 %

## 2018-08-06 PROCEDURE — G0157 HHC PT ASSISTANT EA 15: HCPCS

## 2018-08-07 ENCOUNTER — HOME CARE VISIT (OUTPATIENT)
Dept: SCHEDULING | Facility: HOME HEALTH | Age: 83
End: 2018-08-07
Payer: MEDICARE

## 2018-08-07 ENCOUNTER — OFFICE VISIT (OUTPATIENT)
Dept: FAMILY MEDICINE CLINIC | Age: 83
End: 2018-08-07

## 2018-08-07 ENCOUNTER — HOME CARE VISIT (OUTPATIENT)
Dept: HOME HEALTH SERVICES | Facility: HOME HEALTH | Age: 83
End: 2018-08-07
Payer: MEDICARE

## 2018-08-07 VITALS
RESPIRATION RATE: 20 BRPM | HEART RATE: 75 BPM | OXYGEN SATURATION: 99 % | BODY MASS INDEX: 21.99 KG/M2 | TEMPERATURE: 96.5 F | HEIGHT: 67 IN | WEIGHT: 140.1 LBS | SYSTOLIC BLOOD PRESSURE: 117 MMHG | DIASTOLIC BLOOD PRESSURE: 60 MMHG

## 2018-08-07 VITALS
OXYGEN SATURATION: 98 % | TEMPERATURE: 98.7 F | DIASTOLIC BLOOD PRESSURE: 72 MMHG | RESPIRATION RATE: 16 BRPM | HEART RATE: 66 BPM | SYSTOLIC BLOOD PRESSURE: 133 MMHG

## 2018-08-07 DIAGNOSIS — S72.001D CLOSED FRACTURE OF RIGHT HIP WITH ROUTINE HEALING, SUBSEQUENT ENCOUNTER: ICD-10-CM

## 2018-08-07 DIAGNOSIS — Z79.899 POLYPHARMACY: ICD-10-CM

## 2018-08-07 PROCEDURE — G0152 HHCP-SERV OF OT,EA 15 MIN: HCPCS

## 2018-08-07 PROCEDURE — G0156 HHCP-SVS OF AIDE,EA 15 MIN: HCPCS

## 2018-08-07 NOTE — MR AVS SNAPSHOT
[address] [place] 
[address] [place] [address] 
[phone] Patient: Radha Doyle MRN: [id number] AMZ:[birth year] Visit Information Date & Time Provider Department Dept. Phone Encounter #  
 8/7/2018  9:40 AM Nilton Pittman NP [facility] [phone] [id number] Follow-up Instructions Return in about 6 weeks (around 9/18/2018) for follow up, Depression/med check. Routing History Upcoming Health Maintenance Date Due  
 GLAUCOMA SCREENING Q2Y 12/31/2017 Influenza Age 5 to Adult 8/1/2018 MEDICARE YEARLY EXAM 9/28/2018 DTaP/Tdap/Td series (2 - Td) 5/27/2020 Allergies as of 8/7/2018  Review Complete On: 8/7/2018 By: Jill Ayala LPN Severity Noted Reaction Type Reactions Codeine  11/23/2010    Nausea and Vomiting Demerol (Pf) [Meperidine (Pf)]  12/01/2011    Nausea and Vomiting Current Immunizations  Reviewed on 5/14/2018 Name Date Pneumococcal Conjugate (PCV-13) 9/27/2017 TDAP Vaccine 5/27/2010 ZZZ-RETIRED (DO NOT USE) Pneumococcal Vaccine (Unspecified Type) 5/27/2007 Not reviewed this visit You Were Diagnosed With   
  
 Codes Comments Transition of care performed with sharing of clinical summary    -  Primary ICD-10-CM: Z91.89 ICD-9-CM: V15.89 Closed fracture of right hip with routine healing, subsequent encounter     ICD-10-CM: S72.001D ICD-9-CM: V54.13 Polypharmacy     ICD-10-CM: C34.955 ICD-9-CM: V58.69 Vitals BP Pulse Temp Resp Height(growth percentile) Weight(growth percentile)  
 117/60 (BP 1 Location: Left arm, BP Patient Position: Sitting) 75 96.5 °F (35.8 °C) (Oral) 20 5' 7\" (1.702 m) 140 lb 1.6 oz (63.5 kg) SpO2 BMI OB Status Smoking Status 99% 21.94 kg/m2 Postmenopausal Never Smoker BMI and BSA Data Body Mass Index Body Surface Area  
 21.94 kg/m 2 1.73 m 2 Preferred Pharmacy Pharmacy Name Phone [facility]ACY #[id number] [address]oss [id number] [place] [address] [phone] Your Updated Medication List  
  
   
This list is accurate as of 8/7/18 10:48 AM.  Always use your most recent med list.  
  
  
  
  
 acetaminophen 325 mg tablet Commonly known as:  TYLENOL Take 2 Tabs by mouth every six (6) hours as needed for Pain. ADULT MULTI PLUS OMEGA-3 PO Take 2 Caps by mouth daily. Patient takes 2 gummies daily  
  
 amLODIPine 10 mg tablet Commonly known as:  Poe Fanti TAKE 1 TABLET BY MOUTH EVERY MORNING  
  
 aspirin delayed-release 325 mg tablet Take 1 Tab by mouth two (2) times a day. atorvastatin 20 mg tablet Commonly known as:  LIPITOR  
TAKE 1 TABLET BY MOUTH DAILY  
  
 benazepril 40 mg tablet Commonly known as:  LOTENSIN  
TAKE 1 TABLET BY MOUTH EVERY DAY  
  
 CALCIUM 600 + D 600-125 mg-unit Tab Generic drug:  calcium-cholecalciferol (d3) Take 1 Tab by mouth two (2) times a day. CO Q-10 200 mg capsule Generic drug:  coenzyme Q-10 Take 200 mg by mouth daily. famotidine 20 mg tablet Commonly known as:  PEPCID Take 1 Tab by mouth two (2) times a day. GLUCOSAMINE 1500 COMPLEX PO Take 1 Tab by mouth daily. metoprolol tartrate 25 mg tablet Commonly known as:  LOPRESSOR Take 1 Tab by mouth every twelve (12) hours. Hold for SBP less than 120 or HR less than 65  
  
 * oxyCODONE IR 5 mg immediate release tablet Commonly known as:  Perlita Ramal Take 1 Tab by mouth every four (4) hours as needed. Max Daily Amount: 30 mg.  
  
 * oxyCODONE IR 5 mg immediate release tablet Commonly known as:  Perlita Ramal Take 0.5 Tabs by mouth every four (4) hours as needed. Max Daily Amount: 15 mg.  
  
 polyethylene glycol 17 gram packet Commonly known as:  Nessa Kit Take 1 Packet by mouth daily. senna-docusate 8.6-50 mg per tablet Commonly known as:  Simón Key  
 Take 1 Tab by mouth two (2) times a day. varicella-zoster recombinant (PF) 50 mcg/0.5 mL Susr injection Commonly known as:  SHINGRIX  
0.5 mL IM once now and then repeat in 2-6 months VITAMIN D3 1,000 unit tablet Generic drug:  cholecalciferol Take 1,000 Units by mouth daily. * Notice: This list has 2 medication(s) that are the same as other medications prescribed for you. Read the directions carefully, and ask your doctor or other care provider to review them with you. Prescriptions Printed Refills  
 varicella-zoster recombinant, PF, (SHINGRIX) 50 mcg/0.5 mL susr injection 1 Si.5 mL IM once now and then repeat in 2-6 months Class: Print We Performed the Following REFERRAL TO PHARMACIST [[id number] Custom] Comments:  
 Review medications Follow-up Instructions Return in about 6 weeks (around 2018) for follow up, Depression/med check. To-Do List   
 2018 To Be Determined Appointment with Kirti Acevedo LPN at [place] [address]  
  
 2018 2:00 PM  
  Appointment with Gillian Frank at [place] [address]  
  
 2018 To Be Determined Appointment with Kirti Acevedo LPN at [place] [address]  
  
 2018 9:00 AM  
  Appointment with Parminder Pitt PTA at [place] [address]  
  
 08/10/2018 To Be Determined Appointment with Mode Amador CNA at [place] [address]  
  
 2018 To Be Determined Appointment with Parminder Pitt PTA at [place] [address]  
  
 2018 To Be Determined Appointment with Sean Olmedo at [place] [address]  
  
 2018 To Be Determined Appointment with Radha Frederick at [place] [address]  
  
 2018 To Be Determined Appointment with Sherl Leyden at [place] [address]  
  
 08/15/2018 To Be Determined Appointment with Keaton Allred PTA at [place] [address]  
  
 08/16/2018 To Be Determined Appointment with Guadalupe Baptiste at [place] [address]  
  
 08/17/2018 10:00 AM  
  Appointment with Hannah Montes at [place] [address]  
  
 08/21/2018 To Be Determined Appointment with Sherl Leyden at [place] [address]  
  
 08/21/2018 To Be Determined Appointment with Guadalupe Baptiste at [place] [address]  
  
 08/23/2018 To Be Determined Appointment with Guadalupe Baptiste at [place] [address]  
  
 08/28/2018 To Be Determined Appointment with Ghanshyam Grover LPN at [place] [address]  
  
 09/04/2018 To Be Determined Appointment with Ghanshyam Grover LPN at [place] [address]  
  
 09/11/2018 To Be Determined Appointment with Sherl Leyden at [place] [address]  
  
 09/18/2018 To Be Determined Appointment with Ghanshyam Grover LPN at [place] [address]  
  
 09/25/2018 To Be Determined Appointment with Sherl Leyden at [place] [address] Referral Information Referral ID Referred By Referred To  
  
 [id number] Ana M Clifford B Not Available Visits Status Start Date End Date 1 New Request 8/7/18 8/7/19 If your referral has a status of pending review or denied, additional information will be sent to support the outcome of this decision. Patient Instructions 1) Good nutrition is important to healing. If you are not eating a lot, Ensure is a good nutritional supplement to use. [place] Instant Breakfast can be added to milk.   Smoothies with fruit and veggies and yogurt are also a good option Fair Life milk is a healthy choice in milk products. It is double filtered to remove much of the lactose (sugar found in milk) and recommended by trainers and nutritionists. There is 13 g of protein in a serving, so it is an easy way to increase your protein and calcium intake. 2) Will have Yoon Umana, our Nurse Navigator, call and give you information about Carepak to keep medications straight 3) Will complete FMLA form for [employer] 4) Sekou Friedman is a pharmacist with [facility] and can meet to review medications. I sent her a message to get in touch with you. Shingles Vaccine - Shingrix Herpes Zoster (Shingles) Herpes zoster (shingles) is a painful rash caused by the same virus that causes chickenpox. After an episode of chickenpox, the virus retreats to cells of the nervous system, where it can reside quietly for decades. However, later in life, the varicella-zoster virus can become active again. When it reactivates, it causes shingles. Shingles can affect people of all ages. It is particularly common in adults over age 48 years. It is also more common in individuals of all ages with conditions that weaken the immune system. Pain is usually the first sign of shingles. Other symptoms include: fever, chills, headache, numbness, tingling and/or burning pain and a skin rash. The rash can appear anywhere on your body, but most commonly on the torso. It is usually on only 1 side of your body, in a band or beltlike pattern. During the first several days, small, painful blisters appear in the area. Scarring may occur where the blisters appear and there may be continued sensitivity and pain in that patch of skin for months after the infection. Treatment:  
There is no cure for shingles - it is usually self-limiting. However, anti-viral medications can reduce the spread of the rash, speed healing and decrease the risk of complications. Supportive Treatment:  
1)  Tylenol or ibuprofen can be used to reduce pain 2) Colloidal oatmeal bath or wet cool compresses may help with itching. Make a solution of cool water and cornstarch, baking soda, or Aveeno Oatmeal.  Apply the solution as a compress to the area. This will soothe the skin. 3) Wash the skin with soap and water to keep rash free of infection. 4) Reduce stress - exercise, yoga, healthy diet THE BLISTER FLUID IS CONTAGIOUS TO ANYONE WHO HAS NOT ALREADY HAD CHICKEN POX. Stay home from work or school until all blisters have formed a crust (usually 2 weeks after the illness begins) and you are no longer contagious. Prevention: The CDC recommends everyone over the age of 61 receive the shingles vaccine. This is recommended for people who have already had a shingles outbreak as it could prevent future occurrences of the disease. According to the CDC, it is also recommended for people born before [birth year] in the [address] [place] [address],[address] who have not had varicella (chicken pox) as they are considered immune. [facility] is a vaccine indicated for prevention of herpes zoster (shingles) in adults aged 48 years and older and is the preferred vaccine for preventing shingles and related complications The Center for Disease Control and Prevention recommended Shingrix for the following: 
- healthy adults aged 48 years and older to prevent shingles and related complications 
- adults who previously received the current shingles vaccine (Zostavax®) to prevent shingles and related complications Two doses (0.5 mL each) are needed for full efficacy. The vaccines are administered intramuscularly, with the second one administered 2-6 months after the initial vaccine. Hip Replacement Surgery (Anterior): What to Expect at [facility] Your Recovery Anterior hip replacement surgery is done through a small incision in the front (anterior) of the hip.  This causes less damage to muscles and other soft tissues than the more common type of surgery. There is also a lower risk of the new ball on the thighbone slipping out of the hip socket (dislocation). As a result, healing is usually faster, and there are fewer limits on activity. After surgery, you will use crutches or a walker. You will need someone to help you at home for a few days or weeks or until you have more energy and can move around better. You may go to a rehab center if you don't have help at home. You will go home with a bandage and stitches, staples, tissue glue, or tape strips. You can remove the bandage when your doctor tells you to. If you have stitches or staples, your doctor will remove them 10 to 14 days after your surgery. Glue or tape strips will fall off on their own over time. You may have some mild pain and swelling after surgery. Your doctor may give you medicine for the pain. You will keep doing the physical therapy you started in the hospital. The better you do with your exercises, the sooner you will get your strength and movement back. Your doctor will tell you when you can go back to work or other activities. This will depend on what type of work and activities you do. This care sheet gives you a general idea about how long it will take for you to recover. But each person recovers at a different pace. Follow the steps below to get better as quickly as possible. How can you care for yourself at home? Activity 
  · For the first few months, your doctor may want you to avoid things that could dislocate your hip. If so, your therapist may suggest ideas such as: ¨ Do not turn your leg too far out to the side. Keep your toes pointing forward or slightly in. ¨ Do not step backwards or bend backwards. ¨ Do not cross your legs.  
  · Go slowly when you climb stairs. Make sure the lights are on, and have someone watch you, if possible. When you climb stairs: ¨ Step up first with your unaffected leg. Then bring the affected leg up to the same step. Bring your crutches or cane up. ¨ To go down stairs, reverse the order. First, put your crutches or cane on the lower step. Then bring the affected leg down to that step. Finally, step down with the unaffected leg.  
  · Rest when you feel tired. You may take a nap, but don't stay in bed all day.  
  · You may be able to take frequent short walks using crutches or a walker. You may use crutches or a walker for a couple of weeks, and then switch to a cane.  
  · Try not to sit for too long at one time. You will feel less stiff if you take a short walk about every hour.  
  · Ask your doctor when you can drive again.  
  · Ask your doctor when it is okay for you to have sex. Diet 
  · By the time you leave the hospital, you will probably be eating your normal diet. If your stomach is upset, try bland, low-fat foods like plain rice, broiled chicken, toast, and yogurt. Your doctor may recommend that you take iron and vitamin supplements.  
  · Eat healthy foods, and watch your portion sizes. Try to stay at your ideal weight. Controlling your weight will help your new hip joint last longer.  
  · If your bowel movements are not regular right after surgery, try to avoid constipation and straining. Drink plenty of water. Your doctor may suggest fiber, a stool softener, or a mild laxative. Medicines 
  · Be safe with medicines. Read and follow all instructions on the label. ¨ If the doctor gave you a prescription medicine for pain, take it as prescribed. ¨ If you are not taking a prescription pain medicine, ask your doctor if you can take an over-the-counter medicine.  
  · If your doctor prescribed antibiotics, take them as directed. Do not stop taking them just because you feel better. You need to take the full course of antibiotics.  
  · Your doctor will tell you if and when you can restart your medicines. He or she will also give you instructions about taking any new medicines.  
  · If you take aspirin or some other blood thinner, be sure to talk to your doctor. He or she will tell you if and when to start taking this medicine again. Make sure that you understand exactly what your doctor wants you to do.  
  · Your doctor may give you a blood-thinning medicine to prevent blood clots for a few weeks after surgery. Be sure you get instructions about how to take your medicine safely. Blood thinners can cause serious bleeding problems. This medicine could be in pill form or as a shot (injection). If a shot is necessary, your doctor will tell you how to do this. Incision care 
  · If your doctor told you how to care for your cut (incision), follow your doctor's instructions. You will have a dressing over the cut. A dressing helps the incision heal and protects it. Your doctor will tell you how to take care of this.  
  · If you did not get instructions, follow this general advice: ¨ If you have strips of tape on the cut the doctor made, leave the tape on for a week or until it falls off. ¨ If you have stitches or staples, your doctor will tell you when to come back to have them removed. ¨ If you have skin adhesive on the cut, leave it on until it falls off. Skin adhesive is also called glue or liquid stitches. ¨ Change the bandage every day. ¨ Wash the area daily with warm water, and pat it dry. Don't use hydrogen peroxide or alcohol. They can slow healing. ¨ You may cover the area with a gauze bandage if it oozes fluid or rubs against clothing. ¨ You may shower 24 to 48 hours after surgery. Pat the incision dry. Don't swim or take a bath for the first 2 weeks, or until your doctor tells you it is okay. Exercise 
  · Your physical therapist will teach you exercises to do at home.  Always do them as your therapist tells you.  
  · Your doctor may advise you to stay away from activities that put stress on the joint. This includes sports such as tennis, football, and jogging.  
  · Avoid activities where you might fall. These include motorcycle or horseback riding, water-skiing, and mountain biking. Ice and elevation 
  · For pain, put ice or a cold pack on the area for 10 to 20 minutes at a time. Put a thin cloth between the ice and your skin.  
  · Your ankle may swell for a few weeks after hip surgery. Prop up your ankle when you ice your hip or anytime you sit or lie down. Try to keep it above the level of your heart. This will help reduce swelling. Other instructions 
  · Keep wearing your support stockings. These help to prevent blood clots. Your doctor will tell you how long you need to keep wearing them.  
  · Try to prevent falls. To avoid falling: ¨ Arrange furniture so that you will not trip on it. ¨ Get rid of throw rugs, and move electrical cords out of the way. ¨ Put grab bars in showers and bathtubs. ¨ Wear shoes with sturdy, flat soles. ¨ Walk only in areas with plenty of light. ¨ Avoid icy or snowy sidewalks. Follow-up care is a key part of your treatment and safety. Be sure to make and go to all appointments, and call your doctor if you are having problems. It's also a good idea to know your test results and keep a list of the medicines you take. When should you call for help? Call 911 anytime you think you may need emergency care. For example, call if: 
  · You passed out (lost consciousness).  
  · You have severe trouble breathing.  
  · You have sudden chest pain and shortness of breath, or you cough up blood.  
 Call your doctor now or seek immediate medical care if: 
  · You have symptoms of a blood clot in your leg (called a deep vein thrombosis), such as: 
¨ Pain in your calf, back of the knee, thigh, or groin. ¨ Redness and swelling in your leg or groin.  
  · You have signs of infection, such as: 
¨ Increased pain, swelling, warmth, or redness. ¨ Red streaks leading from the incision. ¨ Pus draining from the incision. ¨ A fever.  
  · Your leg or foot turns cold or changes color.  
  · You have tingling, weakness, or numbness in your leg or foot.  
  · You have signs that your hip may be dislocated, including: ¨ Severe pain and not being able to stand. ¨ A crooked leg that looks like your hip is out of position. ¨ Not being able to bend or straighten your leg.  
  · You have pain that does not get better after you take pain medicine.  
  · You have loose stitches, or your incision comes open.  
 Watch closely for changes in your health, and be sure to contact your doctor if: 
  · You do not have a bowel movement after taking a laxative.  
  · You do not get better as expected. Where can you learn more? Go to http://radha-alex.info/. Enter [phone] in the search box to learn more about \"Hip Replacement Surgery (Anterior): What to Expect at Home. \" Current as of: November 29, 2017 Content Version: 11.7 © 0791-6152 [employer]. Care instructions adapted under license by [employer] (which disclaims liability or warranty for this information). If you have questions about a medical condition or this instruction, always ask your healthcare professional. [address] any warranty or liability for your use of this information. Introducing \[facility]\"" & HEALTH SERVICES! Dear Corazon Darnell: Thank you for requesting a "[employer] account. Our records indicate that you already have an active "[employer] account. You can access your account anytime at [email or web address] Did you know that you can access your hospital and ER discharge instructions at any time in "[employer]? You can also review all of your test results from your hospital stay or ER visit. Additional Information If you have questions, please visit the Frequently Asked Questions section of the [employer] website at [email or web address] Remember, [employer] is NOT to be used for urgent needs. For medical emergencies, dial 911. Now available from your iPhone and Android! Please provide this summary of care documentation to your next provider. Your primary care clinician is listed as Ca Rojas. If you have any questions after today's visit, please call [phone].

## 2018-08-07 NOTE — Clinical Note
Pt s/p hemiarthroplasty. She would like information about Carepak, Iris She would also like to review existing medications, [place]. Can you contact pt? Thanks!

## 2018-08-07 NOTE — PROGRESS NOTES
Date of visit: 8/7/2018   Subjective:      History obtained from:  Child Saira Munguia - daughter) and the patient. Thompson Roldan is a 80 y.o. female who presents today for face-to face transition of care. Assessment/Plan:  1. Transition of care performed with sharing of clinical summary  -discussed good nutrition, hydration, sleep and PT to help heal;   -HH - PT/OT, skilled needed  -letter for travel insurance given  -daughter requesting FMLA form for herself to enable her to accompany mother to healthcare appts    2. Polypharmacy  -pt's daughter to speak with HEAVEN García, [facility]  - REFERRAL TO PHARMACIST    RTC 6-8 weeks for f/u   Transition of Care documentation  Home Care Face to Face Encounter    Patients Name: Thompson Roldan    YOB: 1932    Primary Diagnosis: closed right hip fracture    Date of Face to Face:   August 7, 2018                                 Face to Face Encounter findings are related to primary reason for home care:   yes. 1. I certify that the patient needs intermittent care as follows: skilled nursing care:  rehabilitative nursing  physical therapy: strengthening, stretching/ROM, gait/stair training and balance training  occupational therapy:  ADL safety (ie. cooking, bathing, dressing) and ROM    2. I certify that this patient is homebound, that is: 1) patient requires the use of a walker device, special transportation, or assistance of another to leave the home; or 2) patient's condition makes leaving the home medically contraindicated; and 3) patient has a normal inability to leave the home and leaving the home requires considerable and taxing effort. Patient may leave the home for infrequent and short duration for medical reasons, and occasional absences for non-medical reasons.  Homebound status is due to the following functional limitations: Patient's ambulation limited secondary to severe pain and requires the use of an assistive device and the assistance of a caregiver for safe completion. Patient with strength and ROM deficits limiting ambulation endurance requiring the use of an assistive device and the assistance of a caregiver. Patient deemed temporarily homebound secondary to increased risk for infection when leaving home and going out into the community. Patient currently under activity restrictions secondary to recent surgical procedure, this hinders their ability to safely leave the home. Patient with strength deficits limiting the performance of all ADL's without caregiver assistance or the use of an assistive device. 3. I certify that this patient is under my care and that I, or a nurse practitioner or  [id number], or clinical nurse specialist, or certified nurse midwife, working with me, had a Face-to-Face Encounter that meets the physician Face-to-Face Encounter requirements. The following are the clinical findings from the [address] [place] [address] encounter that support the need for skilled services and is a summary of the encounter:     See attached progess note    Dewandjr Flurry at 4:30am - didn't call EMS until 6:30am bc she wanted \"to get cleaned up\"  Date of discharge:  7/16/18 (admitted:  7/12/18)  Hospital Course:    Right hip fracture, -S/P Hemiarthroplasty  Hypokalemia 3.2  HTN -   (current therapy: amlodipine 10mg + benazepril 40mg + metoprolol tar 25mg bid)  Mild Cognitive deficiency - not yet diagnosed with dementia  Went to [facility] - had a good experience  Home health instituted yes.       Concerns for today from daughter:  Shingrex vaccine - discussed; will give rx  Carepak - discussed and will have Iris contact   Polypharmacy - discussed meeting with Emma Renee pharmacist for evaluation    Needs letter for medical excuse for travel from August 16-20th     Social history:   Nutrition: not big appetite, did have Ensure in hospital,   Physical: [facility] coming   Social: lives with  and has 2 adult daughters that help take care of her    History   Smoking Status    Never Smoker   Smokeless Tobacco    Never Used     History   Alcohol Use No     History   Drug Use No       PHQ over the last two weeks 8/7/2018   Little interest or pleasure in doing things Not at all   Feeling down, depressed, irritable, or hopeless Not at all   Total Score PHQ 2 0        I reviewed the following:   Patient Active Problem List    Diagnosis Date Noted    Bradycardia 07/13/2018    Hypertension 07/12/2018    Closed right hip fracture ([address] 07/12/2018    Hypokalemia 02/28/2015    OAB (overactive bladder) 02/15/2013    Hyperlipidemia 12/01/2011    HTN (hypertension) 05/27/2011     Current Outpatient Prescriptions   Medication Sig Dispense Refill    metoprolol tartrate (LOPRESSOR) 25 mg tablet Take 1 Tab by mouth every twelve (12) hours. Hold for SBP less than 120 or HR less than 65 60 Tab 1    acetaminophen (TYLENOL) 325 mg tablet Take 2 Tabs by mouth every six (6) hours as needed for Pain. 60 Tab 1    famotidine (PEPCID) 20 mg tablet Take 1 Tab by mouth two (2) times a day. 60 Tab 0    senna-docusate (PERICOLACE) 8.6-50 mg per tablet Take 1 Tab by mouth two (2) times a day. 60 Tab 0    polyethylene glycol (MIRALAX) 17 gram packet Take 1 Packet by mouth daily. 30 Packet 0    aspirin delayed-release 325 mg tablet Take 1 Tab by mouth two (2) times a day. 60 Tab 0    amLODIPine (NORVASC) 10 mg tablet TAKE 1 TABLET BY MOUTH EVERY MORNING 90 Tab 4    gluc walters/chondro walters A/vit C/Mn (GLUCOSAMINE 1500 COMPLEX PO) Take 1 Tab by mouth daily.  calcium-cholecalciferol, d3, (CALCIUM 600 + D) 600-125 mg-unit tab Take 1 Tab by mouth two (2) times a day.  coenzyme Q-10 (CO Q-10) 200 mg capsule Take 200 mg by mouth daily.       benazepril (LOTENSIN) 40 mg tablet TAKE 1 TABLET BY MOUTH EVERY DAY 90 Tab 3    atorvastatin (LIPITOR) 20 mg tablet TAKE 1 TABLET BY MOUTH DAILY 90 Tab 3    cholecalciferol, vitamin d3, (VITAMIN D) 1,000 unit tablet Take 1,000 Units by mouth daily.  oxyCODONE IR (ROXICODONE) 5 mg immediate release tablet Take 0.5 Tabs by mouth every four (4) hours as needed. Max Daily Amount: 15 mg. 30 Tab 0    oxyCODONE IR (ROXICODONE) 5 mg immediate release tablet Take 1 Tab by mouth every four (4) hours as needed. Max Daily Amount: 30 mg. 30 Tab 0    MV-MIN/FA/D3/OM-3/DHA/EPA/FISH (ADULT MULTI PLUS OMEGA-3 PO) Take 2 Caps by mouth daily.  Patient takes 2 gummies daily       Allergies   Allergen Reactions    Codeine Nausea and Vomiting    Demerol (Pf) [Meperidine (Pf)] Nausea and Vomiting     Past Medical History:   Diagnosis Date    HTN (hypertension) 5/27/2011    Hyperlipidemia 12/1/2011    Hypertension     OAB (overactive bladder) 2/15/2013    Other ill-defined conditions(799.89)     hyperlipidemia    Other ill-defined conditions(799.89)     bladder problems    Other ill-defined conditions(799.89)     back pain     Past Surgical History:   Procedure Laterality Date    COLONOSCOPY N/A 3/12/2018    COLONOSCOPY performed by Nick Fink [facility] at [facility]    COLONOSCOPY,DIAGNOSTIC  3/12/2018         HX APPENDECTOMY  1948    HX CATARACT REMOVAL Left     HX CORNEAL TRANSPLANT Left     HX DILATION AND CURETTAGE      x 2    HX TONSILLECTOMY  1938     Family History   Problem Relation Age of Onset    Cancer Father      throat (smoker); mouth and gums    Heart Disease Mother      Age 52   [facility] Clotting Disorder Mother     Arthritis-osteo Paternal Aunt      Social History   Substance Use Topics    Smoking status: Never Smoker    Smokeless tobacco: Never Used    Alcohol use No      Social History     Social History Narrative        Review of Systems  - Constitutional Symptoms: no fevers, chills  - Cardiovascular: no chest pain or palpitations  - Respiratory: no cough or shortness of breath  - Gastrointestinal: no dysphagia or abdominal pain; no issues with bowel movements  - Integumentary: no rashes, PU's  - : no issues urination      Objective:     Vitals:    08/07/18 0955   BP: 117/60   Pulse: 75   Resp: 20   Temp: 96.5 °F (35.8 °C)   TempSrc: Oral   SpO2: 99%   Weight: 140 lb 1.6 oz (63.5 kg)   Height: 5' 7\" (1.702 m)       GENERAL: Isaías Lauren is in no acute distress. Non-toxic. Well nourished. Well developed. Appropriately groomed. HEAD:  Normocephalic. Atraumatic. Non tender sinuses x 4. RESP: Breath sounds are symmetrical bilaterally. Unlabored without SOB. Speaking in full sentences. Clear to auscultation bilaterally anteriorly and posteriorly. No wheezes. No rales or rhonchi. CV: normal rate. Regular rhythm. S1, S2 audible. No murmur noted. No rubs, clicks or gallops noted. MUSC:  Intact x 4 extremities. Right hip: surgical scar well approximated, no edema, erythema or discharge noted. HEME/LYMPH: peripheral pulses palpable 2+ x 4 extremities. No peripheral edema is noted. No cervical adenopathy noted. SKIN: Skin is warm and dry. Turgor is normal. No petechiae, no purpura, no rash. No cyanosis. No mottling, jaundice or pallor. PSYCH: appropriate behavior, dress and thought processes. Good eye contact. Clear and coherent speech. Full affect. Good insight.        Lab Results   Component Value Date/Time    Hemoglobin A1c 5.7 (H) 10/06/2017 08:45 AM     Lab Results   Component Value Date/Time    Cholesterol, total 129 10/06/2017 08:45 AM    Cholesterol (POC) 130 11/09/2016 02:33 PM    HDL Cholesterol 51 10/06/2017 08:45 AM    HDL Cholesterol (POC) 53 11/09/2016 02:33 PM    LDL Cholesterol (POC) 64 11/09/2016 02:33 PM    LDL, calculated 64 10/06/2017 08:45 AM    VLDL, calculated 14 10/06/2017 08:45 AM    Triglyceride 72 10/06/2017 08:45 AM    Triglycerides (POC) 64 11/09/2016 02:33 PM     Lab Results   Component Value Date/Time    Sodium 139 07/13/2018 05:12 AM    Potassium 3.4 (L) 07/13/2018 05:12 AM    Chloride 106 07/13/2018 05:12 AM    CO2 24 07/13/2018 05:12 AM Anion gap 9 07/13/2018 05:12 AM    Glucose 127 (H) 07/13/2018 05:12 AM    BUN 15 07/13/2018 05:12 AM    Creatinine 0.71 07/13/2018 05:12 AM    BUN/Creatinine ratio 21 (H) 07/13/2018 05:12 AM    GFR est AA >60 07/13/2018 05:12 AM    GFR est non-AA >60 07/13/2018 05:12 AM    Calcium 8.3 (L) 07/13/2018 05:12 AM    Bilirubin, total 0.7 07/12/2018 08:47 AM    AST (SGOT) 31 07/12/2018 08:47 AM    Alk. phosphatase 79 07/12/2018 08:47 AM    Protein, total 7.8 07/12/2018 08:47 AM    Albumin 4.1 07/12/2018 08:47 AM    Globulin 3.7 07/12/2018 08:47 AM    A-G Ratio 1.1 07/12/2018 08:47 AM    ALT (SGPT) 28 07/12/2018 08:47 AM     Lab Results   Component Value Date/Time    WBC 12.4 (H) 07/12/2018 08:47 AM    HGB (POC) 13.9 09/08/2017 09:47 AM    HGB 10.4 (L) 07/14/2018 05:15 AM    HCT (POC) 42.3 09/08/2017 09:47 AM    HCT 40.5 07/12/2018 08:47 AM    PLATELET 470 (L) 27/29/0847 08:47 AM    MCV 91.0 07/12/2018 08:47 AM     Lab Results   Component Value Date/Time    TSH 2.49 07/12/2018 08:47 AM     _____________________________________________________________________  I have discussed the diagnosis with the patient and the intended plan as seen in the above orders. The patient has received an after-visit summary and questions were answered concerning future plans. I have discussed medication side effects and warnings with the patient as well. Informed pt to return to the office if new symptoms arise. Urgent consultation with the nearest Emergency Department is strongly recommended if condition worsens.

## 2018-08-07 NOTE — PATIENT INSTRUCTIONS
1) Good nutrition is important to healing. If you are not eating a lot, Ensure is a good nutritional supplement to use. [place] Instant Breakfast can be added to milk. Smoothies with fruit and veggies and yogurt are also a good option    Fair Life milk is a healthy choice in milk products. It is double filtered to remove much of the lactose (sugar found in milk) and recommended by trainers and nutritionists. There is 13 g of protein in a serving, so it is an easy way to increase your protein and calcium intake. 2) Will have Shannan Katz, our Nurse Navigator, call and give you information about Carepak to keep medications straight    3) Will complete FMLA form for Maricruz Flood     4) Monica Theodore is a pharmacist with [facility] and can meet to review medications. I sent her a message to get in touch with you. Shingles Vaccine - Shingrix  Herpes Zoster (Shingles)     Herpes zoster (shingles) is a painful rash caused by the same virus that causes chickenpox. After an episode of chickenpox, the virus retreats to cells of the nervous system, where it can reside quietly for decades. However, later in life, the varicella-zoster virus can become active again. When it reactivates, it causes shingles. Shingles can affect people of all ages. It is particularly common in adults over age 48 years. It is also more common in individuals of all ages with conditions that weaken the immune system. Pain is usually the first sign of shingles. Other symptoms include: fever, chills, headache, numbness, tingling and/or burning pain and a skin rash. The rash can appear anywhere on your body, but most commonly on the torso. It is usually on only 1 side of your body, in a band or beltlike pattern. During the first several days, small, painful blisters appear in the area.  Scarring may occur where the blisters appear and there may be continued sensitivity and pain in that patch of skin for months after the infection. Treatment:   There is no cure for shingles - it is usually self-limiting. However, anti-viral medications can reduce the spread of the rash, speed healing and decrease the risk of complications. Supportive Treatment:   1)  Tylenol or ibuprofen can be used to reduce pain  2) Colloidal oatmeal bath or wet cool compresses may help with itching. Make a solution of cool water and cornstarch, baking soda, or Aveeno Oatmeal.  Apply the solution as a compress to the area. This will soothe the skin. 3) Wash the skin with soap and water to keep rash free of infection. 4) Reduce stress - exercise, yoga, healthy diet      THE BLISTER FLUID IS CONTAGIOUS TO ANYONE WHO HAS NOT ALREADY HAD CHICKEN POX. Stay home from work or school until all blisters have formed a crust (usually 2 weeks after the illness begins) and you are no longer contagious. Prevention:  The CDC recommends everyone over the age of 61 receive the shingles vaccine. This is recommended for people who have already had a shingles outbreak as it could prevent future occurrences of the disease. According to the CDC, it is also recommended for people born before [birth year] in the [address] who have not had varicella (chicken pox) as they are considered immune. [facility] is a vaccine indicated for prevention of herpes zoster (shingles) in adults aged 48 years and older and is the preferred vaccine for preventing shingles and related complications    The Center for Disease Control and Prevention recommended Shingrix for the following:  - healthy adults aged 48 years and older to prevent shingles and related complications  - adults who previously received the current shingles vaccine (Zostavax®) to prevent shingles and related complications    Two doses (0.5 mL each) are needed for full efficacy. The vaccines are administered intramuscularly, with the second one administered 2-6 months after the initial vaccine.           Hip Replacement Surgery (Anterior): What to Expect at Home  Your Recovery    Anterior hip replacement surgery is done through a small incision in the front (anterior) of the hip. This causes less damage to muscles and other soft tissues than the more common type of surgery. There is also a lower risk of the new ball on the thighbone slipping out of the hip socket (dislocation). As a result, healing is usually faster, and there are fewer limits on activity. After surgery, you will use crutches or a walker. You will need someone to help you at home for a few days or weeks or until you have more energy and can move around better. You may go to a rehab center if you don't have help at home. You will go home with a bandage and stitches, staples, tissue glue, or tape strips. You can remove the bandage when your doctor tells you to. If you have stitches or staples, your doctor will remove them 10 to 14 days after your surgery. Glue or tape strips will fall off on their own over time. You may have some mild pain and swelling after surgery. Your doctor may give you medicine for the pain. You will keep doing the physical therapy you started in the hospital. The better you do with your exercises, the sooner you will get your strength and movement back. Your doctor will tell you when you can go back to work or other activities. This will depend on what type of work and activities you do. This care sheet gives you a general idea about how long it will take for you to recover. But each person recovers at a different pace. Follow the steps below to get better as quickly as possible. How can you care for yourself at home? Activity    · For the first few months, your doctor may want you to avoid things that could dislocate your hip. If so, your therapist may suggest ideas such as:  ¨ Do not turn your leg too far out to the side. Keep your toes pointing forward or slightly in. ¨ Do not step backwards or bend backwards. ¨ Do not cross your legs.   · Go slowly when you climb stairs. Make sure the lights are on, and have someone watch you, if possible. When you climb stairs:  ¨ Step up first with your unaffected leg. Then bring the affected leg up to the same step. Bring your crutches or cane up. ¨ To go down stairs, reverse the order. First, put your crutches or cane on the lower step. Then bring the affected leg down to that step. Finally, step down with the unaffected leg.     · Rest when you feel tired. You may take a nap, but don't stay in bed all day.     · You may be able to take frequent short walks using crutches or a walker. You may use crutches or a walker for a couple of weeks, and then switch to a cane.     · Try not to sit for too long at one time. You will feel less stiff if you take a short walk about every hour.     · Ask your doctor when you can drive again.     · Ask your doctor when it is okay for you to have sex. Diet    · By the time you leave the hospital, you will probably be eating your normal diet. If your stomach is upset, try bland, low-fat foods like plain rice, broiled chicken, toast, and yogurt. Your doctor may recommend that you take iron and vitamin supplements.     · Eat healthy foods, and watch your portion sizes. Try to stay at your ideal weight. Controlling your weight will help your new hip joint last longer.     · If your bowel movements are not regular right after surgery, try to avoid constipation and straining. Drink plenty of water. Your doctor may suggest fiber, a stool softener, or a mild laxative. Medicines    · Be safe with medicines. Read and follow all instructions on the label. ¨ If the doctor gave you a prescription medicine for pain, take it as prescribed. ¨ If you are not taking a prescription pain medicine, ask your doctor if you can take an over-the-counter medicine.     · If your doctor prescribed antibiotics, take them as directed. Do not stop taking them just because you feel better.  You need to take the full course of antibiotics.     · Your doctor will tell you if and when you can restart your medicines. He or she will also give you instructions about taking any new medicines.     · If you take aspirin or some other blood thinner, be sure to talk to your doctor. He or she will tell you if and when to start taking this medicine again. Make sure that you understand exactly what your doctor wants you to do.     · Your doctor may give you a blood-thinning medicine to prevent blood clots for a few weeks after surgery. Be sure you get instructions about how to take your medicine safely. Blood thinners can cause serious bleeding problems. This medicine could be in pill form or as a shot (injection). If a shot is necessary, your doctor will tell you how to do this. Incision care    · If your doctor told you how to care for your cut (incision), follow your doctor's instructions. You will have a dressing over the cut. A dressing helps the incision heal and protects it. Your doctor will tell you how to take care of this.     · If you did not get instructions, follow this general advice:  ¨ If you have strips of tape on the cut the doctor made, leave the tape on for a week or until it falls off. ¨ If you have stitches or staples, your doctor will tell you when to come back to have them removed. ¨ If you have skin adhesive on the cut, leave it on until it falls off. Skin adhesive is also called glue or liquid stitches. ¨ Change the bandage every day. ¨ Wash the area daily with warm water, and pat it dry. Don't use hydrogen peroxide or alcohol. They can slow healing. ¨ You may cover the area with a gauze bandage if it oozes fluid or rubs against clothing. ¨ You may shower 24 to 48 hours after surgery. Pat the incision dry. Don't swim or take a bath for the first 2 weeks, or until your doctor tells you it is okay. Exercise    · Your physical therapist will teach you exercises to do at home.  Always do them as your therapist tells you.     · Your doctor may advise you to stay away from activities that put stress on the joint. This includes sports such as tennis, football, and jogging.     · Avoid activities where you might fall. These include motorcycle or horseback riding, water-skiing, and mountain biking. Ice and elevation    · For pain, put ice or a cold pack on the area for 10 to 20 minutes at a time. Put a thin cloth between the ice and your skin.     · Your ankle may swell for a few weeks after hip surgery. Prop up your ankle when you ice your hip or anytime you sit or lie down. Try to keep it above the level of your heart. This will help reduce swelling. Other instructions    · Keep wearing your support stockings. These help to prevent blood clots. Your doctor will tell you how long you need to keep wearing them.     · Try to prevent falls. To avoid falling:  ¨ Arrange furniture so that you will not trip on it. ¨ Get rid of throw rugs, and move electrical cords out of the way. ¨ Put grab bars in showers and bathtubs. ¨ Wear shoes with sturdy, flat soles. ¨ Walk only in areas with plenty of light. ¨ Avoid icy or snowy sidewalks. Follow-up care is a key part of your treatment and safety. Be sure to make and go to all appointments, and call your doctor if you are having problems. It's also a good idea to know your test results and keep a list of the medicines you take. When should you call for help? Call 911 anytime you think you may need emergency care. For example, call if:    · You passed out (lost consciousness).     · You have severe trouble breathing.     · You have sudden chest pain and shortness of breath, or you cough up blood.    Call your doctor now or seek immediate medical care if:    · You have symptoms of a blood clot in your leg (called a deep vein thrombosis), such as:  ¨ Pain in your calf, back of the knee, thigh, or groin.   ¨ Redness and swelling in your leg or groin.     · You have signs of infection, such as:  ¨ Increased pain, swelling, warmth, or redness. ¨ Red streaks leading from the incision. ¨ Pus draining from the incision. ¨ A fever.     · Your leg or foot turns cold or changes color.     · You have tingling, weakness, or numbness in your leg or foot.     · You have signs that your hip may be dislocated, including:  ¨ Severe pain and not being able to stand. ¨ A crooked leg that looks like your hip is out of position. ¨ Not being able to bend or straighten your leg.     · You have pain that does not get better after you take pain medicine.     · You have loose stitches, or your incision comes open.    Watch closely for changes in your health, and be sure to contact your doctor if:    · You do not have a bowel movement after taking a laxative.     · You do not get better as expected. Where can you learn more? Go to http://radha-alex.info/. Enter [phone] in the search box to learn more about \"Hip Replacement Surgery (Anterior): What to Expect at Home. \"  Current as of: November 29, 2017  Content Version: 11.7  © 5180-1565 [employer]. Care instructions adapted under license by [employer] (which disclaims liability or warranty for this information). If you have questions about a medical condition or this instruction, always ask your healthcare professional. [address] any warranty or liability for your use of this information.

## 2018-08-07 NOTE — PROGRESS NOTES
Chief Complaint   Patient presents with    Transitions Of Care     Discharged from SN Dx hip fx       Mary Hardwick  Identified pt with two pt identifiers(name and ). Chief Complaint   Patient presents with    Transitions Of Care     [facility]       1. Have you been to the ER, urgent care clinic since your last visit?  yHospitalized since your last visit? No    2. Have you seen or consulted any other health care providers outside of the [address] [place] [address] since your last visit? N  Include any pap smears or colon screening. No    Today's provider has been notified of reason for visit, vitals and flowsheets obtained on patients.        Reviewed record In preparation for visit, huddled with provider and have obtained necessary documentation      Health Maintenance Due   Topic    GLAUCOMA SCREENING Q2Y     Influenza Age 5 to Adult        Wt Readings from Last 3 Encounters:   18 140 lb 1.6 oz (63.5 kg)   18 160 lb (72.6 kg)   07/15/18 141 lb 8 oz (64.2 kg)     Temp Readings from Last 3 Encounters:   18 96.5 °F (35.8 °C) (Oral)   18 98.3 °F (36.8 °C)   18 98 °F (36.7 °C)     BP Readings from Last 3 Encounters:   18 117/60   18 130/60   18 120/56     Pulse Readings from Last 3 Encounters:   18 75   18 60   18 (!) 57     Vitals:    18 0955   BP: 117/60   Pulse: 75   Resp: 20   Temp: 96.5 °F (35.8 °C)   TempSrc: Oral   SpO2: 99%   Weight: 140 lb 1.6 oz (63.5 kg)   Height: 5' 7\" (1.702 m)   PainSc:   0 - No pain         Learning Assessment:  :     Learning Assessment 2017   PRIMARY LEARNER Patient   HIGHEST LEVEL OF EDUCATION - PRIMARY LEARNER  SOME COLLEGE   BARRIERS PRIMARY LEARNER NONE   PRIMARY LANGUAGE ENGLISH   LEARNER PREFERENCE PRIMARY DEMONSTRATION   ANSWERED BY Rylie AYERS SELF       Depression Screening:  :     PHQ over the last two weeks 2018   Little interest or pleasure in doing things Not at all   Feeling down, depressed, irritable, or hopeless Not at all   Total Score PHQ 2 0       Fall Risk Assessment:  :     Fall Risk Assessment, last 12 mths 8/7/2018   Able to walk? Yes   Fall in past 12 months? Yes   Fall with injury? Yes   Number of falls in past 12 months 2   Fall Risk Score 3       Abuse Screening:  :     Abuse Screening Questionnaire 8/7/2018 9/27/2017   Do you ever feel afraid of your partner? N N   Are you in a relationship with someone who physically or mentally threatens you? N N   Is it safe for you to go home? Y Y       ADL Screening:  :     ADL Assessment 8/7/2018   Feeding yourself No Help Needed   Getting from bed to chair No Help Needed   Getting dressed No Help Needed   Bathing or showering Help Needed   Walk across the room (includes cane/walker) No Help Needed   Using the telphone No Help Needed   Taking your medications Help Needed   Preparing meals Help Needed   Managing money (expenses/bills) Help Needed   Moderately strenuous housework (laundry) Help Needed   Shopping for personal items (toiletries/medicines) Help Needed   Shopping for groceries Help Needed   Driving Help Needed   Climbing a flight of stairs Help Needed   Getting to places beyond walking distances Help Needed                 Medication reconciliation up to date and corrected with patient at this time.

## 2018-08-07 NOTE — LETTER
8/7/2018 10:34 AM 
 
Ms. Harman Arechiga [address] [facility] [phone] Re: Harman Arechiga To Whom It May Concern: 
 
Mrs. Harman Arechiga is under my care for a medical issue that required surgery. She is unable to travel on her trip scheduled to start August 16, 2018. Thank you for your assistance in this matter. If there are questions or concerns, please have the patient contact our office. Sincerely, Kelby Veliz NP

## 2018-08-08 ENCOUNTER — TELEPHONE (OUTPATIENT)
Dept: FAMILY MEDICINE CLINIC | Age: 83
End: 2018-08-08

## 2018-08-08 NOTE — TELEPHONE ENCOUNTER
Juli Rivas daughter of patient made aware that patient FMLA papers are ready. Daughter will  Friday 08/10/2018. No further assistance needed from clinic at this time.

## 2018-08-09 ENCOUNTER — HOME CARE VISIT (OUTPATIENT)
Dept: SCHEDULING | Facility: HOME HEALTH | Age: 83
End: 2018-08-09
Payer: MEDICARE

## 2018-08-09 ENCOUNTER — TELEPHONE (OUTPATIENT)
Dept: FAMILY MEDICINE CLINIC | Age: 83
End: 2018-08-09

## 2018-08-09 VITALS
HEART RATE: 61 BPM | SYSTOLIC BLOOD PRESSURE: 134 MMHG | RESPIRATION RATE: 16 BRPM | DIASTOLIC BLOOD PRESSURE: 70 MMHG | OXYGEN SATURATION: 98 % | TEMPERATURE: 98.1 F

## 2018-08-09 VITALS
OXYGEN SATURATION: 97 % | DIASTOLIC BLOOD PRESSURE: 65 MMHG | TEMPERATURE: 98.1 F | RESPIRATION RATE: 16 BRPM | HEART RATE: 83 BPM | SYSTOLIC BLOOD PRESSURE: 130 MMHG

## 2018-08-09 PROCEDURE — G0157 HHC PT ASSISTANT EA 15: HCPCS

## 2018-08-09 PROCEDURE — G0300 HHS/HOSPICE OF LPN EA 15 MIN: HCPCS

## 2018-08-09 RX ORDER — OXYCODONE HYDROCHLORIDE 5 MG/1
5 TABLET ORAL
COMMUNITY
End: 2018-09-17 | Stop reason: ALTCHOICE

## 2018-08-09 NOTE — TELEPHONE ENCOUNTER
Placed an outgoing call to patient (2 identifiers used) to review her medications. Patient had with her a list of discharge medications from [facility] (7/25/18). Since discharge, her daughter has been putting the medications in pill box for patient which she states has been working a lot better for her and allows her to keep them straight. Although she is only taking calcium/vit D once daily vs twice daily-reminded her to take twice daily with food. Patient didn't have any specific questions about her medications other than to state she is taking a lot more than before. She did read off her [place] discharge papers that famotidine has a last day of 8/17/18 and the twice daily ASA has a last day of 8/18/18, which is different than her hospital discharge instructions which list twice daily ASA was to only continue for 2 weeks (7/30/18) then go back to daily. She denies any adverse effects from her medications and despite not using oxycodone and is on scheduled stool softener/laxatives (patient does however have history of constipation). Patient was sent home with oxycodone 5 mg tablets, however patient has not taken any of them since her pain has been controlled with the acetaminophen. She states they are currently bubble packed and in her freezer. I did review with her the indications for each of the medications and I discussed with her proper disposal/dates of national take back day/dropping off in receptacle at [facility]. Patient is unsure why she takes coenzyme Q10 and glucosamine and denies any statin-related myalgias or arthritis pain. Reminded her of the last dates listed on her instructions and told her if there were any changes that need to be made that [facility] office will let her know.     In an effort to reduce the number of medications she on, could consider:  · stopping the glucosamine complex and coenzyme Q10  · reevaluate if the twice daily ASA and famotidine needs to be continued since discharge instructions from hospital have this ending on 7/30/18.     Angus Martinez, PharmD, [facility]

## 2018-08-10 ENCOUNTER — HOME CARE VISIT (OUTPATIENT)
Dept: SCHEDULING | Facility: HOME HEALTH | Age: 83
End: 2018-08-10

## 2018-08-10 ENCOUNTER — HOME CARE VISIT (OUTPATIENT)
Dept: HOME HEALTH SERVICES | Facility: HOME HEALTH | Age: 83
End: 2018-08-10
Payer: MEDICARE

## 2018-08-10 PROCEDURE — G0156 HHCP-SVS OF AIDE,EA 15 MIN: HCPCS

## 2018-08-10 NOTE — PROGRESS NOTES
Connor Nichole note from 8/9/2018  Placed an outgoing call to patient (2 identifiers used) to review her medications. Patient had with her a list of discharge medications from [facility] (7/25/18).    Since discharge, her daughter has been putting the medications in pill box for patient which she states has been working a lot better for her and allows her to keep them straight. Although she is only taking calcium/vit D once daily vs twice daily-reminded her to take twice daily with food.     Patient didn't have any specific questions about her medications other than to state she is taking a lot more than before. She did read off her [place] discharge papers that famotidine has a last day of 8/17/18 and the twice daily ASA has a last day of 8/18/18, which is different than her hospital discharge instructions which list twice daily ASA was to only continue for 2 weeks (7/30/18) then go back to daily. She denies any adverse effects from her medications and despite not using oxycodone and is on scheduled stool softener/laxatives (patient does however have history of constipation).       Patient was sent home with oxycodone 5 mg tablets, however patient has not taken any of them since her pain has been controlled with the acetaminophen. She states they are currently bubble packed and in her freezer.       I did review with her the indications for each of the medications and I discussed with her proper disposal/dates of national take back day/dropping off in receptacle at [facility]. Patient is unsure why she takes coenzyme Q10 and glucosamine and denies any statin-related myalgias or arthritis pain.   Reminded her of the last dates listed on her instructions and told her if there were any changes that need to be made that [facility] office will let her know.     In an effort to reduce the number of medications she on, could consider:  · stopping the glucosamine complex and coenzyme Q10  · reevaluate if the twice daily ASA and famotidine needs to be continued since discharge instructions from hospital have this ending on 7/30/18.     Lebron Sky, PharmD, [facility]

## 2018-08-13 ENCOUNTER — HOME CARE VISIT (OUTPATIENT)
Dept: SCHEDULING | Facility: HOME HEALTH | Age: 83
End: 2018-08-13
Payer: MEDICARE

## 2018-08-13 VITALS
OXYGEN SATURATION: 97 % | DIASTOLIC BLOOD PRESSURE: 58 MMHG | SYSTOLIC BLOOD PRESSURE: 118 MMHG | TEMPERATURE: 99.5 F | HEART RATE: 84 BPM | RESPIRATION RATE: 17 BRPM

## 2018-08-13 PROCEDURE — G0152 HHCP-SERV OF OT,EA 15 MIN: HCPCS

## 2018-08-13 PROCEDURE — G0157 HHC PT ASSISTANT EA 15: HCPCS

## 2018-08-14 ENCOUNTER — HOME CARE VISIT (OUTPATIENT)
Dept: SCHEDULING | Facility: HOME HEALTH | Age: 83
End: 2018-08-14
Payer: MEDICARE

## 2018-08-14 VITALS
OXYGEN SATURATION: 98 % | DIASTOLIC BLOOD PRESSURE: 56 MMHG | TEMPERATURE: 98.8 F | HEART RATE: 67 BPM | SYSTOLIC BLOOD PRESSURE: 140 MMHG | RESPIRATION RATE: 18 BRPM

## 2018-08-14 VITALS
SYSTOLIC BLOOD PRESSURE: 141 MMHG | RESPIRATION RATE: 16 BRPM | OXYGEN SATURATION: 98 % | DIASTOLIC BLOOD PRESSURE: 82 MMHG | HEART RATE: 77 BPM | TEMPERATURE: 98.9 F

## 2018-08-14 PROCEDURE — G0156 HHCP-SVS OF AIDE,EA 15 MIN: HCPCS

## 2018-08-14 PROCEDURE — G0299 HHS/HOSPICE OF RN EA 15 MIN: HCPCS

## 2018-08-15 ENCOUNTER — HOME CARE VISIT (OUTPATIENT)
Dept: SCHEDULING | Facility: HOME HEALTH | Age: 83
End: 2018-08-15
Payer: MEDICARE

## 2018-08-15 VITALS
RESPIRATION RATE: 16 BRPM | TEMPERATURE: 98.7 F | SYSTOLIC BLOOD PRESSURE: 135 MMHG | DIASTOLIC BLOOD PRESSURE: 78 MMHG | OXYGEN SATURATION: 97 % | HEART RATE: 62 BPM

## 2018-08-15 PROCEDURE — G0152 HHCP-SERV OF OT,EA 15 MIN: HCPCS

## 2018-08-15 PROCEDURE — G0157 HHC PT ASSISTANT EA 15: HCPCS

## 2018-08-16 ENCOUNTER — HOME CARE VISIT (OUTPATIENT)
Dept: HOME HEALTH SERVICES | Facility: HOME HEALTH | Age: 83
End: 2018-08-16
Payer: MEDICARE

## 2018-08-16 VITALS
DIASTOLIC BLOOD PRESSURE: 60 MMHG | SYSTOLIC BLOOD PRESSURE: 120 MMHG | HEART RATE: 66 BPM | OXYGEN SATURATION: 97 % | RESPIRATION RATE: 17 BRPM | TEMPERATURE: 98.8 F

## 2018-08-17 ENCOUNTER — HOME CARE VISIT (OUTPATIENT)
Dept: SCHEDULING | Facility: HOME HEALTH | Age: 83
End: 2018-08-17
Payer: MEDICARE

## 2018-08-17 ENCOUNTER — HOME CARE VISIT (OUTPATIENT)
Dept: HOME HEALTH SERVICES | Facility: HOME HEALTH | Age: 83
End: 2018-08-17
Payer: MEDICARE

## 2018-08-17 PROCEDURE — G0151 HHCP-SERV OF PT,EA 15 MIN: HCPCS

## 2018-08-20 ENCOUNTER — HOME CARE VISIT (OUTPATIENT)
Dept: SCHEDULING | Facility: HOME HEALTH | Age: 83
End: 2018-08-20
Payer: MEDICARE

## 2018-08-20 VITALS
RESPIRATION RATE: 16 BRPM | SYSTOLIC BLOOD PRESSURE: 138 MMHG | TEMPERATURE: 98.8 F | DIASTOLIC BLOOD PRESSURE: 72 MMHG | HEART RATE: 63 BPM | OXYGEN SATURATION: 97 %

## 2018-08-20 PROCEDURE — G0152 HHCP-SERV OF OT,EA 15 MIN: HCPCS

## 2018-08-21 ENCOUNTER — HOME CARE VISIT (OUTPATIENT)
Dept: HOME HEALTH SERVICES | Facility: HOME HEALTH | Age: 83
End: 2018-08-21
Payer: MEDICARE

## 2018-08-21 ENCOUNTER — HOME CARE VISIT (OUTPATIENT)
Dept: SCHEDULING | Facility: HOME HEALTH | Age: 83
End: 2018-08-21
Payer: MEDICARE

## 2018-08-21 VITALS
SYSTOLIC BLOOD PRESSURE: 124 MMHG | HEART RATE: 64 BPM | OXYGEN SATURATION: 97 % | RESPIRATION RATE: 18 BRPM | DIASTOLIC BLOOD PRESSURE: 68 MMHG

## 2018-08-21 PROCEDURE — G0299 HHS/HOSPICE OF RN EA 15 MIN: HCPCS

## 2018-08-23 ENCOUNTER — HOME CARE VISIT (OUTPATIENT)
Dept: HOME HEALTH SERVICES | Facility: HOME HEALTH | Age: 83
End: 2018-08-23
Payer: MEDICARE

## 2018-08-28 ENCOUNTER — HOME CARE VISIT (OUTPATIENT)
Dept: HOME HEALTH SERVICES | Facility: HOME HEALTH | Age: 83
End: 2018-08-28
Payer: MEDICARE

## 2018-08-29 ENCOUNTER — TELEPHONE (OUTPATIENT)
Dept: FAMILY MEDICINE CLINIC | Age: 83
End: 2018-08-29

## 2018-08-29 NOTE — TELEPHONE ENCOUNTER
Spoke to patient. Verified with two patient identifiers. Informed pt that the [facility] paperwork is ready to be picked. Pt wanted writer to call her daughter and inform her that the paper is ready for . Writer called her daughter, confirming with phi release and HIPPA . Writer left a message to call the clinic back. Message is: [facility] paperwork is ready for .    Nothing further

## 2018-09-06 ENCOUNTER — HOME CARE VISIT (OUTPATIENT)
Dept: SCHEDULING | Facility: HOME HEALTH | Age: 83
End: 2018-09-06
Payer: MEDICARE

## 2018-09-06 VITALS
HEART RATE: 66 BPM | SYSTOLIC BLOOD PRESSURE: 124 MMHG | OXYGEN SATURATION: 96 % | DIASTOLIC BLOOD PRESSURE: 70 MMHG | TEMPERATURE: 98.7 F | RESPIRATION RATE: 16 BRPM

## 2018-09-06 PROCEDURE — G0300 HHS/HOSPICE OF LPN EA 15 MIN: HCPCS

## 2018-09-11 ENCOUNTER — HOME CARE VISIT (OUTPATIENT)
Dept: HOME HEALTH SERVICES | Facility: HOME HEALTH | Age: 83
End: 2018-09-11
Payer: MEDICARE

## 2018-09-11 ENCOUNTER — HOME CARE VISIT (OUTPATIENT)
Dept: SCHEDULING | Facility: HOME HEALTH | Age: 83
End: 2018-09-11
Payer: MEDICARE

## 2018-09-11 VITALS
SYSTOLIC BLOOD PRESSURE: 128 MMHG | RESPIRATION RATE: 18 BRPM | DIASTOLIC BLOOD PRESSURE: 80 MMHG | OXYGEN SATURATION: 98 % | HEART RATE: 76 BPM | TEMPERATURE: 97 F

## 2018-09-11 PROCEDURE — G0299 HHS/HOSPICE OF RN EA 15 MIN: HCPCS

## 2018-09-17 ENCOUNTER — OFFICE VISIT (OUTPATIENT)
Dept: FAMILY MEDICINE CLINIC | Age: 83
End: 2018-09-17

## 2018-09-17 VITALS
OXYGEN SATURATION: 96 % | HEIGHT: 67 IN | HEART RATE: 59 BPM | DIASTOLIC BLOOD PRESSURE: 71 MMHG | TEMPERATURE: 98.3 F | RESPIRATION RATE: 18 BRPM | BODY MASS INDEX: 21.38 KG/M2 | SYSTOLIC BLOOD PRESSURE: 147 MMHG | WEIGHT: 136.2 LBS

## 2018-09-17 DIAGNOSIS — I10 ESSENTIAL HYPERTENSION: Primary | ICD-10-CM

## 2018-09-17 RX ORDER — AMOXICILLIN 250 MG
1 CAPSULE ORAL DAILY
Qty: 60 TAB | Refills: 0
Start: 2018-09-17 | End: 2018-10-11 | Stop reason: SDUPTHER

## 2018-09-17 RX ORDER — ASPIRIN 81 MG/1
81 TABLET ORAL DAILY
Qty: 90 TAB | Refills: 2
Start: 2018-09-17 | End: 2018-10-11 | Stop reason: SDUPTHER

## 2018-09-17 NOTE — PROGRESS NOTES
Chief Complaint   Patient presents with    Medication Evaluation       Nilda Zacarias  Identified pt with two pt identifiers(name and ). Chief Complaint   Patient presents with    Medication Evaluation       1. Have you been to the ER, urgent care clinic since your last visit?n   Hospitalized since your last visit?  n    2. Have you seen or consulted any other health care providers outside of the [address] [place] [address] since your last visit?  n  Include any pap smears or colon screening n    Advance Care Planning    In the event something were to happen to you and you were unable to speak on your behalf, do you have an Advance Directive/ Living Will in place stating your wishes?  NO   If yes, do we have a copy on file  NO    If no, would you like information NO    Medication reconciliation up to date and corrected with patient at this time. y  Today's provider has been notified of reason for visit, vitals and flowsheets obtained on patients. y     Reviewed record in preparation for visit, huddled with provider and have obtained necessary documentation. y      Health Maintenance Due   Topic    GLAUCOMA SCREENING Q2Y     Influenza Age 5 to Adult        Wt Readings from Last 3 Encounters:   18 136 lb 3.2 oz (61.8 kg)   18 140 lb 1.6 oz (63.5 kg)   18 160 lb (72.6 kg)     Temp Readings from Last 3 Encounters:   18 98.3 °F (36.8 °C) (Oral)   18 97 °F (36.1 °C)   18 98.7 °F (37.1 °C) (Temporal)     BP Readings from Last 3 Encounters:   18 147/71   18 128/80   18 124/70     Pulse Readings from Last 3 Encounters:   18 (!) 59   18 76   18 66     Vitals:    18 1459   BP: 147/71   Pulse: (!) 59   Resp: 18   Temp: 98.3 °F (36.8 °C)   TempSrc: Oral   SpO2: 96%   Weight: 136 lb 3.2 oz (61.8 kg)   Height: 5' 7\" (1.702 m)         Learning Assessment:  :     Learning Assessment 2017   PRIMARY LEARNER Patient   HIGHEST LEVEL OF EDUCATION - PRIMARY LEARNER  SOME COLLEGE   BARRIERS PRIMARY LEARNER NONE   PRIMARY LANGUAGE ENGLISH   LEARNER PREFERENCE PRIMARY DEMONSTRATION   ANSWERED BY Martine Keys   RELATIONSHIP SELF       Depression Screening:  :     PHQ over the last two weeks 9/17/2018   Little interest or pleasure in doing things Not at all   Feeling down, depressed, irritable, or hopeless Not at all   Total Score PHQ 2 0       Fall Risk Assessment:  :     Fall Risk Assessment, last 12 mths 9/17/2018   Able to walk? Yes   Fall in past 12 months? Yes   Fall with injury? Yes   Number of falls in past 12 months -   Fall Risk Score -       Abuse Screening:  :     Abuse Screening Questionnaire 8/7/2018 9/27/2017   Do you ever feel afraid of your partner? N N   Are you in a relationship with someone who physically or mentally threatens you? N N   Is it safe for you to go home? Y Y       ADL Screening:  :     ADL Assessment 8/7/2018   Feeding yourself No Help Needed   Getting from bed to chair No Help Needed   Getting dressed No Help Needed   Bathing or showering Help Needed   Walk across the room (includes cane/walker) No Help Needed   Using the telphone No Help Needed   Taking your medications Help Needed   Preparing meals Help Needed   Managing money (expenses/bills) Help Needed   Moderately strenuous housework (laundry) Help Needed   Shopping for personal items (toiletries/medicines) Help Needed   Shopping for groceries Help Needed   Driving Help Needed   Climbing a flight of stairs Help Needed   Getting to places beyond walking distances Help Needed                 Medication reconciliation up to date and corrected with patient at this time.

## 2018-09-17 NOTE — PATIENT INSTRUCTIONS
1) Medication - using pre-wrapped medication packets can lessen mistakes and make taking multiple pills easier. Make a guide so you can identify the pills in the packaging, in case you need to discontinue one         Heart-Healthy Diet: Care Instructions  Your Care Instructions    A heart-healthy diet has lots of vegetables, fruits, nuts, beans, and whole grains, and is low in salt. It limits foods that are high in saturated fat, such as meats, cheeses, and fried foods. It may be hard to change your diet, but even small changes can lower your risk of heart attack and heart disease. Follow-up care is a key part of your treatment and safety. Be sure to make and go to all appointments, and call your doctor if you are having problems. It's also a good idea to know your test results and keep a list of the medicines you take. How can you care for yourself at home? Watch your portions  · Learn what a serving is. A \"serving\" and a \"portion\" are not always the same thing. Make sure that you are not eating larger portions than are recommended. For example, a serving of pasta is ½ cup. A serving size of meat is 2 to 3 ounces. A 3-ounce serving is about the size of a deck of cards. Measure serving sizes until you are good at [place]" them. Keep in mind that restaurants often serve portions that are 2 or 3 times the size of one serving. · To keep your energy level up and keep you from feeling hungry, eat often but in smaller portions. · Eat only the number of calories you need to stay at a healthy weight. If you need to lose weight, eat fewer calories than your body burns (through exercise and other physical activity). Eat more fruits and vegetables  · Eat a variety of fruit and vegetables every day. Dark green, deep orange, red, or yellow fruits and vegetables are especially good for you. Examples include spinach, carrots, peaches, and berries. · Keep carrots, celery, and other veggies handy for snacks.  Buy fruit that is in season and store it where you can see it so that you will be tempted to eat it. · Cook dishes that have a lot of veggies in them, such as stir-fries and soups. Limit saturated and trans fat  · Read food labels, and try to avoid saturated and trans fats. They increase your risk of heart disease. Trans fat is found in many processed foods such as cookies and crackers. · Use olive or canola oil when you cook. Try cholesterol-lowering spreads, such as Benecol or Take Control. · Bake, broil, grill, or steam foods instead of frying them. · Choose lean meats instead of high-fat meats such as hot dogs and sausages. Cut off all visible fat when you prepare meat. · Eat fish, skinless poultry, and meat alternatives such as soy products instead of high-fat meats. Soy products, such as tofu, may be especially good for your heart. · Choose low-fat or fat-free milk and dairy products. Eat fish  · Eat at least two servings of fish a week. Certain fish, such as salmon and tuna, contain omega-3 fatty acids, which may help reduce your risk of heart attack. Eat foods high in fiber  · Eat a variety of grain products every day. Include whole-grain foods that have lots of fiber and nutrients. Examples of whole-grain foods include oats, whole wheat bread, and brown rice. · Buy whole-grain breads and cereals, instead of white bread or pastries. Limit salt and sodium  · Limit how much salt and sodium you eat to help lower your blood pressure. · Taste food before you salt it. Add only a little salt when you think you need it. With time, your taste buds will adjust to less salt. · Eat fewer snack items, fast foods, and other high-salt, processed foods. Check food labels for the amount of sodium in packaged foods. · Choose low-sodium versions of canned goods (such as soups, vegetables, and beans). Limit sugar  · Limit drinks and foods with added sugar. These include candy, desserts, and soda pop.   Limit alcohol  · Limit alcohol to no more than 2 drinks a day for men and 1 drink a day for women. Too much alcohol can cause health problems. When should you call for help? Watch closely for changes in your health, and be sure to contact your doctor if:    · You would like help planning heart-healthy meals. Where can you learn more? Go to http://radha-alex.info/. Enter V137 in the search box to learn more about \"Heart-Healthy Diet: Care Instructions. \"  Current as of: December 6, 2017  Content Version: 11.7  © 0051-5427 [employer]. Care instructions adapted under license by [employer] (which disclaims liability or warranty for this information). If you have questions about a medical condition or this instruction, always ask your healthcare professional. [address] any warranty or liability for your use of this information.

## 2018-09-17 NOTE — PROGRESS NOTES
S: Belem Carpenter is a 80 y.o. female who presents for follow up     Assessment/Plan:     1. Medication management  -pt and daughter wanted to review medications before hiring [facility]  -advised pt and daughter to be able to identify pills in case pt doesn't need to take daily med (ie metoprolol, pericolace)    2. Hypertension  -current therapy: amlodipine 10mg + metoprolol tartrate 25mg bid + benazepril 40mg + asa 81mg         Wants to discuss medication - planning to have pills [facility]packaged by [facility]     HTN  Amlodipine 10mg + metoprolol tartrate 25mg bid+ benazepril 40mg + asa 81mg (was taking 325mg, advised to decrease)  BM - having 1 daily, no issues, taking 1 pericolace daily  Not on any opioids (med disposal bags given to throw out unnecessary meds     Social History:  Nutrition: appetite ok - still not 100% - is making healthy choices, using Fairlife milk and carnation instant breakfast   Physical: has exercises PT has given   Social: lives with spouse, daughters in town and visit frequently     History   Smoking Status    Never Smoker   Smokeless Tobacco    Never Used     History   Alcohol Use No     History   Drug Use No        Review of Systems:  - Constitutional Symptoms: no fevers, chills, 4 lbs since  8/6/18 weight loss  - Eyes: no blurry vision or double vision  - Cardiovascular: no chest pain or palpitations  - Respiratory: no cough or shortness of breath  ROS is negative otherwise.     PHQ over the last two weeks 9/17/2018   Little interest or pleasure in doing things Not at all   Feeling down, depressed, irritable, or hopeless Not at all   Total Score PHQ 2 0       I reviewed the following:  Past Medical History:   Diagnosis Date    HTN (hypertension) 5/27/2011    Hyperlipidemia 12/1/2011    Hypertension     OAB (overactive bladder) 2/15/2013    Other ill-defined conditions(799.89)     hyperlipidemia    Other ill-defined conditions(799.89)     bladder problems    Other ill-defined conditions(799.89)     back pain       Current Outpatient Prescriptions   Medication Sig Dispense Refill    aspirin delayed-release 81 mg tablet Take 1 Tab by mouth daily. 90 Tab 2    senna-docusate (PERICOLACE) 8.6-50 mg per tablet Take 1 Tab by mouth daily. 60 Tab 0    multivit-minerals/folic acid (CENTRUM MULTIGUMMIES PO) Take 2 Each by mouth daily.  metoprolol tartrate (LOPRESSOR) 25 mg tablet Take 1 Tab by mouth every twelve (12) hours. Hold for SBP less than 120 or HR less than 65 60 Tab 1    acetaminophen (TYLENOL) 325 mg tablet Take 2 Tabs by mouth every six (6) hours as needed for Pain. 60 Tab 1    polyethylene glycol (MIRALAX) 17 gram packet Take 1 Packet by mouth daily. (Patient taking differently: Take 17 g by mouth daily. Mix in 8 oz liquid) 30 Packet 0    amLODIPine (NORVASC) 10 mg tablet TAKE 1 TABLET BY MOUTH EVERY MORNING 90 Tab 4    gluc walters/chondro walters A/vit C/Mn (GLUCOSAMINE 1500 COMPLEX PO) Take 1 Tab by mouth daily.  calcium-cholecalciferol, d3, (CALCIUM 600 + D) 600-125 mg-unit tab Take 1 Tab by mouth two (2) times a day.  coenzyme Q-10 (CO Q-10) 200 mg capsule Take 200 mg by mouth daily.  benazepril (LOTENSIN) 40 mg tablet TAKE 1 TABLET BY MOUTH EVERY DAY 90 Tab 3    cholecalciferol, vitamin d3, (VITAMIN D) 1,000 unit tablet Take 1,000 Units by mouth every evening.       atorvastatin (LIPITOR) 20 mg tablet TAKE 1 TABLET BY MOUTH DAILY (Patient taking differently: TAKE 1 TABLET BY MOUTH IN THE EVENING) 90 Tab 3       Allergies   Allergen Reactions    Codeine Nausea and Vomiting    Demerol (Pf) [Meperidine (Pf)] Nausea and Vomiting    Meperidine Nausea and Vomiting        O: VS:   Visit Vitals    /71 (BP 1 Location: Right arm, BP Patient Position: Sitting)    Pulse (!) 59    Temp 98.3 °F (36.8 °C) (Oral)    Resp 18    Ht 5' 7\" (1.702 m)    Wt 136 lb 3.2 oz (61.8 kg)    SpO2 96%    BMI 21.33 kg/m2       Data Reviewed:  Vitals 9/17/2018 9/11/2018 9/6/2018 8/21/2018 8/20/2018   Blood Pressure 147/71 128/80 124/70 124/68 138/72       GENERAL: Aster Mcfadden is in no acute distress. Non-toxic. Well nourished. Well developed. Appropriately groomed. RESP: Breath sounds are symmetrical bilaterally. Unlabored without SOB. Speaking in full sentences. Clear to auscultation bilaterally anteriorly and posteriorly. No wheezes. No rales or rhonchi. CV: normal rate. Regular rhythm. S1, S2 audible. No murmur noted. No rubs, clicks or gallops noted. HEME/LYMPH: peripheral pulses palpable 2+ x 4 extremities. No peripheral edema is noted. SKIN: Skin is warm and dry. Turgor is normal. No petechiae, no purpura, no rash. No cyanosis. No mottling, jaundice or pallor. PSYCH: appropriate behavior, dress and thought processes. Good eye contact. Clear and coherent speech.    ___________________________________________________________________  Patient education was done. Advised on nutrition, physical activity, weight management,  and safety. Counseling included discussion of diagnosis, differentials, treatment options, prescribed treatment, warning signs and follow up. Medication risks/benefits, interactions and alternatives discussed with patient.      Patient verbalized understanding and agreed to plan of care. Patient was given an after visit summary which included current diagnoses, medications and vital signs. Follow up as directed.

## 2018-09-17 NOTE — MR AVS SNAPSHOT
[facility] 
 
 
 [address] [facility] 
[address] [place] [address] 
[phone] Patient: Harman Arechiga MRN: [id number] ZUR:[id number] Visit Information Date & Time Provider Department Dept. Phone Encounter #  
 9/17/2018  3:00 PM Lourdes Mullen NP [facility] [phone] [id number] Follow-up Instructions Return if symptoms worsen or fail to improve. Upcoming Health Maintenance Date Due  
 GLAUCOMA SCREENING Q2Y 12/31/2017 Influenza Age 5 to Adult 8/1/2018 MEDICARE YEARLY EXAM 9/28/2018 DTaP/Tdap/Td series (2 - Td) 5/27/2020 Allergies as of 9/17/2018  Review Complete On: 9/17/2018 By: Lourdes Mullen NP Severity Noted Reaction Type Reactions Codeine  11/23/2010    Nausea and Vomiting Demerol (Pf) [Meperidine (Pf)]  12/01/2011    Nausea and Vomiting Meperidine  12/01/2011    Nausea and Vomiting Current Immunizations  Reviewed on 5/14/2018 Name Date Pneumococcal Conjugate (PCV-13) 9/27/2017 TDAP Vaccine 5/27/2010 ZZZ-RETIRED (DO NOT USE) Pneumococcal Vaccine (Unspecified Type) 5/27/2007 Not reviewed this visit You Were Diagnosed With   
  
 Codes Comments Essential hypertension    -  Primary ICD-10-CM: I10 
ICD-9-CM: 401.9 Vitals BP Pulse Temp Resp Height(growth percentile) Weight(growth percentile) 147/71 (BP 1 Location: Right arm, BP Patient Position: Sitting) (!) 59 98.3 °F (36.8 °C) (Oral) 18 5' 7\" (1.702 m) 136 lb 3.2 oz (61.8 kg) SpO2 BMI OB Status Smoking Status 96% 21.33 kg/m2 Postmenopausal Never Smoker BMI and BSA Data Body Mass Index Body Surface Area  
 21.33 kg/m 2 1.71 m 2 Preferred Pharmacy Pharmacy Name Phone [id number], [id number] [place] [address] [phone] Your Updated Medication List  
  
   
 This list is accurate as of 9/17/18  3:50 PM.  Always use your most recent med list.  
  
  
  
  
 acetaminophen 325 mg tablet Commonly known as:  TYLENOL Take 2 Tabs by mouth every six (6) hours as needed for Pain. amLODIPine 10 mg tablet Commonly known as:  [place] Rafael TAKE 1 TABLET BY MOUTH EVERY MORNING  
  
 aspirin delayed-release 81 mg tablet Take 1 Tab by mouth daily. atorvastatin 20 mg tablet Commonly known as:  LIPITOR  
TAKE 1 TABLET BY MOUTH DAILY  
  
 benazepril 40 mg tablet Commonly known as:  LOTENSIN  
TAKE 1 TABLET BY MOUTH EVERY DAY  
  
 CALCIUM 600 + D 600-125 mg-unit Tab Generic drug:  calcium-cholecalciferol (d3) Take 1 Tab by mouth two (2) times a day. CENTRUM MULTIGUMMIES PO Take 2 Each by mouth daily. CO Q-10 200 mg capsule Generic drug:  coenzyme Q-10 Take 200 mg by mouth daily. GLUCOSAMINE 1500 COMPLEX PO Take 1 Tab by mouth daily. metoprolol tartrate 25 mg tablet Commonly known as:  LOPRESSOR Take 1 Tab by mouth every twelve (12) hours. Hold for SBP less than 120 or HR less than 65  
  
 polyethylene glycol 17 gram packet Commonly known as:  Carvajal Jordin Take 1 Packet by mouth daily. senna-docusate 8.6-50 mg per tablet Commonly known as:  Tempie Myra Take 1 Tab by mouth two (2) times a day. VITAMIN D3 1,000 unit tablet Generic drug:  cholecalciferol Take 1,000 Units by mouth every evening. Follow-up Instructions Return if symptoms worsen or fail to improve. Patient Instructions 1) Medication - using pre-wrapped medication packets can lessen mistakes and make taking multiple pills easier. Make a guide so you can identify the pills in the packaging, in case you need to discontinue one Heart-Healthy Diet: Care Instructions Your Care Instructions A heart-healthy diet has lots of vegetables, fruits, nuts, beans, and whole grains, and is low in salt. It limits foods that are high in saturated fat, such as meats, cheeses, and fried foods. It may be hard to change your diet, but even small changes can lower your risk of heart attack and heart disease. Follow-up care is a key part of your treatment and safety. Be sure to make and go to all appointments, and call your doctor if you are having problems. It's also a good idea to know your test results and keep a list of the medicines you take. How can you care for yourself at home? Watch your portions · Learn what a serving is. A \"serving\" and a \"portion\" are not always the same thing. Make sure that you are not eating larger portions than are recommended. For example, a serving of pasta is ½ cup. A serving size of meat is 2 to 3 ounces. A 3-ounce serving is about the size of a deck of cards. Measure serving sizes until you are good at [place]" them. Keep in mind that restaurants often serve portions that are 2 or 3 times the size of one serving. · To keep your energy level up and keep you from feeling hungry, eat often but in smaller portions. · Eat only the number of calories you need to stay at a healthy weight. If you need to lose weight, eat fewer calories than your body burns (through exercise and other physical activity). Eat more fruits and vegetables · Eat a variety of fruit and vegetables every day. Dark green, deep orange, red, or yellow fruits and vegetables are especially good for you. Examples include spinach, carrots, peaches, and berries. · Keep carrots, celery, and other veggies handy for snacks. Buy fruit that is in season and store it where you can see it so that you will be tempted to eat it. · Cook dishes that have a lot of veggies in them, such as stir-fries and soups. Limit saturated and trans fat · Read food labels, and try to avoid saturated and trans fats. They increase your risk of heart disease.  Trans fat is found in many processed foods such as cookies and crackers. · Use olive or canola oil when you cook. Try cholesterol-lowering spreads, such as Benecol or Take Control. · Bake, broil, grill, or steam foods instead of frying them. · Choose lean meats instead of high-fat meats such as hot dogs and sausages. Cut off all visible fat when you prepare meat. · Eat fish, skinless poultry, and meat alternatives such as soy products instead of high-fat meats. Soy products, such as tofu, may be especially good for your heart. · Choose low-fat or fat-free milk and dairy products. Eat fish · Eat at least two servings of fish a week. Certain fish, such as salmon and tuna, contain omega-3 fatty acids, which may help reduce your risk of heart attack. Eat foods high in fiber · Eat a variety of grain products every day. Include whole-grain foods that have lots of fiber and nutrients. Examples of whole-grain foods include oats, whole wheat bread, and brown rice. · Buy whole-grain breads and cereals, instead of white bread or pastries. Limit salt and sodium · Limit how much salt and sodium you eat to help lower your blood pressure. · Taste food before you salt it. Add only a little salt when you think you need it. With time, your taste buds will adjust to less salt. · Eat fewer snack items, fast foods, and other high-salt, processed foods. Check food labels for the amount of sodium in packaged foods. · Choose low-sodium versions of canned goods (such as soups, vegetables, and beans). Limit sugar · Limit drinks and foods with added sugar. These include candy, desserts, and soda pop. Limit alcohol · Limit alcohol to no more than 2 drinks a day for men and 1 drink a day for women. Too much alcohol can cause health problems. When should you call for help? Watch closely for changes in your health, and be sure to contact your doctor if: 
  · You would like help planning heart-healthy meals. Where can you learn more? Go to http://radha-alex.info/. Enter V137 in the search box to learn more about \"Heart-Healthy Diet: Care Instructions. \" Current as of: December 6, 2017 Content Version: 11.7 © 9939-3232 CoffeeTable. Care instructions adapted under license by [employer] (which disclaims liability or warranty for this information). If you have questions about a medical condition or this instruction, always ask your healthcare professional. [address] any warranty or liability for your use of this information. Introducing [facility]! Dear Mena Samaniego: Thank you for requesting a Bocada account. Our records indicate that you already have an active Bocada account. You can access your account anytime at [email or web address] Did you know that you can access your hospital and ER discharge instructions at any time in Bocada? You can also review all of your test results from your hospital stay or ER visit. Additional Information If you have questions, please visit the Frequently Asked Questions section of the Bocada website at [email or web address] Remember, Bocada is NOT to be used for urgent needs. For medical emergencies, dial 911. Now available from your iPhone and Android! Please provide this summary of care documentation to your next provider. Your primary care clinician is listed as Theodore Corona. If you have any questions after today's visit, please call [phone].

## 2018-10-11 NOTE — TELEPHONE ENCOUNTER
Requested Prescriptions     Pending Prescriptions Disp Refills    senna-docusate (PERICOLACE) 8.6-50 mg per tablet 60 Tab 0     Sig: Take 1 Tab by mouth daily.  aspirin delayed-release 81 mg tablet 90 Tab 2     Sig: Take 1 Tab by mouth daily. Please send new Rx to med adherence blister pack pharmacy.

## 2018-10-11 NOTE — TELEPHONE ENCOUNTER
PCP: Delfina Mack NP    Last appt: 9/17/2018  No future appointments. Requested Prescriptions     Pending Prescriptions Disp Refills    senna-docusate (PERICOLACE) 8.6-50 mg per tablet 60 Tab 0     Sig: Take 1 Tab by mouth daily.  aspirin delayed-release 81 mg tablet 90 Tab 2     Sig: Take 1 Tab by mouth daily.        Prior labs and Blood pressures:  BP Readings from Last 3 Encounters:   09/17/18 147/71   09/11/18 128/80   09/06/18 124/70     Lab Results   Component Value Date/Time    Sodium 139 07/13/2018 05:12 AM    Potassium 3.4 (L) 07/13/2018 05:12 AM    Chloride 106 07/13/2018 05:12 AM    CO2 24 07/13/2018 05:12 AM    Anion gap 9 07/13/2018 05:12 AM    Glucose 127 (H) 07/13/2018 05:12 AM    BUN 15 07/13/2018 05:12 AM    Creatinine 0.71 07/13/2018 05:12 AM    BUN/Creatinine ratio 21 (H) 07/13/2018 05:12 AM    GFR est AA >60 07/13/2018 05:12 AM    GFR est non-AA >60 07/13/2018 05:12 AM    Calcium 8.3 (L) 07/13/2018 05:12 AM     Lab Results   Component Value Date/Time    Hemoglobin A1c 5.7 (H) 10/06/2017 08:45 AM     Lab Results   Component Value Date/Time    Cholesterol, total 129 10/06/2017 08:45 AM    Cholesterol (POC) 130 11/09/2016 02:33 PM    HDL Cholesterol 51 10/06/2017 08:45 AM    HDL Cholesterol (POC) 53 11/09/2016 02:33 PM    LDL Cholesterol (POC) 64 11/09/2016 02:33 PM    LDL, calculated 64 10/06/2017 08:45 AM    VLDL, calculated 14 10/06/2017 08:45 AM    Triglyceride 72 10/06/2017 08:45 AM    Triglycerides (POC) 64 11/09/2016 02:33 PM     No results found for: SUMMER Myles    Lab Results   Component Value Date/Time    TSH 2.49 07/12/2018 08:47 AM

## 2018-10-12 RX ORDER — AMOXICILLIN 250 MG
1 CAPSULE ORAL DAILY
Qty: 60 TAB | Refills: 2 | Status: SHIPPED | OUTPATIENT
Start: 2018-10-12 | End: 2018-11-01

## 2018-10-12 RX ORDER — ASPIRIN 81 MG/1
81 TABLET ORAL DAILY
Qty: 90 TAB | Refills: 2 | Status: SHIPPED | OUTPATIENT
Start: 2018-10-12 | End: 2018-11-12 | Stop reason: SDUPTHER

## 2018-10-17 ENCOUNTER — TELEPHONE (OUTPATIENT)
Dept: FAMILY MEDICINE CLINIC | Age: 83
End: 2018-10-17

## 2018-10-17 ENCOUNTER — PATIENT OUTREACH (OUTPATIENT)
Dept: FAMILY MEDICINE CLINIC | Age: 83
End: 2018-10-17

## 2018-10-17 NOTE — PROGRESS NOTES
NN Note 
 
- Received request from NP Asif Moscoso to contact pt re provide information on [address] & delivering meds program. 
 
- Spoke w/ pt's dtr Caterina Renee. Dtr reported pt reluctant to leave long term relationship w/ local [facility]. 
 
- Dtr spoke w/ [facility]. Informed they provide med packaging service also. - Dtr reported [facility] currently in process of \"synchronyzing pt's meds\". Dtr has gone over pt's meds w/ Pharmacist.  
 
- Dtr reported anticipate pill pack arrangement to be initiated soon. - Dtr to contact NN if any additional questions/concerns. NN contact # reviewed.

## 2018-10-17 NOTE — Clinical Note
Soory it took a while for me to address your request. Was lower on workflow priority list. Better late than never:)

## 2018-10-17 NOTE — TELEPHONE ENCOUNTER
----- Message from Maxine Esquivel sent at 10/17/2018 11:03 AM EDT -----  Regarding: Guilherme Stoll/telephone  Pts daughter, Margarita Estrada, calling to confirm it is ok for her mother and father to received a flu shot. Bonnie Baxter will take them to [facility] to receive if it is ok. Bonnie Baxter best contact [phone]  Bonnie Baxter stated she just needs a yes or no so if the call is missed you can leave a message.     Pts father info:  Tita Roth   4YF Jose Brenner [phone] #[id number] [id number]

## 2018-10-19 ENCOUNTER — TELEPHONE (OUTPATIENT)
Dept: FAMILY MEDICINE CLINIC | Age: 83
End: 2018-10-19

## 2018-10-19 NOTE — TELEPHONE ENCOUNTER
Writer attempted to return call from patients daughter, msg on phone that stated subscriber is not accepting calls at this time. Need to advise daughter that it is ok per NP Brendon Grimm to receive influenza vaccination.

## 2018-10-25 NOTE — TELEPHONE ENCOUNTER
Please return brunilda call to let her know it is okay for patient to receive flu shot at [phone]. If she don't answer she stated it is okay to leave a detailed message.

## 2018-10-26 NOTE — TELEPHONE ENCOUNTER
Writer called patient's daughter, Saji Elias, regarding flu vaccine for patient. Saji Elias did not answer. Writer left detailed message as requested in previous message. Writer notified dtr in message that patient can get flu vaccine per Elen Steen NP and to call our office to make a NV to have done. fall

## 2018-11-01 ENCOUNTER — HOSPITAL ENCOUNTER (EMERGENCY)
Age: 83
Discharge: HOME | End: 2018-11-01
Attending: EMERGENCY MEDICINE | Admitting: EMERGENCY MEDICINE
Payer: MEDICARE

## 2018-11-01 VITALS
HEIGHT: 63 IN | BODY MASS INDEX: 23.74 KG/M2 | RESPIRATION RATE: 18 BRPM | OXYGEN SATURATION: 96 % | HEART RATE: 62 BPM | DIASTOLIC BLOOD PRESSURE: 53 MMHG | WEIGHT: 134 LBS | SYSTOLIC BLOOD PRESSURE: 145 MMHG | TEMPERATURE: 97.9 F

## 2018-11-01 DIAGNOSIS — N30.00 ACUTE CYSTITIS WITHOUT HEMATURIA: ICD-10-CM

## 2018-11-01 DIAGNOSIS — R55 NEAR SYNCOPE: Primary | ICD-10-CM

## 2018-11-01 DIAGNOSIS — E86.0 DEHYDRATION: ICD-10-CM

## 2018-11-01 LAB
ALBUMIN SERPL-MCNC: 3.7 G/DL (ref 3.5–5)
ALBUMIN/GLOB SERPL: 1 {RATIO} (ref 1.1–2.2)
ALP SERPL-CCNC: 90 U/L (ref 45–117)
ALT SERPL-CCNC: 22 U/L (ref 12–78)
AMORPH CRY URNS QL MICRO: ABNORMAL
ANION GAP SERPL CALC-SCNC: 9 MMOL/L (ref 5–15)
APPEARANCE UR: ABNORMAL
AST SERPL-CCNC: 23 U/L (ref 15–37)
BACTERIA URNS QL MICRO: NEGATIVE /HPF
BASOPHILS # BLD: 0 K/UL (ref 0–0.1)
BASOPHILS NFR BLD: 1 % (ref 0–1)
BILIRUB SERPL-MCNC: 0.6 MG/DL (ref 0.2–1)
BILIRUB UR QL: NEGATIVE
BUN SERPL-MCNC: 15 MG/DL (ref 6–20)
BUN/CREAT SERPL: 20 (ref 12–20)
CALCIUM SERPL-MCNC: 9.5 MG/DL (ref 8.5–10.1)
CHLORIDE SERPL-SCNC: 102 MMOL/L (ref 97–108)
CO2 SERPL-SCNC: 27 MMOL/L (ref 21–32)
COLOR UR: ABNORMAL
CREAT SERPL-MCNC: 0.76 MG/DL (ref 0.55–1.02)
DIFFERENTIAL METHOD BLD: NORMAL
EOSINOPHIL # BLD: 0.3 K/UL (ref 0–0.4)
EOSINOPHIL NFR BLD: 3 % (ref 0–7)
EPITH CASTS URNS QL MICRO: ABNORMAL /LPF
ERYTHROCYTE [DISTWIDTH] IN BLOOD BY AUTOMATED COUNT: 12.3 % (ref 11.5–14.5)
GLOBULIN SER CALC-MCNC: 3.7 G/DL (ref 2–4)
GLUCOSE SERPL-MCNC: 117 MG/DL (ref 65–100)
GLUCOSE UR STRIP.AUTO-MCNC: NEGATIVE MG/DL
HCT VFR BLD AUTO: 39.9 % (ref 35–47)
HGB BLD-MCNC: 13.3 G/DL (ref 11.5–16)
HGB UR QL STRIP: NEGATIVE
IMM GRANULOCYTES # BLD: 0 K/UL (ref 0–0.04)
IMM GRANULOCYTES NFR BLD AUTO: 0 % (ref 0–0.5)
KETONES UR QL STRIP.AUTO: ABNORMAL MG/DL
LEUKOCYTE ESTERASE UR QL STRIP.AUTO: ABNORMAL
LYMPHOCYTES # BLD: 2.1 K/UL (ref 0.8–3.5)
LYMPHOCYTES NFR BLD: 27 % (ref 12–49)
MCH RBC QN AUTO: 30.3 PG (ref 26–34)
MCHC RBC AUTO-ENTMCNC: 33.3 G/DL (ref 30–36.5)
MCV RBC AUTO: 90.9 FL (ref 80–99)
MONOCYTES # BLD: 0.7 K/UL (ref 0–1)
MONOCYTES NFR BLD: 9 % (ref 5–13)
NEUTS SEG # BLD: 4.9 K/UL (ref 1.8–8)
NEUTS SEG NFR BLD: 61 % (ref 32–75)
NITRITE UR QL STRIP.AUTO: NEGATIVE
NRBC # BLD: 0 K/UL (ref 0–0.01)
NRBC BLD-RTO: 0 PER 100 WBC
PH UR STRIP: 7.5 [PH] (ref 5–8)
PLATELET # BLD AUTO: 173 K/UL (ref 150–400)
PMV BLD AUTO: 10.3 FL (ref 8.9–12.9)
POTASSIUM SERPL-SCNC: 3.3 MMOL/L (ref 3.5–5.1)
PROT SERPL-MCNC: 7.4 G/DL (ref 6.4–8.2)
PROT UR STRIP-MCNC: NEGATIVE MG/DL
RBC # BLD AUTO: 4.39 M/UL (ref 3.8–5.2)
RBC #/AREA URNS HPF: ABNORMAL /HPF (ref 0–5)
SODIUM SERPL-SCNC: 138 MMOL/L (ref 136–145)
SP GR UR REFRACTOMETRY: 1.01 (ref 1–1.03)
UROBILINOGEN UR QL STRIP.AUTO: 0.2 EU/DL (ref 0.2–1)
WBC # BLD AUTO: 8 K/UL (ref 3.6–11)
WBC URNS QL MICRO: ABNORMAL /HPF (ref 0–4)

## 2018-11-01 PROCEDURE — 74011250636 HC RX REV CODE- 250/636: Performed by: EMERGENCY MEDICINE

## 2018-11-01 PROCEDURE — 80053 COMPREHEN METABOLIC PANEL: CPT | Performed by: EMERGENCY MEDICINE

## 2018-11-01 PROCEDURE — 99284 EMERGENCY DEPT VISIT MOD MDM: CPT

## 2018-11-01 PROCEDURE — 85025 COMPLETE CBC W/AUTO DIFF WBC: CPT | Performed by: EMERGENCY MEDICINE

## 2018-11-01 PROCEDURE — 36415 COLL VENOUS BLD VENIPUNCTURE: CPT | Performed by: EMERGENCY MEDICINE

## 2018-11-01 PROCEDURE — 74011250637 HC RX REV CODE- 250/637: Performed by: EMERGENCY MEDICINE

## 2018-11-01 PROCEDURE — 96374 THER/PROPH/DIAG INJ IV PUSH: CPT

## 2018-11-01 PROCEDURE — 93005 ELECTROCARDIOGRAM TRACING: CPT

## 2018-11-01 PROCEDURE — 81001 URINALYSIS AUTO W/SCOPE: CPT | Performed by: EMERGENCY MEDICINE

## 2018-11-01 RX ORDER — ONDANSETRON 2 MG/ML
4 INJECTION INTRAMUSCULAR; INTRAVENOUS
Status: COMPLETED | OUTPATIENT
Start: 2018-11-01 | End: 2018-11-01

## 2018-11-01 RX ORDER — CEPHALEXIN 500 MG/1
500 CAPSULE ORAL 3 TIMES DAILY
Qty: 21 CAP | Refills: 0 | Status: SHIPPED | OUTPATIENT
Start: 2018-11-01 | End: 2018-11-08

## 2018-11-01 RX ORDER — CEPHALEXIN 250 MG/1
500 CAPSULE ORAL
Status: COMPLETED | OUTPATIENT
Start: 2018-11-01 | End: 2018-11-01

## 2018-11-01 RX ADMIN — SODIUM CHLORIDE 1000 ML: 900 INJECTION, SOLUTION INTRAVENOUS at 21:48

## 2018-11-01 RX ADMIN — CEPHALEXIN 500 MG: 250 CAPSULE ORAL at 23:35

## 2018-11-01 RX ADMIN — ONDANSETRON 4 MG: 2 INJECTION INTRAMUSCULAR; INTRAVENOUS at 21:48

## 2018-11-02 ENCOUNTER — TELEPHONE (OUTPATIENT)
Dept: FAMILY MEDICINE CLINIC | Age: 83
End: 2018-11-02

## 2018-11-02 LAB
ATRIAL RATE: 72 BPM
CALCULATED P AXIS, ECG09: 63 DEGREES
CALCULATED R AXIS, ECG10: -5 DEGREES
CALCULATED T AXIS, ECG11: 25 DEGREES
DIAGNOSIS, 93000: NORMAL
P-R INTERVAL, ECG05: 170 MS
Q-T INTERVAL, ECG07: 402 MS
QRS DURATION, ECG06: 70 MS
QTC CALCULATION (BEZET), ECG08: 440 MS
VENTRICULAR RATE, ECG03: 72 BPM

## 2018-11-02 NOTE — TELEPHONE ENCOUNTER
Writer called patient to asked about appointment yesterday, 2018. Writer spoke with patient. Patient verified . Patient stated that yesterday, 2018, around 6:00pm, her daughter called 911 when she passed out after feeling hot and nauseated, and was taken to [facility] ER. Stated that she has been having some hip pain, has seen Dr. Lexa Marin for follow up from hip surgery. But had an incomplete fall about 3-4 days ago. When she had an x-ray done there were no fractures noted. Writer verbalized understanding. Writer told patient that she needed to make an ER/hospital follow up with Shahrzad Toth or another provided in the office to discuss what happened and [facility]. Patient verbalized understanding and appreciation. Will make an appointment for next week.

## 2018-11-02 NOTE — TELEPHONE ENCOUNTER
----- Message from Chan Peter sent at 11/2/2018 12:52 PM EDT -----  Regarding: Np.Jerman/Telephone  Pt is requesting a call back regarding her appointment yesterday with [address] [place] [address].     Best [phone]

## 2018-11-02 NOTE — ED PROVIDER NOTES
EMERGENCY DEPARTMENT HISTORY AND PHYSICAL EXAM 
 
 
Date: 11/1/2018 Patient Name: Mak Hall History of Presenting Illness Chief Complaint Patient presents with  Back Pain  
  past hip surgury & today hurting on both hips & trouble walking History Provided By: Patient HPI: Mak Hall, 80 y.o. female with PMHx significant for hyperlipidemia, chronic back pain, HTN, presents via EMS to the ED with cc of trouble walking secondary to bll hip soreness and lower back pain s/p landing onto wooden chair 3 days ago. On 7/12 pt had R hip hemiarthroplasty by Dr. Maida Carbajal s/p fx of R neck of femur. Pt reports that 3 days ago, she had tripped and landed onto a wooden chair, eliciting lower back pain and bll hip soreness. She saw Dr. Maida Carbajal yesterday in the clinic; x-rays were completed and noted to be normal. Dr Maida Carbajal started her on robaxin and prednisone. However, today, pt reports that she felt \"really hot and like I was going to pass out. \" She then sat at the dinner table and her family told her she passed out for a few seconds. Pt also endorses nausea, lightheadedness, and diaphoresis but denies any CP, SOB, palpitations. There are no other complaints, changes, or physical findings at this time. PCP: Suzy Parham NP Social Hx:  
Social History Tobacco Use Smoking Status Never Smoker Smokeless Tobacco Never Used  
,  
Social History Substance and Sexual Activity Alcohol Use No  
,  
Social History Substance and Sexual Activity Drug Use No  
 
 
Past History Past Surgical History: 
Past Surgical History:  
Procedure Laterality Date  COLONOSCOPY,DIAGNOSTIC  3/12/2018 [address]  HX CATARACT REMOVAL Left  HX CORNEAL TRANSPLANT Left  HX DILATION AND CURETTAGE    
 x 2  
 HX TONSILLECTOMY  1938 Family History: 
Family History Problem Relation Age of Onset  Cancer Father   
     throat (smoker); mouth and gums  Heart Disease Mother Age 52  Clotting Disorder Mother  Arthritis-osteo Paternal Aunt Allergies: Allergies Allergen Reactions  Codeine Nausea and Vomiting  Demerol (Pf) [Meperidine (Pf)] Nausea and Vomiting  Meperidine Nausea and Vomiting Review of Systems Review of Systems Constitutional: Positive for diaphoresis. Negative for activity change, appetite change, chills, fever and unexpected weight change. HENT: Negative for congestion. Eyes: Negative for pain and visual disturbance. Respiratory: Negative for cough and shortness of breath. Cardiovascular: Negative for chest pain. Gastrointestinal: Positive for nausea. Negative for abdominal pain, diarrhea and vomiting. Genitourinary: Negative for dysuria. Musculoskeletal: Positive for arthralgias (bll hips), back pain and gait problem (trouble walking). Skin: Negative for rash. Neurological: Positive for light-headedness. Negative for headaches. Physical Exam  
Physical Exam  
Constitutional: She is oriented to person, place, and time. She appears well-developed and well-nourished. Elderly HENT:  
Head: Normocephalic and atraumatic. Mouth/Throat: Oropharynx is clear and moist. Mucous membranes are dry. Eyes: Conjunctivae and EOM are normal. Pupils are equal, round, and reactive to light. Right eye exhibits no discharge. Left eye exhibits no discharge. Neck: Normal range of motion. Neck supple. Cardiovascular: Normal rate, regular rhythm and normal heart sounds. No murmur heard. Pulmonary/Chest: Effort normal and breath sounds normal. No respiratory distress. She has no wheezes. She has no rales. Abdominal: Soft. Bowel sounds are normal. She exhibits no distension. There is no tenderness. There is no rebound and no guarding. Musculoskeletal: Normal range of motion. She exhibits no edema. No groin or hip tenderness. No peripheral edema. Neurological: She is alert and oriented to person, place, and time. No cranial nerve deficit. She exhibits normal muscle tone. Skin: Skin is warm and dry. No rash noted. She is not diaphoretic. Psychiatric:  
Appears slightly confused to timing of events over past few days Nursing note and vitals reviewed. Diagnostic Study Results Labs - No results found for this or any previous visit (from the past 12 hour(s)). Radiologic Studies - No orders to display CT Results  (Last 48 hours) None CXR Results  (Last 48 hours) None Medical Decision Making I am the first provider for this patient. I reviewed the vital signs, available nursing notes, past medical history, past surgical history, family history and social history. Vital Signs-Reviewed the patient's vital signs. No data found. Pulse Oximetry Analysis - 96% on RA 
 
EKG interpretation: (Preliminary) 23:04 Rhythm: normal sinus rhythm; and regular . Rate (approx.): 72; Axis: normal; IL interval: normal; QRS interval: normal ; ST/T wave: Non-specific ST&T changes; Other findings: Unchanged from 7/12/2018. Written by Jerad Wilson, ED Scribe, as dictated by Tnagela Trotter MD. Records Reviewed: Nursing Notes, Old Medical Records, Previous Radiology Studies and Previous Laboratory Studies Provider Notes (Medical Decision Making):  
Near syncope, pt appearing slightly dehydrated. Non-focal neuro exam. 
 
ED Course:  
Initial assessment performed. The patients presenting problems have been discussed, and they are in agreement with the care plan formulated and outlined with them. I have encouraged them to ask questions as they arise throughout their visit. Progress Notes: 
Patient continues to do well and remains asx. Reviewed results with patient and her daughter. Recommend increased hydration and antibiotics at home with recheck in PCP office this week. Critical Care Time:  
0 minutes. Disposition: 
11pm DISCHARGE NOTE The patient has been re-evaluated and is ready for discharge. Reviewed available results with patient. Counseled pt on diagnosis and care plan. Pt has expressed understanding, and all questions have been answered. Pt agrees with plan and agrees to F/U as recommended, or return to the ED if their sxs worsen. Discharge instructions have been provided and explained to the pt, along with reasons to return to the ED. PLAN: 
1. Discharge Medication List as of 11/1/2018 11:27 PM  
  
START taking these medications Details  
cephALEXin (KEFLEX) 500 mg capsule Take 1 Cap by mouth three (3) times daily for 7 days. , Normal, Disp-21 Cap, R-0  
  
  
CONTINUE these medications which have NOT CHANGED Details  
metoprolol tartrate (LOPRESSOR) 25 mg tablet TAKE 1 TAB BY MOUTH EVERY TWELVE (12) HOURS. HOLD FOR SBP LESS THAN 120 OR HR LESS THAN 65, NormalNEW MULTI-DOSE PACKAGING PATIENT. MD PLEASE SEND AUTHORIZATIONS AT YOUR EARLIEST CONVENIENCE SO WE MAY PACK/SEND TO PATIENT. THANK YOU. Disp-60 Tab, R-3  
  
aspirin delayed-release 81 mg tablet Take 1 Tab by mouth daily. , Normal, Disp-90 Tab, R-2  
  
SENNA PLUS 8.6-50 mg per tablet TAKE 1 TABLET BY MOUTH TWICE A DAY, NormalNEEDS FOR FIRST BOXDisp-60 Tab, R-11  
  
multivit-minerals/folic acid (CENTRUM MULTIGUMMIES PO) Take 2 Each by mouth daily. , Historical Med  
  
acetaminophen (TYLENOL) 325 mg tablet Take 2 Tabs by mouth every six (6) hours as needed for Pain., Normal, Disp-60 Tab, R-1  
  
polyethylene glycol (MIRALAX) 17 gram packet Take 1 Packet by mouth daily. , Normal, Disp-30 Packet, R-0  
  
amLODIPine (NORVASC) 10 mg tablet TAKE 1 TABLET BY MOUTH EVERY MORNING, Normal, Disp-90 Tab, R-4  
  
gluc walters/chondro walters A/vit C/Mn (GLUCOSAMINE 1500 COMPLEX PO) Take 1 Tab by mouth daily. , Historical Med  
  
calcium-cholecalciferol, d3, (CALCIUM 600 + D) 600-125 mg-unit tab Take 1 Tab by mouth two (2) times a day., Historical Med  
  
 coenzyme Q-10 (CO Q-10) 200 mg capsule Take 200 mg by mouth daily. , Historical Med  
  
benazepril (LOTENSIN) 40 mg tablet TAKE 1 TABLET BY MOUTH EVERY DAY, Normal, Disp-90 Tab, R-3  
  
atorvastatin (LIPITOR) 20 mg tablet TAKE 1 TABLET BY MOUTH DAILY, Normal, Disp-90 Tab, R-3  
  
cholecalciferol, vitamin d3, (VITAMIN D) 1,000 unit tablet Take 1,000 Units by mouth every evening., Historical Med 2. Follow-up Information Follow up With Specialties Details Why Contact Info Ronaldo Oliver NP Nurse Practitioner Call today for a recheck appointment [address] [facility] 
[address] [place] [id number] 
[phone] [facility] EMERGENCY DEPT Emergency Medicine  If symptoms worsen [address] [facility] 
[phone] Return to ED if worse Diagnosis Clinical Impression: 1. Near syncope 2. Dehydration 3. Acute cystitis without hematuria Attestations: This note is prepared by Claudy Griffith, acting as scribe for MD Perez Bianchi MD: The scribe's documentation has been prepared under my direction and personally reviewed by me in its entirety. I confirm that the note above accurately reflects all work, treatment, procedures, and medical decision making performed by me.

## 2018-11-02 NOTE — ED NOTES
Adelina Forbes MD 
 has done a bedside review of the discharge instructions. The patient is in understanding. The patients line(s) are removed.  The patient is dressed, and belongings together for discharge. '

## 2018-11-02 NOTE — DISCHARGE INSTRUCTIONS
Dehydration: Care Instructions  Your Care Instructions  Dehydration happens when your body loses too much fluid. This might happen when you do not drink enough water or you lose large amounts of fluids from your body because of diarrhea, vomiting, or sweating. Severe dehydration can be life-threatening. Water and minerals called electrolytes help put your body fluids back in balance. Learn the early signs of fluid loss, and drink more fluids to prevent dehydration. Follow-up care is a key part of your treatment and safety. Be sure to make and go to all appointments, and call your doctor if you are having problems. It's also a good idea to know your test results and keep a list of the medicines you take. How can you care for yourself at home? · To prevent dehydration, drink plenty of fluids, enough so that your urine is light yellow or clear like water. Choose water and other caffeine-free clear liquids until you feel better. If you have kidney, heart, or liver disease and have to limit fluids, talk with your doctor before you increase the amount of fluids you drink. · If you do not feel like eating or drinking, try taking small sips of water, sports drinks, or other rehydration drinks. · Get plenty of rest.  To prevent dehydration  · Add more fluids to your diet and daily routine, unless your doctor has told you not to. · During hot weather, drink more fluids. Drink even more fluids if you exercise a lot. Stay away from drinks with alcohol or caffeine. · Watch for the symptoms of dehydration. These include:  ? A dry, sticky mouth. ? Dark yellow urine, and not much of it. ? Dry and sunken eyes. ? Feeling very tired. · Learn what problems can lead to dehydration. These include:  ? Diarrhea, fever, and vomiting. ? Any illness with a fever, such as pneumonia or the flu. ? Activities that cause heavy sweating, such as endurance races and heavy outdoor work in hot or humid weather. ?  Alcohol or drug abuse or withdrawal.  ? Certain medicines, such as cold and allergy pills (antihistamines), diet pills (diuretics), and laxatives. ? Certain diseases, such as diabetes, cancer, and heart or kidney disease. When should you call for help? Call 911 anytime you think you may need emergency care. For example, call if:    · You passed out (lost consciousness).    Call your doctor now or seek immediate medical care if:    · You are confused and cannot think clearly.     · You are dizzy or lightheaded, or you feel like you may faint.     · You have signs of needing more fluids. You have sunken eyes and a dry mouth, and you pass only a little dark urine.     · You cannot keep fluids down.    Watch closely for changes in your health, and be sure to contact your doctor if:    · You are not making tears.     · Your skin is very dry and sags slowly back into place after you pinch it.     · Your mouth and eyes are very dry. Where can you learn more? Go to http://radha-alex.info/. Enter P233 in the search box to learn more about \"Dehydration: Care Instructions. \"  Current as of: November 20, 2017  Content Version: 11.8  © 4654-2999 [employer]. Care instructions adapted under license by [employer] (which disclaims liability or warranty for this information). If you have questions about a medical condition or this instruction, always ask your healthcare professional. [place] [address] any warranty or liability for your use of this information. Lightheadedness or Faintness: Care Instructions  Your Care Instructions  Lightheadedness is a feeling that you are about to faint or \"pass out. \" You do not feel as if you or your surroundings are moving. It is different from vertigo, which is the feeling that you or things around you are spinning or tilting. Lightheadedness usually goes away or gets better when you lie down.  If lightheadedness gets worse, it can lead to a fainting spell. It is common to feel lightheaded from time to time. Lightheadedness usually is not caused by a serious problem. It often is caused by a short-lasting drop in blood pressure and blood flow to your head that occurs when you get up too quickly from a seated or lying position. Follow-up care is a key part of your treatment and safety. Be sure to make and go to all appointments, and call your doctor if you are having problems. It's also a good idea to know your test results and keep a list of the medicines you take. How can you care for yourself at home? · Lie down for 1 or 2 minutes when you feel lightheaded. After lying down, sit up slowly and remain sitting for 1 to 2 minutes before slowly standing up. · Avoid movements, positions, or activities that have made you lightheaded in the past.  · Get plenty of rest, especially if you have a cold or flu, which can cause lightheadedness. · Make sure you drink plenty of fluids, especially if you have a fever or have been sweating. · Do not drive or put yourself and others in danger while you feel lightheaded. When should you call for help? Call 911 anytime you think you may need emergency care. For example, call if:    · You have symptoms of a stroke. These may include:  ? Sudden numbness, tingling, weakness, or loss of movement in your face, arm, or leg, especially on only one side of your body. ? Sudden vision changes. ? Sudden trouble speaking. ? Sudden confusion or trouble understanding simple statements. ? Sudden problems with walking or balance. ? A sudden, severe headache that is different from past headaches.     · You have symptoms of a heart attack. These may include:  ? Chest pain or pressure, or a strange feeling in the chest.  ? Sweating. ? Shortness of breath. ? Nausea or vomiting. ? Pain, pressure, or a strange feeling in the back, neck, jaw, or upper belly or in one or both shoulders or arms.   ? Lightheadedness or sudden weakness. ? A fast or irregular heartbeat. After you call 911, the  may tell you to chew 1 adult-strength or 2 to 4 low-dose aspirin. Wait for an ambulance. Do not try to drive yourself.    Watch closely for changes in your health, and be sure to contact your doctor if:    · Your lightheadedness gets worse or does not get better with home care. Where can you learn more? Go to http://radha-alex.info/. Enter R688 in the search box to learn more about \"Lightheadedness or Faintness: Care Instructions. \"  Current as of: November 20, 2017  Content Version: 11.8  © 2691-4828 [employer]. Care instructions adapted under license by [employer] (which disclaims liability or warranty for this information). If you have questions about a medical condition or this instruction, always ask your healthcare professional. [address] any warranty or liability for your use of this information.

## 2018-11-06 ENCOUNTER — OFFICE VISIT (OUTPATIENT)
Dept: FAMILY MEDICINE CLINIC | Age: 83
End: 2018-11-06

## 2018-11-06 VITALS
SYSTOLIC BLOOD PRESSURE: 142 MMHG | OXYGEN SATURATION: 99 % | RESPIRATION RATE: 18 BRPM | DIASTOLIC BLOOD PRESSURE: 64 MMHG | WEIGHT: 138.1 LBS | BODY MASS INDEX: 24.47 KG/M2 | HEART RATE: 59 BPM | HEIGHT: 63 IN | TEMPERATURE: 96.8 F

## 2018-11-06 DIAGNOSIS — E87.6 HYPOKALEMIA: Primary | ICD-10-CM

## 2018-11-06 DIAGNOSIS — M54.50 ACUTE BILATERAL LOW BACK PAIN WITHOUT SCIATICA: ICD-10-CM

## 2018-11-06 NOTE — PATIENT INSTRUCTIONS
1) Potassium was low in ED at 3.3, so will check today Low potassium, called hypokalemia, is a condition that can cause muscle weakness and other problems. Potassium is one of many substances called \"electrolytes\" that help carry electrical signals between cells. That's important because many cells rely on electrical signals to work right.  
If the potassium level is above 2.5mEq/L (normal range 3.5-4.5) symptoms are not usually present. The main symptom of hypokalemia is muscle weakness. The weakness usually starts in the legs and then spreads to the middle of the body and the arms. Other symptoms include: an irregular heartbeat, disruption of the electrical signals that control the heart, problems with the kidneys. Hypokalemia can damage muscles, so it is important to replenish potassium levels. Hypokalemia most often happens in people who have been vomiting or had diarrhea for a while or in people taking certain medications called \"diuretics,\" which can cause the body to lose too much potassium. 2) Working getting enough fluids during the day - you should try and get at least 5 (8oz) glasses of fluid before 4pm.  Fairlife milk will count as fluid and has the protein and Vit D and calcium for healthy bones Almonds are a complete protein and good to eat for snacks. 3) Back Pain Conservative Therapy 
-For acute pain, please rest.  You can use intermittent application of cold packs, and then switch to heat after 48 hours. (Frozen peas work well as cold packs in small areas.) -Moist heat (penetrates better than dry heat) - such as standing in a shower or applying a warm washcloth to area applied to affected area three times a day for 10 minutes at a time. You can put 1 cup of epsom salt in 1 quart of warm water and soak a washcloth in solution. ([facility] carries a good epsom salt product by Dr. Cesario Calix and spearmint for about $6) Apply to area of pain 2-3x day. Eating Healthy Foods: Care Instructions Your Care Instructions Eating healthy foods can help lower your risk for disease. Healthy food gives you energy and keeps your heart strong, your brain active, your muscles working, and your bones strong. A healthy diet includes a variety of foods from the basic food groups: grains, vegetables, fruits, milk and milk products, and meat and beans. Some people may eat more of their favorite foods from only one food group and, as a result, miss getting the nutrients they need. So, it is important to pay attention not only to what you eat but also to what you are missing from your diet. You can eat a healthy, balanced diet by making a few small changes. Follow-up care is a key part of your treatment and safety. Be sure to make and go to all appointments, and call your doctor if you are having problems. It's also a good idea to know your test results and keep a list of the medicines you take. How can you care for yourself at home? Look at what you eat · Keep a food diary for a week or two and record everything you eat or drink. Track the number of servings you eat from each food group. · For a balanced diet every day, eat a variety of: 
? 6 or more ounce-equivalents of grains, such as cereals, breads, crackers, rice, or pasta, every day. An ounce-equivalent is 1 slice of bread, 1 cup of ready-to-eat cereal, or ½ cup of cooked rice, cooked pasta, or cooked cereal. 
? 2½ cups of vegetables, especially: § Dark-green vegetables such as broccoli and spinach. § Orange vegetables such as carrots and sweet potatoes. § Dry beans (such as pan and kidney beans) and peas (such as lentils). ? 2 cups of fresh, frozen, or canned fruit. A small apple or 1 banana or orange equals 1 cup. ? 3 cups of nonfat or low-fat milk, yogurt, or other milk products.  
? 5½ ounces of meat and beans, such as chicken, fish, lean meat, beans, nuts, and seeds. One egg, 1 tablespoon of peanut butter, ½ ounce nuts or seeds, or ¼ cup of cooked beans equals 1 ounce of meat. · Learn how to read food labels for serving sizes and ingredients. Fast-food and convenience-food meals often contain few or no fruits or vegetables. Make sure you eat some fruits and vegetables to make the meal more nutritious. · Look at your food diary. For each food group, add up what you have eaten and then divide the total by the number of days. This will give you an idea of how much you are eating from each food group. See if you can find some ways to change your diet to make it more healthy. Start small · Do not try to make dramatic changes to your diet all at once. You might feel that you are missing out on your favorite foods and then be more likely to fail. · Start slowly, and gradually change your habits. Try some of the following: ? Use whole wheat bread instead of white bread. ? Use nonfat or low-fat milk instead of whole milk. ? Eat brown rice instead of white rice, and eat whole wheat pasta instead of white-flour pasta. ? Try low-fat cheeses and low-fat yogurt. ? Add more fruits and vegetables to meals and have them for snacks. ? Add lettuce, tomato, cucumber, and onion to sandwiches. ? Add fruit to yogurt and cereal. 
Enjoy food · You can still eat your favorite foods. You just may need to eat less of them. If your favorite foods are high in fat, salt, and sugar, limit how often you eat them, but do not cut them out entirely. · Eat a wide variety of foods. Make healthy choices when eating out · The type of restaurant you choose can help you make healthy choices. Even fast-food chains are now offering more low-fat or healthier choices on the menu. · Choose smaller portions, or take half of your meal home. · When eating out, try: ? A veggie pizza with a whole wheat crust or grilled chicken (instead of sausage or pepperoni). ? Pasta with roasted vegetables, grilled chicken, or marinara sauce instead of cream sauce. ? A vegetable wrap or grilled chicken wrap. ? Broiled or poached food instead of fried or breaded items. Make healthy choices easy · Buy packaged, prewashed, ready-to-eat fresh vegetables and fruits, such as baby carrots, salad mixes, and chopped or shredded broccoli and cauliflower. · Buy packaged, presliced fruits, such as melon or pineapple. · Choose 100% fruit or vegetable juice instead of soda. Limit juice intake to 4 to 6 oz (½ to ¾ cup) a day. · Blend low-fat yogurt, fruit juice, and canned or frozen fruit to make a smoothie for breakfast or a snack. Where can you learn more? Go to http://radha-alex.info/. Enter T756 in the search box to learn more about \"Eating Healthy Foods: Care Instructions. \" Current as of: March 29, 2018 Content Version: 11.8 © 7045-8455 [employer]. Care instructions adapted under license by [employer] (which disclaims liability or warranty for this information). If you have questions about a medical condition or this instruction, always ask your healthcare professional. [address] any warranty or liability for your use of this information.

## 2018-11-06 NOTE — PROGRESS NOTES
Jo Presley  Identified pt with two pt identifiers(name and ). Chief Complaint Patient presents with  
[facility] Follow Up 1. Have you been to the ER, urgent care clinic since your last visit? Y Hospitalized since your last visit? N 
 
2. Have you seen or consulted any other health care providers outside of the [address] [place] [address] since your last visit? N  Include any pap smears or colon screening. Nate Forrest Advance Care Planning In the event something were to happen to you and you were unable to speak on your behalf, do you have an Advance Directive/ Living Will in place stating your wishes? YES If yes, do we have a copy on file YES If no, would you like information NO Medication reconciliation up to date and corrected with patient at this time. Today's provider has been notified of reason for visit, vitals and flowsheets obtained on patients. Reviewed record in preparation for visit, huddled with provider and have obtained necessary documentation. Health Maintenance Due Topic  Shingrix Vaccine Age 50> (1 of 2)  GLAUCOMA SCREENING Q2Y  Influenza Age 5 to Adult  MEDICARE YEARLY EXAM   
 
 
Wt Readings from Last 3 Encounters:  
18 138 lb 1.6 oz (62.6 kg) 18 134 lb (60.8 kg) 18 136 lb 3.2 oz (61.8 kg) Temp Readings from Last 3 Encounters:  
18 96.8 °F (36 °C) (Oral) 18 97.9 °F (36.6 °C)  
18 98.3 °F (36.8 °C) (Oral) BP Readings from Last 3 Encounters:  
18 142/64  
18 145/53  
18 147/71 Pulse Readings from Last 3 Encounters:  
18 (!) 59  
18 62  
18 (!) 59 Vitals:  
 18 1207 BP: 142/64 Pulse: (!) 59 Resp: 18 Temp: 96.8 °F (36 °C) TempSrc: Oral  
SpO2: 99% Weight: 138 lb 1.6 oz (62.6 kg) Height: 5' 3\" (1.6 m) PainSc:   2 PainLoc: Back Learning Assessment: 
:  
 
Learning Assessment 2017 PRIMARY LEARNER Patient HIGHEST LEVEL OF EDUCATION - PRIMARY LEARNER  SOME COLLEGE  
BARRIERS PRIMARY LEARNER NONE PRIMARY LANGUAGE ENGLISH  
LEARNER PREFERENCE PRIMARY DEMONSTRATION  
ANSWERED BY Juhi Mckay RELATIONSHIP SELF Depression Screening: 
:  
 
PHQ over the last two weeks 11/6/2018 Little interest or pleasure in doing things Not at all Feeling down, depressed, irritable, or hopeless Not at all Total Score PHQ 2 0 Fall Risk Assessment: 
:  
 
Fall Risk Assessment, last 12 mths 11/6/2018 Able to walk? Yes Fall in past 12 months? Yes Fall with injury? Yes  
Number of falls in past 12 months 2 Fall Risk Score 3 Abuse Screening: 
:  
 
Abuse Screening Questionnaire 8/7/2018 9/27/2017 Do you ever feel afraid of your partner? Marcel Mendes Are you in a relationship with someone who physically or mentally threatens you? Marcel Mendes Is it safe for you to go home? Y Y  
 
 
ADL Screening: 
:  
 
ADL Assessment 8/7/2018 Feeding yourself No Help Needed Getting from bed to chair No Help Needed Getting dressed No Help Needed Bathing or showering Help Needed Walk across the room (includes cane/walker) No Help Needed Using the telphone No Help Needed Taking your medications Help Needed Preparing meals Help Needed Managing money (expenses/bills) Help Needed Moderately strenuous housework (laundry) Help Needed Shopping for personal items (toiletries/medicines) Help Needed Shopping for groceries Help Needed Driving Help Needed Climbing a flight of stairs Help Needed Getting to places beyond walking distances Help Needed BMI: 
Weight Metrics 11/6/2018 11/1/2018 9/17/2018 8/7/2018 7/29/2018 7/15/2018 7/12/2018 Weight 138 lb 1.6 oz 134 lb 136 lb 3.2 oz 140 lb 1.6 oz 160 lb 141 lb 8 oz -  
BMI 24.46 kg/m2 23.74 kg/m2 21.33 kg/m2 21.94 kg/m2 25.06 kg/m2 - 20.9 kg/m2 Medication reconciliation up to date and corrected with patient at this time.

## 2018-11-06 NOTE — PROGRESS NOTES
S: Antonette Velez is a 80 y.o. female who presents for follow up from ED visit Assessment/Plan: 1. Hypokalemia 
-in ED K = 3.3, was dx with dehydration 
- POTASSIUM 2. Acute bilateral low back pain without sciatica -s/p fall and twisting motion - ED 11/1/18 
-saw Dr. Robert Khoury and right hip is fine 
-will start PT next week; supportive care instructions given HPI: 
11/1/18 - ED for hip/back pain s/p tripping and twisted to fall into chair instead of floor; Did see Dr. Robert Khoury day after and said hip was fine Dx with UTI (given keflex) and dehydration (given fluids) and d/c home Potassium 3.3 - will check today 7/12/18 - right hip replacement Dr. Robert Khoury ordered PT (will start next Tuesday) 2-3x week for 6 weeks Lost 2lbs since 9/17/18 No longer driving - nor her  Social History: 
Nutrition: eating ok -  
Drinks: water and decaf tea Physical: has been doing PT for hip replacement Social: lives with , has daughters in area that help with care Social History Tobacco Use Smoking Status Never Smoker Smokeless Tobacco Never Used Social History Substance and Sexual Activity Alcohol Use No  
 
Social History Substance and Sexual Activity Drug Use No  
  
 
Review of Systems: 
- Constitutional Symptoms: no fevers, chills - Cardiovascular: no chest pain or palpitations - Respiratory: no cough or shortness of breath 
- Gastrointestinal: no dysphagia or abdominal pain ROS is negative otherwise. PHQ over the last two weeks 9/17/2018 Little interest or pleasure in doing things Not at all Feeling down, depressed, irritable, or hopeless Not at all Total Score PHQ 2 0 I reviewed the following: 
Past Medical History:  
Diagnosis Date  
 HTN (hypertension) 5/27/2011  Hyperlipidemia 12/1/2011  Hypertension  OAB (overactive bladder) 2/15/2013  Other ill-defined conditions(799.89)   
 hyperlipidemia  Other ill-defined conditions(799.89)   
 bladder problems  Other ill-defined conditions(799.89)   
 back pain Current Outpatient Medications Medication Sig Dispense Refill  cephALEXin (KEFLEX) 500 mg capsule Take 1 Cap by mouth three (3) times daily for 7 days. 21 Cap 0  
 metoprolol tartrate (LOPRESSOR) 25 mg tablet TAKE 1 TAB BY MOUTH EVERY TWELVE (12) HOURS. HOLD FOR SBP LESS THAN 120 OR HR LESS THAN 65 60 Tab 3  
 aspirin delayed-release 81 mg tablet Take 1 Tab by mouth daily. 90 Tab 2  
 SENNA PLUS 8.6-50 mg per tablet TAKE 1 TABLET BY MOUTH TWICE A DAY 60 Tab 11  
 multivit-minerals/folic acid (CENTRUM MULTIGUMMIES PO) Take 2 Each by mouth daily.  acetaminophen (TYLENOL) 325 mg tablet Take 2 Tabs by mouth every six (6) hours as needed for Pain. 60 Tab 1  polyethylene glycol (MIRALAX) 17 gram packet Take 1 Packet by mouth daily. (Patient taking differently: Take 17 g by mouth daily. Mix in 8 oz liquid) 30 Packet 0  
 amLODIPine (NORVASC) 10 mg tablet TAKE 1 TABLET BY MOUTH EVERY MORNING 90 Tab 4  
 gluc walters/chondro walters A/vit C/Mn (GLUCOSAMINE 1500 COMPLEX PO) Take 1 Tab by mouth daily.  calcium-cholecalciferol, d3, (CALCIUM 600 + D) 600-125 mg-unit tab Take 1 Tab by mouth two (2) times a day.  coenzyme Q-10 (CO Q-10) 200 mg capsule Take 200 mg by mouth daily.  benazepril (LOTENSIN) 40 mg tablet TAKE 1 TABLET BY MOUTH EVERY DAY 90 Tab 3  
 atorvastatin (LIPITOR) 20 mg tablet TAKE 1 TABLET BY MOUTH DAILY (Patient taking differently: TAKE 1 TABLET BY MOUTH IN THE EVENING) 90 Tab 3  cholecalciferol, vitamin d3, (VITAMIN D) 1,000 unit tablet Take 1,000 Units by mouth every evening. Allergies Allergen Reactions  Codeine Nausea and Vomiting  Demerol (Pf) [Meperidine (Pf)] Nausea and Vomiting  Meperidine Nausea and Vomiting O: VS:  
Visit Vitals /64 (BP 1 Location: Left arm, BP Patient Position: Sitting) Pulse (!) 59 Temp 96.8 °F (36 °C) (Oral) Resp 18 Ht 5' 3\" (1.6 m) Wt 138 lb 1.6 oz (62.6 kg) SpO2 99% BMI 24.46 kg/m² GENERAL: Prudence Villaseñor is in no acute distress. Non-toxic. Well nourished. Well developed. Appropriately groomed. HEAD:  Normocephalic. Atraumatic. Non tender sinuses x 4. RESP: Breath sounds are symmetrical bilaterally. Unlabored without SOB. Speaking in full sentences. Clear to auscultation bilaterally anteriorly and posteriorly. No wheezes. No rales or rhonchi. CV: normal rate. Regular rhythm. S1, S2 audible. No murmur noted. No rubs, clicks or gallops noted. MUSC:  Intact x 4 extremities. Full ROM x 4 extremities. Mild pain with movement. Steady gait with cane HEME/LYMPH: peripheral pulses palpable 2+ x 4 extremities. No peripheral edema is noted. SKIN: Skin is warm and dry. Turgor is normal. No petechiae, no purpura, no rash. No cyanosis. No mottling, jaundice or pallor. PSYCH: appropriate behavior, dress and thought processes. Good eye contact. Clear and coherent speech. 
___________________________________________________________________ Patient education was done. Advised on nutrition, physical activity, weight management, and safety. Counseling included discussion of diagnosis, differentials, treatment options, prescribed treatment, warning signs and follow up. Medication risks/benefits, interactions and alternatives discussed with patient.  
  
Patient verbalized understanding and agreed to plan of care. Patient was given an after visit summary which included current diagnoses, medications and vital signs. Follow up as directed.

## 2018-11-06 NOTE — LETTER
11/7/2018 7:32 PM 
 
Ms. Simeon Begun [address] [facility] [place]  [id number] Dear Ms. Carrera: It was nice to see you in the clinic. Your potassium level is normal.   
 
Potassium is a mineral that helps keep the right mix of fluids in your body. It also helps your nerves and muscles work properly. Potassium is found in milk, meats, and all fresh foods, including fruits and vegetables. Most adults need about 5 grams of potassium a day. The foods you eat should supply all the potassium you need. Some health conditions, such as kidney problems and stomach problems with vomiting and diarrhea, can cause a loss of potassium. Some medicines, such as water pills (diuretics), can cause low potassium. Your potassium level is 4.6 mmol/L which is normal.  (Normal range is 3.5 - 5.2mmol/L) Please find your most recent results below. Resulted Orders POTASSIUM Result Value Ref Range Potassium 4.6 3.5 - 5.2 mmol/L Narrative Performed at:  [address]  [id number] : Tasneem Tse MD, Phone:  [phone]

## 2018-11-07 LAB — POTASSIUM SERPL-SCNC: 4.6 MMOL/L (ref 3.5–5.2)

## 2018-11-08 RX ORDER — POLYETHYLENE GLYCOL 3350 17 G/17G
17 POWDER, FOR SOLUTION ORAL AS NEEDED
Qty: 14 PACKET | Refills: 3 | Status: ON HOLD | OUTPATIENT
Start: 2018-11-08 | End: 2019-01-22 | Stop reason: DRUGHIGH

## 2018-11-08 RX ORDER — BENAZEPRIL HYDROCHLORIDE 40 MG/1
TABLET ORAL
Qty: 90 TAB | Refills: 3 | Status: ON HOLD | OUTPATIENT
Start: 2018-11-08 | End: 2019-01-22

## 2018-11-08 RX ORDER — GLUCOSAMINE/CHONDROITIN/C/MANG 500-400 MG
1 CAPSULE ORAL DAILY
Qty: 90 CAP | Refills: 1 | Status: SHIPPED | OUTPATIENT
Start: 2018-11-08

## 2018-11-08 RX ORDER — ATORVASTATIN CALCIUM 20 MG/1
TABLET, FILM COATED ORAL
Qty: 90 TAB | Refills: 3 | Status: SHIPPED | OUTPATIENT
Start: 2018-11-08 | End: 2019-11-20 | Stop reason: SDUPTHER

## 2018-11-08 RX ORDER — ACETAMINOPHEN 160 MG/5ML
200 SUSPENSION, ORAL (FINAL DOSE FORM) ORAL DAILY
Qty: 90 CAP | Refills: 3 | Status: SHIPPED | OUTPATIENT
Start: 2018-11-08

## 2018-11-08 RX ORDER — MELATONIN
1000 EVERY EVENING
Qty: 90 TAB | Refills: 3 | Status: ON HOLD | OUTPATIENT
Start: 2018-11-08 | End: 2019-01-22

## 2018-11-08 NOTE — TELEPHONE ENCOUNTER
PCP: Kaushik Ramirez NP    Last appt: 11/6/2018  Future Appointments   Date Time Provider Eusebio Babcock   11/9/2018  2:00 PM Kaushik Ramirez NP [facility]       Requested Prescriptions     Pending Prescriptions Disp Refills    benazepril (LOTENSIN) 40 mg tablet 90 Tab 3    atorvastatin (LIPITOR) 20 mg tablet 90 Tab 3    calcium-cholecalciferol, d3, (CALCIUM 600 + D) 600-125 mg-unit tab       Sig: Take 1 Tab by mouth two (2) times a day.  coenzyme Q-10 (CO Q-10) 200 mg capsule       Sig: Take 1 Cap by mouth daily.  glucosamine-chondroit-vit c-mn (GLUCOSAMINE 1500 COMPLEX) 500-400 mg capsule       Sig: Take  by mouth daily.  polyethylene glycol (MIRALAX) 17 gram packet 30 Packet 0     Sig: Take 1 Packet by mouth daily.  cholecalciferol (VITAMIN D3) 1,000 unit tablet       Sig: Take 1 Tab by mouth every evening.        Prior labs and Blood pressures:  BP Readings from Last 3 Encounters:   11/06/18 142/64   11/01/18 145/53   09/17/18 147/71     Lab Results   Component Value Date/Time    Sodium 138 11/01/2018 09:17 PM    Potassium 4.6 11/06/2018 12:55 PM    Chloride 102 11/01/2018 09:17 PM    CO2 27 11/01/2018 09:17 PM    Anion gap 9 11/01/2018 09:17 PM    Glucose 117 (H) 11/01/2018 09:17 PM    BUN 15 11/01/2018 09:17 PM    Creatinine 0.76 11/01/2018 09:17 PM    BUN/Creatinine ratio 20 11/01/2018 09:17 PM    GFR est AA >60 11/01/2018 09:17 PM    GFR est non-AA >60 11/01/2018 09:17 PM    Calcium 9.5 11/01/2018 09:17 PM     Lab Results   Component Value Date/Time    Hemoglobin A1c 5.7 (H) 10/06/2017 08:45 AM     Lab Results   Component Value Date/Time    Cholesterol, total 129 10/06/2017 08:45 AM    Cholesterol (POC) 130 11/09/2016 02:33 PM    HDL Cholesterol 51 10/06/2017 08:45 AM    HDL Cholesterol (POC) 53 11/09/2016 02:33 PM    LDL Cholesterol (POC) 64 11/09/2016 02:33 PM    LDL, calculated 64 10/06/2017 08:45 AM    VLDL, calculated 14 10/06/2017 08:45 AM    Triglyceride 72 10/06/2017 08:45 AM    Triglycerides (POC) 64 11/09/2016 02:33 PM     No results found for: SUMMER Paige    Lab Results   Component Value Date/Time    TSH 2.49 07/12/2018 08:47 AM

## 2018-11-08 NOTE — TELEPHONE ENCOUNTER
Requested Prescriptions     Pending Prescriptions Disp Refills    benazepril (LOTENSIN) 40 mg tablet 90 Tab 3    atorvastatin (LIPITOR) 20 mg tablet 90 Tab 3    calcium-cholecalciferol, d3, (CALCIUM 600 + D) 600-125 mg-unit tab       Sig: Take 1 Tab by mouth two (2) times a day.  coenzyme Q-10 (CO Q-10) 200 mg capsule       Sig: Take 1 Cap by mouth daily.  glucosamine-chondroit-vit c-mn (GLUCOSAMINE 1500 COMPLEX) 500-400 mg capsule       Sig: Take  by mouth daily.  polyethylene glycol (MIRALAX) 17 gram packet 30 Packet 0     Sig: Take 1 Packet by mouth daily.  cholecalciferol (VITAMIN D3) 1,000 unit tablet       Sig: Take 1 Tab by mouth every evening.

## 2018-11-09 ENCOUNTER — HOME HEALTH ADMISSION (OUTPATIENT)
Dept: HOME HEALTH SERVICES | Facility: HOME HEALTH | Age: 83
End: 2018-11-09
Payer: MEDICARE

## 2018-11-09 ENCOUNTER — TELEPHONE (OUTPATIENT)
Dept: FAMILY MEDICINE CLINIC | Age: 83
End: 2018-11-09

## 2018-11-09 DIAGNOSIS — S72.001D CLOSED FRACTURE OF RIGHT HIP WITH ROUTINE HEALING, SUBSEQUENT ENCOUNTER: ICD-10-CM

## 2018-11-09 DIAGNOSIS — M54.5 ACUTE BILATERAL LOW BACK PAIN, WITH SCIATICA PRESENCE UNSPECIFIED: Primary | ICD-10-CM

## 2018-11-09 NOTE — TELEPHONE ENCOUNTER
Writer called Alexandr Barahona to notify of referral to [facility] was placed. Writer spoke with Mari Parisi. Patient's  verified. Writer notified Mari Parisi. Mari Parisi verbalized understanding and appreciation for the phone call, stated that [facility] has already called and set up time and day that they will come out to see her. Writer verbalized understanding.

## 2018-11-09 NOTE — TELEPHONE ENCOUNTER
Usama Fatima on patient's PHI called and stated that the patient was previously seen in the office for a hospital follow up, patient is still having bad back pain. Would like to request an order for Physical Therapy and would prefer for it to be with Home Health.   P: [phone]

## 2018-11-10 ENCOUNTER — HOME CARE VISIT (OUTPATIENT)
Dept: SCHEDULING | Facility: HOME HEALTH | Age: 83
End: 2018-11-10
Payer: MEDICARE

## 2018-11-10 VITALS
TEMPERATURE: 98.4 F | RESPIRATION RATE: 16 BRPM | OXYGEN SATURATION: 95 % | DIASTOLIC BLOOD PRESSURE: 72 MMHG | SYSTOLIC BLOOD PRESSURE: 130 MMHG | HEART RATE: 61 BPM

## 2018-11-10 PROCEDURE — 400013 HH SOC

## 2018-11-10 PROCEDURE — 3331090002 HH PPS REVENUE DEBIT

## 2018-11-10 PROCEDURE — G0151 HHCP-SERV OF PT,EA 15 MIN: HCPCS

## 2018-11-10 PROCEDURE — 3331090001 HH PPS REVENUE CREDIT

## 2018-11-11 ENCOUNTER — APPOINTMENT (OUTPATIENT)
Dept: GENERAL RADIOLOGY | Age: 83
End: 2018-11-11
Attending: NURSE PRACTITIONER
Payer: MEDICARE

## 2018-11-11 ENCOUNTER — APPOINTMENT (OUTPATIENT)
Dept: GENERAL RADIOLOGY | Age: 83
End: 2018-11-11
Attending: EMERGENCY MEDICINE
Payer: MEDICARE

## 2018-11-11 ENCOUNTER — HOSPITAL ENCOUNTER (EMERGENCY)
Age: 83
Discharge: HOME | End: 2018-11-11
Attending: EMERGENCY MEDICINE
Payer: MEDICARE

## 2018-11-11 VITALS
HEIGHT: 63 IN | OXYGEN SATURATION: 99 % | HEART RATE: 65 BPM | DIASTOLIC BLOOD PRESSURE: 118 MMHG | SYSTOLIC BLOOD PRESSURE: 147 MMHG | RESPIRATION RATE: 16 BRPM | BODY MASS INDEX: 24.1 KG/M2 | WEIGHT: 136 LBS | TEMPERATURE: 98.3 F

## 2018-11-11 DIAGNOSIS — M54.50 ACUTE MIDLINE LOW BACK PAIN WITHOUT SCIATICA: Primary | ICD-10-CM

## 2018-11-11 PROCEDURE — 73521 X-RAY EXAM HIPS BI 2 VIEWS: CPT

## 2018-11-11 PROCEDURE — 72072 X-RAY EXAM THORAC SPINE 3VWS: CPT

## 2018-11-11 PROCEDURE — 99282 EMERGENCY DEPT VISIT SF MDM: CPT

## 2018-11-11 PROCEDURE — 3331090002 HH PPS REVENUE DEBIT

## 2018-11-11 PROCEDURE — 3331090001 HH PPS REVENUE CREDIT

## 2018-11-11 PROCEDURE — 72100 X-RAY EXAM L-S SPINE 2/3 VWS: CPT

## 2018-11-11 RX ORDER — LIDOCAINE 50 MG/G
PATCH TOPICAL
Qty: 30 EACH | Refills: 0 | Status: ON HOLD | OUTPATIENT
Start: 2018-11-11 | End: 2019-01-22 | Stop reason: SDUPTHER

## 2018-11-11 NOTE — ED NOTES
Patient discharged by [facility]. Patient provided with discharge instructions Rx and instructions on follow up care. Patient out of ED via wheelchair accompanied by family.

## 2018-11-11 NOTE — DISCHARGE INSTRUCTIONS
We hope that we have addressed all of your medical concerns. The examination and treatment you received in the Emergency Department were for an emergent problem and were not intended as complete care. It is important that you follow up with your healthcare provider(s) for ongoing care. If your symptoms worsen or do not improve as expected, and you are unable to reach your usual health care provider(s), you should return to the Emergency Department. [facility] participate in nationally recognized quality of care measures. If your blood pressure is greater than 120/80, as reported below, we urge that you seek medical care to address the potential of high blood pressure, commonly known as hypertension. Hypertension can be hereditary or can be caused by certain medical conditions, pain, stress, or \"white coat syndrome. \"       Please make an appointment with your health care provider(s) for follow up of your Emergency Department visit. VITALS:   Patient Vitals for the past 8 hrs:   Temp Pulse Resp BP SpO2   11/11/18 1457 98.3 °F (36.8 °C) 65 16 (!) 147/118 99 %          Thank you for allowing us to provide you with medical care today. We realize that you have many choices for your emergency care needs. Please choose us in the future for any continued health care needs. Pk Pimentel NP                No results found for this or any previous visit (from the past 24 hour(s)). Xr Spine Thorac 3 V    Result Date: 11/11/2018  INDICATION:  fall back pain EXAM: 2 views thoracic spine. Comparison: None FINDINGS: That there is a dextroscoliosis. Chronic calcifications in the homero and chronic granuloma the right base. Bone mineral density is decreased. Multilevel degenerative change. . No bony destructive lesions or fractures. IMPRESSION: 1. Scoliosis osteopenia and degenerative change.  An acute fracture is not demonstrated     Xr Spine Lumb 2 Or 3 V    Result Date: 11/11/2018  INDICATION:  fall lower back pain EXAM: 3 views lumbar spine FINDINGS: Is a kyphoscoliosis of the lumbar spine. Bones are submitted diffusely osteopenic. There is chronic appearing wedging of T11 and T12. An acute fracture is not demonstrated. There is degeneration of all lumbar disc levels. There is an oval calcification in the right retroperitoneum region, chronic in appearance. IMPRESSION: 1. Scoliosis with chronic wedging of T11 and T12. An acute fracture is not demonstrated     Xr Hips Bi W Ap Pelv    Result Date: 11/11/2018  EXAM:  XR HIPS BI W AP PELV INDICATION:   fall s/p hip replacement. COMPARISON: July 12, 2018. FINDINGS: An AP view of the pelvis and frogleg lateral views of both hips demonstrate diffuse osteopenia. Hemiarthroplasty of the right hip is noted. No malalignment or periprosthetic fracture.      IMPRESSION:  No acute abnormality

## 2018-11-12 ENCOUNTER — TELEPHONE (OUTPATIENT)
Dept: FAMILY MEDICINE CLINIC | Age: 83
End: 2018-11-12

## 2018-11-12 PROCEDURE — 3331090001 HH PPS REVENUE CREDIT

## 2018-11-12 PROCEDURE — 3331090002 HH PPS REVENUE DEBIT

## 2018-11-12 NOTE — ED PROVIDER NOTES
EMERGENCY DEPARTMENT HISTORY AND PHYSICAL EXAM 
 
 
Date: 11/11/2018 Patient Name: Shalonda Patel History of Presenting Illness Chief Complaint Patient presents with  Back Pain The patient presents to the ED with complaints of lower back pain, states that she injured her back two weeks ago and it has gotten continously worse. History Provided By: Patient HPI: Shalonda Patel, 80 y.o. female with PMHx significant for HLD, back pain, and HTN, presents ambulatory from home to the ED with cc of left sided back pain. She reports have right hip replacement in the end of October but has not been able to start PT yet. Seen in the ED on 11/1 for the same. she had tripped and landed onto a wooden chair, eliciting lower back pain and bll hip soreness. She saw Dr. Arana yesterday in the clinic; x-rays were completed and noted to be normal. Dr Arana started her on robaxin and prednisone. Robaxin and prednisone were discontinued after she had a poor reaction. Home health saw her yesterday and recommended that she present back the ED for xrays. No new fall, injury, or trauma. She has been taking Tylenol and Aleve for pain with significant relief. Pt denies fevers, chills, night sweats, chest pain, pressure, SOB, KRAFT, PND, orthopnea, abdominal pain, n/v/d, melena, hematuria, dysuria, constipation, HA, dizziness, and syncope. No saddle anesthesia, distal numbness or tingling, or loss of bowel or bladder function. PCP: Gen Hanson NP Current Outpatient Medications Medication Sig Dispense Refill  lidocaine (LIDODERM) 5 % Apply patch to the affected area for 12 hours a day and remove for 12 hours a day. 30 Each 0  
 aspirin delayed-release 81 mg tablet Take 81 mg by mouth daily.  senna-docusate (SENNA PLUS) 8.6-50 mg per tablet Take 1 Tab by mouth daily.     
 benazepril (LOTENSIN) 40 mg tablet TAKE 1 TABLET BY MOUTH EVERY DAY 90 Tab 3  
  atorvastatin (LIPITOR) 20 mg tablet TAKE 1 TABLET BY MOUTH IN THE EVENING 90 Tab 3  
 calcium-cholecalciferol, d3, (CALCIUM 600 + D) 600-125 mg-unit tab Take 1 Tab by mouth two (2) times a day. 180 Tab 3  coenzyme Q-10 (CO Q-10) 200 mg capsule Take 1 Cap by mouth daily. 90 Cap 3  
 glucosamine-chondroit-vit c-mn (GLUCOSAMINE 1500 COMPLEX) 500-400 mg capsule Take 1 Cap by mouth daily. 90 Cap 1  polyethylene glycol (MIRALAX) 17 gram packet Take 1 Packet by mouth as needed. [place]  cholecalciferol (VITAMIN D3) 1,000 unit tablet Take 1 Tab by mouth every evening. 90 Tab 3  
 metoprolol tartrate (LOPRESSOR) 25 mg tablet TAKE 1 TAB BY MOUTH EVERY TWELVE (12) HOURS. HOLD FOR SBP LESS THAN 120 OR HR LESS THAN 65 60 Tab 3  
 aspirin delayed-release 81 mg tablet Take 1 Tab by mouth daily. (Patient not taking: Reported on 11/10/2018) 90 Tab 2  
 SENNA PLUS 8.6-50 mg per tablet TAKE 1 TABLET BY MOUTH TWICE A DAY (Patient not taking: No sig reported) 60 Tab 11  
 multivit-minerals/folic acid (CENTRUM MULTIGUMMIES PO) Take 2 Each by mouth daily.  acetaminophen (TYLENOL) 325 mg tablet Take 2 Tabs by mouth every six (6) hours as needed for Pain. 60 Tab 1  
 amLODIPine (NORVASC) 10 mg tablet TAKE 1 TABLET BY MOUTH EVERY MORNING 90 Tab 4 Past History Past Medical History: 
Past Medical History:  
Diagnosis Date  
 HTN (hypertension) 5/27/2011  Hyperlipidemia 12/1/2011  Hypertension  OAB (overactive bladder) 2/15/2013  Other ill-defined conditions(799.89)   
 hyperlipidemia  Other ill-defined conditions(799.89)   
 bladder problems  Other ill-defined conditions(799.89)   
 back pain Past Surgical History: 
Past Surgical History:  
Procedure Laterality Date  COLONOSCOPY,DIAGNOSTIC  3/12/2018 [address]  HX CATARACT REMOVAL Left  HX CORNEAL TRANSPLANT Left  HX DILATION AND CURETTAGE    
 x 2  
 HX TONSILLECTOMY  1938 Family History: Family History Problem Relation Age of Onset  Cancer Father   
     throat (smoker); mouth and gums  Heart Disease Mother Age 52  Clotting Disorder Mother  Arthritis-osteo Paternal Aunt Social History: 
Social History Tobacco Use  Smoking status: Never Smoker  Smokeless tobacco: Never Used Substance Use Topics  Alcohol use: No  
 Drug use: No  
 
 
Allergies: Allergies Allergen Reactions  Codeine Nausea and Vomiting  Demerol (Pf) [Meperidine (Pf)] Nausea and Vomiting  Meperidine Nausea and Vomiting Review of Systems Review of Systems Constitutional: Negative for activity change, appetite change, chills, diaphoresis, fatigue, fever and unexpected weight change. HENT: Negative for congestion, ear pain, rhinorrhea, sinus pressure, sore throat and tinnitus. Eyes: Negative for photophobia, pain, discharge and visual disturbance. Respiratory: Negative for apnea, cough, choking, chest tightness, shortness of breath, wheezing and stridor. Cardiovascular: Negative for chest pain, palpitations and leg swelling. Gastrointestinal: Negative for abdominal pain, constipation, diarrhea, nausea and vomiting. Endocrine: Negative for polydipsia, polyphagia and polyuria. Genitourinary: Negative for decreased urine volume, dyspareunia, dysuria, enuresis, flank pain, frequency, hematuria and urgency. Musculoskeletal: Positive for arthralgias and back pain. Negative for gait problem, myalgias and neck pain. Skin: Negative for color change, pallor, rash and wound. Allergic/Immunologic: Negative for immunocompromised state. Neurological: Negative for dizziness, seizures, syncope, weakness, light-headedness and headaches. Hematological: Does not bruise/bleed easily. Psychiatric/Behavioral: Negative for agitation and confusion. The patient is not nervous/anxious.    
 
 
Physical Exam  
Physical Exam  
 Constitutional: She is oriented to person, place, and time. She appears well-developed and well-nourished. No distress. HENT:  
Head: Normocephalic. Right Ear: External ear normal.  
Left Ear: External ear normal.  
Mouth/Throat: Oropharynx is clear and moist. No oropharyngeal exudate. Eyes: Conjunctivae and EOM are normal. Pupils are equal, round, and reactive to light. Right eye exhibits no discharge. Left eye exhibits no discharge. No scleral icterus. Neck: Normal range of motion. Neck supple. No JVD present. No tracheal deviation present. No thyromegaly present. Cardiovascular: Normal rate, regular rhythm, normal heart sounds and intact distal pulses. Exam reveals no gallop and no friction rub. No murmur heard. Pulmonary/Chest: Effort normal and breath sounds normal. No stridor. No respiratory distress. She has no wheezes. She has no rales. She exhibits no tenderness. Abdominal: Soft. Bowel sounds are normal. She exhibits no distension and no mass. There is no tenderness. There is no rebound and no guarding. Musculoskeletal: Normal range of motion. She exhibits no edema. Cervical back: Normal.  
     Thoracic back: Normal.  
     Lumbar back: She exhibits tenderness and pain. She exhibits no bony tenderness. Back: 
 
Lymphadenopathy:  
  She has no cervical adenopathy. Neurological: She is alert and oriented to person, place, and time. She displays normal reflexes. No cranial nerve deficit. Coordination normal.  
Skin: Skin is warm and dry. No rash noted. She is not diaphoretic. No erythema. No pallor. Psychiatric: She has a normal mood and affect. Her behavior is normal. Judgment and thought content normal.  
Nursing note and vitals reviewed. Diagnostic Study Results Labs - No results found for this or any previous visit (from the past 12 hour(s)). Radiologic Studies -  
XR HIPS BI W AP PELV Final Result XR SPINE LUMB 2 OR 3 V Final Result XR SPINE THORAC 3 V Final Result CT Results  (Last 48 hours) None CXR Results  (Last 48 hours) None Medical Decision Making I am the first provider for this patient. I reviewed the vital signs, available nursing notes, past medical history, past surgical history, family history and social history. Vital Signs-Reviewed the patient's vital signs. Patient Vitals for the past 12 hrs: 
 Temp Pulse Resp BP SpO2  
11/11/18 1457 98.3 °F (36.8 °C) 65 16 (!) 147/118 99 % Pulse Oximetry Analysis - 99% on RA Cardiac Monitor:  
Rate: 65 bpm 
 
Records Reviewed: Nursing Notes and Old Medical Records Provider Notes (Medical Decision Making): Ongoing back pain XR series Referral for in home PT - patient and spouse are not able to drive ED Course:  
Initial assessment performed. The patients presenting problems have been discussed, and they are in agreement with the care plan formulated and outlined with them. I have encouraged them to ask questions as they arise throughout their visit. Stable ambulatory pt in [facility] Critical Care Time:  
0 Disposition: 
Discharge to home with PCP and Ortho follow Referral made for home PT 
 
PLAN: 
1. Discharge Medication List as of 11/11/2018  5:25 PM  
  
START taking these medications Details  
lidocaine (LIDODERM) 5 % Apply patch to the affected area for 12 hours a day and remove for 12 hours a day., Print, Disp-30 Each, R-0  
  
  
CONTINUE these medications which have NOT CHANGED Details  
!! aspirin delayed-release 81 mg tablet Take 81 mg by mouth daily. , Historical Med  
  
!! senna-docusate (SENNA PLUS) 8.6-50 mg per tablet Take 1 Tab by mouth daily. , Historical Med  
  
benazepril (LOTENSIN) 40 mg tablet TAKE 1 TABLET BY MOUTH EVERY DAY, Normal, Disp-90 Tab, R-3  
  
atorvastatin (LIPITOR) 20 mg tablet TAKE 1 TABLET BY MOUTH IN THE EVENING, Normal, Disp-90 Tab, R-3  
  
 calcium-cholecalciferol, d3, (CALCIUM 600 + D) 600-125 mg-unit tab Take 1 Tab by mouth two (2) times a day., Normal, Disp-180 Tab, R-3  
  
coenzyme Q-10 (CO Q-10) 200 mg capsule Take 1 Cap by mouth daily. , Normal, Disp-90 Cap, R-3  
  
glucosamine-chondroit-vit c-mn (GLUCOSAMINE 1500 COMPLEX) 500-400 mg capsule Take 1 Cap by mouth daily. , Normal, Disp-90 Cap, R-1  
  
polyethylene glycol (MIRALAX) 17 gram packet Take 1 Packet by mouth as needed., Normal, Disp-14 Packet, R-3  
  
cholecalciferol (VITAMIN D3) 1,000 unit tablet Take 1 Tab by mouth every evening., Normal, Disp-90 Tab, R-3  
  
metoprolol tartrate (LOPRESSOR) 25 mg tablet TAKE 1 TAB BY MOUTH EVERY TWELVE (12) HOURS. HOLD FOR SBP LESS THAN 120 OR HR LESS THAN 65, NormalNEW MULTI-DOSE PACKAGING PATIENT. MD PLEASE SEND AUTHORIZATIONS AT YOUR EARLIEST CONVENIENCE SO WE MAY PACK/SEND TO PATIENT. THANK YOU. Disp-60 Tab, R-3  
  
!! aspirin delayed-release 81 mg tablet Take 1 Tab by mouth daily. , Normal, Disp-90 Tab, R-2  
  
!! SENNA PLUS 8.6-50 mg per tablet TAKE 1 TABLET BY MOUTH TWICE A DAY, NormalNEEDS FOR FIRST BOXDisp-60 Tab, R-11  
  
multivit-minerals/folic acid (CENTRUM MULTIGUMMIES PO) Take 2 Each by mouth daily. , Historical Med  
  
acetaminophen (TYLENOL) 325 mg tablet Take 2 Tabs by mouth every six (6) hours as needed for Pain., Normal, Disp-60 Tab, R-1  
  
amLODIPine (NORVASC) 10 mg tablet TAKE 1 TABLET BY MOUTH EVERY MORNING, Normal, Disp-90 Tab, R-4  
  
 !! - Potential duplicate medications found. Please discuss with provider. 2.  
Follow-up Information Follow up With Specialties Details Why Contact Info Luis Ly NP Nurse Practitioner In 2 days  [address] [facility] 
[address] [facility] [address] 
[phone] Carleen Cordova MD Orthopedic Surgery In 1 day  [address] [facility] 
[phone]  [facility] EMERGENCY DEPT Emergency Medicine  As needed, If symptoms worsen [id number] 
[phone] Return to ED if worse Diagnosis Clinical Impression: 1. Acute midline low back pain without sciatica Attestations: 
 
Martita Allen NP 
7:56 PM

## 2018-11-12 NOTE — TELEPHONE ENCOUNTER
Patient called in stating that she was in the ED yesterday due to pain back and was prescribed Lidoderm, but the only problem with it is that it needs a PA. Made caller aware that i'm not sure if NP will have any doings with this since it was prescribed to her from the ED and would might need to come into the office. Patient verbalized understanding,but still would like a call back in reference on how to handle the pain.   P: [phone]

## 2018-11-12 NOTE — TELEPHONE ENCOUNTER
Writer called patient back regarding ER visit. Writer spoke with patient. Patient verified . Writer told patient that PA might have gone to ER department since they prescribed medication. Have not seen a PA notification for Lidoderm. Writer notified patient that she would need a ER f/u visit with Meagan Sauceda to discuss visit and symptoms she is having. Patient stated that she would make an appointment. Stated that she is using OTC pain patches instead of rx given to her. Working just as well and were cheaper. Writer transferred patient to [facility] in front office to schedule an appointment.

## 2018-11-13 ENCOUNTER — HOME CARE VISIT (OUTPATIENT)
Dept: SCHEDULING | Facility: HOME HEALTH | Age: 83
End: 2018-11-13
Payer: MEDICARE

## 2018-11-13 VITALS
OXYGEN SATURATION: 96 % | DIASTOLIC BLOOD PRESSURE: 84 MMHG | TEMPERATURE: 98.5 F | SYSTOLIC BLOOD PRESSURE: 145 MMHG | HEART RATE: 60 BPM

## 2018-11-13 PROCEDURE — G0157 HHC PT ASSISTANT EA 15: HCPCS

## 2018-11-13 PROCEDURE — 3331090001 HH PPS REVENUE CREDIT

## 2018-11-13 PROCEDURE — 3331090002 HH PPS REVENUE DEBIT

## 2018-11-13 NOTE — PROGRESS NOTES
ER CM received consult in Follow Up Book asking for us to set up [facility] RN/PT at home. Pt is already open to [facility] for RN/PT. CM called [phone] and they confirmed Pt is open to both services. Kajal Santiago CM Ext [phone]

## 2018-11-14 PROCEDURE — 3331090002 HH PPS REVENUE DEBIT

## 2018-11-14 PROCEDURE — 3331090001 HH PPS REVENUE CREDIT

## 2018-11-15 ENCOUNTER — OFFICE VISIT (OUTPATIENT)
Dept: FAMILY MEDICINE CLINIC | Age: 83
End: 2018-11-15

## 2018-11-15 VITALS
SYSTOLIC BLOOD PRESSURE: 139 MMHG | OXYGEN SATURATION: 97 % | HEART RATE: 62 BPM | TEMPERATURE: 98.2 F | BODY MASS INDEX: 23.5 KG/M2 | DIASTOLIC BLOOD PRESSURE: 70 MMHG | WEIGHT: 132.6 LBS | RESPIRATION RATE: 18 BRPM | HEIGHT: 63 IN

## 2018-11-15 DIAGNOSIS — M54.50 ACUTE BILATERAL LOW BACK PAIN WITHOUT SCIATICA: Primary | ICD-10-CM

## 2018-11-15 PROCEDURE — 3331090002 HH PPS REVENUE DEBIT

## 2018-11-15 PROCEDURE — 3331090001 HH PPS REVENUE CREDIT

## 2018-11-15 RX ORDER — CAPSAICIN 0.03 G/100G
CREAM TOPICAL 3 TIMES DAILY
Qty: 60 G | Refills: 2 | Status: SHIPPED | OUTPATIENT
Start: 2018-11-15 | End: 2019-02-12

## 2018-11-15 RX ORDER — ACETAMINOPHEN 500 MG
1000 TABLET ORAL
COMMUNITY
End: 2019-01-24

## 2018-11-15 RX ORDER — LIDOCAINE 50 MG/G
PATCH TOPICAL
Qty: 1 EACH | Refills: 0 | Status: ON HOLD | OUTPATIENT
Start: 2018-11-15 | End: 2019-01-22 | Stop reason: DRUGHIGH

## 2018-11-15 RX ORDER — CHOLECALCIFEROL (VITAMIN D3) 125 MCG
220 CAPSULE ORAL
COMMUNITY
End: 2019-01-24

## 2018-11-15 NOTE — PROGRESS NOTES
S: Yony Sullivan is a 80 y.o. female who presents for back pain    Assessment/Plan:     1. Acute bilateral low back pain without sciatica  -ED visit 11/11/18 d/t LBP; given lidocaine patches, but not covered by insurance  -advised moist heat, light stretching and OTC lidocaine/aspercreme patches  -PT coming tomorrow and pt to ask for stretches to help with LBP pain         HPI:  S/p right hip replacement 7/2018 with 2 falls, last being 11/1/18 into a chair, causing LBP  Ortho r/o injury to hip/back  Site of pain: mid back   Pain:   10 /10 without any patches; dull, aching  Onset: 11/1/18  Duration: worsening since the fall   Functionality: yes - walking   No pain radiate down legs  No numbness/tingling  + mechanism of Injury/trauma - feel on chair 2 weeks   No trouble urinating/bowel movements      PHQ over the last two weeks 11/6/2018   Little interest or pleasure in doing things Not at all   Feeling down, depressed, irritable, or hopeless Not at all   Total Score PHQ 2 0       Social History:  Social: lives with spouse, daughters oversee care    Social History     Tobacco Use   Smoking Status Never Smoker   Smokeless Tobacco Never Used     Social History     Substance and Sexual Activity   Alcohol Use No     Social History     Substance and Sexual Activity   Drug Use No        Review of Systems:  - Constitutional Symptoms: no fevers or chills  - Cardiovascular: no chest pain or palpitations  - Respiratory: no cough or shortness of breath  - Neurological: no numbness, tingling, or headaches  ROS is negative otherwise.     I reviewed the following:  Past Medical History:   Diagnosis Date    HTN (hypertension) 5/27/2011    Hyperlipidemia 12/1/2011    Hypertension     OAB (overactive bladder) 2/15/2013    Other ill-defined conditions(799.89)     hyperlipidemia    Other ill-defined conditions(799.89)     bladder problems    Other ill-defined conditions(799.89)     back pain       Current Outpatient Medications Medication Sig Dispense Refill    acetaminophen (TYLENOL EXTRA STRENGTH) 500 mg tablet Take  by mouth every six (6) hours as needed for Pain.  naproxen sodium (ALEVE) 220 mg cap Take  by mouth.  aspirin delayed-release 81 mg tablet Take 81 mg by mouth daily.  benazepril (LOTENSIN) 40 mg tablet TAKE 1 TABLET BY MOUTH EVERY DAY 90 Tab 3    atorvastatin (LIPITOR) 20 mg tablet TAKE 1 TABLET BY MOUTH IN THE EVENING 90 Tab 3    coenzyme Q-10 (CO Q-10) 200 mg capsule Take 1 Cap by mouth daily. 90 Cap 3    polyethylene glycol (MIRALAX) 17 gram packet Take 1 Packet by mouth as needed. 14 Packet 3    cholecalciferol (VITAMIN D3) 1,000 unit tablet Take 1 Tab by mouth every evening. 90 Tab 3    metoprolol tartrate (LOPRESSOR) 25 mg tablet TAKE 1 TAB BY MOUTH EVERY TWELVE (12) HOURS. HOLD FOR SBP LESS THAN 120 OR HR LESS THAN 65 60 Tab 3    SENNA PLUS 8.6-50 mg per tablet TAKE 1 TABLET BY MOUTH TWICE A DAY 60 Tab 11    multivit-minerals/folic acid (CENTRUM MULTIGUMMIES PO) Take 2 Each by mouth daily.  amLODIPine (NORVASC) 10 mg tablet TAKE 1 TABLET BY MOUTH EVERY MORNING 90 Tab 4    lidocaine (LIDODERM) 5 % Apply patch to the affected area for 12 hours a day and remove for 12 hours a day. 30 Each 0    senna-docusate (SENNA PLUS) 8.6-50 mg per tablet Take 1 Tab by mouth daily.  calcium-cholecalciferol, d3, (CALCIUM 600 + D) 600-125 mg-unit tab Take 1 Tab by mouth two (2) times a day. 180 Tab 3    glucosamine-chondroit-vit c-mn (GLUCOSAMINE 1500 COMPLEX) 500-400 mg capsule Take 1 Cap by mouth daily.  90 Cap 1       Allergies   Allergen Reactions    Codeine Nausea and Vomiting    Demerol (Pf) [Meperidine (Pf)] Nausea and Vomiting    Meperidine Nausea and Vomiting        O: VS:   Visit Vitals  /70 (BP 1 Location: Right arm, BP Patient Position: Sitting)   Pulse 62   Temp 98.2 °F (36.8 °C) (Oral)   Resp 18   Ht 5' 3\" (1.6 m)   Wt 132 lb 9.6 oz (60.1 kg)   SpO2 97%   BMI 23.49 kg/m² GENERAL Twilla Pupa is in no acute distress. Non-toxic. Well nourished. Well developed. Appropriately groomed. RESP: Breath sounds are symmetrical bilaterally. Unlabored without SOB. Speaking in full sentences. Clear to auscultation bilaterally anteriorly and posteriorly. No wheezes. No rales or rhonchi. CV: normal rate. Regular rhythm. S1, S2 audible. No murmur noted. No rubs, clicks or gallops noted. MUSC:  Intact x 4 extremities. Back Inspection and Palpation: +deformity (spine curvature), muscle atrophy, no erythema, edema or ecchymosis noted. T12-L2 - ttp  HEME/LYMPH: peripheral pulses palpable 2+ x 4 extremities. SKIN: Skin is warm and dry. Turgor is normal. No petechiae, no purpura, no rash. No cyanosis. PSYCH: appropriate behavior, dress and thought processes. Good eye contact. Clear and coherent speech. Full affect. Good insight.   ____________________________________________________________________  Follow up as needed or if symptoms progress. Patient education was completed. Counseling included discussion of diagnosis, differentials, treatment options, prescribed treatment, warning signs and follow up. Advised on nutrition, physical activity, weight management. Medication risks/benefits, interactions and alternatives discussed with patient.      Patient verbalized understanding and agreed to plan of care. Patient was given an after visit summary which included current diagnoses, medications and vital signs.

## 2018-11-15 NOTE — PROGRESS NOTES
Christian Kwan  Identified pt with two pt identifiers(name and ). Chief Complaint   Patient presents with    Back Pain     rm 10/non fasting     Patient stated that she went to ED on 2018, she was prescribed the lidocaine patches for her back pain and that has given relief. 1. Have you been to the ER, urgent care clinic since your last visit? Yes 2018 Hospitalized since your last visit? no    2. Have you seen or consulted any other health care providers outside of the [address] [place] [address] since your last visit? Include any pap smears or colon screening. no      Advance Care Planning    In the event something were to happen to you and you were unable to speak on your behalf, do you have an Advance Directive/ Living Will in place stating your wishes? NO    If yes, do we have a copy on file NO    If no, would you like information NO    Medication reconciliation up to date and corrected with patient at this time. Today's provider has been notified of reason for visit, vitals and flowsheets obtained on patients. Reviewed record in preparation for visit, huddled with provider and have obtained necessary documentation. Health Maintenance Due   Topic    Shingrix Vaccine Age 50> (1 of 2)    GLAUCOMA SCREENING Q2Y     Influenza Age 5 to Adult     MEDICARE YEARLY EXAM        Wt Readings from Last 3 Encounters:   18 136 lb (61.7 kg)   18 138 lb 1.6 oz (62.6 kg)   18 134 lb (60.8 kg)     Temp Readings from Last 3 Encounters:   18 98.5 °F (36.9 °C) (Temporal)   18 98.3 °F (36.8 °C)   11/10/18 98.4 °F (36.9 °C)     BP Readings from Last 3 Encounters:   18 145/84   18 (!) 147/118   11/10/18 130/72     Pulse Readings from Last 3 Encounters:   18 60   18 65   11/10/18 61     There were no vitals filed for this visit.       Learning Assessment:  :     Learning Assessment 2017   PRIMARY LEARNER Patient   HIGHEST LEVEL OF EDUCATION - PRIMARY LEARNER  SOME COLLEGE   BARRIERS PRIMARY LEARNER NONE   PRIMARY LANGUAGE ENGLISH   LEARNER PREFERENCE PRIMARY DEMONSTRATION   ANSWERED BY Juhi Mckay   RELATIONSHIP SELF       Depression Screening:  :     PHQ over the last two weeks 11/6/2018   Little interest or pleasure in doing things Not at all   Feeling down, depressed, irritable, or hopeless Not at all   Total Score PHQ 2 0       No flowsheet data found. Fall Risk Assessment:  :     Fall Risk Assessment, last 12 mths 11/6/2018   Able to walk? Yes   Fall in past 12 months? Yes   Fall with injury? Yes   Number of falls in past 12 months 2   Fall Risk Score 3       Abuse Screening:  :     Abuse Screening Questionnaire 8/7/2018 9/27/2017   Do you ever feel afraid of your partner? N N   Are you in a relationship with someone who physically or mentally threatens you? N N   Is it safe for you to go home? Y Y       ADL Screening:  :     ADL Assessment 8/7/2018   Feeding yourself No Help Needed   Getting from bed to chair No Help Needed   Getting dressed No Help Needed   Bathing or showering Help Needed   Walk across the room (includes cane/walker) No Help Needed   Using the telphone No Help Needed   Taking your medications Help Needed   Preparing meals Help Needed   Managing money (expenses/bills) Help Needed   Moderately strenuous housework (laundry) Help Needed   Shopping for personal items (toiletries/medicines) Help Needed   Shopping for groceries Help Needed   Driving Help Needed   Climbing a flight of stairs Help Needed   Getting to places beyond walking distances Help Needed           BMI:  Weight Metrics 11/11/2018 11/6/2018 11/1/2018 9/17/2018 8/7/2018 7/29/2018 7/15/2018   Weight 136 lb 138 lb 1.6 oz 134 lb 136 lb 3.2 oz 140 lb 1.6 oz 160 lb 141 lb 8 oz   BMI 24.09 kg/m2 24.46 kg/m2 23.74 kg/m2 21.33 kg/m2 21.94 kg/m2 25.06 kg/m2 -           Medication reconciliation up to date and corrected with patient at this time.

## 2018-11-15 NOTE — PATIENT INSTRUCTIONS
1) Back Pain Conservative Therapy  -Moist heat (penetrates better than dry heat) - such as standing in a shower or applying a warm washcloth to area applied to affected area three times a day for 10 minutes at a time. You can also take a 20 minute warm bath with 2 cups of epsom salt, if you are safe getting in and out of tub. Alternatively, you can put 1 cup of epsom salt in 1 quart of warm water and soak a washcloth in solution. ([employer] carries a good epsom salt product by Dr. Eleuterio Cisse and spearmint for about $6) Apply to area of pain 2-3x day. Can use BioFreeze, IcyHot patches if can't fill lidocaine patches due to expense. Don't use caspian creams and patches at same time    -Relative rest.  Avoid strenuous lifting, pushing, pulling. Avoid twisting and bending. Can put a pillow under your knees and feet at night to help ease back discomfort. 2) Ask the physical therapist for modified stretching    Do stretches before you get out of bed in morning - modified as you won't be as flexible first thing in morning         Learning About Relief for Back Pain  What is back tension and strain? Back strain happens when you overstretch, or pull, a muscle in your back. You may hurt your back in an accident or when you exercise or lift something. Most back pain will get better with rest and time. You can take care of yourself at home to help your back heal.  What can you do first to relieve back pain? When you first feel back pain, try these steps:  · Walk. Take a short walk (10 to 20 minutes) on a level surface (no slopes, hills, or stairs) every 2 to 3 hours. Walk only distances you can manage without pain, especially leg pain. · Relax. Find a comfortable position for rest. Some people are comfortable on the floor or a medium-firm bed with a small pillow under their head and another under their knees. Some people prefer to lie on their side with a pillow between their knees.  Don't stay in one position for too long. · Try heat or ice. Try using a heating pad on a low or medium setting, or take a warm shower, for 15 to 20 minutes every 2 to 3 hours. Or you can buy single-use heat wraps that last up to 8 hours. You can also try an ice pack for 10 to 15 minutes every 2 to 3 hours. You can use an ice pack or a bag of frozen vegetables wrapped in a thin towel. There is not strong evidence that either heat or ice will help, but you can try them to see if they help. You may also want to try switching between heat and cold. · Take pain medicine exactly as directed. ? If the doctor gave you a prescription medicine for pain, take it as prescribed. ? If you are not taking a prescription pain medicine, ask your doctor if you can take an over-the-counter medicine. What else can you do? · Stretch and exercise. Exercises that increase flexibility may relieve your pain and make it easier for your muscles to keep your spine in a good, neutral position. And don't forget to keep walking. · Do self-massage. You can use self-massage to unwind after work or school or to energize yourself in the morning. You can easily massage your feet, hands, or neck. Self-massage works best if you are in comfortable clothes and are sitting or lying in a comfortable position. Use oil or lotion to massage bare skin. · Reduce stress. Back pain can lead to a vicious [place]: Distress about the pain tenses the muscles in your back, which in turn causes more pain. Learn how to relax your mind and your muscles to lower your stress. Where can you learn more? Go to http://radha-alex.info/. Enter W903 in the search box to learn more about \"Learning About Relief for Back Pain. \"  Current as of: November 29, 2017  Content Version: 11.8  © 6912-9796 [employer], [employer]. Care instructions adapted under license by [employer] (which disclaims liability or warranty for this information).  If you have questions about a medical condition or this instruction, always ask your healthcare professional. [place] [address] any warranty or liability for your use of this information.

## 2018-11-16 ENCOUNTER — HOME CARE VISIT (OUTPATIENT)
Dept: SCHEDULING | Facility: HOME HEALTH | Age: 83
End: 2018-11-16
Payer: MEDICARE

## 2018-11-16 VITALS
SYSTOLIC BLOOD PRESSURE: 149 MMHG | DIASTOLIC BLOOD PRESSURE: 67 MMHG | TEMPERATURE: 98.7 F | HEART RATE: 76 BPM | OXYGEN SATURATION: 98 %

## 2018-11-16 PROCEDURE — G0157 HHC PT ASSISTANT EA 15: HCPCS

## 2018-11-16 PROCEDURE — 3331090001 HH PPS REVENUE CREDIT

## 2018-11-16 PROCEDURE — 3331090002 HH PPS REVENUE DEBIT

## 2018-11-17 PROCEDURE — 3331090002 HH PPS REVENUE DEBIT

## 2018-11-17 PROCEDURE — 3331090001 HH PPS REVENUE CREDIT

## 2018-11-18 PROCEDURE — 3331090001 HH PPS REVENUE CREDIT

## 2018-11-18 PROCEDURE — 3331090002 HH PPS REVENUE DEBIT

## 2018-11-19 ENCOUNTER — HOME CARE VISIT (OUTPATIENT)
Dept: SCHEDULING | Facility: HOME HEALTH | Age: 83
End: 2018-11-19
Payer: MEDICARE

## 2018-11-19 VITALS
HEART RATE: 75 BPM | SYSTOLIC BLOOD PRESSURE: 149 MMHG | RESPIRATION RATE: 18 BRPM | OXYGEN SATURATION: 98 % | DIASTOLIC BLOOD PRESSURE: 76 MMHG | TEMPERATURE: 97.7 F

## 2018-11-19 PROCEDURE — G0157 HHC PT ASSISTANT EA 15: HCPCS

## 2018-11-19 PROCEDURE — 3331090002 HH PPS REVENUE DEBIT

## 2018-11-19 PROCEDURE — 3331090001 HH PPS REVENUE CREDIT

## 2018-11-20 PROCEDURE — 3331090002 HH PPS REVENUE DEBIT

## 2018-11-20 PROCEDURE — 3331090001 HH PPS REVENUE CREDIT

## 2018-11-21 ENCOUNTER — HOME CARE VISIT (OUTPATIENT)
Dept: HOME HEALTH SERVICES | Facility: HOME HEALTH | Age: 83
End: 2018-11-21
Payer: MEDICARE

## 2018-11-21 ENCOUNTER — HOME CARE VISIT (OUTPATIENT)
Dept: SCHEDULING | Facility: HOME HEALTH | Age: 83
End: 2018-11-21
Payer: MEDICARE

## 2018-11-21 VITALS
RESPIRATION RATE: 16 BRPM | DIASTOLIC BLOOD PRESSURE: 60 MMHG | SYSTOLIC BLOOD PRESSURE: 122 MMHG | OXYGEN SATURATION: 98 % | TEMPERATURE: 98.1 F

## 2018-11-21 PROCEDURE — 3331090001 HH PPS REVENUE CREDIT

## 2018-11-21 PROCEDURE — 3331090002 HH PPS REVENUE DEBIT

## 2018-11-21 PROCEDURE — G0151 HHCP-SERV OF PT,EA 15 MIN: HCPCS

## 2018-11-22 PROCEDURE — 3331090002 HH PPS REVENUE DEBIT

## 2018-11-22 PROCEDURE — 3331090001 HH PPS REVENUE CREDIT

## 2018-11-23 PROCEDURE — 3331090002 HH PPS REVENUE DEBIT

## 2018-11-23 PROCEDURE — 3331090001 HH PPS REVENUE CREDIT

## 2018-11-24 PROCEDURE — 3331090002 HH PPS REVENUE DEBIT

## 2018-11-24 PROCEDURE — 3331090001 HH PPS REVENUE CREDIT

## 2018-11-25 PROCEDURE — 3331090001 HH PPS REVENUE CREDIT

## 2018-11-25 PROCEDURE — 3331090002 HH PPS REVENUE DEBIT

## 2018-11-26 PROCEDURE — 3331090001 HH PPS REVENUE CREDIT

## 2018-11-26 PROCEDURE — 3331090002 HH PPS REVENUE DEBIT

## 2018-11-27 ENCOUNTER — HOME CARE VISIT (OUTPATIENT)
Dept: SCHEDULING | Facility: HOME HEALTH | Age: 83
End: 2018-11-27
Payer: MEDICARE

## 2018-11-27 VITALS
DIASTOLIC BLOOD PRESSURE: 75 MMHG | HEART RATE: 60 BPM | OXYGEN SATURATION: 98 % | TEMPERATURE: 98.7 F | SYSTOLIC BLOOD PRESSURE: 147 MMHG

## 2018-11-27 PROCEDURE — 3331090002 HH PPS REVENUE DEBIT

## 2018-11-27 PROCEDURE — G0157 HHC PT ASSISTANT EA 15: HCPCS

## 2018-11-27 PROCEDURE — 3331090001 HH PPS REVENUE CREDIT

## 2018-11-28 PROCEDURE — 3331090002 HH PPS REVENUE DEBIT

## 2018-11-28 PROCEDURE — 3331090001 HH PPS REVENUE CREDIT

## 2018-11-29 ENCOUNTER — HOME CARE VISIT (OUTPATIENT)
Dept: SCHEDULING | Facility: HOME HEALTH | Age: 83
End: 2018-11-29
Payer: MEDICARE

## 2018-11-29 VITALS
DIASTOLIC BLOOD PRESSURE: 72 MMHG | TEMPERATURE: 97.3 F | OXYGEN SATURATION: 98 % | SYSTOLIC BLOOD PRESSURE: 114 MMHG | HEART RATE: 72 BPM

## 2018-11-29 PROCEDURE — 3331090001 HH PPS REVENUE CREDIT

## 2018-11-29 PROCEDURE — G0157 HHC PT ASSISTANT EA 15: HCPCS

## 2018-11-29 PROCEDURE — 3331090002 HH PPS REVENUE DEBIT

## 2018-11-30 PROCEDURE — 3331090002 HH PPS REVENUE DEBIT

## 2018-11-30 PROCEDURE — 3331090001 HH PPS REVENUE CREDIT

## 2018-12-01 PROCEDURE — 3331090002 HH PPS REVENUE DEBIT

## 2018-12-01 PROCEDURE — 3331090001 HH PPS REVENUE CREDIT

## 2018-12-02 PROCEDURE — 3331090001 HH PPS REVENUE CREDIT

## 2018-12-02 PROCEDURE — 3331090002 HH PPS REVENUE DEBIT

## 2018-12-03 PROCEDURE — 3331090001 HH PPS REVENUE CREDIT

## 2018-12-03 PROCEDURE — 3331090002 HH PPS REVENUE DEBIT

## 2018-12-04 ENCOUNTER — HOME CARE VISIT (OUTPATIENT)
Dept: SCHEDULING | Facility: HOME HEALTH | Age: 83
End: 2018-12-04
Payer: MEDICARE

## 2018-12-04 VITALS
SYSTOLIC BLOOD PRESSURE: 133 MMHG | DIASTOLIC BLOOD PRESSURE: 72 MMHG | TEMPERATURE: 97.8 F | RESPIRATION RATE: 18 BRPM | OXYGEN SATURATION: 98 % | HEART RATE: 81 BPM

## 2018-12-04 PROCEDURE — 3331090002 HH PPS REVENUE DEBIT

## 2018-12-04 PROCEDURE — G0157 HHC PT ASSISTANT EA 15: HCPCS

## 2018-12-04 PROCEDURE — 3331090001 HH PPS REVENUE CREDIT

## 2018-12-05 PROCEDURE — 3331090001 HH PPS REVENUE CREDIT

## 2018-12-05 PROCEDURE — 3331090002 HH PPS REVENUE DEBIT

## 2018-12-06 ENCOUNTER — HOME CARE VISIT (OUTPATIENT)
Dept: SCHEDULING | Facility: HOME HEALTH | Age: 83
End: 2018-12-06
Payer: MEDICARE

## 2018-12-06 ENCOUNTER — HOME CARE VISIT (OUTPATIENT)
Dept: HOME HEALTH SERVICES | Facility: HOME HEALTH | Age: 83
End: 2018-12-06
Payer: MEDICARE

## 2018-12-06 PROCEDURE — G0151 HHCP-SERV OF PT,EA 15 MIN: HCPCS

## 2018-12-06 PROCEDURE — 3331090001 HH PPS REVENUE CREDIT

## 2018-12-06 PROCEDURE — 3331090002 HH PPS REVENUE DEBIT

## 2018-12-07 VITALS
RESPIRATION RATE: 16 BRPM | SYSTOLIC BLOOD PRESSURE: 128 MMHG | OXYGEN SATURATION: 97 % | DIASTOLIC BLOOD PRESSURE: 70 MMHG | TEMPERATURE: 98.1 F

## 2018-12-07 PROCEDURE — 3331090001 HH PPS REVENUE CREDIT

## 2018-12-07 PROCEDURE — 3331090002 HH PPS REVENUE DEBIT

## 2018-12-08 PROCEDURE — 3331090001 HH PPS REVENUE CREDIT

## 2018-12-08 PROCEDURE — 3331090002 HH PPS REVENUE DEBIT

## 2018-12-09 PROCEDURE — 3331090001 HH PPS REVENUE CREDIT

## 2018-12-09 PROCEDURE — 3331090002 HH PPS REVENUE DEBIT

## 2019-01-20 ENCOUNTER — APPOINTMENT (OUTPATIENT)
Dept: CT IMAGING | Age: 84
DRG: 467 | End: 2019-01-20
Attending: EMERGENCY MEDICINE
Payer: MEDICARE

## 2019-01-20 ENCOUNTER — APPOINTMENT (OUTPATIENT)
Dept: GENERAL RADIOLOGY | Age: 84
DRG: 467 | End: 2019-01-20
Attending: EMERGENCY MEDICINE
Payer: MEDICARE

## 2019-01-20 ENCOUNTER — HOSPITAL ENCOUNTER (INPATIENT)
Age: 84
LOS: 4 days | Discharge: NURSING, REHAB OR LONG-TERM CARE | DRG: 467 | End: 2019-01-24
Attending: EMERGENCY MEDICINE | Admitting: GENERAL ACUTE CARE HOSPITAL
Payer: MEDICARE

## 2019-01-20 DIAGNOSIS — S72.001A CLOSED FRACTURE OF RIGHT HIP, INITIAL ENCOUNTER (HCC): Primary | ICD-10-CM

## 2019-01-20 DIAGNOSIS — I10 ESSENTIAL HYPERTENSION: ICD-10-CM

## 2019-01-20 DIAGNOSIS — S09.90XA CLOSED HEAD INJURY, INITIAL ENCOUNTER: ICD-10-CM
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
## 2019-01-20 PROBLEM — S72.009A HIP FRACTURE (HCC): Status: ACTIVE | Noted: 2019-01-20

## 2019-01-20 LAB
ABO + RH BLD: NORMAL
ALBUMIN SERPL-MCNC: 4.3 G/DL (ref 3.5–5)
ALBUMIN/GLOB SERPL: 1.2 {RATIO} (ref 1.1–2.2)
ALP SERPL-CCNC: 91 U/L (ref 45–117)
ALT SERPL-CCNC: 27 U/L (ref 12–78)
ANION GAP SERPL CALC-SCNC: 6 MMOL/L (ref 5–15)
APPEARANCE UR: ABNORMAL
AST SERPL-CCNC: 29 U/L (ref 15–37)
BACTERIA URNS QL MICRO: NEGATIVE /HPF
BASOPHILS # BLD: 0 K/UL (ref 0–0.1)
BASOPHILS NFR BLD: 0 % (ref 0–1)
BILIRUB SERPL-MCNC: 0.6 MG/DL (ref 0.2–1)
BILIRUB UR QL: NEGATIVE
BLOOD GROUP ANTIBODIES SERPL: NORMAL
BUN SERPL-MCNC: 18 MG/DL (ref 6–20)
BUN/CREAT SERPL: 23 (ref 12–20)
CALCIUM SERPL-MCNC: 9.9 MG/DL (ref 8.5–10.1)
CHLORIDE SERPL-SCNC: 101 MMOL/L (ref 97–108)
CO2 SERPL-SCNC: 30 MMOL/L (ref 21–32)
COLOR UR: ABNORMAL
CREAT SERPL-MCNC: 0.78 MG/DL (ref 0.55–1.02)
DIFFERENTIAL METHOD BLD: ABNORMAL
EOSINOPHIL # BLD: 0.4 K/UL (ref 0–0.4)
EOSINOPHIL NFR BLD: 5 % (ref 0–7)
EPITH CASTS URNS QL MICRO: ABNORMAL /LPF
ERYTHROCYTE [DISTWIDTH] IN BLOOD BY AUTOMATED COUNT: 12.7 % (ref 11.5–14.5)
GLOBULIN SER CALC-MCNC: 3.6 G/DL (ref 2–4)
GLUCOSE SERPL-MCNC: 97 MG/DL (ref 65–100)
GLUCOSE UR STRIP.AUTO-MCNC: NEGATIVE MG/DL
HCT VFR BLD AUTO: 40.6 % (ref 35–47)
HGB BLD-MCNC: 13.6 G/DL (ref 11.5–16)
HGB UR QL STRIP: ABNORMAL
HYALINE CASTS URNS QL MICRO: ABNORMAL /LPF (ref 0–5)
IMM GRANULOCYTES # BLD AUTO: 0.1 K/UL (ref 0–0.04)
IMM GRANULOCYTES NFR BLD AUTO: 1 % (ref 0–0.5)
INR PPP: 1.1 (ref 0.9–1.1)
KETONES UR QL STRIP.AUTO: NEGATIVE MG/DL
LEUKOCYTE ESTERASE UR QL STRIP.AUTO: NEGATIVE
LYMPHOCYTES # BLD: 2.1 K/UL (ref 0.8–3.5)
LYMPHOCYTES NFR BLD: 27 % (ref 12–49)
MCH RBC QN AUTO: 31.6 PG (ref 26–34)
MCHC RBC AUTO-ENTMCNC: 33.5 G/DL (ref 30–36.5)
MCV RBC AUTO: 94.2 FL (ref 80–99)
MONOCYTES # BLD: 0.7 K/UL (ref 0–1)
MONOCYTES NFR BLD: 9 % (ref 5–13)
NEUTS SEG # BLD: 4.5 K/UL (ref 1.8–8)
NEUTS SEG NFR BLD: 58 % (ref 32–75)
NITRITE UR QL STRIP.AUTO: NEGATIVE
NRBC # BLD: 0 K/UL (ref 0–0.01)
NRBC BLD-RTO: 0 PER 100 WBC
PH UR STRIP: 8 [PH] (ref 5–8)
PLATELET # BLD AUTO: 153 K/UL (ref 150–400)
PMV BLD AUTO: 9.9 FL (ref 8.9–12.9)
POTASSIUM SERPL-SCNC: 3.4 MMOL/L (ref 3.5–5.1)
PROT SERPL-MCNC: 7.9 G/DL (ref 6.4–8.2)
PROT UR STRIP-MCNC: NEGATIVE MG/DL
PROTHROMBIN TIME: 10.8 SEC (ref 9–11.1)
RBC # BLD AUTO: 4.31 M/UL (ref 3.8–5.2)
RBC #/AREA URNS HPF: ABNORMAL /HPF (ref 0–5)
SODIUM SERPL-SCNC: 137 MMOL/L (ref 136–145)
SP GR UR REFRACTOMETRY: 1.01 (ref 1–1.03)
SPECIMEN EXP DATE BLD: NORMAL
UA: UC IF INDICATED,UAUC: ABNORMAL
UROBILINOGEN UR QL STRIP.AUTO: 0.2 EU/DL (ref 0.2–1)
WBC # BLD AUTO: 7.8 K/UL (ref 3.6–11)
WBC URNS QL MICRO: ABNORMAL /HPF (ref 0–4)

## 2019-01-20 PROCEDURE — 77030005514 HC CATH URETH FOL14 BARD -A

## 2019-01-20 PROCEDURE — 71045 X-RAY EXAM CHEST 1 VIEW: CPT

## 2019-01-20 PROCEDURE — 65270000029 HC RM PRIVATE

## 2019-01-20 PROCEDURE — 86900 BLOOD TYPING SEROLOGIC ABO: CPT

## 2019-01-20 PROCEDURE — 70450 CT HEAD/BRAIN W/O DYE: CPT

## 2019-01-20 PROCEDURE — 74011250637 HC RX REV CODE- 250/637: Performed by: EMERGENCY MEDICINE

## 2019-01-20 PROCEDURE — 80053 COMPREHEN METABOLIC PANEL: CPT

## 2019-01-20 PROCEDURE — 77030038269 HC DRN EXT URIN PURWCK BARD -A

## 2019-01-20 PROCEDURE — 74011250637 HC RX REV CODE- 250/637: Performed by: PHYSICIAN ASSISTANT

## 2019-01-20 PROCEDURE — 96374 THER/PROPH/DIAG INJ IV PUSH: CPT

## 2019-01-20 PROCEDURE — 74011250637 HC RX REV CODE- 250/637: Performed by: GENERAL ACUTE CARE HOSPITAL

## 2019-01-20 PROCEDURE — 51702 INSERT TEMP BLADDER CATH: CPT

## 2019-01-20 PROCEDURE — 74011250636 HC RX REV CODE- 250/636: Performed by: EMERGENCY MEDICINE

## 2019-01-20 PROCEDURE — 93005 ELECTROCARDIOGRAM TRACING: CPT

## 2019-01-20 PROCEDURE — 72192 CT PELVIS W/O DYE: CPT

## 2019-01-20 PROCEDURE — 73552 X-RAY EXAM OF FEMUR 2/>: CPT

## 2019-01-20 PROCEDURE — 81001 URINALYSIS AUTO W/SCOPE: CPT

## 2019-01-20 PROCEDURE — 96375 TX/PRO/DX INJ NEW DRUG ADDON: CPT

## 2019-01-20 PROCEDURE — 36415 COLL VENOUS BLD VENIPUNCTURE: CPT

## 2019-01-20 PROCEDURE — 73502 X-RAY EXAM HIP UNI 2-3 VIEWS: CPT

## 2019-01-20 PROCEDURE — 85025 COMPLETE CBC W/AUTO DIFF WBC: CPT

## 2019-01-20 PROCEDURE — 85610 PROTHROMBIN TIME: CPT

## 2019-01-20 PROCEDURE — 99285 EMERGENCY DEPT VISIT HI MDM: CPT

## 2019-01-20 RX ORDER — OXYCODONE HYDROCHLORIDE 5 MG/1
5 TABLET ORAL
Status: DISCONTINUED | OUTPATIENT
Start: 2019-01-20 | End: 2019-01-21

## 2019-01-20 RX ORDER — MORPHINE SULFATE 4 MG/ML
1 INJECTION INTRAVENOUS
Status: DISCONTINUED | OUTPATIENT
Start: 2019-01-20 | End: 2019-01-21 | Stop reason: SDUPTHER

## 2019-01-20 RX ORDER — ONDANSETRON 2 MG/ML
4 INJECTION INTRAMUSCULAR; INTRAVENOUS
Status: COMPLETED | OUTPATIENT
Start: 2019-01-20 | End: 2019-01-20

## 2019-01-20 RX ORDER — FENTANYL CITRATE 50 UG/ML
50 INJECTION, SOLUTION INTRAMUSCULAR; INTRAVENOUS
Status: COMPLETED | OUTPATIENT
Start: 2019-01-20 | End: 2019-01-20

## 2019-01-20 RX ORDER — LISINOPRIL 20 MG/1
40 TABLET ORAL DAILY
Status: DISCONTINUED | OUTPATIENT
Start: 2019-01-20 | End: 2019-01-21

## 2019-01-20 RX ORDER — SODIUM CHLORIDE 0.9 % (FLUSH) 0.9 %
5-40 SYRINGE (ML) INJECTION EVERY 8 HOURS
Status: DISCONTINUED | OUTPATIENT
Start: 2019-01-20 | End: 2019-01-21

## 2019-01-20 RX ORDER — CEFAZOLIN SODIUM/WATER 2 G/20 ML
2 SYRINGE (ML) INTRAVENOUS ONCE
Status: DISCONTINUED | OUTPATIENT
Start: 2019-01-20 | End: 2019-01-20

## 2019-01-20 RX ORDER — HYDRALAZINE HYDROCHLORIDE 20 MG/ML
10 INJECTION INTRAMUSCULAR; INTRAVENOUS
Status: DISCONTINUED | OUTPATIENT
Start: 2019-01-20 | End: 2019-01-24 | Stop reason: HOSPADM

## 2019-01-20 RX ORDER — SODIUM CHLORIDE AND POTASSIUM CHLORIDE .9; .15 G/100ML; G/100ML
SOLUTION INTRAVENOUS CONTINUOUS
Status: DISPENSED | OUTPATIENT
Start: 2019-01-20 | End: 2019-01-21

## 2019-01-20 RX ORDER — ONDANSETRON 2 MG/ML
4 INJECTION INTRAMUSCULAR; INTRAVENOUS
Status: DISCONTINUED | OUTPATIENT
Start: 2019-01-20 | End: 2019-01-21 | Stop reason: SDUPTHER

## 2019-01-20 RX ORDER — METOPROLOL TARTRATE 25 MG/1
25 TABLET, FILM COATED ORAL 2 TIMES DAILY
Status: DISCONTINUED | OUTPATIENT
Start: 2019-01-20 | End: 2019-01-24 | Stop reason: HOSPADM

## 2019-01-20 RX ORDER — AMOXICILLIN 250 MG
2 CAPSULE ORAL 2 TIMES DAILY
Status: DISCONTINUED | OUTPATIENT
Start: 2019-01-20 | End: 2019-01-21 | Stop reason: SDUPTHER

## 2019-01-20 RX ORDER — POTASSIUM CHLORIDE 750 MG/1
40 TABLET, FILM COATED, EXTENDED RELEASE ORAL
Status: DISCONTINUED | OUTPATIENT
Start: 2019-01-20 | End: 2019-01-20

## 2019-01-20 RX ORDER — ENOXAPARIN SODIUM 100 MG/ML
40 INJECTION SUBCUTANEOUS EVERY 24 HOURS
Status: DISCONTINUED | OUTPATIENT
Start: 2019-01-20 | End: 2019-01-20

## 2019-01-20 RX ORDER — OXYCODONE HYDROCHLORIDE 5 MG/1
2.5 TABLET ORAL
Status: DISCONTINUED | OUTPATIENT
Start: 2019-01-20 | End: 2019-01-21

## 2019-01-20 RX ORDER — SODIUM CHLORIDE 0.9 % (FLUSH) 0.9 %
5-40 SYRINGE (ML) INJECTION AS NEEDED
Status: DISCONTINUED | OUTPATIENT
Start: 2019-01-20 | End: 2019-01-21

## 2019-01-20 RX ORDER — NALOXONE HYDROCHLORIDE 0.4 MG/ML
0.4 INJECTION, SOLUTION INTRAMUSCULAR; INTRAVENOUS; SUBCUTANEOUS AS NEEDED
Status: DISCONTINUED | OUTPATIENT
Start: 2019-01-20 | End: 2019-01-21 | Stop reason: SDUPTHER

## 2019-01-20 RX ORDER — ONDANSETRON 2 MG/ML
4 INJECTION INTRAMUSCULAR; INTRAVENOUS
Status: DISCONTINUED | OUTPATIENT
Start: 2019-01-20 | End: 2019-01-20

## 2019-01-20 RX ORDER — ATORVASTATIN CALCIUM 20 MG/1
20 TABLET, FILM COATED ORAL
Status: DISCONTINUED | OUTPATIENT
Start: 2019-01-20 | End: 2019-01-24 | Stop reason: HOSPADM

## 2019-01-20 RX ORDER — ACETAMINOPHEN 325 MG/1
650 TABLET ORAL
Status: COMPLETED | OUTPATIENT
Start: 2019-01-20 | End: 2019-01-20

## 2019-01-20 RX ORDER — ASPIRIN 81 MG/1
81 TABLET ORAL DAILY
Status: DISCONTINUED | OUTPATIENT
Start: 2019-01-21 | End: 2019-01-21

## 2019-01-20 RX ORDER — AMLODIPINE BESYLATE 5 MG/1
10 TABLET ORAL DAILY
Status: DISCONTINUED | OUTPATIENT
Start: 2019-01-21 | End: 2019-01-24 | Stop reason: HOSPADM

## 2019-01-20 RX ADMIN — LISINOPRIL 40 MG: 20 TABLET ORAL at 23:02

## 2019-01-20 RX ADMIN — FENTANYL CITRATE 50 MCG: 50 INJECTION, SOLUTION INTRAMUSCULAR; INTRAVENOUS at 14:57

## 2019-01-20 RX ADMIN — METOPROLOL TARTRATE 25 MG: 25 TABLET, FILM COATED ORAL at 23:02

## 2019-01-20 RX ADMIN — SODIUM CHLORIDE AND POTASSIUM CHLORIDE: 9; 1.49 INJECTION, SOLUTION INTRAVENOUS at 20:38

## 2019-01-20 RX ADMIN — ACETAMINOPHEN 650 MG: 325 TABLET ORAL at 14:57

## 2019-01-20 RX ADMIN — ONDANSETRON 4 MG: 2 INJECTION INTRAMUSCULAR; INTRAVENOUS at 14:57

## 2019-01-20 RX ADMIN — ATORVASTATIN CALCIUM 20 MG: 20 TABLET, FILM COATED ORAL at 23:02

## 2019-01-20 RX ADMIN — DOCUSATE SODIUM AND SENNOSIDES 2 TABLET: 8.6; 5 TABLET, FILM COATED ORAL at 23:02

## 2019-01-20 RX ADMIN — Medication 10 ML: at 23:02

## 2019-01-20 NOTE — ED NOTES
Pt received via EMS for ground level fall. Pt lost her balance while getting out the car, fell on right hip. RLE is shortened and rotated externally. Pt reports intermittent shooting pain to posterior side, \"under the bone\". Pt had a right hip replacement in July 2018. Pt has abrasion to right elbow.

## 2019-01-20 NOTE — ED PROVIDER NOTES
EMERGENCY DEPARTMENT HISTORY AND PHYSICAL EXAM 
 
 
Date: 1/20/2019 Patient Name: Tae Momin History of Presenting Illness Chief Complaint Patient presents with  
 Hip Pain Pt fell on right hip one hour ago History Provided By: Patient HPI: Tae Momin, 80 y.o. female with PMHx significant for R hip fx, HTN, OAB, presents via EMS to the ED with cc of R hip pain s/p GLF 1 hr PTA. Per chart review, pt sustained closed fx of R femoral neck on 7/2018. Pt had R hip hemiarthroplasty performed by Dr. Arana. Pt reports that today, she was getting out of her car when she lost her balance, sustaining a GLF onto her R hip. She denies being ambulatory after fall. She denies using an assistive walking device at baseline. She reports R hip pain is exacerbated by movement or trying to bear weight. She denies being on blood thinners. She currently rates her pain 5/10. She reports last eating this AM. There are no other complaints, changes, or physical findings at this time. PCP: Graciela Troy NP Social Hx:  
Social History Tobacco Use Smoking Status Never Smoker Smokeless Tobacco Never Used  
,  
Social History Substance and Sexual Activity Alcohol Use No  
,  
Social History Substance and Sexual Activity Drug Use No  
 
 
Past History Past Surgical History: 
Past Surgical History:  
Procedure Laterality Date  COLONOSCOPY N/A 3/12/2018 COLONOSCOPY performed by Mariana Mercado MD at [facility] ENDOSCOPY  COLONOSCOPY,DIAGNOSTIC  3/12/2018 [facility]  HX CATARACT REMOVAL Left  HX CORNEAL TRANSPLANT Left  HX DILATION AND CURETTAGE    
 x 2  
 HX TONSILLECTOMY  1938 Family History: 
Family History Problem Relation Age of Onset  Cancer Father   
     throat (smoker); mouth and gums  Heart Disease Mother Age 52  Clotting Disorder Mother  Arthritis-osteo Paternal Aunt Allergies: Allergies Allergen Reactions  Codeine Nausea and Vomiting  Demerol (Pf) [Meperidine (Pf)] Nausea and Vomiting  Meperidine Nausea and Vomiting Review of Systems Review of Systems Constitutional: Negative. Negative for activity change, appetite change, chills, fatigue, fever and unexpected weight change. HENT: Negative. Negative for congestion, hearing loss, rhinorrhea, sneezing and voice change. Eyes: Negative. Negative for pain and visual disturbance. Respiratory: Negative. Negative for apnea, cough, choking, chest tightness and shortness of breath. Cardiovascular: Negative. Negative for chest pain and palpitations. Gastrointestinal: Negative. Negative for abdominal distention, abdominal pain, blood in stool, diarrhea, nausea and vomiting. Genitourinary: Negative. Negative for difficulty urinating, flank pain, frequency and urgency. No discharge Musculoskeletal: Positive for arthralgias (R hip). Negative for back pain, myalgias and neck stiffness. Skin: Positive for wound (skin tear on R elbow). Negative for color change and rash. Neurological: Negative. Negative for dizziness, seizures, syncope, speech difficulty, weakness, numbness and headaches. Hematological: Negative for adenopathy. Psychiatric/Behavioral: Negative. Negative for agitation, behavioral problems, dysphoric mood and suicidal ideas. The patient is not nervous/anxious. Physical Exam  
Physical Exam  
Constitutional: She is oriented to person, place, and time. She appears well-developed and well-nourished. HENT:  
Head: Normocephalic and atraumatic. Moist mucous membranes Eyes: Conjunctivae are normal. Pupils are equal, round, and reactive to light. Right eye exhibits no discharge. Left eye exhibits no discharge. Neck: Normal range of motion. Neck supple. No tracheal deviation present. Cardiovascular: Normal rate, regular rhythm and normal heart sounds. No murmur heard. Pulmonary/Chest: Effort normal and breath sounds normal. No respiratory distress. She has no wheezes. She has no rales. Abdominal: Soft. Bowel sounds are normal. There is no tenderness. There is no rebound and no guarding. Musculoskeletal:  
RLE externally rotated and shortened. Pain with R leg roll. ttp at the right hip. N/V intact. Neurological: She is alert and oriented to person, place, and time. Skin: Skin is warm and dry. No rash noted. No erythema. Skin tear on R elbow. Surgical scar across R hiop Psychiatric: Her behavior is normal.  
Nursing note and vitals reviewed. Diagnostic Study Results Labs - Recent Results (from the past 12 hour(s)) CBC WITH AUTOMATED DIFF Collection Time: 01/20/19  2:30 PM  
Result Value Ref Range WBC 7.8 3.6 - 11.0 K/uL  
 RBC 4.31 3.80 - 5.20 M/uL  
 HGB 13.6 11.5 - 16.0 g/dL HCT 40.6 35.0 - 47.0 % MCV 94.2 80.0 - 99.0 FL  
 MCH 31.6 26.0 - 34.0 PG  
 MCHC 33.5 30.0 - 36.5 g/dL  
 RDW 12.7 11.5 - 14.5 % PLATELET 987 538 - 777 K/uL MPV 9.9 8.9 - 12.9 FL  
 NRBC 0.0 0  WBC ABSOLUTE NRBC 0.00 0.00 - 0.01 K/uL NEUTROPHILS 58 32 - 75 % LYMPHOCYTES 27 12 - 49 % MONOCYTES 9 5 - 13 % EOSINOPHILS 5 0 - 7 % BASOPHILS 0 0 - 1 % IMMATURE GRANULOCYTES 1 (H) 0.0 - 0.5 % ABS. NEUTROPHILS 4.5 1.8 - 8.0 K/UL  
 ABS. LYMPHOCYTES 2.1 0.8 - 3.5 K/UL  
 ABS. MONOCYTES 0.7 0.0 - 1.0 K/UL  
 ABS. EOSINOPHILS 0.4 0.0 - 0.4 K/UL  
 ABS. BASOPHILS 0.0 0.0 - 0.1 K/UL  
 ABS. IMM. GRANS. 0.1 (H) 0.00 - 0.04 K/UL  
 DF AUTOMATED PROTHROMBIN TIME + INR Collection Time: 01/20/19  2:30 PM  
Result Value Ref Range INR 1.1 0.9 - 1.1 Prothrombin time 10.8 9.0 - 11.1 sec METABOLIC PANEL, COMPREHENSIVE Collection Time: 01/20/19  2:30 PM  
Result Value Ref Range Sodium 137 136 - 145 mmol/L Potassium 3.4 (L) 3.5 - 5.1 mmol/L Chloride 101 97 - 108 mmol/L  
 CO2 30 21 - 32 mmol/L  Anion gap 6 5 - 15 mmol/L  
 Glucose 97 65 - 100 mg/dL BUN 18 6 - 20 MG/DL Creatinine 0.78 0.55 - 1.02 MG/DL  
 BUN/Creatinine ratio 23 (H) 12 - 20 GFR est AA >60 >60 ml/min/1.73m2 GFR est non-AA >60 >60 ml/min/1.73m2 Calcium 9.9 8.5 - 10.1 MG/DL Bilirubin, total 0.6 0.2 - 1.0 MG/DL  
 ALT (SGPT) 27 12 - 78 U/L  
 AST (SGOT) 29 15 - 37 U/L Alk. phosphatase 91 45 - 117 U/L Protein, total 7.9 6.4 - 8.2 g/dL Albumin 4.3 3.5 - 5.0 g/dL Globulin 3.6 2.0 - 4.0 g/dL A-G Ratio 1.2 1.1 - 2.2 EKG, 12 LEAD, INITIAL Collection Time: 01/20/19  2:54 PM  
Result Value Ref Range Ventricular Rate 64 BPM  
 Atrial Rate 64 BPM  
 P-R Interval 176 ms QRS Duration 86 ms  
 Q-T Interval 402 ms QTC Calculation (Bezet) 414 ms Calculated P Axis 48 degrees Calculated R Axis 2 degrees Calculated T Axis 37 degrees Diagnosis Normal sinus rhythm Normal ECG When compared with ECG of 01-NOV-2018 23:04, 
Criteria for Anterior infarct are no longer present No significant change was found TYPE & SCREEN Collection Time: 01/20/19  3:00 PM  
Result Value Ref Range Crossmatch Expiration 01/23/2019 ABO/Rh(D) O NEGATIVE Antibody screen NEG   
URINALYSIS W/ REFLEX CULTURE Collection Time: 01/20/19  4:04 PM  
Result Value Ref Range Color YELLOW/STRAW Appearance CLOUDY (A) CLEAR Specific gravity 1.009 1.003 - 1.030    
 pH (UA) 8.0 5.0 - 8.0 Protein NEGATIVE  NEG mg/dL Glucose NEGATIVE  NEG mg/dL Ketone NEGATIVE  NEG mg/dL Bilirubin NEGATIVE  NEG Blood MODERATE (A) NEG Urobilinogen 0.2 0.2 - 1.0 EU/dL Nitrites NEGATIVE  NEG Leukocyte Esterase NEGATIVE  NEG    
 WBC 0-4 0 - 4 /hpf  
 RBC 20-50 0 - 5 /hpf Epithelial cells FEW FEW /lpf Bacteria NEGATIVE  NEG /hpf  
 UA:UC IF INDICATED CULTURE NOT INDICATED BY UA RESULT CNI Hyaline cast 0-2 0 - 5 /lpf Radiologic Studies -  
CT PELV WO CONT  
  
  
CT HEAD WO CONT Final Result IMPRESSION:  
1. No evidence of acute intracranial abnormality. 2. Generalized parenchymal volume loss and mild chronic microvascular ischemic  
disease. XR FEMUR RT 2 VS  
Final Result IMPRESSION: No acute abnormality. XR HIP RT W OR WO PELV 2-3 VWS Final Result IMPRESSION:  No acute abnormality. Stable postsurgical changes of right hip  
hemiarthroplasty without acute complication. XR CHEST PORT Final Result IMPRESSION: No acute cardiopulmonary process. CT Results  (Last 48 hours) 01/20/19 1807  CT PELV WO CONT Final result Impression:  IMPRESSION:   
   
Nondisplaced periprosthetic right femoral intertrochanteric fracture. Narrative:  **PRELIMINARY REPORT** Postsurgical changes of right total hip arthroplasty. Acute nondisplaced  
periprosthetic right intertrochanteric femur fracture. No additional acute fractures are identified. Degenerative changes of the lower  
lumbar spine. Osteopenia. Preliminary report was provided by Dr. Kelsea Thorne, the on-call radiologist, at [address] PM on 1/20/2019. Final report to follow. **END PRELIMINARY REPORT*  
   
**FINAL REPORT BELOW* EXAM: CT PELV WO CONT INDICATION: Right hip pain after fall. Right hip replacement. Unable to bear  
weight on the right lower extremity. COMPARISON: Right hip views earlier the same day at 1438 hours. TECHNIQUE: Helical CT of the bony pelvis with coronal and sagittal reformats. Images reviewed in soft tissue and bone windows. CT dose reduction was achieved  
through the use of a standardized protocol tailored for this examination and  
automatic exposure control for dose modulation. CONTRAST: None. FINDINGS: Hardware: Metal artifact arises from the right hip prosthesis. No  
surrounding lucency. Bones: Right femoral periprosthetic fracture is intertrochanteric. Osteopenia is mild. Left femur is intact. No pelvis or sacral fracture. Joint fluid: None. Articulations: Minimal left hip osteoarthritis. 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  
sacroiliac joint osteoarthritis. No sacroiliitis. Spine: Severe facet arthrosis in the lower lumbar spine. Tendons: No full-thickness tendon tear. Muscles: Mild diffuse atrophy. Soft tissue mass: None. Soft tissue contusion is in the subcutaneous adipose  
tissues lateral to the proximal right femur. No acute process within the pelvis. 01/20/19 1544  CT HEAD WO CONT Final result Impression:  IMPRESSION:  
1. No evidence of acute intracranial abnormality. 2. Generalized parenchymal volume loss and mild chronic microvascular ischemic  
disease. Narrative:  EXAM:  CT HEAD WO CONT INDICATION:   Head trauma, closed, mild, GCS >= 13, no risk factors, neuro exam  
normal  
   
COMPARISON: None. TECHNIQUE: Unenhanced CT of the head was performed using 5 mm images. Brain and  
bone windows were generated. CT dose reduction was achieved through use of a  
standardized protocol tailored for this examination and automatic exposure  
control for dose modulation. FINDINGS:  
Generalized parenchymal volume loss with commensurate dilation of the sulci and  
ventricular system. Scattered periventricular and deep white matter  
hypodensities, consistent with mild chronic microangiopathic ischemic changes. Basilar cisterns are patent. No midline shift. There is no evidence of acute  
infarct, hemorrhage, or extraaxial fluid collection. The paranasal sinuses, mastoid air cells, and middle ears are clear. The orbital  
contents are within normal limits with left lens implants. There are no  
significant osseous or extracranial soft tissue lesions. CXR Results  (Last 48 hours) 01/20/19 1458  XR CHEST PORT Final result Impression:  IMPRESSION: No acute cardiopulmonary process. Narrative:  EXAM:  XR CHEST PORT INDICATION:   preop COMPARISON: Chest radiograph 7/12/2018. FINDINGS: AP radiograph of the chest was obtained. Calcified granuloma in the right lower lobe with calcified right hilar lymph  
nodes. No evidence of focal consolidation. No pleural effusion or pneumothorax. Heart, homero, mediastinum are within normal limits. Calcifications of the  
thoracic aorta. No acute osseous abnormalities. Medical Decision Making I am the first provider for this patient. I reviewed the vital signs, available nursing notes, past medical history, past surgical history, family history and social history. Vital Signs-Reviewed the patient's vital signs. Patient Vitals for the past 12 hrs: 
 Temp Pulse Resp BP SpO2  
01/20/19 1358     95 % 01/20/19 1340 97.7 °F (36.5 °C) (!) 57 18 162/68 94 % Pulse Oximetry Analysis - 95% on RA Records Reviewed: Nursing Notes, Old Medical Records, Previous Radiology Studies and Previous Laboratory Studies EKG interpretation: (Preliminary) 14:54 Rhythm: normal sinus rhythm; and regular . Rate (approx.): 64; Axis: normal; LA interval: normal; QRS interval: 86 ms; QT/QTc: 402/414 ms; ST/T wave: normal; Other findings: no ischemic changes. Written by Fallon Quintanilla ED Scribe, as dictated by [employer], DO 
 
Provider Notes (Medical Decision Making): DDx: Hip fx, hip contusion, intracranial bleed, CHI. ED Course:  
Initial assessment performed. The patients presenting problems have been discussed, and they are in agreement with the care plan formulated and outlined with them. I have encouraged them to ask questions as they arise throughout their visit. Progress Notes: 
4:02 PM 
Pain improved, able to actively and passively range the R hip with minimal pain. 
 
5:04 PM 
Per nursing, pt is unable to bear weight on RLE. CONSULT NOTE:  
6:27 PM 
Sanchez Ludwig DO spoke with Claudetta Fujita, PA, Specialty: Digna Barajas Discussed pt's hx, disposition, and available diagnostic and imaging results. Reviewed care plans. Consultant agrees with plans as outlined. [employer] Dr. Alisson Wilson. Will come see pt. Written by Alycia Mccray ED Scribe, as dictated by Sanchez Ludwig DO. 
 
CONSULT NOTE:  
7:00 PM 
Sanchez Ludwig DO spoke with Dr. Krzysztof Chou, Specialty: Hospitalist 
Discussed pt's hx, disposition, and available diagnostic and imaging results. Reviewed care plans. Consultant will evaluate pt for admission. Written by Alycia Mccray ED Scribe, as dictated by Sanchez Ludwig DO. Critical Care Time:  
0 minutes. Disposition: PLAN FOR ADMISSION: 
7:00 PM 
The patient is being admitted to the hospital. The results of their tests and reasons for their admission have been discussed with the patient and/or available family. The patient/family has conveyed agreement and understanding for the need to be admitted and for their admission diagnosis. Consultation has been made with the inpatient physician specialist for hospitalization. Written by Alycia Mccray ED Scribe, as dictated by Sanchez Ludwig DO Diagnosis Clinical Impression: 1. Closed fracture of right hip, initial encounter ([address] 2. Fall, initial encounter 3. Closed head injury, initial encounter 4. Essential hypertension Attestations: This note is prepared by Alycia Mccray, acting as scribe for DO Sanchez Vaz DO: The scribe's documentation has been prepared under my direction and personally reviewed by me in its entirety. I confirm that the note above accurately reflects all work, treatment, procedures, and medical decision making performed by me.

## 2019-01-21 ENCOUNTER — ANESTHESIA (OUTPATIENT)
Dept: SURGERY | Age: 84
DRG: 467 | End: 2019-01-21
Payer: MEDICARE

## 2019-01-21 ENCOUNTER — ANESTHESIA EVENT (OUTPATIENT)
Dept: SURGERY | Age: 84
DRG: 467 | End: 2019-01-21
Payer: MEDICARE

## 2019-01-21 LAB
ATRIAL RATE: 64 BPM
CALCULATED P AXIS, ECG09: 48 DEGREES
CALCULATED R AXIS, ECG10: 2 DEGREES
CALCULATED T AXIS, ECG11: 37 DEGREES
DIAGNOSIS, 93000: NORMAL
P-R INTERVAL, ECG05: 176 MS
Q-T INTERVAL, ECG07: 402 MS
QRS DURATION, ECG06: 86 MS
QTC CALCULATION (BEZET), ECG08: 414 MS
VENTRICULAR RATE, ECG03: 64 BPM

## 2019-01-21 PROCEDURE — 74011250636 HC RX REV CODE- 250/636: Performed by: PHYSICIAN ASSISTANT

## 2019-01-21 PROCEDURE — 74011250637 HC RX REV CODE- 250/637: Performed by: GENERAL ACUTE CARE HOSPITAL

## 2019-01-21 PROCEDURE — 74011250637 HC RX REV CODE- 250/637: Performed by: PHYSICIAN ASSISTANT

## 2019-01-21 PROCEDURE — 77030018836 HC SOL IRR NACL ICUM -A: Performed by: ORTHOPAEDIC SURGERY

## 2019-01-21 PROCEDURE — 77030026438 HC STYL ET INTUB CARD -A: Performed by: NURSE ANESTHETIST, CERTIFIED REGISTERED

## 2019-01-21 PROCEDURE — 74011000250 HC RX REV CODE- 250

## 2019-01-21 PROCEDURE — 77030008467 HC STPLR SKN COVD -B: Performed by: ORTHOPAEDIC SURGERY

## 2019-01-21 PROCEDURE — 77030018723 HC ELCTRD BLD COVD -A: Performed by: ORTHOPAEDIC SURGERY

## 2019-01-21 PROCEDURE — 74011250636 HC RX REV CODE- 250/636: Performed by: ORTHOPAEDIC SURGERY

## 2019-01-21 PROCEDURE — 77030003666 HC NDL SPINAL BD -A: Performed by: ORTHOPAEDIC SURGERY

## 2019-01-21 PROCEDURE — 74011000258 HC RX REV CODE- 258

## 2019-01-21 PROCEDURE — 74011250636 HC RX REV CODE- 250/636

## 2019-01-21 PROCEDURE — 77030018846 HC SOL IRR STRL H20 ICUM -A: Performed by: ORTHOPAEDIC SURGERY

## 2019-01-21 PROCEDURE — 76210000006 HC OR PH I REC 0.5 TO 1 HR: Performed by: ORTHOPAEDIC SURGERY

## 2019-01-21 PROCEDURE — 0SRR0JA REPLACEMENT OF RIGHT HIP JOINT, FEMORAL SURFACE WITH SYNTHETIC SUBSTITUTE, UNCEMENTED, OPEN APPROACH: ICD-10-PCS | Performed by: ORTHOPAEDIC SURGERY

## 2019-01-21 PROCEDURE — 76010000153 HC OR TIME 1.5 TO 2 HR: Performed by: ORTHOPAEDIC SURGERY

## 2019-01-21 PROCEDURE — 74011250636 HC RX REV CODE- 250/636: Performed by: GENERAL ACUTE CARE HOSPITAL

## 2019-01-21 PROCEDURE — C1713 ANCHOR/SCREW BN/BN,TIS/BN: HCPCS | Performed by: ORTHOPAEDIC SURGERY

## 2019-01-21 PROCEDURE — 77030035236 HC SUT PDS STRATFX BARB J&J -B: Performed by: ORTHOPAEDIC SURGERY

## 2019-01-21 PROCEDURE — 77030036660

## 2019-01-21 PROCEDURE — 74011250636 HC RX REV CODE- 250/636: Performed by: ANESTHESIOLOGY

## 2019-01-21 PROCEDURE — 74011250637 HC RX REV CODE- 250/637: Performed by: ORTHOPAEDIC SURGERY

## 2019-01-21 PROCEDURE — 76060000034 HC ANESTHESIA 1.5 TO 2 HR: Performed by: ORTHOPAEDIC SURGERY

## 2019-01-21 PROCEDURE — 77030008684 HC TU ET CUF COVD -B: Performed by: NURSE ANESTHETIST, CERTIFIED REGISTERED

## 2019-01-21 PROCEDURE — 74011000250 HC RX REV CODE- 250: Performed by: PHYSICIAN ASSISTANT

## 2019-01-21 PROCEDURE — 77030020782 HC GWN BAIR PAWS FLX 3M -B

## 2019-01-21 PROCEDURE — 77010033678 HC OXYGEN DAILY

## 2019-01-21 PROCEDURE — 74011250636 HC RX REV CODE- 250/636: Performed by: INTERNAL MEDICINE

## 2019-01-21 PROCEDURE — 74011000258 HC RX REV CODE- 258: Performed by: PHYSICIAN ASSISTANT

## 2019-01-21 PROCEDURE — 77030020788: Performed by: ORTHOPAEDIC SURGERY

## 2019-01-21 PROCEDURE — 74011000272 HC RX REV CODE- 272: Performed by: ORTHOPAEDIC SURGERY

## 2019-01-21 PROCEDURE — 94760 N-INVAS EAR/PLS OXIMETRY 1: CPT

## 2019-01-21 PROCEDURE — 77030031139 HC SUT VCRL2 J&J -A: Performed by: ORTHOPAEDIC SURGERY

## 2019-01-21 PROCEDURE — 77030022704 HC SUT VLOC COVD -B: Performed by: ORTHOPAEDIC SURGERY

## 2019-01-21 PROCEDURE — 77030013079 HC BLNKT BAIR HGGR 3M -A: Performed by: NURSE ANESTHETIST, CERTIFIED REGISTERED

## 2019-01-21 PROCEDURE — 65270000029 HC RM PRIVATE

## 2019-01-21 PROCEDURE — C1776 JOINT DEVICE (IMPLANTABLE): HCPCS | Performed by: ORTHOPAEDIC SURGERY

## 2019-01-21 PROCEDURE — 0SPR0JZ REMOVAL OF SYNTHETIC SUBSTITUTE FROM RIGHT HIP JOINT, FEMORAL SURFACE, OPEN APPROACH: ICD-10-PCS | Performed by: ORTHOPAEDIC SURGERY

## 2019-01-21 PROCEDURE — 77030019908 HC STETH ESOPH SIMS -A: Performed by: NURSE ANESTHETIST, CERTIFIED REGISTERED

## 2019-01-21 PROCEDURE — P9045 ALBUMIN (HUMAN), 5%, 250 ML: HCPCS

## 2019-01-21 PROCEDURE — 77030011640 HC PAD GRND REM COVD -A: Performed by: ORTHOPAEDIC SURGERY

## 2019-01-21 PROCEDURE — 74011000250 HC RX REV CODE- 250: Performed by: ORTHOPAEDIC SURGERY

## 2019-01-21 PROCEDURE — 77030018547 HC SUT ETHBND1 J&J -B: Performed by: ORTHOPAEDIC SURGERY

## 2019-01-21 PROCEDURE — 77030033138 HC SUT PGA STRATFX J&J -B: Performed by: ORTHOPAEDIC SURGERY

## 2019-01-21 DEVICE — IMPLANTABLE DEVICE
Type: IMPLANTABLE DEVICE | Site: HIP | Status: FUNCTIONAL
Brand: EXACTECH

## 2019-01-21 DEVICE — DEVICE REATTACHMENT L50MM DIA1MM STD TROCHANTERIC TI W/ CO: Type: IMPLANTABLE DEVICE | Site: HIP | Status: FUNCTIONAL

## 2019-01-21 DEVICE — IMPLANTABLE DEVICE
Type: IMPLANTABLE DEVICE | Site: HIP | Status: FUNCTIONAL
Brand: ALTEON

## 2019-01-21 RX ORDER — DIPHENHYDRAMINE HYDROCHLORIDE 50 MG/ML
12.5 INJECTION, SOLUTION INTRAMUSCULAR; INTRAVENOUS AS NEEDED
Status: DISCONTINUED | OUTPATIENT
Start: 2019-01-21 | End: 2019-01-21 | Stop reason: HOSPADM

## 2019-01-21 RX ORDER — NALOXONE HYDROCHLORIDE 0.4 MG/ML
0.4 INJECTION, SOLUTION INTRAMUSCULAR; INTRAVENOUS; SUBCUTANEOUS AS NEEDED
Status: DISCONTINUED | OUTPATIENT
Start: 2019-01-21 | End: 2019-01-24 | Stop reason: HOSPADM

## 2019-01-21 RX ORDER — FAMOTIDINE 20 MG/1
10 TABLET, FILM COATED ORAL 2 TIMES DAILY
Status: DISCONTINUED | OUTPATIENT
Start: 2019-01-21 | End: 2019-01-24 | Stop reason: HOSPADM

## 2019-01-21 RX ORDER — SODIUM CHLORIDE 0.9 % (FLUSH) 0.9 %
5-40 SYRINGE (ML) INJECTION AS NEEDED
Status: DISCONTINUED | OUTPATIENT
Start: 2019-01-21 | End: 2019-01-24 | Stop reason: HOSPADM

## 2019-01-21 RX ORDER — SODIUM CHLORIDE 0.9 % (FLUSH) 0.9 %
5-40 SYRINGE (ML) INJECTION AS NEEDED
Status: DISCONTINUED | OUTPATIENT
Start: 2019-01-21 | End: 2019-01-21 | Stop reason: HOSPADM

## 2019-01-21 RX ORDER — MORPHINE SULFATE 2 MG/ML
1 INJECTION, SOLUTION INTRAMUSCULAR; INTRAVENOUS
Status: ACTIVE | OUTPATIENT
Start: 2019-01-21 | End: 2019-01-22

## 2019-01-21 RX ORDER — SODIUM CHLORIDE, SODIUM LACTATE, POTASSIUM CHLORIDE, CALCIUM CHLORIDE 600; 310; 30; 20 MG/100ML; MG/100ML; MG/100ML; MG/100ML
25 INJECTION, SOLUTION INTRAVENOUS CONTINUOUS
Status: DISCONTINUED | OUTPATIENT
Start: 2019-01-21 | End: 2019-01-21 | Stop reason: HOSPADM

## 2019-01-21 RX ORDER — DIPHENHYDRAMINE HCL 12.5MG/5ML
12.5 ELIXIR ORAL
Status: DISCONTINUED | OUTPATIENT
Start: 2019-01-21 | End: 2019-01-24 | Stop reason: HOSPADM

## 2019-01-21 RX ORDER — AMOXICILLIN 250 MG
1 CAPSULE ORAL 2 TIMES DAILY
Status: DISCONTINUED | OUTPATIENT
Start: 2019-01-21 | End: 2019-01-24 | Stop reason: HOSPADM

## 2019-01-21 RX ORDER — ONDANSETRON 2 MG/ML
INJECTION INTRAMUSCULAR; INTRAVENOUS
Status: DISPENSED
Start: 2019-01-21 | End: 2019-01-22

## 2019-01-21 RX ORDER — SODIUM CHLORIDE 0.9 % (FLUSH) 0.9 %
5-40 SYRINGE (ML) INJECTION EVERY 8 HOURS
Status: DISCONTINUED | OUTPATIENT
Start: 2019-01-21 | End: 2019-01-21 | Stop reason: HOSPADM

## 2019-01-21 RX ORDER — CEFAZOLIN SODIUM/WATER 2 G/20 ML
2 SYRINGE (ML) INTRAVENOUS EVERY 8 HOURS
Status: COMPLETED | OUTPATIENT
Start: 2019-01-21 | End: 2019-01-22

## 2019-01-21 RX ORDER — LIDOCAINE HYDROCHLORIDE 20 MG/ML
INJECTION, SOLUTION EPIDURAL; INFILTRATION; INTRACAUDAL; PERINEURAL AS NEEDED
Status: DISCONTINUED | OUTPATIENT
Start: 2019-01-21 | End: 2019-01-21 | Stop reason: HOSPADM

## 2019-01-21 RX ORDER — ONDANSETRON 2 MG/ML
4 INJECTION INTRAMUSCULAR; INTRAVENOUS
Status: ACTIVE | OUTPATIENT
Start: 2019-01-21 | End: 2019-01-22

## 2019-01-21 RX ORDER — PHENYLEPHRINE HCL IN 0.9% NACL 0.4MG/10ML
SYRINGE (ML) INTRAVENOUS AS NEEDED
Status: DISCONTINUED | OUTPATIENT
Start: 2019-01-21 | End: 2019-01-21 | Stop reason: HOSPADM

## 2019-01-21 RX ORDER — SODIUM CHLORIDE 9 MG/ML
75 INJECTION, SOLUTION INTRAVENOUS CONTINUOUS
Status: DISPENSED | OUTPATIENT
Start: 2019-01-21 | End: 2019-01-22

## 2019-01-21 RX ORDER — EPHEDRINE SULFATE 50 MG/ML
INJECTION, SOLUTION INTRAVENOUS AS NEEDED
Status: DISCONTINUED | OUTPATIENT
Start: 2019-01-21 | End: 2019-01-21 | Stop reason: HOSPADM

## 2019-01-21 RX ORDER — LIDOCAINE HYDROCHLORIDE 10 MG/ML
0.1 INJECTION, SOLUTION EPIDURAL; INFILTRATION; INTRACAUDAL; PERINEURAL AS NEEDED
Status: DISCONTINUED | OUTPATIENT
Start: 2019-01-21 | End: 2019-01-21 | Stop reason: HOSPADM

## 2019-01-21 RX ORDER — TRANEXAMIC ACID 100 MG/ML
INJECTION, SOLUTION INTRAVENOUS
Status: DISCONTINUED
Start: 2019-01-21 | End: 2019-01-21

## 2019-01-21 RX ORDER — HYDROMORPHONE HYDROCHLORIDE 1 MG/ML
0.2 INJECTION, SOLUTION INTRAMUSCULAR; INTRAVENOUS; SUBCUTANEOUS
Status: DISCONTINUED | OUTPATIENT
Start: 2019-01-21 | End: 2019-01-21 | Stop reason: HOSPADM

## 2019-01-21 RX ORDER — OXYCODONE HYDROCHLORIDE 5 MG/1
5 TABLET ORAL
Status: DISCONTINUED | OUTPATIENT
Start: 2019-01-21 | End: 2019-01-23

## 2019-01-21 RX ORDER — ROCURONIUM BROMIDE 10 MG/ML
INJECTION, SOLUTION INTRAVENOUS AS NEEDED
Status: DISCONTINUED | OUTPATIENT
Start: 2019-01-21 | End: 2019-01-21 | Stop reason: HOSPADM

## 2019-01-21 RX ORDER — FENTANYL CITRATE 50 UG/ML
25 INJECTION, SOLUTION INTRAMUSCULAR; INTRAVENOUS
Status: DISCONTINUED | OUTPATIENT
Start: 2019-01-21 | End: 2019-01-21 | Stop reason: HOSPADM

## 2019-01-21 RX ORDER — PROPOFOL 10 MG/ML
INJECTION, EMULSION INTRAVENOUS AS NEEDED
Status: DISCONTINUED | OUTPATIENT
Start: 2019-01-21 | End: 2019-01-21 | Stop reason: HOSPADM

## 2019-01-21 RX ORDER — ONDANSETRON 2 MG/ML
INJECTION INTRAMUSCULAR; INTRAVENOUS AS NEEDED
Status: DISCONTINUED | OUTPATIENT
Start: 2019-01-21 | End: 2019-01-21 | Stop reason: HOSPADM

## 2019-01-21 RX ORDER — SODIUM CHLORIDE 9 MG/ML
INJECTION, SOLUTION INTRAVENOUS
Status: DISPENSED
Start: 2019-01-21 | End: 2019-01-22

## 2019-01-21 RX ORDER — POLYETHYLENE GLYCOL 3350 17 G/17G
17 POWDER, FOR SOLUTION ORAL DAILY
Status: DISCONTINUED | OUTPATIENT
Start: 2019-01-22 | End: 2019-01-24 | Stop reason: HOSPADM

## 2019-01-21 RX ORDER — GUAIFENESIN 100 MG/5ML
81 LIQUID (ML) ORAL 2 TIMES DAILY
Status: DISCONTINUED | OUTPATIENT
Start: 2019-01-21 | End: 2019-01-24 | Stop reason: HOSPADM

## 2019-01-21 RX ORDER — ALBUMIN HUMAN 50 G/1000ML
SOLUTION INTRAVENOUS AS NEEDED
Status: DISCONTINUED | OUTPATIENT
Start: 2019-01-21 | End: 2019-01-21 | Stop reason: HOSPADM

## 2019-01-21 RX ORDER — SODIUM CHLORIDE 0.9 % (FLUSH) 0.9 %
5-40 SYRINGE (ML) INJECTION EVERY 8 HOURS
Status: DISCONTINUED | OUTPATIENT
Start: 2019-01-21 | End: 2019-01-24 | Stop reason: HOSPADM

## 2019-01-21 RX ORDER — SUCCINYLCHOLINE CHLORIDE 20 MG/ML
INJECTION INTRAMUSCULAR; INTRAVENOUS AS NEEDED
Status: DISCONTINUED | OUTPATIENT
Start: 2019-01-21 | End: 2019-01-21 | Stop reason: HOSPADM

## 2019-01-21 RX ORDER — ACETAMINOPHEN 325 MG/1
650 TABLET ORAL EVERY 6 HOURS
Status: DISCONTINUED | OUTPATIENT
Start: 2019-01-21 | End: 2019-01-21

## 2019-01-21 RX ORDER — ACETAMINOPHEN 325 MG/1
650 TABLET ORAL EVERY 6 HOURS
Status: DISCONTINUED | OUTPATIENT
Start: 2019-01-21 | End: 2019-01-23

## 2019-01-21 RX ORDER — SODIUM CHLORIDE AND POTASSIUM CHLORIDE .9; .15 G/100ML; G/100ML
SOLUTION INTRAVENOUS
Status: COMPLETED
Start: 2019-01-21 | End: 2019-01-21

## 2019-01-21 RX ORDER — CEFAZOLIN SODIUM/WATER 2 G/20 ML
2 SYRINGE (ML) INTRAVENOUS ONCE
Status: COMPLETED | OUTPATIENT
Start: 2019-01-21 | End: 2019-01-21

## 2019-01-21 RX ORDER — FENTANYL CITRATE 50 UG/ML
INJECTION, SOLUTION INTRAMUSCULAR; INTRAVENOUS AS NEEDED
Status: DISCONTINUED | OUTPATIENT
Start: 2019-01-21 | End: 2019-01-21 | Stop reason: HOSPADM

## 2019-01-21 RX ORDER — FACIAL-BODY WIPES
10 EACH TOPICAL DAILY PRN
Status: DISCONTINUED | OUTPATIENT
Start: 2019-01-23 | End: 2019-01-24 | Stop reason: HOSPADM

## 2019-01-21 RX ORDER — OXYCODONE HCL 5 MG/5 ML
2.5 SOLUTION, ORAL ORAL
Status: DISCONTINUED | OUTPATIENT
Start: 2019-01-21 | End: 2019-01-23

## 2019-01-21 RX ADMIN — METOPROLOL TARTRATE 25 MG: 25 TABLET, FILM COATED ORAL at 08:55

## 2019-01-21 RX ADMIN — FENTANYL CITRATE 25 MCG: 50 INJECTION, SOLUTION INTRAMUSCULAR; INTRAVENOUS at 12:02

## 2019-01-21 RX ADMIN — SODIUM CHLORIDE, SODIUM LACTATE, POTASSIUM CHLORIDE, AND CALCIUM CHLORIDE: 600; 310; 30; 20 INJECTION, SOLUTION INTRAVENOUS at 13:03

## 2019-01-21 RX ADMIN — ONDANSETRON 4 MG: 2 INJECTION INTRAMUSCULAR; INTRAVENOUS at 13:25

## 2019-01-21 RX ADMIN — ATORVASTATIN CALCIUM 20 MG: 20 TABLET, FILM COATED ORAL at 21:09

## 2019-01-21 RX ADMIN — DOCUSATE SODIUM AND SENNOSIDES 2 TABLET: 8.6; 5 TABLET, FILM COATED ORAL at 08:55

## 2019-01-21 RX ADMIN — SODIUM CHLORIDE, SODIUM LACTATE, POTASSIUM CHLORIDE, AND CALCIUM CHLORIDE 25 ML/HR: 600; 310; 30; 20 INJECTION, SOLUTION INTRAVENOUS at 10:13

## 2019-01-21 RX ADMIN — FAMOTIDINE 10 MG: 20 TABLET ORAL at 18:13

## 2019-01-21 RX ADMIN — PROPOFOL 120 MG: 10 INJECTION, EMULSION INTRAVENOUS at 11:23

## 2019-01-21 RX ADMIN — LIDOCAINE HYDROCHLORIDE 60 MG: 20 INJECTION, SOLUTION EPIDURAL; INFILTRATION; INTRACAUDAL; PERINEURAL at 11:23

## 2019-01-21 RX ADMIN — EPHEDRINE SULFATE 5 MG: 50 INJECTION, SOLUTION INTRAVENOUS at 11:44

## 2019-01-21 RX ADMIN — ALBUMIN HUMAN 250 ML: 50 SOLUTION INTRAVENOUS at 12:21

## 2019-01-21 RX ADMIN — ASPIRIN 81 MG 81 MG: 81 TABLET ORAL at 18:14

## 2019-01-21 RX ADMIN — Medication 40 MCG: at 12:02

## 2019-01-21 RX ADMIN — ROCURONIUM BROMIDE 10 MG: 10 INJECTION, SOLUTION INTRAVENOUS at 11:58

## 2019-01-21 RX ADMIN — ROCURONIUM BROMIDE 25 MG: 10 INJECTION, SOLUTION INTRAVENOUS at 11:34

## 2019-01-21 RX ADMIN — SUCCINYLCHOLINE CHLORIDE 120 MG: 20 INJECTION INTRAMUSCULAR; INTRAVENOUS at 11:23

## 2019-01-21 RX ADMIN — Medication 2 G: at 18:16

## 2019-01-21 RX ADMIN — METOPROLOL TARTRATE 25 MG: 25 TABLET, FILM COATED ORAL at 18:13

## 2019-01-21 RX ADMIN — ALBUMIN HUMAN 250 ML: 50 SOLUTION INTRAVENOUS at 12:03

## 2019-01-21 RX ADMIN — HYDRALAZINE HYDROCHLORIDE 10 MG: 20 INJECTION INTRAMUSCULAR; INTRAVENOUS at 05:31

## 2019-01-21 RX ADMIN — SODIUM CHLORIDE 75 ML/HR: 900 INJECTION, SOLUTION INTRAVENOUS at 15:38

## 2019-01-21 RX ADMIN — TRANEXAMIC ACID 1000 MG: 100 INJECTION, SOLUTION INTRAVENOUS at 13:46

## 2019-01-21 RX ADMIN — Medication 40 MCG: at 12:48

## 2019-01-21 RX ADMIN — AMLODIPINE BESYLATE 10 MG: 5 TABLET ORAL at 08:55

## 2019-01-21 RX ADMIN — SODIUM CHLORIDE AND POTASSIUM CHLORIDE: 9; 1.49 INJECTION, SOLUTION INTRAVENOUS at 13:45

## 2019-01-21 RX ADMIN — ACETAMINOPHEN 650 MG: 325 TABLET ORAL at 18:13

## 2019-01-21 RX ADMIN — Medication 40 MCG: at 12:15

## 2019-01-21 RX ADMIN — FENTANYL CITRATE 50 MCG: 50 INJECTION, SOLUTION INTRAMUSCULAR; INTRAVENOUS at 11:23

## 2019-01-21 RX ADMIN — Medication 2 G: at 11:29

## 2019-01-21 RX ADMIN — Medication 10 ML: at 05:31

## 2019-01-21 RX ADMIN — Medication 40 MCG: at 12:19

## 2019-01-21 NOTE — ANESTHESIA PREPROCEDURE EVALUATION
Anesthetic History No history of anesthetic complications Review of Systems / Medical History Patient summary reviewed, nursing notes reviewed and pertinent labs reviewed Pulmonary Within defined limits Neuro/Psych Within defined limits Cardiovascular Hypertension Exercise tolerance: >4 METS 
  
GI/Hepatic/Renal 
Within defined limits Endo/Other Within defined limits Other Findings Comments: Right Hip fracture Physical Exam 
 
Airway Mallampati: II 
TM Distance: 4 - 6 cm Neck ROM: normal range of motion Mouth opening: Normal 
 
 Cardiovascular Regular rate and rhythm,  S1 and S2 normal,  no murmur, click, rub, or gallop Dental 
No notable dental hx 
 
Comments: Several missing teeth, none loose. Pulmonary Breath sounds clear to auscultation Abdominal 
GI exam deferred Other Findings Anesthetic Plan ASA: 2 Anesthesia type: general 
 
Monitoring Plan: BIS Induction: Intravenous Anesthetic plan and risks discussed with: Patient

## 2019-01-21 NOTE — PERIOP NOTES
1021 - voicemail left for daughter Sarita at [phone] to make here aware her mom is in pre op holding area awaiting surgery.

## 2019-01-21 NOTE — ROUTINE PROCESS
Patient: Janene Mcfadden MRN: [id number]  SSN: [id number] YOB: 1932  Age: 80 y.o. Sex: female Patient is status post Procedure(s) with comments: 
REVISION RIGHT HIP HEMIARTHROPLASTY - REGULAR TABLE WITH PEGBOARD; ABRAHAN; REST MOD STEM. REQUEST TO FOLLOW PER LIZA BROWN. Surgeon(s) and Role: 
   Corry Keller MD - Primary Local/Dose/Irrigation:  100 ml local injection right hip Peripheral IV 01/20/19 Left Antecubital (Active) Site Assessment Clean, dry, & intact 1/21/2019 10:01 AM  
Phlebitis Assessment 0 1/21/2019 10:01 AM  
Infiltration Assessment 0 1/21/2019 10:01 AM  
Dressing Status Clean, dry, & intact 1/21/2019 10:01 AM  
Dressing Type Tape;Transparent 1/21/2019 10:01 AM  
Hub Color/Line Status Pink; Infusing 1/21/2019 10:01 AM  
Action Taken Blood drawn 1/20/2019  2:29 PM  
Alcohol Cap Used Yes 1/20/2019  2:29 PM  
                 
 
 
 
Dressing/Packing:  Wound Hip Right-DRESSING TYPE: Staples; Other (Comment)(honeycomb) (01/21/19 1255) Splint/Cast:  ] Other:  Hyman catheter in  Place.

## 2019-01-21 NOTE — CONSULTS
ORTHOPAEDIC CONSULT NOTE    Subjective:     Date of Consultation:  January 20, 2019      Jose Dillon is a 80 y.o. female who is being seen for right periprosthetic femur fracture. Pt reports GLF PTA stumbled getting out of vehicle. Ambulates at baseline unassisted, occasionally with cane. Lives at home with  and cat. Patient Active Problem List    Diagnosis Date Noted    Hip fracture ([facility] [address] 01/20/2019    Bradycardia 07/13/2018    Hypertension 07/12/2018    Closed right hip fracture ([facility] [address] 07/12/2018    Hypokalemia 02/28/2015    OAB (overactive bladder) 02/15/2013    Hyperlipidemia 12/01/2011    HTN (hypertension) 05/27/2011     Family History   Problem Relation Age of Onset    Cancer Father         throat (smoker); mouth and gums    Heart Disease Mother         Age 52    Clotting Disorder Mother     Arthritis-osteo Paternal Aunt       Social History     Tobacco Use    Smoking status: Never Smoker    Smokeless tobacco: Never Used   Substance Use Topics    Alcohol use: No     Past Medical History:   Diagnosis Date    HTN (hypertension) 5/27/2011    Hyperlipidemia 12/1/2011    Hypertension     OAB (overactive bladder) 2/15/2013    Other ill-defined conditions(799.89)     hyperlipidemia    Other ill-defined conditions(799.89)     bladder problems    Other ill-defined conditions(799.89)     back pain      Past Surgical History:   Procedure Laterality Date    COLONOSCOPY N/A 3/12/2018    COLONOS[facility] performed by [facility]rell [facility] at [facility]  3/12/2018         HX APPENDECTOMY  1948    HX CATARACT REMOVAL Left     HX CORNEAL TRANSPLANT Left     HX DILATION AND CURETTAGE      x 2    HX TONSILLECTOMY  1938      Prior to Admission medications    Medication Sig Start Date End Date Taking? Authorizing Provider   acetaminophen (TYLENOL EXTRA STRENGTH) 500 mg tablet Take  by mouth every six (6) hours as needed for Pain.     Provider, Historical   naproxen sodium (ALEVE) 220 mg cap Take  by mouth. Provider, Historical   capsicum oleoresin 0.025 % topical cream Apply  to affected area three (3) times daily. 11/15/18   Yumiko Sky NP   lidocaine (LIDODERM) 5 % Apply patch to the affected area for 12 hours a day and remove for 12 hours a day. 11/15/18   Yumiko Sky NP   lidocaine (LIDODERM) 5 % Apply patch to the affected area for 12 hours a day and remove for 12 hours a day. 11/11/18   Werner Najjar, NP   aspirin delayed-release 81 mg tablet Take 81 mg by mouth daily. Rafael Cruz MD   senna-docusate (SENNA PLUS) 8.6-50 mg per tablet Take 1 Tab by mouth daily. Malcolm Bobo MD   benazepril (LOTENSIN) 40 mg tablet TAKE 1 TABLET BY MOUTH EVERY DAY 11/8/18   Margy PEPPER NP   atorvastatin (LIPITOR) 20 mg tablet TAKE 1 TABLET BY MOUTH IN THE EVENING 11/8/18   Yumiko Sky NP   calcium-cholecalciferol, d3, (CALCIUM 600 + D) 600-125 mg-unit tab Take 1 Tab by mouth two (2) times a day. 11/8/18   Yumiko Sky NP   coenzyme Q-10 (CO Q-10) 200 mg capsule Take 1 Cap by mouth daily. 11/8/18   Yumiko Sky NP   glucosamine-chondroit-vit c-mn (GLUCOSAMINE 1500 COMPLEX) 500-400 mg capsule Take 1 Cap by mouth daily. 11/8/18   Yumiko Sky NP   polyethylene glycol (MIRALAX) 17 gram packet Take 1 Packet by mouth as needed. Patient taking differently: Take 1 Packet by mouth as needed (constipation). 11/8/18   Yumiko Sky NP   cholecalciferol (VITAMIN D3) 1,000 unit tablet Take 1 Tab by mouth every evening. 11/8/18   Yumiko Sky NP   metoprolol tartrate (LOPRESSOR) 25 mg tablet TAKE 1 TAB BY MOUTH EVERY TWELVE (12) HOURS. HOLD FOR SBP LESS THAN 120 OR HR LESS THAN 65 10/16/18   Margy PEPPER NP   SENNA PLUS 8.6-50 mg per tablet TAKE 1 TABLET BY MOUTH TWICE A DAY 10/12/18   Sridevi Hall MD   multivit-minerals/folic acid (CENTRUM MULTIGUMMIES PO) Take 2 Each by mouth daily.     Provider, Historical   amLODIPine (NORVASC) 10 mg tablet TAKE 1 TABLET BY MOUTH EVERY MORNING 7/12/18   Lyndsey Muniz MD     Current Facility-Administered Medications   Medication Dose Route Frequency    0.9% sodium chloride with KCl 20 mEq/L infusion   IntraVENous CONTINUOUS    hydrALAZINE (APRESOLINE) 20 mg/mL injection 10 mg  10 mg IntraVENous Q6H PRN     Current Outpatient Medications   Medication Sig    acetaminophen (TYLENOL EXTRA STRENGTH) 500 mg tablet Take  by mouth every six (6) hours as needed for Pain.  naproxen sodium (ALEVE) 220 mg cap Take  by mouth.  capsicum oleoresin 0.025 % topical cream Apply  to affected area three (3) times daily.  lidocaine (LIDODERM) 5 % Apply patch to the affected area for 12 hours a day and remove for 12 hours a day.  lidocaine (LIDODERM) 5 % Apply patch to the affected area for 12 hours a day and remove for 12 hours a day.  aspirin delayed-release 81 mg tablet Take 81 mg by mouth daily.  senna-docusate (SENNA PLUS) 8.6-50 mg per tablet Take 1 Tab by mouth daily.  benazepril (LOTENSIN) 40 mg tablet TAKE 1 TABLET BY MOUTH EVERY DAY    atorvastatin (LIPITOR) 20 mg tablet TAKE 1 TABLET BY MOUTH IN THE EVENING    calcium-cholecalciferol, d3, (CALCIUM 600 + D) 600-125 mg-unit tab Take 1 Tab by mouth two (2) times a day.  coenzyme Q-10 (CO Q-10) 200 mg capsule Take 1 Cap by mouth daily.  glucosamine-chondroit-vit c-mn (GLUCOSAMINE 1500 COMPLEX) 500-400 mg capsule Take 1 Cap by mouth daily.  polyethylene glycol (MIRALAX) 17 gram packet Take 1 Packet by mouth as needed. (Patient taking differently: Take 1 Packet by mouth as needed (constipation). )    cholecalciferol (VITAMIN D3) 1,000 unit tablet Take 1 Tab by mouth every evening.  metoprolol tartrate (LOPRESSOR) 25 mg tablet TAKE 1 TAB BY MOUTH EVERY TWELVE (12) HOURS.  HOLD FOR SBP LESS THAN 120 OR HR LESS THAN 65    SENNA PLUS 8.6-50 mg per tablet TAKE 1 TABLET BY MOUTH TWICE A DAY    multivit-minerals/folic acid (CENTRUM MULTIGUMMIES PO) Take 2 Each by mouth daily.  amLODIPine (NORVASC) 10 mg tablet TAKE 1 TABLET BY MOUTH EVERY MORNING      Allergies   Allergen Reactions    Codeine Nausea and Vomiting    Demerol (Pf) [Meperidine (Pf)] Nausea and Vomiting    Meperidine Nausea and Vomiting        Review of Systems:  A comprehensive review of systems was negative except for that written in the HPI. Mental Status: no dementia    Objective:     Patient Vitals for the past 8 hrs:   BP Temp Pulse Resp SpO2 Height Weight   19 1900 (!) 160/148    95 %     19 1830 152/59    95 %     19 1730 151/55    97 %     19 1630 168/62    96 %     19 1600 155/72    98 %     19 1358     95 %     19 1340 162/68 97.7 °F (36.5 °C) (!) 57 18 94 % 5' 8\" (1.727 m) 63.1 kg (139 lb 1.8 oz)     Temp (24hrs), Av.7 °F (36.5 °C), Min:97.7 °F (36.5 °C), Max:97.7 °F (36.5 °C)      Gen: Well-developed,  in no acute distress   HEENT: Pink conjunctivae, hearing intact to voice, moist mucous membranes   Neck: Supple  Resp: No respiratory distress   Card: RRR, palpable distal pulse-equal bilaterally, birsk cap refill all distal digits   Abd: non-distended  Musc: RLE no sig leg length discrepancy, severe pain with log roll on the right. 5/5 MMT gastroc/ant tib/EHL. NO sign of LE VTE. Skin: No skin breakdown noted. Skin warm, pink, dry  Neuro: Cranial nerves are grossly intact, no focal motor weakness, follows commands appropriately   Psych: Good insight, oriented to person, place and time, alert    Imaging Review: **PRELIMINARY REPORT**   Postsurgical changes of right total hip arthroplasty. Acute nondisplaced  periprosthetic right intertrochanteric femur fracture.    No additional acute fractures are identified. Degenerative changes of the lower  lumbar spine.  Osteopenia.       Labs:   Recent Results (from the past 24 hour(s))   CBC WITH AUTOMATED DIFF    Collection Time: 01/20/19  2:30 PM   Result Value Ref Range    WBC 7.8 3.6 - 11.0 K/uL    RBC 4.31 3.80 - 5.20 M/uL    HGB 13.6 11.5 - 16.0 g/dL    HCT 40.6 35.0 - 47.0 %    MCV 94.2 80.0 - 99.0 FL    MCH 31.6 26.0 - 34.0 PG    MCHC 33.5 30.0 - 36.5 g/dL    RDW 12.7 11.5 - 14.5 %    PLATELET 206 714 - 402 K/uL    MPV 9.9 8.9 - 12.9 FL    NRBC 0.0 0  WBC    ABSOLUTE NRBC 0.00 0.00 - 0.01 K/uL    NEUTROPHILS 58 32 - 75 %    LYMPHOCYTES 27 12 - 49 %    MONOCYTES 9 5 - 13 %    EOSINOPHILS 5 0 - 7 %    BASOPHILS 0 0 - 1 %    IMMATURE GRANULOCYTES 1 (H) 0.0 - 0.5 %    ABS. NEUTROPHILS 4.5 1.8 - 8.0 K/UL    ABS. LYMPHOCYTES 2.1 0.8 - 3.5 K/UL    ABS. MONOCYTES 0.7 0.0 - 1.0 K/UL    ABS. EOSINOPHILS 0.4 0.0 - 0.4 K/UL    ABS. BASOPHILS 0.0 0.0 - 0.1 K/UL    ABS. IMM. GRANS. 0.1 (H) 0.00 - 0.04 K/UL    DF AUTOMATED     PROTHROMBIN TIME + INR    Collection Time: 01/20/19  2:30 PM   Result Value Ref Range    INR 1.1 0.9 - 1.1      Prothrombin time 10.8 9.0 - 96.2 sec   METABOLIC PANEL, COMPREHENSIVE    Collection Time: 01/20/19  2:30 PM   Result Value Ref Range    Sodium 137 136 - 145 mmol/L    Potassium 3.4 (L) 3.5 - 5.1 mmol/L    Chloride 101 97 - 108 mmol/L    CO2 30 21 - 32 mmol/L    Anion gap 6 5 - 15 mmol/L    Glucose 97 65 - 100 mg/dL    BUN 18 6 - 20 MG/DL    Creatinine 0.78 0.55 - 1.02 MG/DL    BUN/Creatinine ratio 23 (H) 12 - 20      GFR est AA >60 >60 ml/min/1.73m2    GFR est non-AA >60 >60 ml/min/1.73m2    Calcium 9.9 8.5 - 10.1 MG/DL    Bilirubin, total 0.6 0.2 - 1.0 MG/DL    ALT (SGPT) 27 12 - 78 U/L    AST (SGOT) 29 15 - 37 U/L    Alk.  phosphatase 91 45 - 117 U/L    Protein, total 7.9 6.4 - 8.2 g/dL    Albumin 4.3 3.5 - 5.0 g/dL    Globulin 3.6 2.0 - 4.0 g/dL    A-G Ratio 1.2 1.1 - 2.2     EKG, 12 LEAD, INITIAL    Collection Time: 01/20/19  2:54 PM   Result Value Ref Range    Ventricular Rate 64 BPM    Atrial Rate 64 BPM    P-R Interval 176 ms    QRS Duration 86 ms    Q-T Interval 402 ms    QTC Calculation (Bezet) 414 ms    Calculated P Axis 48 degrees    Calculated R Axis 2 degrees    Calculated T Axis 37 degrees    Diagnosis       Normal sinus rhythm  Normal ECG  When compared with ECG of 01-NOV-2018 23:04,  Criteria for Anterior infarct are no longer present  No significant change was found     TYPE & SCREEN    Collection Time: 01/20/19  3:00 PM   Result Value Ref Range    Crossmatch Expiration 01/23/2019     ABO/Rh(D) O NEGATIVE     Antibody screen NEG    URINALYSIS W/ REFLEX CULTURE    Collection Time: 01/20/19  4:04 PM   Result Value Ref Range    Color YELLOW/STRAW      Appearance CLOUDY (A) CLEAR      Specific gravity 1.009 1.003 - 1.030      pH (UA) 8.0 5.0 - 8.0      Protein NEGATIVE  NEG mg/dL    Glucose NEGATIVE  NEG mg/dL    Ketone NEGATIVE  NEG mg/dL    Bilirubin NEGATIVE  NEG      Blood MODERATE (A) NEG      Urobilinogen 0.2 0.2 - 1.0 EU/dL    Nitrites NEGATIVE  NEG      Leukocyte Esterase NEGATIVE  NEG      WBC 0-4 0 - 4 /hpf    RBC 20-50 0 - 5 /hpf    Epithelial cells FEW FEW /lpf    Bacteria NEGATIVE  NEG /hpf    UA:UC IF INDICATED CULTURE NOT INDICATED BY UA RESULT CNI      Hyaline cast 0-2 0 - 5 /lpf         Impression:     Patient Active Problem List    Diagnosis Date Noted    Hip fracture ([facility] [address] 01/20/2019    Bradycardia 07/13/2018    Hypertension 07/12/2018    Closed right hip fracture (HCC) 07/12/2018    Hypokalemia 02/28/2015    OAB (overactive bladder) 02/15/2013    Hyperlipidemia 12/01/2011    HTN (hypertension) 05/27/2011     Active Problems:    Hip fracture (HCC) (1/20/2019)        Plan:     -  Bedrest- NPO after midnight   -  Consent for revision arthroplasty   -  Medicine for Pre-Operative Clearence/ Post-Operative management.     -  DVT Prophylaxis - SCDs  -  D/C planning SNF likely     Sabi Dunn PA-C  [address]    This patient was seen and examined by be me personally with the findings as documented above by the NP/PA with the following changes/additions:    NONE - periprosthetic fx - plan to revise hip and repair troch fx    All pertinent medical history, medications, and test results and imaging were reviewed by me personally. Plan for treatment is as described and formulated by me after discussion with the patient and family personally.

## 2019-01-21 NOTE — PROGRESS NOTES
Bedside and Verbal shift change report given to Shaka Tanner (oncoming nurse) by Henry Cummings (offgoing nurse). Report included the following information SBAR, Kardex, Intake/Output, MAR and Recent Results.

## 2019-01-21 NOTE — PROGRESS NOTES
Bedside and Verbal shift change report given to [address] [facility] Branden (oncoming nurse) by Norma Son (offgoing nurse). Report included the following information SBAR, Kardex, Intake/Output, MAR and Recent Results.

## 2019-01-21 NOTE — PERIOP NOTES
Handoff Report from Operating Room to PACU Report received from Rehana Power RN  and Valery Camacho CRNA regarding Chava Beavers. Surgeon(s): 
General Christopher MD  And Procedure(s) (LRB): 
REVISION RIGHT HIP HEMIARTHROPLASTY (Right)  confirmed  
with allergies, drains and dressings discussed. Anesthesia type, drugs, patient history, complications, estimated blood loss, vital signs, intake and output, and last pain medication and reversal medications were reviewed.

## 2019-01-21 NOTE — PROGRESS NOTES
Yanick Su  MRN [id number] POD #0  EBL 400ml Please consider discontinuing LIsinopril 40mg until BP increases. Current 115/49 @ 1:45pm 
also on Metoprolol, Amlodipine & Hydralazine IV prn Thanks, Amarjit Pion  or [phone]

## 2019-01-21 NOTE — PROGRESS NOTES
Problem: Falls - Risk of 
Goal: *Absence of Falls Document Deisy Day Fall Risk and appropriate interventions in the flowsheet. Outcome: Progressing Towards Goal 
Fall Risk Interventions: 
Mobility Interventions: OT consult for ADLs Medication Interventions: Patient to call before getting OOB Elimination Interventions: Call light in reach Problem: Pressure Injury - Risk of 
Goal: *Prevention of pressure injury Document Lee Scale and appropriate interventions in the flowsheet. Pressure Injury Interventions: 
Sensory Interventions: Assess changes in LOC Activity Interventions: Assess need for specialty bed Mobility Interventions: HOB 30 degrees or less Nutrition Interventions: Document food/fluid/supplement intake

## 2019-01-21 NOTE — PROGRESS NOTES
TRANSFER - IN REPORT: 
 
Verbal report received from [facility] RN([facility] [address]  [facility] received from ED(unit) for routine progression of care Report consisted of patients Situation, Background, Assessment and  
Recommendations(SBAR). Information from the following report(s) SBAR, Kardex, Intake/Output, MAR and Recent Results was reviewed with the receiving nurse. Opportunity for questions and clarification was provided. Assessment completed upon patients arrival to unit and care assumed.

## 2019-01-21 NOTE — PROGRESS NOTES
2:39 PM- CM reviewed pt chart. Pt had surgery midmorning and just returned to the floor. Pt will need authorization prior to going to any type of rehab (inpatient rehab vs SNF) PT/OT eval will need to be done prior to authorization beginning. CM will follow up tomorrow 1/22/19 after pt works with PT/OT, for discharge planning. JACOB Chung, CM [facility] [phone]

## 2019-01-21 NOTE — PERIOP NOTES
09:36= called and left voicemail for spouse on cell phone ([phone]) to inform that spouse is coming to OR for surgery. 12:02= called Daughter, Sarita, to inform that pt had gone to surgery at 11:17.

## 2019-01-21 NOTE — ROUTINE PROCESS
TRANSFER - OUT REPORT: 
 
Verbal report given to [address] Nyla RN(name) on [address]  being transferred to [place] [address](unit) for routine progression of care Report consisted of patients Situation, Background, Assessment and  
Recommendations(SBAR). Information from the following report(s) SBAR was reviewed with the receiving nurse. Lines:  
Peripheral IV 01/20/19 Left Antecubital (Active) Site Assessment Clean, dry, & intact 1/20/2019  2:29 PM  
Phlebitis Assessment 0 1/20/2019  2:29 PM  
Infiltration Assessment 0 1/20/2019  2:29 PM  
Dressing Status Clean, dry, & intact 1/20/2019  2:29 PM  
Dressing Type Transparent 1/20/2019  2:29 PM  
Hub Color/Line Status Pink;Capped; Patent; Flushed 1/20/2019  2:29 PM  
Action Taken Blood drawn 1/20/2019  2:29 PM  
Alcohol Cap Used Yes 1/20/2019  2:29 PM  
  
 
Opportunity for questions and clarification was provided. Patient transported with: 
 [employer]

## 2019-01-21 NOTE — ANESTHESIA POSTPROCEDURE EVALUATION
Procedure(s): REVISION RIGHT HIP HEMIARTHROPLASTY. Anesthesia Post Evaluation Patient location during evaluation: PACU Note status: Adequate. Level of consciousness: responsive to verbal stimuli and sleepy but conscious Pain management: satisfactory to patient Airway patency: patent Anesthetic complications: no 
Cardiovascular status: acceptable Respiratory status: acceptable Hydration status: acceptable Comments: +Post-Anesthesia Evaluation and Assessment Patient: Elizabeth Ferris MRN: [id number]  SSN: [id number] YOB: 1932  Age: 80 y.o. Sex: female Cardiovascular Function/Vital Signs /42   Pulse 71   Temp 36.9 °C (98.4 °F)   Resp 15   Ht 5' 8\" (1.727 m)   Wt 63.1 kg (139 lb 1.8 oz)   SpO2 100%   BMI 21.15 kg/m² Patient is status post Procedure(s) with comments: 
REVISION RIGHT HIP HEMIARTHROPLASTY - REGULAR TABLE WITH PEGBOARD; ABRAHAN; REST MOD STEM. REQUEST TO FOLLOW PER LIZA BROWN. Nausea/Vomiting: Controlled. Postoperative hydration reviewed and adequate. Pain: 
Pain Scale 1: Numeric (0 - 10) (01/21/19 1345) Pain Intensity 1: 0 (01/21/19 1345) Managed. Neurological Status:  
Neuro (WDL): Within Defined Limits (01/21/19 1325) At baseline. Mental Status and Level of Consciousness: Arousable. Pulmonary Status:  
O2 Device: Nasal cannula (01/21/19 1345) Adequate oxygenation and airway patent. Complications related to anesthesia: None Post-anesthesia assessment completed. No concerns. Signed By: Ivana Rodriguez MD  
 1/21/2019 Post anesthesia nausea and vomiting:  controlled Visit Vitals /42 Pulse 71 Temp 36.9 °C (98.4 °F) Resp 15 Ht 5' 8\" (1.727 m) Wt 63.1 kg (139 lb 1.8 oz) SpO2 100% BMI 21.15 kg/m²

## 2019-01-21 NOTE — PERIOP NOTES
TRANSFER - OUT REPORT: 
 
Verbal report given to [place] minor RN (name) on Gayle Phlegm  being transferred to [facility](unit) for routine post - op Report consisted of patients Situation, Background, Assessment and  
Recommendations(SBAR). Information from the following report(s) SBAR, Kardex, OR Summary, Intake/Output and MAR was reviewed with the receiving nurse. Opportunity for questions and clarification was provided. Patient transported with: 
 O2 @ 2 liters Registered Nurse Tech

## 2019-01-21 NOTE — PROGRESS NOTES
Hospitalist Progress Note NAME: Pushpa Eye :  7/3/1932 MRN:  [id number] Assessment / Plan: 
R Hip Fx, secondary to [facility] S/p surgery today Pain Mx and DVT Px as per ortho 
  
Hypokalemia 
replete upon admission Will repeat lytes tomorrow 
  
HTN 
BP trending low Hold ACE inh for now Continue Norvasc and BB HLD Continue Statins 
  
Code Status: Full Surrogate Decision Maker:  DVT Prophylaxis: Lovenox GI Prophylaxis: not indicated Baseline: Independent Subjective: Pt seen and examined at bedside. S/p Surgery, No new complaints. Overnight events d/w RN  
CHIEF COMPLAINT: f/u \"fall, hip pain\" Review of Systems: 
Symptom Y/N Comments  Symptom Y/N Comments Fever/Chills n   Chest Pain n   
Poor Appetite    Edema Cough n   Abdominal Pain n   
Sputum    Joint Pain SOB/KRAFT n   Pruritis/Rash Nausea/vomit    Tolerating PT/OT Diarrhea    Tolerating Diet y Constipation    Other Could NOT obtain due to:   
 
Objective: VITALS:  
Last 24hrs VS reviewed since prior progress note. Most recent are: 
Patient Vitals for the past 24 hrs: 
 Temp Pulse Resp BP SpO2  
19 1949 97.5 °F (36.4 °C) 64 18 139/65 97 % 19 1625 97.9 °F (36.6 °C) 67 18 149/65 97 % 19 1555 97.6 °F (36.4 °C) 67 18 158/70 97 % 19 1525 97.9 °F (36.6 °C) 64 18 133/42 100 % 19 1445 97.4 °F (36.3 °C) 67 18 115/49 97 % 19 1402 98.5 °F (36.9 °C)      
19 1400  72 16 124/41 98 % 19 1345 98.4 °F (36.9 °C) 71 15 134/42 100 % 19 1340  71 13  100 % 19 1335  76 18  100 % 19 1330  74 13 135/58 100 % 19 1325 98.5 °F (36.9 °C) 72 13 145/42 100 % 19 1320    145/42   
19 0957 98.6 °F (37 °C) 61 16 145/56 97 % 19 0740 97.2 °F (36.2 °C) 66 18 147/48 96 % 19 0611  69  150/66   
19 0531 97.5 °F (36.4 °C) 65 18 181/87 98 % 01/20/19 2355 97.5 °F (36.4 °C) 60 18 152/67 98 % 01/20/19 2239 97.5 °F (36.4 °C) 64 18 174/74 95 % Intake/Output Summary (Last 24 hours) at 1/21/2019 2023 Last data filed at 1/21/2019 3316 Gross per 24 hour Intake 1720 ml Output 1050 ml Net 670 ml PHYSICAL EXAM: 
General: WD, WN. Alert, cooperative, no acute distress   
EENT:  EOMI. Anicteric sclerae. MMM Resp:  CTA bilaterally, no wheezing or rales. No accessory muscle use CV:  Regular  rhythm,  No edema GI:  Soft, Non distended, Non tender.  +Bowel sounds Neurologic:  Alert and oriented X 3, normal speech, Psych:   Good insight. Not anxious nor agitated Skin:  No rashes. No jaundice Reviewed most current lab test results and cultures  YES Reviewed most current radiology test results   YES Review and summation of old records today    NO Reviewed patient's current orders and MAR    YES 
PMH/SH reviewed - no change compared to H&P 
________________________________________________________________________ Care Plan discussed with: 
  Comments Patient y Family  y At bedside RN y   
Care Manager Consultant Multidiciplinary team rounds were held today with , nursing, pharmacist and clinical coordinator. Patient's plan of care was discussed; medications were reviewed and discharge planning was addressed. ________________________________________________________________________ Total NON critical care TIME:  35 Minutes Total CRITICAL CARE TIME Spent:   Minutes non procedure based Comments >50% of visit spent in counseling and coordination of care    
________________________________________________________________________ Grey Keller MD  
 
Procedures: see electronic medical records for all procedures/Xrays and details which were not copied into this note but were reviewed prior to creation of Plan. LABS: 
I reviewed today's most current labs and imaging studies. Pertinent labs include: 
Recent Labs  
  01/20/19 
1430 WBC 7.8 HGB 13.6 HCT 40.6  Recent Labs  
  01/20/19 
1430   
K 3.4*  
 CO2 30  
GLU 97 BUN 18 CREA 0.78 CA 9.9 ALB 4.3 TBILI 0.6 SGOT 29 ALT 27 INR 1.1 Signed: Elise Boogie MD

## 2019-01-21 NOTE — H&P
Hospitalist Admission NoteNAME: Stephane Bowling :  7/3/1932 MRN:  [id number] Date/Time:  2019 7:05 PM 
 
Patient PCP: Parish Lee, NP 
______________________________________________________________________ Given the patient's current clinical presentation, I have a high level of concern for decompensation if discharged from the emergency department. Complex decision making was performed, which includes reviewing the patient's available past medical records, laboratory results, and x-ray films. My assessment of this patient's clinical condition and my plan of care is as follows. Assessment / Plan: 
R hip fx, secondary to GLF 
-Admit to Ortho floor -Ortho Consult 
-Will keep NPO MN 
-Pain meds PRN 
-Lovenox for DVT prophylaxis 
-CT: Postsurgical changes of right total hip arthroplasty. Acute nondisplaced 
periprosthetic right intertrochanteric femur fracture. -CXR negative, EKG: NSR Pre-Op: 
Class 1 Risk. 0.4% risk of major cardiac event. Details of the procedure to be explained to the pt and family by Orthopedics, and consent to be obtained by Orthopedics. Hypokalemia 
-K 3.4, repleted HTN 
OAB 
HLD 
-Resume home meds 
-Hydralazine PRN 
-Monitor for orthostatic hypotension Code Status: Full Surrogate Decision Maker:  DVT Prophylaxis: Lovenox GI Prophylaxis: not indicated Baseline: Independent Subjective: CHIEF COMPLAINT: fall, hip pain HISTORY OF PRESENT ILLNESS:    
Robert Arriola is a 80 y.o.  female who presents with CC listed above. Pt states that she was trying to get out of her car this morning when she believes her feet \"got stuck or jumbled, I don't know\" and she fell on the ground, striking her right hip. Pt denies any LOC or striking her head. Pt denies any CP, SOB, or palpitation prior to, during or after the event.  Since the fall however, pt has been unable to ambulate and only felt better after she was given IV pain medications by the ER. She is very pleasant and otherwise denies any complaints. Her family does endorse pt has been experiencing dizziness intermittently, and pt states that it is particularly when she rises from a seated position. We were asked to admit for work up and evaluation of the above problems. Past Medical History:  
Diagnosis Date  
 HTN (hypertension) 5/27/2011  Hyperlipidemia 12/1/2011  Hypertension  OAB (overactive bladder) 2/15/2013  Other ill-defined conditions(799.89)   
 hyperlipidemia  Other ill-defined conditions(799.89)   
 bladder problems  Other ill-defined conditions(799.89)   
 back pain Past Surgical History:  
Procedure Laterality Date  COLONOSCOPY N/A 3/12/2018 COLONOSCOPY performed by Vinny Mark MD at [facility]  COLONOSCOPY,DIAGNOSTIC  3/12/2018 [address]  HX CATARACT REMOVAL Left  HX CORNEAL TRANSPLANT Left  HX DILATION AND CURETTAGE    
 x 2  
 HX TONSILLECTOMY  1938 Social History Tobacco Use  Smoking status: Never Smoker  Smokeless tobacco: Never Used Substance Use Topics  Alcohol use: No  
  
 
Family History Problem Relation Age of Onset  Cancer Father   
     throat (smoker); mouth and gums  Heart Disease Mother Age 52  Clotting Disorder Mother  Arthritis-osteo Paternal Aunt Allergies Allergen Reactions  Codeine Nausea and Vomiting  Demerol (Pf) [Meperidine (Pf)] Nausea and Vomiting  Meperidine Nausea and Vomiting Prior to Admission medications Medication Sig Start Date End Date Taking? Authorizing Provider  
acetaminophen (TYLENOL EXTRA STRENGTH) 500 mg tablet Take  by mouth every six (6) hours as needed for Pain. Provider, Historical  
naproxen sodium (ALEVE) 220 mg cap Take  by mouth.     Provider, Historical  
capsicum oleoresin 0.025 % topical cream Apply  to affected area three (3) times daily. 11/15/18   Sarita Cole NP  
lidocaine (LIDODERM) 5 % Apply patch to the affected area for 12 hours a day and remove for 12 hours a day. 11/15/18   Sarita Cole NP  
lidocaine (LIDODERM) 5 % Apply patch to the affected area for 12 hours a day and remove for 12 hours a day. 11/11/18   Lorenzo Nair NP  
aspirin delayed-release 81 mg tablet Take 81 mg by mouth daily. Johan Tinoco MD  
senna-docusate (SENNA PLUS) 8.6-50 mg per tablet Take 1 Tab by mouth daily. Anat Bobo MD  
benazepril (LOTENSIN) 40 mg tablet TAKE 1 TABLET BY MOUTH EVERY DAY 11/8/18   Nataliya PEPPER NP  
atorvastatin (LIPITOR) 20 mg tablet TAKE 1 TABLET BY MOUTH IN THE EVENING 11/8/18   Sarita Cole NP  
calcium-cholecalciferol, d3, (CALCIUM 600 + D) 600-125 mg-unit tab Take 1 Tab by mouth two (2) times a day. 11/8/18   Sarita Cole NP  
coenzyme Q-10 (CO Q-10) 200 mg capsule Take 1 Cap by mouth daily. 11/8/18   Sarita Cole NP  
glucosamine-chondroit-vit c-mn (GLUCOSAMINE 1500 COMPLEX) 500-400 mg capsule Take 1 Cap by mouth daily. 11/8/18   Sarita Cole NP  
polyethylene glycol (MIRALAX) 17 gram packet Take 1 Packet by mouth as needed. Patient taking differently: Take 1 Packet by mouth as needed (constipation). 11/8/18   Sarita Cole NP  
cholecalciferol (VITAMIN D3) 1,000 unit tablet Take 1 Tab by mouth every evening. 11/8/18   Sarita Cole NP  
metoprolol tartrate (LOPRESSOR) 25 mg tablet TAKE 1 TAB BY MOUTH EVERY TWELVE (12) HOURS. HOLD FOR SBP LESS THAN 120 OR HR LESS THAN 65 10/16/18   Nataliya PEPPER NP  
SENNA PLUS 8.6-50 mg per tablet TAKE 1 TABLET BY MOUTH TWICE A DAY 10/12/18   Merline Janus, MD  
multivit-minerals/folic acid (CENTRUM MULTIGUMMIES PO) Take 2 Each by mouth daily.     Provider, Historical  
amLODIPine (NORVASC) 10 mg tablet TAKE 1 TABLET BY MOUTH EVERY MORNING 7/12/18   Merline Janus, MD  
 
 
REVIEW OF SYSTEMS:    
 I am not able to complete the review of systems because: The patient is intubated and sedated The patient has altered mental status due to his acute medical problems The patient has baseline aphasia from prior stroke(s) The patient has baseline dementia and is not reliable historian The patient is in acute medical distress and unable to provide information Total of 12 systems reviewed as follows:   
   POSITIVE= underlined text  Negative = text not underlined General:  fever, chills, sweats, generalized weakness, weight loss/gain,  
   loss of appetite Eyes:    blurred vision, eye pain, loss of vision, double vision ENT:    rhinorrhea, pharyngitis Respiratory:   cough, sputum production, SOB, KRAFT, wheezing, pleuritic pain  
Cardiology:   chest pain, palpitations, orthopnea, PND, edema, syncope Gastrointestinal:  abdominal pain , N/V, diarrhea, dysphagia, constipation, bleeding Genitourinary:  frequency, urgency, dysuria, hematuria, incontinence Muskuloskeletal :  arthralgia, myalgia, back pain Hematology:  easy bruising, nose or gum bleeding, lymphadenopathy Dermatological: rash, ulceration, pruritis, color change / jaundice Endocrine:   hot flashes or polydipsia Neurological:  headache, dizziness, confusion, focal weakness, paresthesia, Speech difficulties, memory loss, gait difficulty Psychological: Feelings of anxiety, depression, agitation Objective: VITALS:   
Visit Vitals /68 (BP 1 Location: Right arm, BP Patient Position: At rest) Pulse (!) 57 Temp 97.7 °F (36.5 °C) Resp 18 Ht 5' 8\" (1.727 m) Wt 139 lb 1.8 oz (63.1 kg) SpO2 95% BMI 21.15 kg/m² PHYSICAL EXAM: 
 
General:    Alert, cooperative, no distress, elderly HEENT: Atraumatic, anicteric sclerae, pink conjunctivae No oral ulcers, mucosa moist, throat clear, dentition fair Neck:  Supple, symmetrical,  thyroid: non tender Lungs:   Clear to auscultation bilaterally. No Wheezing or Rhonchi. No rales. Chest wall:  No tenderness  No Accessory muscle use. Heart:   Regular  rhythm,  No  murmur   No edema Abdomen:   Soft, non-tender. Not distended. Bowel sounds normal 
Extremities: No cyanosis. No clubbing,   
  Skin turgor normal, Capillary refill normal, Radial dial pulse 2+ 
  R leg externally rotated and shorter. Did not test ROM as pt requested. Skin:     Not pale. Not Jaundiced  No rashes Psych:  Good insight. Not depressed. Not anxious or agitated. Neurologic: EOMs intact. No facial asymmetry. No aphasia or slurred speech. Symmetrical strength, Sensation grossly intact. Alert and oriented X 4.  
 
_______________________________________________________________________ Care Plan discussed with: 
  Comments Patient x Family  x   
RN x Care Manager Consultant:     
_______________________________________________________________________ Expected  Disposition:  
Home with Family HH/PT/OT/RN   
SNF/LTC   
JUAN x  
________________________________________________________________________ TOTAL TIME:  55 Minutes Critical Care Provided     Minutes non procedure based Comments Reviewed previous records  
>50% of visit spent in counseling and coordination of care  Discussion with patient and/or family and questions answered 
  
 
________________________________________________________________________ Signed: Prabhu Ames MD 
 
Procedures: see electronic medical records for all procedures/Xrays and details which were not copied into this note but were reviewed prior to creation of Plan. LAB DATA REVIEWED:   
Recent Results (from the past 24 hour(s)) CBC WITH AUTOMATED DIFF Collection Time: 19  2:30 PM  
Result Value Ref Range WBC 7.8 3.6 - 11.0 K/uL  
 RBC 4.31 3.80 - 5.20 M/uL  
 HGB 13.6 11.5 - 16.0 g/dL HCT 40.6 35.0 - 47.0 %  MCV 94.2 80.0 - 99.0 FL  
 MCH 31.6 26.0 - 34.0 PG  
 MCHC 33.5 30.0 - 36.5 g/dL  
 RDW 12.7 11.5 - 14.5 % PLATELET 638 002 - 041 K/uL MPV 9.9 8.9 - 12.9 FL  
 NRBC 0.0 0  WBC ABSOLUTE NRBC 0.00 0.00 - 0.01 K/uL NEUTROPHILS 58 32 - 75 % LYMPHOCYTES 27 12 - 49 % MONOCYTES 9 5 - 13 % EOSINOPHILS 5 0 - 7 % BASOPHILS 0 0 - 1 % IMMATURE GRANULOCYTES 1 (H) 0.0 - 0.5 % ABS. NEUTROPHILS 4.5 1.8 - 8.0 K/UL  
 ABS. LYMPHOCYTES 2.1 0.8 - 3.5 K/UL  
 ABS. MONOCYTES 0.7 0.0 - 1.0 K/UL  
 ABS. EOSINOPHILS 0.4 0.0 - 0.4 K/UL  
 ABS. BASOPHILS 0.0 0.0 - 0.1 K/UL  
 ABS. IMM. GRANS. 0.1 (H) 0.00 - 0.04 K/UL  
 DF AUTOMATED PROTHROMBIN TIME + INR Collection Time: 01/20/19  2:30 PM  
Result Value Ref Range INR 1.1 0.9 - 1.1 Prothrombin time 10.8 9.0 - 11.1 sec METABOLIC PANEL, COMPREHENSIVE Collection Time: 01/20/19  2:30 PM  
Result Value Ref Range Sodium 137 136 - 145 mmol/L Potassium 3.4 (L) 3.5 - 5.1 mmol/L Chloride 101 97 - 108 mmol/L  
 CO2 30 21 - 32 mmol/L Anion gap 6 5 - 15 mmol/L Glucose 97 65 - 100 mg/dL BUN 18 6 - 20 MG/DL Creatinine 0.78 0.55 - 1.02 MG/DL  
 BUN/Creatinine ratio 23 (H) 12 - 20 GFR est AA >60 >60 ml/min/1.73m2 GFR est non-AA >60 >60 ml/min/1.73m2 Calcium 9.9 8.5 - 10.1 MG/DL Bilirubin, total 0.6 0.2 - 1.0 MG/DL  
 ALT (SGPT) 27 12 - 78 U/L  
 AST (SGOT) 29 15 - 37 U/L Alk. phosphatase 91 45 - 117 U/L Protein, total 7.9 6.4 - 8.2 g/dL Albumin 4.3 3.5 - 5.0 g/dL Globulin 3.6 2.0 - 4.0 g/dL A-G Ratio 1.2 1.1 - 2.2 EKG, 12 LEAD, INITIAL Collection Time: 01/20/19  2:54 PM  
Result Value Ref Range Ventricular Rate 64 BPM  
 Atrial Rate 64 BPM  
 P-R Interval 176 ms QRS Duration 86 ms  
 Q-T Interval 402 ms QTC Calculation (Bezet) 414 ms Calculated P Axis 48 degrees Calculated R Axis 2 degrees Calculated T Axis 37 degrees Diagnosis Normal sinus rhythm Normal ECG 
 When compared with ECG of 01-NOV-2018 23:04, 
Criteria for Anterior infarct are no longer present No significant change was found TYPE & SCREEN Collection Time: 01/20/19  3:00 PM  
Result Value Ref Range Crossmatch Expiration 01/23/2019 ABO/Rh(D) O NEGATIVE Antibody screen NEG   
URINALYSIS W/ REFLEX CULTURE Collection Time: 01/20/19  4:04 PM  
Result Value Ref Range Color YELLOW/STRAW Appearance CLOUDY (A) CLEAR Specific gravity 1.009 1.003 - 1.030    
 pH (UA) 8.0 5.0 - 8.0 Protein NEGATIVE  NEG mg/dL Glucose NEGATIVE  NEG mg/dL Ketone NEGATIVE  NEG mg/dL Bilirubin NEGATIVE  NEG Blood MODERATE (A) NEG Urobilinogen 0.2 0.2 - 1.0 EU/dL Nitrites NEGATIVE  NEG Leukocyte Esterase NEGATIVE  NEG    
 WBC 0-4 0 - 4 /hpf  
 RBC 20-50 0 - 5 /hpf Epithelial cells FEW FEW /lpf Bacteria NEGATIVE  NEG /hpf  
 UA:UC IF INDICATED CULTURE NOT INDICATED BY UA RESULT CNI Hyaline cast 0-2 0 - 5 /lpf

## 2019-01-21 NOTE — INTERDISCIPLINARY ROUNDS
Interdisciplinary rounds were held today to discuss patient plan of care and outcomes. The following members were present: Nurse, Clinical Care Leader, Pharmacy, Physical Therapy, and Case Management. Plan:  Patient WARREN. OR today. PT/OT tomorrow.

## 2019-01-21 NOTE — BRIEF OP NOTE
BRIEF OPERATIVE NOTE Date of Procedure: 1/21/2019 Preoperative Diagnosis: REVISION RIGHT HEMIARTHROPLASTY Postoperative Diagnosis: REVISION RIGHT HEMIARTHROPLASTY Procedure(s): REVISION RIGHT HIP HEMIARTHROPLASTY, ORIF R trochanter Surgeon(s) and Role: 
   Litzy Gordon MD - Primary Surgical Assistant: Sunita Lewis Surgical Staff: 
Circ-1: Stoney Medina RN 
Circ-Relief: Dante Wang RN Scrub Tech-1: Amrik Zee Scrub Tech-Relief: Reid CORTÉS Surg Asst-1: Krzysztof De La Paz Event Time In Time Out Incision Start 01/21/2019 1148 Incision Close Anesthesia: General  
Estimated Blood Loss: 400 Specimens: * No specimens in log * Findings: n/a Complications: none Implants:  
Implant Name Type Inv. Item Serial No.  Lot No. LRB No. Used Action HEAD FEM COCR 28MM +0 -- OFFSET 12/14 - [id number]  HEAD FEM COCR 28MM +0 -- OFFSET 12/14 [id number] [employer] N/A Right 1 Implanted CABLE TROCH REATTACH DEV STD --  - SN/A  CABLE [address] REATTACH DEV STD --  N/A SYNTHES [place] [id number] Right 1 Implanted Alteon monobloc revision stem press-fit standard offset 12/14   [id number]  N/A Right 1 Implanted Acumatch L-series bipolar component 28mm I.D x 50mm O.D   [id number]   Right 1 Implanted

## 2019-01-22 LAB
ANION GAP SERPL CALC-SCNC: 8 MMOL/L (ref 5–15)
BUN SERPL-MCNC: 10 MG/DL (ref 6–20)
BUN/CREAT SERPL: 12 (ref 12–20)
CALCIUM SERPL-MCNC: 8 MG/DL (ref 8.5–10.1)
CHLORIDE SERPL-SCNC: 107 MMOL/L (ref 97–108)
CO2 SERPL-SCNC: 27 MMOL/L (ref 21–32)
CREAT SERPL-MCNC: 0.84 MG/DL (ref 0.55–1.02)
GLUCOSE SERPL-MCNC: 98 MG/DL (ref 65–100)
HGB BLD-MCNC: 9 G/DL (ref 11.5–16)
POTASSIUM SERPL-SCNC: 3.4 MMOL/L (ref 3.5–5.1)
SODIUM SERPL-SCNC: 142 MMOL/L (ref 136–145)

## 2019-01-22 PROCEDURE — 94760 N-INVAS EAR/PLS OXIMETRY 1: CPT

## 2019-01-22 PROCEDURE — 77030027138 HC INCENT SPIROMETER -A

## 2019-01-22 PROCEDURE — 77030038269 HC DRN EXT URIN PURWCK BARD -A

## 2019-01-22 PROCEDURE — 97162 PT EVAL MOD COMPLEX 30 MIN: CPT

## 2019-01-22 PROCEDURE — 74011250636 HC RX REV CODE- 250/636: Performed by: PHYSICIAN ASSISTANT

## 2019-01-22 PROCEDURE — 74011250637 HC RX REV CODE- 250/637: Performed by: PHYSICIAN ASSISTANT

## 2019-01-22 PROCEDURE — 36415 COLL VENOUS BLD VENIPUNCTURE: CPT

## 2019-01-22 PROCEDURE — 97116 GAIT TRAINING THERAPY: CPT

## 2019-01-22 PROCEDURE — 74011250637 HC RX REV CODE- 250/637: Performed by: GENERAL ACUTE CARE HOSPITAL

## 2019-01-22 PROCEDURE — 77010033678 HC OXYGEN DAILY

## 2019-01-22 PROCEDURE — 85018 HEMOGLOBIN: CPT

## 2019-01-22 PROCEDURE — 65270000029 HC RM PRIVATE

## 2019-01-22 PROCEDURE — 97535 SELF CARE MNGMENT TRAINING: CPT | Performed by: OCCUPATIONAL THERAPIST

## 2019-01-22 PROCEDURE — 97165 OT EVAL LOW COMPLEX 30 MIN: CPT | Performed by: OCCUPATIONAL THERAPIST

## 2019-01-22 PROCEDURE — 74011250637 HC RX REV CODE- 250/637: Performed by: ORTHOPAEDIC SURGERY

## 2019-01-22 PROCEDURE — 74011250637 HC RX REV CODE- 250/637: Performed by: INTERNAL MEDICINE

## 2019-01-22 PROCEDURE — 80048 BASIC METABOLIC PNL TOTAL CA: CPT

## 2019-01-22 RX ORDER — POTASSIUM CHLORIDE 20 MEQ/1
40 TABLET, EXTENDED RELEASE ORAL
Status: COMPLETED | OUTPATIENT
Start: 2019-01-22 | End: 2019-01-22

## 2019-01-22 RX ORDER — POLYETHYLENE GLYCOL 3350 17 G/17G
17 POWDER, FOR SOLUTION ORAL
COMMUNITY

## 2019-01-22 RX ORDER — LIDOCAINE 50 MG/G
1 PATCH TOPICAL
COMMUNITY
End: 2019-02-12

## 2019-01-22 RX ADMIN — SODIUM CHLORIDE 75 ML/HR: 900 INJECTION, SOLUTION INTRAVENOUS at 03:16

## 2019-01-22 RX ADMIN — Medication 10 ML: at 21:35

## 2019-01-22 RX ADMIN — ASPIRIN 81 MG 81 MG: 81 TABLET ORAL at 17:00

## 2019-01-22 RX ADMIN — OXYCODONE HYDROCHLORIDE 5 MG: 5 TABLET ORAL at 16:56

## 2019-01-22 RX ADMIN — POTASSIUM CHLORIDE 40 MEQ: 20 TABLET, EXTENDED RELEASE ORAL at 10:42

## 2019-01-22 RX ADMIN — AMLODIPINE BESYLATE 10 MG: 5 TABLET ORAL at 09:23

## 2019-01-22 RX ADMIN — ACETAMINOPHEN 650 MG: 325 TABLET ORAL at 17:00

## 2019-01-22 RX ADMIN — METOPROLOL TARTRATE 25 MG: 25 TABLET, FILM COATED ORAL at 17:00

## 2019-01-22 RX ADMIN — ACETAMINOPHEN 650 MG: 325 TABLET ORAL at 13:14

## 2019-01-22 RX ADMIN — OXYCODONE HYDROCHLORIDE 5 MG: 5 TABLET ORAL at 09:23

## 2019-01-22 RX ADMIN — ATORVASTATIN CALCIUM 20 MG: 20 TABLET, FILM COATED ORAL at 21:33

## 2019-01-22 RX ADMIN — ACETAMINOPHEN 650 MG: 325 TABLET ORAL at 00:28

## 2019-01-22 RX ADMIN — FAMOTIDINE 10 MG: 20 TABLET ORAL at 09:23

## 2019-01-22 RX ADMIN — FAMOTIDINE 10 MG: 20 TABLET ORAL at 17:00

## 2019-01-22 RX ADMIN — METOPROLOL TARTRATE 25 MG: 25 TABLET, FILM COATED ORAL at 09:24

## 2019-01-22 RX ADMIN — POLYETHYLENE GLYCOL 3350 17 G: 17 POWDER, FOR SOLUTION ORAL at 09:22

## 2019-01-22 RX ADMIN — Medication 2 G: at 03:08

## 2019-01-22 RX ADMIN — STANDARDIZED SENNA CONCENTRATE AND DOCUSATE SODIUM 1 TABLET: 8.6; 5 TABLET, FILM COATED ORAL at 17:00

## 2019-01-22 RX ADMIN — ASPIRIN 81 MG 81 MG: 81 TABLET ORAL at 09:23

## 2019-01-22 RX ADMIN — STANDARDIZED SENNA CONCENTRATE AND DOCUSATE SODIUM 1 TABLET: 8.6; 5 TABLET, FILM COATED ORAL at 09:24

## 2019-01-22 RX ADMIN — ACETAMINOPHEN 650 MG: 325 TABLET ORAL at 06:29

## 2019-01-22 RX ADMIN — Medication 10 ML: at 13:14

## 2019-01-22 NOTE — PROGRESS NOTES
Hospitalist Progress Note NAME: Kathleen Cassidy :  7/3/1932 MRN:  [id number] Assessment / Plan: 
R Hip Fx, secondary to [facility] S/p surgery this admission Pain Mx and DVT Px as per ortho PT/OT eval 
CM consult for disposition 
  
Hypokalemia 
replete upon admission Will repeat lytes tomorrow 
  
HTN 
BP now stable Will decide how much ACE inh to be resume depending how BP trends Continue Norvasc and BB HLD Continue Statins 
  
Code Status: Full Surrogate Decision Maker:  DVT Prophylaxis: Lovenox GI Prophylaxis: not indicated Baseline: Independent Subjective: Pt seen and examined at bedside. Feeling better, awaiting PT/OT. Overnight events d/w RN  
CHIEF COMPLAINT: f/u \"fall, hip pain\" Review of Systems: 
Symptom Y/N Comments  Symptom Y/N Comments Fever/Chills n   Chest Pain n   
Poor Appetite    Edema Cough n   Abdominal Pain n   
Sputum    Joint Pain SOB/KRAFT n   Pruritis/Rash Nausea/vomit    Tolerating PT/OT Diarrhea    Tolerating Diet y Constipation    Other Could NOT obtain due to:   
 
Objective: VITALS:  
Last 24hrs VS reviewed since prior progress note. Most recent are: 
Patient Vitals for the past 24 hrs: 
 Temp Pulse Resp BP SpO2  
19 0740 99.2 °F (37.3 °C) 70 18 149/68 95 % 19 0024 99.1 °F (37.3 °C) 65 16 121/57 94 % 19 1949 97.5 °F (36.4 °C) 64 18 139/65 97 % 19 1625 97.9 °F (36.6 °C) 67 18 149/65 97 % 19 1555 97.6 °F (36.4 °C) 67 18 158/70 97 % 19 1525 97.9 °F (36.6 °C) 64 18 133/42 100 % 19 1445 97.4 °F (36.3 °C) 67 18 115/49 97 % 19 1402 98.5 °F (36.9 °C)      
19 1400  72 16 124/41 98 % 19 1345 98.4 °F (36.9 °C) 71 15 134/42 100 % 19 1340  71 13  100 % 19 1335  76 18  100 % 19 1330  74 13 135/58 100 % 19 1325 98.5 °F (36.9 °C) 72 13 145/42 100 % 19 1320    145/42  01/21/19 0957 98.6 °F (37 °C) 61 16 145/56 97 % Intake/Output Summary (Last 24 hours) at 1/22/2019 1904 Last data filed at 1/22/2019 2306 Gross per 24 hour Intake 2710 ml Output 2450 ml Net 260 ml PHYSICAL EXAM: 
General: WD, WN. Alert, cooperative, no acute distress   
EENT:  EOMI. Anicteric sclerae. MMM Resp:  CTA bilaterally, no wheezing or rales. No accessory muscle use CV:  Regular  rhythm,  No edema GI:  Soft, Non distended, Non tender.  +Bowel sounds Neurologic:  Alert and oriented X 3, normal speech, Psych:   Good insight. Not anxious nor agitated Skin:  No rashes. No jaundice Reviewed most current lab test results and cultures  YES Reviewed most current radiology test results   YES Review and summation of old records today    NO Reviewed patient's current orders and MAR    YES 
PMH/ reviewed - no change compared to H&P 
________________________________________________________________________ Care Plan discussed with: 
  Comments Patient y Family RN y   
Care Manager Consultant Multidiciplinary team rounds were held today with , nursing, pharmacist and clinical coordinator. Patient's plan of care was discussed; medications were reviewed and discharge planning was addressed. ________________________________________________________________________ Total NON critical care TIME:  25 Minutes Total CRITICAL CARE TIME Spent:   Minutes non procedure based Comments >50% of visit spent in counseling and coordination of care    
________________________________________________________________________ Bob Popper, MD  
 
Procedures: see electronic medical records for all procedures/Xrays and details which were not copied into this note but were reviewed prior to creation of Plan. LABS: 
I reviewed today's most current labs and imaging studies. Pertinent labs include: 
Recent Labs  
  01/22/19 0310 01/20/19 1430  
WBC  --  7.8 HGB 9.0* 13.6 HCT  --  40.6 PLT  --  153 Recent Labs  
  01/22/19 
0310 01/20/19 
1430  137  
K 3.4* 3.4*  
 101 CO2 27 30 GLU 98 97 BUN 10 18 CREA 0.84 0.78  
CA 8.0* 9.9 ALB  --  4.3 TBILI  --  0.6 SGOT  --  29 ALT  --  27 INR  --  1.1 Signed: Shahrzad Miller MD

## 2019-01-22 NOTE — PROGRESS NOTES
Orthopedic NP Progress Note Post Op day: 1 Day Post-Op January 22, 2019 8:31 AM  
 
Destin Rouse Attending Physician: Treatment Team: Attending Provider: Johnny Webb MD; Consulting Provider: Lex Hogue MD; Consulting Provider: Julia Tse NP; Consulting Provider: Kaylyn Garcia MD; Utilization Review: Radha Branham RN Vital Signs:   
Patient Vitals for the past 8 hrs: 
 BP Temp Pulse Resp SpO2  
01/22/19 0740 149/68 99.2 °F (37.3 °C) 70 18 95 % BMI (calculated): 21.2 (01/20/19 1340) Intake/Output: 
No intake/output data recorded. 01/20 1901 - 01/22 0700 In: 3710 [P.O.:660; I.V.:2050] Out: 2450 [[id number]] Pain Control:  
Pain Assessment Pain Scale 1: Numeric (0 - 10) Pain Intensity 1: 1 Pain Onset 1: post fall Pain Location 1: Hip Pain Orientation 1: Right Pain Description 1: Other (comment) Pain Intervention(s) 1: Rest 
 
LAB:   
Recent Labs  
  01/22/19 
0310 01/20/19 
1430 HCT  --  40.6 HGB 9.0* 13.6 Lab Results Component Value Date/Time Sodium 142 01/22/2019 03:10 AM  
 Potassium 3.4 (L) 01/22/2019 03:10 AM  
 Chloride 107 01/22/2019 03:10 AM  
 CO2 27 01/22/2019 03:10 AM  
 Glucose 98 01/22/2019 03:10 AM  
 BUN 10 01/22/2019 03:10 AM  
 Creatinine 0.84 01/22/2019 03:10 AM  
 Calcium 8.0 (L) 01/22/2019 03:10 AM  
 
 
Subjective:  Destin Rouse is a 80 y.o. female s/p a  Procedure(s) with comments: 
REVISION RIGHT HIP HEMIARTHROPLASTY - REGULAR TABLE WITH PEGBOARD; ABRAHAN; REST MOD STEM. REQUEST TO FOLLOW PER LIZA BROWN Procedure(s): REVISION RIGHT HIP HEMIARTHROPLASTY. Tolerating diet. Pain is very well managed at rest, increase with movement Objective: General: alert, cooperative, no distress. Neuro/Vascular: CNS Intact. Sensation stable. Brisk cap refill, + pulses UE/LE Musculoskeletal:  +ROM UE/LE. Mohini's sign negative in bilateral lower extremities. Calves soft, supple, non-tender upon palpation or with passive stretch. Skin: Incision - clean, dry and intact. No significant erythema or swelling. Dressing: clean, dry, and intact Hyman - n Drain - n 
  
 
PT/OT:  
Gait:    
            
 
 
Assessment:  
 s/p Procedure(s): REVISION RIGHT HIP HEMIARTHROPLASTY Active Problems: 
  Hip fracture ([address] (1/20/2019) Plan:  
 
-  Continue PT/OT - WBAT, awaiting first session  
-  Continue established methods of pain control 
-  VTE Prophylaxes - TEDS &/or SCDs with ASA BID for 30 days -  GI Prophylaxes - pepcid Signed By: Radha Velasquez NP Orthopedic Nurse Practitioner

## 2019-01-22 NOTE — PROGRESS NOTES
Bedside and Verbal shift change report given to Kirti Pérez (oncoming nurse) by Sunshine Feng RN (offgoing nurse). Report included the following information SBAR, Kardex, OR Summary, Procedure Summary, Intake/Output, MAR and Recent Results.

## 2019-01-22 NOTE — PROGRESS NOTES
Problem: Mobility Impaired (Adult and Pediatric) Goal: *Acute Goals and Plan of Care (Insert Text) Physical Therapy Goals Initiated 1/22/2019 1. Patient will move from supine to sit and sit to supine  in bed with independence within 4 days. 2. Patient will perform sit to stand with modified independence within 4 days. 3. Patient will ambulate with modified independence for 300 feet with the least restrictive device within 4 days. 4. Patient will ascend/descend 2 stairs with B handrail(s) with modified independence within 4 days. 5. Patient will verbalize and demonstrate understanding of posterior precautions per protocol within 4 days. 6. Patient will perform all home exercise program per protocol with independence within 4 days. physical Therapy TREATMENT Patient: Elizabeth Ferris (89 y.o. female) Date: 1/22/2019 Diagnosis: Hip fracture ([address] <principal problem not specified> Procedure(s) (LRB): 
REVISION RIGHT HIP HEMIARTHROPLASTY (Right) 1 Day Post-Op Precautions: WBAT, Fall, Total hip Chart, physical therapy assessment, plan of care and goals were reviewed. ASSESSMENT: 
Pt was received sitting up in the chair and cleared by nursing to mobilize. She was more fatigued. She seems confused at times and that is concerning if she is not able to have 24/7 assistance at home rather than her . She required additional time to stand with RW and ambulated into the narvaez for a longer distance from am session. She was returned to supine at the end of the session. Will continue to work with pt and assess abilities to return home with  [facility] vs rehab. She could likely tolerate IP. Progression toward goals: 
[]    Improving appropriately and progressing toward goals [x]    Improving slowly and progressing toward goals 
[]    Not making progress toward goals and plan of care will be adjusted PLAN: 
Patient continues to benefit from skilled intervention to address the above impairments. Continue treatment per established plan of care. Discharge Recommendations: Home Health with 24/7 assistance vs rehab Further Equipment Recommendations for Discharge:  TBD SUBJECTIVE:  
Patient stated it is hurting more than the last fracture.  OBJECTIVE DATA SUMMARY:  
Critical Behavior: 
  
  
  
  
Functional Mobility Training: 
Bed Mobility: 
  
  min A to sit>supine Transfers: 
Sit to Stand: Contact guard assistance Stand to Sit: Contact guard assistance Balance: 
Sitting: Intact Standing: Impaired Standing - Static: Good;Constant support Standing - Dynamic : FairAmbulation/Gait Training: 
Distance (ft): 100 Feet (ft) Assistive Device: Gait belt;Walker, rolling Ambulation - Level of Assistance: Contact guard assistance Gait Abnormalities: Decreased step clearance;Shuffling gait; Antalgic Base of Support: Widened Speed/Kimberlyn: Shuffled;Pace decreased (<100 feet/min) Step Length: Left shortened;Right shortened Pain: 
Pain Scale 1: Numeric (0 - 10) Pain Intensity 1: 0 Pain Location 1: Hip Pain Orientation 1: Right Pain Description 1: Aching Pain Intervention(s) 1: Medication (see MAR) Activity Tolerance: WFL Please refer to the flowsheet for vital signs taken during this treatment. After treatment:  
[]    Patient left in no apparent distress sitting up in chair 
[x]    Patient left in no apparent distress in bed 
[x]    Call bell left within reach [x]    Nursing notified 
[]    Caregiver present 
[]    Bed alarm activated COMMUNICATION/COLLABORATION:  
The patients plan of care was discussed with: Registered Nurse Yolanda Cordero PT, DPT Time Calculation: 16 mins

## 2019-01-22 NOTE — PROGRESS NOTES
Reason for Admission:   Hip fracture (hcc) RRAT Score: 9 LOW Plan for utilizing home health:  Per recommendation Likelihood of Readmission: Low per acuity. Pt has a strong support system. No problems financially or accessing medications. Pt has no concerns for discharge at this time. Transition of Care Plan:                  Pt is a 80year old,  female, admitted Hip fracture (hcc). Pt was alert and oriented when meeting with CM, confirming address, emergency contact and PCP. Pt states she lives with her  in a two level home. Pt states two steps to enter and a chair lift to go upstairs. Pt a walker and cane and does not drive at baseline. Pt has had [place] in the past 2018 with [facility]. Pt has been to [facility] in the past and would like to go there again if needed. Pt's preferred pharmacy is the [facility] at [facility]. Pt has no problems affording or accessing medications. Pt states she has a strong family support system and family drives her and her  as needed. CM consult noted: CM waiting on PT/OT notes for recommendation. Pt will likely need SNF at time of discharge and would like to go to [facility] if needed again. FOC completed. If this is the recommendation referral will need to be sent. CM will continue to follow pt for discharge planning. Care Management Interventions PCP Verified by CM: Yes Mode of Transport at Discharge: BLS Transition of Care Consult (CM Consult): Discharge Planning MyChart Signup: No 
Discharge Durable Medical Equipment: No 
Health Maintenance Reviewed: Yes Physical Therapy Consult: Yes Occupational Therapy Consult: Yes Speech Therapy Consult: No 
Current Support Network: Own Home, Lives with Spouse Confirm Follow Up Transport: Family Plan discussed with Pt/Family/Caregiver: Yes Discharge Location Discharge Placement: Other:(TBD) Saintclair Ohm, MSW, [place]  
 [facility] [phone]

## 2019-01-22 NOTE — PROGRESS NOTES
Problem: Mobility Impaired (Adult and Pediatric) Goal: *Acute Goals and Plan of Care (Insert Text) Physical Therapy Goals Initiated 1/22/2019 1. Patient will move from supine to sit and sit to supine  in bed with independence within 4 days. 2. Patient will perform sit to stand with modified independence within 4 days. 3. Patient will ambulate with modified independence for 300 feet with the least restrictive device within 4 days. 4. Patient will ascend/descend 2 stairs with B handrail(s) with modified independence within 4 days. 5. Patient will verbalize and demonstrate understanding of posterior precautions per protocol within 4 days. 6. Patient will perform all home exercise program per protocol with independence within 4 days. physical Therapy EVALUATION Patient: Jose Mcclendon (89 y.o. female) Date: 1/22/2019 Primary Diagnosis: Hip fracture ([address] Procedure(s) (LRB): 
REVISION RIGHT HIP HEMIARTHROPLASTY (Right) 1 Day Post-Op Precautions:   WBAT, Fall, Total hip ASSESSMENT : 
Based on the objective data described below, the patient presents with generalized weakness, decreased activity tolerance, increased pain, impaired balance and altered gait. Pt was received sitting on the EOB with PCT and cleared by nursing to mobilize. She was able to stand and ambulate into the narvaez with RW for overall CGA. Shuffling steps, but presented with step through pattern. She was returned to the room and left sitting up in the chair to eat lunch. Ice applied. She would benefit from HHPT if she has additional assistance at home from family, if not available recommending IP. Patient will benefit from skilled intervention to address the above impairments. Patients rehabilitation potential is considered to be Good Factors which may influence rehabilitation potential include:  
[]         None noted 
[]         Mental ability/status []         Medical condition [x]         Home/family situation and support systems 
[]         Safety awareness 
[]         Pain tolerance/management 
[]         Other: PLAN : 
Recommendations and Planned Interventions: 
[x]           Bed Mobility Training             []    Neuromuscular Re-Education 
[x]           Transfer Training                   []    Orthotic/Prosthetic Training 
[x]           Gait Training                         []    Modalities [x]           Therapeutic Exercises           []    Edema Management/Control 
[x]           Therapeutic Activities            [x]    Patient and Family Training/Education 
[]           Other (comment): Frequency/Duration: Patient will be followed by physical therapy  twice daily to address goals. Discharge Recommendations: Home Health with increased family assistance vs rehab pending progress Further Equipment Recommendations for Discharge: none SUBJECTIVE:  
Patient stated Carolina Cheek would like to go home because of my cat.  OBJECTIVE DATA SUMMARY:  
HISTORY:   
Past Medical History:  
Diagnosis Date  
 HTN (hypertension) 5/27/2011  Hyperlipidemia 12/1/2011  Hypertension  OAB (overactive bladder) 2/15/2013  Other ill-defined conditions(799.89)   
 hyperlipidemia  Other ill-defined conditions(799.89)   
 bladder problems  Other ill-defined conditions(799.89)   
 back pain Past Surgical History:  
Procedure Laterality Date  COLONOSCOPY N/A 3/12/2018 COLONOSCOPY performed by Sohan Arrington MD at [facility] ENDOSCOPY  COLONOSCOPY,DIAGNOSTIC  3/12/2018 [facility]  HX CATARACT REMOVAL Left  HX CORNEAL TRANSPLANT Left  HX DILATION AND CURETTAGE    
 x 2  
 HX TONSILLECTOMY  1938 Prior Level of Function/Home Situation: pt lives with  and cat. She had another fx and went to [address] [place] [address] and then had HHPT. Personal factors and/or comorbidities impacting plan of care: assistance at home Home Situation Home Environment: Private residence # Steps to Enter: 1 One/Two Story Residence: One story Living Alone: No 
Support Systems: Family member(s) Patient Expects to be Discharged to[de-identified] Private residence Current DME Used/Available at Home: Cane, straight, Lift chair, Raised toilet seat EXAMINATION/PRESENTATION/DECISION MAKING: Critical Behavior: 
  
 alert and oriented x 4 Hearing: Auditory Auditory Impairment: NoneSkin:  Dressing intact Edema: WDL Range Of Motion: 
AROM: Generally decreased, functional 
  
  
  
PROM: Generally decreased, functional 
  
  
  
Strength:   
Strength: Generally decreased, functional 
  
  
  
  
  
  
Tone & Sensation:  
Tone: Normal 
  
  
  
  
Sensation: Intact Coordination: 
Coordination: Within functional limits Vision:  
  
Functional Mobility: 
Bed Mobility: 
  
  
 session began and ended in sitting Transfers: 
Sit to Stand: Contact guard assistance Stand to Sit: Contact guard assistance Balance:  
Sitting: Intact Standing: Impaired Standing - Static: Good;Constant support Standing - Dynamic : FairAmbulation/Gait Training:Distance (ft): 80 Feet (ft) Assistive Device: Gait belt;Walker, rolling Ambulation - Level of Assistance: Contact guard assistance Gait Abnormalities: Decreased step clearance; Antalgic; Shuffling gait Base of Support: Widened Speed/Kimberlyn: Pace decreased (<100 feet/min); Shuffled Step Length: Left shortened;Right shortened Functional Measure: 
Barthel Index: 
 
Bathin Bladder: 5 Bowels: 10 
Groomin Dressin Feeding: 10 Mobility: 5 Stairs: 0 Toilet Use: 5 Transfer (Bed to Chair and Back): 10 Total: 55 The Barthel ADL Index: Guidelines 1. The index should be used as a record of what a patient does, not as a record of what a patient could do.  
2. The main aim is to establish degree of independence from any help, physical or verbal, however minor and for whatever reason. 3. The need for supervision renders the patient not independent. 4. A patient's performance should be established using the best available evidence. Asking the patient, friends/relatives and nurses are the usual sources, but direct observation and common sense are also important. However direct testing is not needed. 5. Usually the patient's performance over the preceding 24-48 hours is important, but occasionally longer periods will be relevant. 6. Middle categories imply that the patient supplies over 50 per cent of the effort. 7. Use of aids to be independent is allowed. Kendra Kimball., Barthel, DSukumarWSukumar ([id number]). Functional evaluation: the Barthel Index. [facility] [address] [facility] (14)2. JARRELL Cuba Sa, Viviane Moody., Tessa Goss., [facility], [address] [place] [address] (1999). Measuring the change indisability after inpatient rehabilitation; comparison of the responsiveness of the Barthel Index and Functional [place] Measure. Journal of Neurology, Neurosurgery, and Psychiatry, 66(4), 403-550. Monie Black, N.J.A, PRASAD Ruano, & Mika Carrillo, MSukumarA. (2004.) Assessment of post-stroke quality of life in cost-effectiveness studies: The usefulness of the Barthel Index and the EuroQoL-5D. [facility], 13, 566-50 Physical Therapy Evaluation Charge Determination History Examination Presentation Decision-Making MEDIUM  Complexity : 1-2 comorbidities / personal factors will impact the outcome/ POC  MEDIUM Complexity : 3 Standardized tests and measures addressing body structure, function, activity limitation and / or participation in recreation  MEDIUM Complexity : Evolving with changing characteristics  Other outcome measures barthel  MEDIUM Based on the above components, the patient evaluation is determined to be of the following complexity level: MEDIUM Pain: 
Pain Scale 1: Numeric (0 - 10) Pain Intensity 1: 0 Pain Location 1: Hip Pain Orientation 1: Right Pain Description 1: Aching Pain Intervention(s) 1: Medication (see MAR) Activity Tolerance: WFL Please refer to the flowsheet for vital signs taken during this treatment. After treatment:  
[x]         Patient left in no apparent distress sitting up in chair 
[]         Patient left in no apparent distress in bed 
[x]         Call bell left within reach [x]         Nursing notified 
[]         Caregiver present 
[]         Bed alarm activated COMMUNICATION/EDUCATION:  
The patients plan of care was discussed with: Registered Nurse and . [x]         Fall prevention education was provided and the patient/caregiver indicated understanding. []         Patient/family have participated as able in goal setting and plan of care. [x]         Patient/family agree to work toward stated goals and plan of care. []         Patient understands intent and goals of therapy, but is neutral about his/her participation. []         Patient is unable to participate in goal setting and plan of care. Thank you for this referral. 
Servando Pace, PT, DPT Time Calculation: 16 mins

## 2019-01-22 NOTE — PROGRESS NOTES
Problem: Self Care Deficits Care Plan (Adult) Goal: *Acute Goals and Plan of Care (Insert Text) Occupational Therapy Goals Initiated 1/22/2019 1. Patient will perform lower body dressing using adaptive aids  at modified independence level within 7 days. 2. Patient will perform toilet transfers at modified independence level using Walkers, Type: Rolling Walker  within 7 days. 3. Patient will perform all aspects of toileting at supervision/set-up level within 7 days. 4. Patient will demonstrate 3/3 hip precautions without cues within 7 days. 5.  Patient will perform standing adls for at least 10 minutes within 7 days. Occupational Therapy EVALUATION Patient: Saira Hernández (56 y.o. female) Date: 1/22/2019 Primary Diagnosis: Hip fracture ([address] Procedure(s) (LRB): 
REVISION RIGHT HIP HEMIARTHROPLASTY (Right) 1 Day Post-Op Precautions:   WBAT, Fall, Total hip ASSESSMENT : 
Based on the objective data described below, the patient presents with baseline vision impairment, history of 2 falls in approx 6 months (pt reports LOB),  hip precautions s/p revision post fall, decreased balance, generalized weakness, and decreased endurance impairing independence and safety during adls and mobility. Pt is functioning at supervision to maximal assistance for self care and is Ramakrishna for bed mobility and CGA during adl mobility in her room. Pt would benefit from rehab at discharge to facilitate increased independence and safety during adls prior to returning home with her . . 
 
Patient will benefit from skilled intervention to address the above impairments. Patients rehabilitation potential is considered to be Good Factors which may influence rehabilitation potential include:  
[]                None noted []                Mental ability/status [x]                Medical condition []                Home/family situation and support systems []                Safety awareness []                Pain tolerance/management 
[]                Other: PLAN : 
Recommendations and Planned Interventions: 
[x]                  Self Care Training                  [x]           Therapeutic Activities [x]                  Functional Mobility Training    []           Cognitive Retraining 
[x]                  Therapeutic Exercises           [x]           Endurance Activities [x]                  Balance Training                   []           Neuromuscular Re-Education []                  Visual/Perceptual Training     [x]      Home Safety Training 
[x]                  Patient Education                 [x]           Family Training/Education []                  Other (comment): Frequency/Duration: Patient will be followed by occupational therapy 5 times a week to address goals. Discharge Recommendations: Rehab Further Equipment Recommendations for Discharge: tbd SUBJECTIVE:  
Patient stated no crossing legs, bending at 90 degrees.  The patient stated 2/3 hip precautions. Reviewed all 3 with patient. OBJECTIVE DATA SUMMARY:  
HISTORY:  
Past Medical History:  
Diagnosis Date  
 HTN (hypertension) 5/27/2011  Hyperlipidemia 12/1/2011  Hypertension  OAB (overactive bladder) 2/15/2013  Other ill-defined conditions(799.89)   
 hyperlipidemia  Other ill-defined conditions(799.89)   
 bladder problems  Other ill-defined conditions(799.89)   
 back pain Past Surgical History:  
Procedure Laterality Date  COLONOSCOPY N/A 3/12/2018 COLONOSCOPY performed by Perlita Hart MD at [facility] ENDOSCOPY  COLONOSCOPY,DIAGNOSTIC  3/12/2018 [facility] [address]  HX CATARACT REMOVAL Left  HX CORNEAL TRANSPLANT Left  HX DILATION AND CURETTAGE    
 x 2  
 HX TONSILLECTOMY  1938 Prior Level of Function/Environment/Context: pt lives with her  (hearing impaired - per pt and would not hear her at night if she needed assist) and reports independence in adls and IADLs. She was driving, but reports that she no longer will and neither will her  (early dementia). Pt reports a good support system. Pt has housekeeping services at baseline. Occupations in which the patient is/was successful, what are the barriers preventing that success: medical condition Performance Patterns (routines, roles, habits, and rituals):  
Personal Interests and/or values:  
Expanded or extensive additional review of patient history: initial hemiarthroplasty post fall in June 2018 Home Situation Home Environment: Private residence # Steps to Enter: 1 One/Two Story Residence: One story Living Alone: No 
Support Systems: Family member(s) Patient Expects to be Discharged to[de-identified] Private residence Current DME Used/Available at Home: Cane, straight, Lift chair, Raised toilet seat Hand dominance: RightEXAMINATION OF PERFORMANCE DEFICITS: 
Cognitive/Behavioral Status: 
Neurologic State: Alert Orientation Level: Oriented to person;Oriented to place;Oriented to situation;Oriented to time Cognition: Follows commands Perception: Appears intact(vision impairment uses glasses and magnifier for reading) Perseveration: No perseveration noted Safety/Judgement: Awareness of environment; Fall prevention;Home safety Skin: generally intact Edema: none observed--surgical site mild Hearing: Auditory Auditory Impairment: None Vision/Perceptual:   
Tracking: (scans enviroment appropriately) Acuity: Impaired near vision; Impaired far vision Corrective Lenses: Glasses(plus magnifier for reading) Range of Motion: BUEs: 
AROM: Generally decreased, functional 
PROM: Generally decreased, functional 
  
  
  
  
  
  
Strength: BUEs:  
Strength: Generally decreased, functional 
  
  
  
  
Coordination: 
Coordination: Within functional limits Fine Motor Skills-Upper: Left Intact; Right Intact Gross Motor Skills-Upper: Left Intact; Right Intact Tone & Sensation: 
 
Tone: Normal 
Sensation: Intact Balance: 
Sitting: Impaired Sitting - Static: Good (unsupported) Sitting - Dynamic: (unable to reach to feet due to precautions TH) Standing: Impaired Standing - Static: Constant support;Good Standing - Dynamic : Fair(cues for safe techniqeu) Functional Mobility and Transfers for ADLs:Bed Mobility: 
Supine to Sit: Minimum assistance Scooting: Supervision Transfers: 
Sit to Stand: Contact guard assistance Stand to Sit: Contact guard assistance Bathroom Mobility: Contact guard assistance Toilet Transfer : Contact guard assistance ADL Assessment: 
Feeding: Supervision Oral Facial Hygiene/Grooming: Stand-by assistance;Setup Bathing: Moderate assistance Upper Body Dressing: Setup Lower Body Dressing: Maximum assistance Toileting: Stand by assistance ADL Intervention and task modifications: 
  
Pt educated on role Of OT and OT plan of care. Pt performed bed mobility and ambulation into bathroom for toilet transfer and st anding grooming at sink. Needs consistent cues for safe technique during transfers and mobility. Cues for TH precautions provided during adls. Cognitive Retraining Safety/Judgement: Awareness of environment; Fall prevention;Home safety Home safety: Patient instructed on home modifications and safety (raise height of ADL objects, appropriate height of chair surfaces, recliner safety, change of floor surfaces, clear pathways) to increase independence and fall prevention. Patient indicated understanding. Standing: Patient instructed and demonstrated to walk up to sink/counter top/surfaces, step into walker to increase safety of joint and fall prevention  Patient educated to maintain neutral alignment in hip during adls.    Educated not to twist on surgical LE and take steps while turning using RW--pt verbalized understanding. Therapeutic Exercise: 
Encouraged up in chair for meals and throughout the day Functional Measure: 
Barthel Index: 
 
Bathin Bladder: 5 Bowels: 10 
Groomin Dressin Feeding: 10 Mobility: 0 Stairs: 0 Toilet Use: 5 Transfer (Bed to Chair and Back): 10 Total: 50 The Barthel ADL Index: Guidelines 1. The index should be used as a record of what a patient does, not as a record of what a patient could do. 2. The main aim is to establish degree of independence from any help, physical or verbal, however minor and for whatever reason. 3. The need for supervision renders the patient not independent. 4. A patient's performance should be established using the best available evidence. Asking the patient, friends/relatives and nurses are the usual sources, but direct observation and common sense are also important. However direct testing is not needed. 5. Usually the patient's performance over the preceding 24-48 hours is important, but occasionally longer periods will be relevant. 6. Middle categories imply that the patient supplies over 50 per cent of the effort. 7. Use of aids to be independent is allowed. Bev Dumont., Barthel, DSukumarW. ([id number]). Functional evaluation: the Barthel Index. [address] [facility] (14)2. JARRELL Oconnor, Anastasiia Lewis., Flavia Casas.[facility], [address] [place] [address] (). Measuring the change indisability after inpatient rehabilitation; comparison of the responsiveness of the Barthel Index and Functional [place] Measure. Journal of Neurology, Neurosurgery, and Psychiatry, 66(4), 964-550. Prudence Salas NLISA.ALVIN, PRASAD Ruano, & Juan Bence, MSukumarA. (2004.) Assessment of post-stroke quality of life in cost-effectiveness studies: The usefulness of the Barthel Index and the EuroQoL-5D. [facility], 13, 245-97 Occupational Therapy Evaluation Charge Determination History Examination Decision-Making MEDIUM Complexity : Expanded review of history including physical, cognitive and psychosocial  history  MEDIUM Complexity : 3-5 performance deficits relating to physical, cognitive , or psychosocial skils that result in activity limitations and / or participation restrictions MEDIUM Complexity : Patient may present with comorbidities that affect occupational performnce. Miniml to moderate modification of tasks or assistance (eg, physical or verbal ) with assesment(s) is necessary to enable patient to complete evaluation Based on the above components, the patient evaluation is determined to be of the following complexity level: MEDIUM Pain: 
Pain Scale 1: Numeric (0 - 10) Pain Intensity 1: 0 Pain Location 1: Hip Pain Orientation 1: Right Pain Description 1: Aching Pain Intervention(s) 1: Medication (see MAR) Activity Tolerance: No complaints during tx session. After treatment:  
[x] Patient left in no apparent distress sitting up in chair 
[] Patient left in no apparent distress in bed 
[x] Call bell left within reach [x] Nursing notified 
[x] Caregiver present 
[] Bed alarm activated COMMUNICATION/EDUCATION:  
The patients plan of care was discussed with: Physical Therapist and Registered Nurse. 
[]    Home safety education was provided and the patient/caregiver indicated understanding. [x]    Patient/family have participated as able in goal setting and plan of care. []    Patient/family agree to work toward stated goals and plan of care. []    Patient understands intent and goals of therapy, but is neutral about his/her participation. []    Patient is unable to participate in goal setting and plan of care. This patients plan of care is appropriate for delegation to hospitals. Thank you for this referral. 
Chelle Johnson OTR/L Time Calculation: 36 mins

## 2019-01-22 NOTE — PROGRESS NOTES
Pharmacy Clarification of the Prior to Admission Medication Regimen Retrospective to the Admission Medication Reconciliation The patient was interviewed regarding clarification of the prior to admission medication regimen. Patient was questioned regarding use of any other inhalers, topical products, over the counter medications, herbal medications, vitamin products or ophthalmic/nasal/otic medication use. Patient was able to verify her PTA med list except blood pressure medications. Pharmacy called patient's outpatient pharmacy, [address] [place] [address], [phone], and spoke with pharmacist, who was able to verify patient's active blood pressure prescriptions. Information Obtained From: Patient, Mary Jo Garcia, patient's outpatient pharmacy Of note, she uses only one pharmacy, which has an old Rx for Benezepril (has not been filled in a few months). Patient may be  confusing this with Lisinopril. ..? , who helps her with medications, could not be reached. Recommendations/Findings: The following amendments were made to the patient's active medication list on file at [facility]:  
 
1) Additions: None 2) Removals:  
? Benazepril ? Vitamin D 
 
3) Changes: 
? acetaminophen (TYLENOL EXTRA STRENGTH) 500 mg tablet (Old regimen: PRN /New regimen: 2 tablets Q6H PRN) ? naproxen sodium (ALEVE) 220 mg cap (Old regimen: PRN /New regimen: 1 capsule TID PRN) 4) Pertinent Pharmacy Findings: 
? Updated patients preferred outpatient pharmacy to: [address] [place] [address]  
 
PTA medication list was corrected to the following:  
 
Prior to Admission Medications Prescriptions Last Dose Informant Patient Reported? Taking?  
acetaminophen (TYLENOL EXTRA STRENGTH) 500 mg tablet 1/19/2019 at Unknown time Self Yes Yes Sig: Take 1,000 mg by mouth every six (6) hours as needed for Pain. amLODIPine (NORVASC) 10 mg tablet 1/19/2019 at Unknown time Other No Yes Sig: TAKE 1 TABLET BY MOUTH EVERY MORNING  
aspirin delayed-release 81 mg tablet 2019 at Unknown time Self Yes Yes Sig: Take 81 mg by mouth daily. atorvastatin (LIPITOR) 20 mg tablet 2019 at Unknown time Self No Yes Sig: TAKE 1 TABLET BY MOUTH IN THE EVENING  
calcium-cholecalciferol, d3, (CALCIUM 600 + D) 600-125 mg-unit tab 2019 at Unknown time Self No Yes Sig: Take 1 Tab by mouth two (2) times a day. capsicum oleoresin 0.025 % topical cream 2018 at Unknown time Self No Yes Sig: Apply  to affected area three (3) times daily. coenzyme Q-10 (CO Q-10) 200 mg capsule 2019 at Unknown time Self No Yes Sig: Take 1 Cap by mouth daily. glucosamine-chondroit-vit c-mn (GLUCOSAMINE 1500 COMPLEX) 500-400 mg capsule 2019 at Unknown time Self No Yes Sig: Take 1 Cap by mouth daily. lidocaine (LIDODERM) 5 % 2018 at Unknown time Self Yes Yes Si Patch by TransDERmal route daily as needed. Apply patch to the affected area for 12 hours a day and remove for 12 hours a day. metoprolol tartrate (LOPRESSOR) 25 mg tablet 2019 at Unknown time Other No Yes Sig: TAKE 1 TAB BY MOUTH EVERY TWELVE (12) HOURS. HOLD FOR SBP LESS THAN 120 OR HR LESS THAN 65  
multivit-minerals/folic acid (CENTRUM MULTIGUMMIES PO) 2019 at Unknown time Self Yes Yes Sig: Take 1 Tab by mouth daily. naproxen sodium (ALEVE) 220 mg cap 2019 at Unknown time Self Yes Yes Sig: Take 220 mg by mouth three (3) times daily as needed for Pain.  
polyethylene glycol (MIRALAX) 17 gram/dose powder 2018 at Unknown time Self Yes Yes Sig: Take 17 g by mouth daily as needed. senna-docusate (SENNA PLUS) 8.6-50 mg per tablet 2018 at Unknown time Self Yes Yes Sig: Take 1 Tab by mouth daily as needed. Facility-Administered Medications: None Thank you, 
Gabby Chandler PharmD candidate Class of 2019 Reviewed ZACK Sanchez [facility]

## 2019-01-22 NOTE — PROGRESS NOTES
Bedside and Verbal shift change report given to [facility] (oncoming nurse) by Iris Sequeira (offgoing nurse). Report included the following information SBAR, Kardex, Intake/Output, MAR and Recent Results.

## 2019-01-22 NOTE — PROGRESS NOTES
Bedside shift change report given to [address] [place] [address] Road (oncoming nurse) by Patsy Casanova RN (offgoing nurse). Report included the following information SBAR, Kardex, MAR and Recent Results.

## 2019-01-23 LAB
ANION GAP SERPL CALC-SCNC: 8 MMOL/L (ref 5–15)
BUN SERPL-MCNC: 12 MG/DL (ref 6–20)
BUN/CREAT SERPL: 21 (ref 12–20)
CALCIUM SERPL-MCNC: 8.3 MG/DL (ref 8.5–10.1)
CHLORIDE SERPL-SCNC: 101 MMOL/L (ref 97–108)
CO2 SERPL-SCNC: 24 MMOL/L (ref 21–32)
CREAT SERPL-MCNC: 0.56 MG/DL (ref 0.55–1.02)
GLUCOSE SERPL-MCNC: 97 MG/DL (ref 65–100)
POTASSIUM SERPL-SCNC: 3.8 MMOL/L (ref 3.5–5.1)
SODIUM SERPL-SCNC: 133 MMOL/L (ref 136–145)

## 2019-01-23 PROCEDURE — 80048 BASIC METABOLIC PNL TOTAL CA: CPT

## 2019-01-23 PROCEDURE — 74011250637 HC RX REV CODE- 250/637: Performed by: GENERAL ACUTE CARE HOSPITAL

## 2019-01-23 PROCEDURE — 74011250637 HC RX REV CODE- 250/637: Performed by: ORTHOPAEDIC SURGERY

## 2019-01-23 PROCEDURE — 36415 COLL VENOUS BLD VENIPUNCTURE: CPT

## 2019-01-23 PROCEDURE — 74011250637 HC RX REV CODE- 250/637: Performed by: PHYSICIAN ASSISTANT

## 2019-01-23 PROCEDURE — 65270000029 HC RM PRIVATE

## 2019-01-23 PROCEDURE — 97110 THERAPEUTIC EXERCISES: CPT

## 2019-01-23 PROCEDURE — 74011250636 HC RX REV CODE- 250/636: Performed by: INTERNAL MEDICINE

## 2019-01-23 PROCEDURE — 97116 GAIT TRAINING THERAPY: CPT

## 2019-01-23 PROCEDURE — 97535 SELF CARE MNGMENT TRAINING: CPT | Performed by: OCCUPATIONAL THERAPIST

## 2019-01-23 RX ORDER — HYDROCODONE BITARTRATE AND ACETAMINOPHEN 5; 325 MG/1; MG/1
1 TABLET ORAL
Status: DISCONTINUED | OUTPATIENT
Start: 2019-01-23 | End: 2019-01-24 | Stop reason: HOSPADM

## 2019-01-23 RX ORDER — SODIUM CHLORIDE AND POTASSIUM CHLORIDE .9; .15 G/100ML; G/100ML
SOLUTION INTRAVENOUS CONTINUOUS
Status: DISCONTINUED | OUTPATIENT
Start: 2019-01-23 | End: 2019-01-23

## 2019-01-23 RX ORDER — ACETAMINOPHEN 500 MG
500 TABLET ORAL EVERY 6 HOURS
Status: DISCONTINUED | OUTPATIENT
Start: 2019-01-23 | End: 2019-01-24 | Stop reason: HOSPADM

## 2019-01-23 RX ADMIN — ACETAMINOPHEN 650 MG: 325 TABLET ORAL at 00:58

## 2019-01-23 RX ADMIN — FAMOTIDINE 10 MG: 20 TABLET ORAL at 09:55

## 2019-01-23 RX ADMIN — ACETAMINOPHEN 500 MG: 500 TABLET ORAL at 17:39

## 2019-01-23 RX ADMIN — STANDARDIZED SENNA CONCENTRATE AND DOCUSATE SODIUM 1 TABLET: 8.6; 5 TABLET, FILM COATED ORAL at 17:39

## 2019-01-23 RX ADMIN — HYDROCODONE BITARTRATE AND ACETAMINOPHEN 1 TABLET: 5; 325 TABLET ORAL at 20:34

## 2019-01-23 RX ADMIN — ACETAMINOPHEN 650 MG: 325 TABLET ORAL at 14:07

## 2019-01-23 RX ADMIN — Medication 10 ML: at 04:35

## 2019-01-23 RX ADMIN — STANDARDIZED SENNA CONCENTRATE AND DOCUSATE SODIUM 1 TABLET: 8.6; 5 TABLET, FILM COATED ORAL at 09:54

## 2019-01-23 RX ADMIN — Medication 10 ML: at 22:00

## 2019-01-23 RX ADMIN — ASPIRIN 81 MG 81 MG: 81 TABLET ORAL at 09:55

## 2019-01-23 RX ADMIN — ASPIRIN 81 MG 81 MG: 81 TABLET ORAL at 17:39

## 2019-01-23 RX ADMIN — POLYETHYLENE GLYCOL 3350 17 G: 17 POWDER, FOR SOLUTION ORAL at 09:55

## 2019-01-23 RX ADMIN — Medication 10 ML: at 14:06

## 2019-01-23 RX ADMIN — FAMOTIDINE 10 MG: 20 TABLET ORAL at 17:40

## 2019-01-23 RX ADMIN — METOPROLOL TARTRATE 25 MG: 25 TABLET, FILM COATED ORAL at 10:08

## 2019-01-23 RX ADMIN — METOPROLOL TARTRATE 25 MG: 25 TABLET, FILM COATED ORAL at 17:40

## 2019-01-23 RX ADMIN — SODIUM CHLORIDE AND POTASSIUM CHLORIDE: 9; 1.49 INJECTION, SOLUTION INTRAVENOUS at 05:37

## 2019-01-23 RX ADMIN — AMLODIPINE BESYLATE 10 MG: 5 TABLET ORAL at 09:54

## 2019-01-23 RX ADMIN — ACETAMINOPHEN 650 MG: 325 TABLET ORAL at 06:12

## 2019-01-23 NOTE — PROGRESS NOTES
Bedside and Verbal shift change report given to Ezio Núñez RN (oncoming nurse) by Shaka Tanner (offgoing nurse). Report included the following information SBAR, Kardex, Intake/Output, MAR and Recent Results.

## 2019-01-23 NOTE — PROGRESS NOTES
Bedside and Verbal shift change report given to MICHEAL Castellon RN  (oncoming nurse) by SWAPNIL Phillips (offgoing nurse). Report included the following information SBAR, Kardex, OR Summary, Procedure Summary, Intake/Output, MAR and Recent Results.

## 2019-01-23 NOTE — PROGRESS NOTES
Problem: Self Care Deficits Care Plan (Adult) Goal: *Acute Goals and Plan of Care (Insert Text) Occupational Therapy Goals Initiated 1/22/2019 1. Patient will perform lower body dressing using adaptive aids  at modified independence level within 7 days. 2. Patient will perform toilet transfers at modified independence level using Walkers, Type: Rolling Walker  within 7 days. 3. Patient will perform all aspects of toileting at supervision/set-up level within 7 days. 4. Patient will demonstrate 3/3 hip precautions without cues within 7 days. 5.  Patient will perform standing adls for at least 10 minutes within 7 days. Occupational Therapy TREATMENT Patient: Tae Momin (53 y.o. female) Date: 1/23/2019 Diagnosis: Hip fracture ([address] <principal problem not specified> Procedure(s) (LRB): 
REVISION RIGHT HIP HEMIARTHROPLASTY (Right) 2 Days Post-Op Precautions: WBAT, Fall, Total hip Chart, occupational therapy assessment, plan of care, and goals were reviewed. ASSESSMENT: 
Pt is progressing towards goals. She demonstrates tolerating at least 5 minutes of standing adls and will need to practice with adaptive aids for lower body adls. Pt reports that she has a sock aide and reacher at home. Pt is not able to recall her total hip precautions and they were reinforced throughout tx session. Pt will benefit from short term intensive rehab to increase independence and safety prior to discharge home with her  and supportive network. Progression toward goals: 
[]          Improving appropriately and progressing toward goals [x]          Improving slowly and progressing toward goals 
[]          Not making progress toward goals and plan of care will be adjusted PLAN: 
Patient continues to benefit from skilled intervention to address the above impairments. Continue treatment per established plan of care. Discharge Recommendations:  Rehab and [facility] Further Equipment Recommendations for Discharge:  tbd at rehab SUBJECTIVE:  
Patient stated  0/3 hip precautions initially then at end of tx session, named 2/3. Impaired memory for stating precautions Reviewed all 3 with patient. OBJECTIVE DATA SUMMARY:  
Cognitive/Behavioral Status: 
Neurologic State: Alert Orientation Level: Oriented to person, Oriented to place Cognition: Follows commands, Memory loss Safety/Judgement: Awareness of environment, Fall prevention, Home safetyFunctional Mobility and Transfers for ADLs:Bed Mobility: 
Transfers: 
Sit to Stand: Contact guard assistance Functional Transfers Bathroom Mobility: Contact guard assistance Toilet Transfer : Contact guard assistance(cues for safe technique) Adaptive Equipment: Grab bars; Margene Luce (comment) Bed to Chair: Contact guard assistance;Assist x1 Balance: 
Sitting: Intact; Without support Sitting - Static: Good (unsupported) Sitting - Dynamic: (educ. on precautions) Standing: Intact; With support Standing - Static: Good;Constant support Standing - Dynamic : Fair ADL Intervention and Instruction: 
Feeding Feeding Assistance: (states that due to her IV site in L elbow area, needs A) Cutting Food: (reports needs maximal assistance due to IV site) Drink to Mouth: Independent Grooming Washing Hands: Supervision/set-up; Contact guard assistance Adaptive Equipment: (rw) Lower Body Dressing Assistance Dressing Assistance: (will need kit of adaptive aids-states she has sock aide/reac) Toileting Toileting Assistance: Supervision/set up Bladder Hygiene: Supervision/set-up Clothing Management: Minimum assistance Cues: Verbal cues provided Adaptive Equipment: Grab bars; Walker(cues for safe technique ) Pt performed standing adls for at least 5 minutes. Worked on safety using RW for reaching into closet, drawers and frig.   Pt verbalized understanding re: squaring up to task and keeping most used items within reach.  Pt able to utilize BUEs for approx one minute without suport on RW with close S. This date. Cognitive Retraining Safety/Judgement: Awareness of environment; Fall prevention;Home safety Pain: 
Pain Scale 1: Numeric (0 - 10) Pain Intensity 1: 4 Pain Location 1: Hip Pain Orientation 1: Right Pain Description 1: Aching Pain Intervention(s) 1: Ice Activity Tolerance:  
Improving. No complaints of fatigue After treatment:  
[x] Patient left in no apparent distress sitting up in chair 
[] Patient left in no apparent distress in bed 
[x] Call bell left within reach [x] Nursing notified 
[x] Caregiver present 
[] Bed alarm activated COMMUNICATION/COLLABORATION:  
The patients plan of care was discussed with: Physical Therapy Assistant and Registered Nurse GRACIELA Flores/L Time Calculation: 26 mins

## 2019-01-23 NOTE — PROGRESS NOTES
11:58 AM- CM informed by attending to contact pt dtr Lesli Krause who has questions in regards to discharge planning. CM spoke with pt who gave permission to CM to contact pt dtr. CM spoke with Lesli Kraues who is inquiring about the team's recommendation. CM explained that Attending/PA and PT are recommending pt go to a SNF for rehab prior to d/c. Pt dtr states pt and pt family are willing to have pt do that and pt will need transportation arranged at time of discharge. CM spoke with pt who understands and would like referral sent to [facility]. Referral sent. Pt will d/c tomorrow vs Friday depending on medical stability, bed availability and insurance authorization. CM will continue to follow pt for discharge planning JACOB Retana, CM [facility] [phone]

## 2019-01-23 NOTE — PROGRESS NOTES
Problem: Mobility Impaired (Adult and Pediatric) Goal: *Acute Goals and Plan of Care (Insert Text) Physical Therapy Goals Initiated 1/22/2019 1. Patient will move from supine to sit and sit to supine  in bed with independence within 4 days. 2. Patient will perform sit to stand with modified independence within 4 days. 3. Patient will ambulate with modified independence for 300 feet with the least restrictive device within 4 days. 4. Patient will ascend/descend 2 stairs with B handrail(s) with modified independence within 4 days. 5. Patient will verbalize and demonstrate understanding of posterior precautions per protocol within 4 days. 6. Patient will perform all home exercise program per protocol with independence within 4 days. physical Therapy TREATMENT Patient: Adryan Kemp (89 y.o. female) Date: 1/23/2019 Diagnosis: Hip fracture ([address] Procedure(s) (LRB): 
REVISION RIGHT HIP HEMIARTHROPLASTY (Right) 2 Days Post-Op Precautions: WBAT, Fall, Total hip Chart, physical therapy assessment, plan of care and goals were reviewed. ASSESSMENT: pt progressing well towards goals, no LOB, min SOB, did fair with bed mob and transfers, good motivation, did fair with stair trng and ther-ex, vc's for safety and proper RW use. Progression toward goals: 
[]      Improving appropriately and progressing toward goals [x]      Improving slowly and progressing toward goals 
[]      Not making progress toward goals and plan of care will be adjusted PLAN: 
Patient continues to benefit from skilled intervention to address the above impairments. Continue treatment per established plan of care. Discharge Recommendations:  [facility] Further Equipment Recommendations for Discharge:  rolling walker SUBJECTIVE: The patient stated 0/3 hip precautions. Reviewed all 3 with patient. OBJECTIVE DATA SUMMARY:  
Critical Behavior: 
Neurologic State: Alert Orientation Level: Oriented X4 Cognition: Follows commands Safety/Judgement: Awareness of environment, Fall prevention, Home safety Functional Mobility Training: 
Bed Mobility: 
Rolling: Stand-by assistance Supine to Sit: Stand-by assistance Scooting: Stand-by assistance Level of Assistance: Stand-by assistance Interventions: Verbal cues Transfers: 
Sit to Stand: Contact guard assistance;Assist x1 Stand to Sit: Contact guard assistance;Assist x1 Bed to Chair: Contact guard assistance;Assist x1 Interventions: Tactile cues; Verbal cues Level of Assistance: Contact guard assistance Balance: 
Sitting: Intact; Without support Sitting - Static: Good (unsupported) Sitting - Dynamic: Not tested Standing: Intact; With support Standing - Static: Good;Constant support Standing - Dynamic : Good Ambulation/Gait Training: 
Distance (ft): 100 Feet (ft) Assistive Device: Walker, rolling;Gait belt Ambulation - Level of Assistance: Contact guard assistance Gait Abnormalities: Antalgic;Decreased step clearance Right Side Weight Bearing: Full Left Side Weight Bearing: Full Base of Support: Widened Speed/Kimberlyn: Pace decreased (<100 feet/min) Step Length: Left shortened;Right shortened Interventions: Tactile cues; Verbal cues Stairs: 
Number of Stairs Trained: 5 Stairs - Level of Assistance: Contact guard assistance Rail Use: Left Therapeutic Exercises:  
 
sitting EXERCISE Sets Reps Active Active Assist  
Passive Comments Ankle pumps 1 10 [x] [] [] bilat Heel raises 1 10 [x] [] [] \" Toe tap 1 10 [x] [] [] \" Knee ext 1 10 [x] [] [] \" Hip flex 1 10 [x] [] [] \" Abd & Add 1 10 [x] [] [] \"  
 
Pain: 
Pain Scale 1: Numeric (0 - 10) Pain Intensity 1: 0 Activity Tolerance: fair After treatment:  
[x]  Patient left in no apparent distress sitting up in chair 
[]  Patient left in no apparent distress in bed 
[x]  Call bell left within reach [x]  Nursing notified []  Caregiver present 
[]  Bed alarm activated COMMUNICATION/COLLABORATION:  
The patients plan of care was discussed with: Registered Nurse Simon Harmon PTA Time Calculation: 25 mins

## 2019-01-23 NOTE — PROGRESS NOTES
Problem: Falls - Risk of 
Goal: *Absence of Falls Document Laura Servin Fall Risk and appropriate interventions in the flowsheet. Outcome: Progressing Towards Goal 
Fall Risk Interventions: 
Mobility Interventions: Patient to call before getting OOB Medication Interventions: Patient to call before getting OOB, Teach patient to arise slowly Elimination Interventions: Call light in reach, Patient to call for help with toileting needs History of Falls Interventions: Door open when patient unattended, Room close to nurse's station

## 2019-01-23 NOTE — PROGRESS NOTES
ORTHO - Progress Note Post Op day: 2 Days Post-Op Vern La     [id number]  female    80 y.o.    7/3/1932 Admit date: 2019 Date of Surgery: 2019 Procedures: Procedure(s):REVISION RIGHT HIP HEMIARTHROPLASTY Admitting Physician: Vandana Alejo MD  
Surgeon:  Duc Poon) and Role:   Nini Luciano MD - Primary Consulting Physician(s): Treatment Team: Attending Provider: Silke Caballero MD; Consulting Provider: Quinn Perkins MD; Consulting Provider: Allyn Muro NP; Consulting Provider: Jordy Quintero MD; Utilization Review: Broderick Mcgregor RN; Care Manager: Schuyler Strickland SUBJECTIVE: 
  
Vern La is a 80 y.o. female s/p a  REVISION RIGHT HIP HEMIARTHROPLASTY resting in the bed. Patient has complaints of appropriate/minimal post-op pain, tolerating PO pain medications sparingly/ as needed. Denies F/C, nausea, vomiting, dizziness, lightheadedness, chest pain, or shortness of breath. OBJECTIVE: 
 
  
Physical Exam: 
General: alert, cooperative, no distress. Gastrointestinal:  non-distended . Cardiovascular: equal pulses in the upper lower extremities,  Brisk cap refill in all distal extremities Genitourinary: Hyman Respiratory: No respiratory distress Neurological:Neurovascular exam within normal limits. Senstion intact: LE bilat. Motor: + DF/PF/EHL. Musculoskeletal: Mohini's sign negative in bilateral lower extremities. Calves soft, supple, non-tender upon palpation or with passive stretch. Dressing/Wound:  Clean, dry and intact. No significant erythema or swelling. Vital Signs:  
  
  
Patient Vitals for the past 8 hrs: 
 BP Temp Pulse Resp SpO2  
19 1152 154/43 98.8 °F (37.1 °C) 70 18 96 % 19 0730 132/47 98.2 °F (36.8 °C) 72 18 94 % Temp (24hrs), Av.3 °F (36.8 °C), Min:97.9 °F (36.6 °C), Max:99.1 °F (37.3 °C) Labs:  
  
 
Recent Labs  
  19 
0310 19 
1430 HCT  --  40.6 HGB 9.0* 13.6 INR  --  1.1 PT/OT:  
 
  
Gait Base of Support: Widened Speed/Kimberlyn: Pace decreased (<100 feet/min) Step Length: Left shortened, Right shortened Gait Abnormalities: Antalgic, Decreased step clearance Ambulation - Level of Assistance: Contact guard assistance Distance (ft): 100 Feet (ft) Assistive Device: Walker, rolling, Gait belt Rail Use: Left Stairs - Level of Assistance: Contact guard assistance Number of Stairs Trained: 5 Interventions: Tactile cues, Verbal cues Interventions: Tactile cues, Verbal cues ASSESSMENT / PLAN:  
Active Problems: 
  Hip fracture ([facility] [address] (1/20/2019) -  DC villalpando -  Narcotic pain meds adjusted -  Continue PT/OT WBAT 
-  DVT prophylaxis- SCD w/ ASA 81BID 
-  DC planning - SNF,v/s HH/PT - continue working with therapy services Signed By: Irvin Almonte PA-C

## 2019-01-23 NOTE — DISCHARGE INSTRUCTIONS
Stephane Bowling  Surgery: Hip Fracture Repair  Surgeon:   Aramis Arroyo MD  Surgery Date:  1/21/2019     To relieve pain:   Use ice/gel packs.  Put the ice pack directly over the wound, or anywhere you are hurting or swollen.  To control pain and swelling, keep ice on regularly, especially after physical activity.  The packs should stay cold for 3-4 hours. When it is not cold anymore, rotate with the packs in the freezer.  Elevate your leg. This will also keep swelling down.  Rest for at least 20 minutes between activity or exercises.  To keep track of your pain medications, write down what you take and when you take it.  The last dose of pain medication you got in the hospital was:     Medication    Dose    Date & Time         Choose your medications based on the pain scale below:     To keep your pain under control, take Tylenol every 6 hours for 14 days - even if you feel like you dont need it.  For mild to moderate pain (1-6 on pain scale), take one pain pill every 3-4 hours as needed.  For severe pain (7-10 on pain scale), take two pain pills every 3-4 hours as needed.  To prevent nausea, take your pain medications with food. Pain Scale                 As your pain lessens:     Slowly start taking less pain medication. You may do this by waiting longer between doses or by taking smaller doses.  Stop using the pain medications as soon as you no longer need it, usually in 2-3 weeks.  Aspirin   To prevent blood clots, you will need to take Aspirin 81 mg twice a day for 30 days.  To prevent stomach upset or bleeding:   Take Pepcid 20 mg twice a day, or a similar home medication, while you are taking a blood thinner.    Do not take non-steroidal anti-inflammatory (NSAID) medications (Ibuprofen, Advil, Motrin, Naproxen, etc.)                                               OPSITE (Honeycomb dressing)     Keep your clear, waterproof dressing in place for 5-7 days after your leave the hospital.      If you are still having drainage, you will need to change your dressing in 5-7 days. You will be given one extra dressing to use at home.  If there is no more drainage from the wound, you may leave it open to the air. OPSITE DRESSING INSTRUCTIONS                              DO NOTs:   Do not rub your surgical wound   Do not put lotion or oils on your wound.  Do not take a tub bath or go swimming until your doctor says it is ok.  You may shower with this dressing over your wound.  After showering pat the dressing dry.  If you have staples a home health nurse/rehab will remove them in about 10-14 days after surgery.  To increase and promote healing:     Stop Smoking (or at least cut back on       Smoking).  Eat a well-balanced diet (high in protein       and vitamin C).  If you have a poor appetite, drink Ensure, Glucerna, or CarnationInstant Breakfast for the next 30 days.  If you are diabetic, you should control your blood         sugars to prevent infection and help your wound         to heal.       To prevent constipation, stay active & drink plenty of fluid.  While using pain medications, you should also take stool softeners and laxatives, such as Pericolace and Miralax.  If you are having too many bowel movements, then you may need  to stop taking the laxatives.  You should have a bowel movement 3-4 days after surgery and then at least every other day while on pain medication.  To improve your recovery, you must be active!  WEIGHT BEARING   As Tolerated = You can put as much weight on your leg as you can tolerate while walking.          THERAPY   If you go to a rehab facility, physical therapy (PT) will need to work with you daily, and sometimes twice a day to teach you how to:     Get in and out of the bed   Walk (gait training) and climb stairs   Do exercises and gain strength   Use a walker, crutches or cane     You may also need to have occupational therapy (OT) work with you to help you practice:     Getting on and off the toilet   Self-care (brushing teeth, eating, bathing, etc)     If you go directly home, home health physical therapy will come the day after you leave the hospital.  They will visit about 4 times over the next couple of weeks to teach you how to get out of bed, to safely walk in your home, and to do your exercises.  NO DRIVING until your surgeon tells you it is ok.  You can return to work when cleared by a physician.  Please call your physician immediately if you have:     Constant bleeding from your wound.  Increasing redness or swelling around your wound (Some warmth, bruising and swelling is normal).  Change in wound drainage (increase in amount, color, or bad smell).  Change in mental status (unusual behavior)     Temperature over 101.5 degrees Fahrenheit     Increased pain, swelling, or redness in the calf (back of your lower leg), thigh, ankle or foot.  Emergency: CALL 911 if you have:   Shortness of breath   Chest pain when you cough or taking a deep breath     Please call your surgeons office at [place] [phone] for a follow up appointment.  You should call as soon as you get home or the next day after discharge. Ask to make an appointment in 2 weeks.

## 2019-01-23 NOTE — PROGRESS NOTES
Hospitalist Progress Note NAME: Stephane Bowling :  7/3/1932 MRN:  [id number] Assessment / Plan: 
Acute Expected Blood Loss Anemia in settings og Hip Fx and Surgery Will continue to monitor H&H Currently hemodynamically stable Transfuse if Hb < 7 or symptomatic  
 
R Hip Fx, secondary to [facility] S/p surgery this admission Pain Mx and DVT Px as per ortho PT/OT eval 
CM consult for disposition 
  
Hypokalemia 
replete upon admission Will repeat lytes tomorrow 
  
HTN 
BP stable Continue Norvasc and BB HLD Continue Statins 
  
Code Status: Full Surrogate Decision Maker:  DVT Prophylaxis: Lovenox GI Prophylaxis: not indicated Baseline: Independent Subjective: Pt seen and examined at bedside. Continue to feel better except hip soreness. Overnight events d/w RN  
CHIEF COMPLAINT: f/u \"fall, hip pain\" Review of Systems: 
Symptom Y/N Comments  Symptom Y/N Comments Fever/Chills n   Chest Pain n   
Poor Appetite    Edema Cough n   Abdominal Pain n   
Sputum    Joint Pain SOB/KRAFT n   Pruritis/Rash Nausea/vomit    Tolerating PT/OT Diarrhea    Tolerating Diet y Constipation    Other Could NOT obtain due to:   
 
Objective: VITALS:  
Last 24hrs VS reviewed since prior progress note. Most recent are: 
Patient Vitals for the past 24 hrs: 
 Temp Pulse Resp BP SpO2  
19 0730 98.2 °F (36.8 °C) 72 18 132/47 94 % 19 0432 98.2 °F (36.8 °C) 64 16 128/43 96 % 19 0055 98.1 °F (36.7 °C) 63 16 126/41 95 % 19 2111 97.9 °F (36.6 °C) 66 18 119/42 95 % 19 2000 97.9 °F (36.6 °C) (!) 57 16 123/57 93 % 19 1600 99.1 °F (37.3 °C) 60 18 119/57 93 % 19 1232 98.1 °F (36.7 °C) 73 18 152/47 97 % Intake/Output Summary (Last 24 hours) at 2019 1142 Last data filed at 2019 2239 Gross per 24 hour Intake 1680 ml Output 300 ml Net 1380 ml PHYSICAL EXAM: 
 General: WD, WN. Alert, cooperative, no acute distress   
EENT:  EOMI. Anicteric sclerae. MMM Resp:  CTA bilaterally, no wheezing or rales. No accessory muscle use CV:  Regular  rhythm,  No edema GI:  Soft, Non distended, Non tender.  +Bowel sounds Neurologic:  Alert and oriented X 3, normal speech, Psych:   Good insight. Not anxious nor agitated Skin:  No rashes. No jaundice Reviewed most current lab test results and cultures  YES Reviewed most current radiology test results   YES Review and summation of old records today    NO Reviewed patient's current orders and MAR    YES 
PMH/SH reviewed - no change compared to H&P 
________________________________________________________________________ Care Plan discussed with: 
  Comments Patient y Family RN y   
Care Manager Consultant Multidiciplinary team rounds were held today with , nursing, pharmacist and clinical coordinator. Patient's plan of care was discussed; medications were reviewed and discharge planning was addressed. ________________________________________________________________________ Total NON critical care TIME:  25 Minutes Total CRITICAL CARE TIME Spent:   Minutes non procedure based Comments >50% of visit spent in counseling and coordination of care    
________________________________________________________________________ Shahrzad Miller MD  
 
Procedures: see electronic medical records for all procedures/Xrays and details which were not copied into this note but were reviewed prior to creation of Plan. LABS: 
I reviewed today's most current labs and imaging studies. Pertinent labs include: 
Recent Labs  
  01/22/19 
0310 01/20/19 
1430 WBC  --  7.8 HGB 9.0* 13.6 HCT  --  40.6 PLT  --  153 Recent Labs  
  01/23/19 
0435 01/22/19 
0310 01/20/19 
1430 * 142 137  
K 3.8 3.4* 3.4*  
 107 101 CO2 24 27 30 GLU 97 98 97 BUN 12 10 18  
 CREA 0.56 0.84 0.78  
CA 8.3* 8.0* 9.9 ALB  --   --  4.3 TBILI  --   --  0.6 SGOT  --   --  29 ALT  --   --  27 INR  --   --  1.1 Signed: Mounika Tao MD

## 2019-01-23 NOTE — PROGRESS NOTES
Problem: Mobility Impaired (Adult and Pediatric) Goal: *Acute Goals and Plan of Care (Insert Text) Physical Therapy Goals Initiated 1/22/2019 1. Patient will move from supine to sit and sit to supine  in bed with independence within 4 days. 2. Patient will perform sit to stand with modified independence within 4 days. 3. Patient will ambulate with modified independence for 300 feet with the least restrictive device within 4 days. 4. Patient will ascend/descend 2 stairs with B handrail(s) with modified independence within 4 days. 5. Patient will verbalize and demonstrate understanding of posterior precautions per protocol within 4 days. 6. Patient will perform all home exercise program per protocol with independence within 4 days. physical Therapy TREATMENT Patient: Elizabeth Ferris (43 y.o. female) Date: 1/23/2019 Diagnosis: Hip fracture ([address] Procedure(s) (LRB): 
REVISION RIGHT HIP HEMIARTHROPLASTY (Right) 2 Days Post-Op Precautions: WBAT, Fall, Total hip Chart, physical therapy assessment, plan of care and goals were reviewed. ASSESSMENT: pt tolerated tx well this pm, no LOB or SOB, did well with bed mob and transfers, good motivation, did well with ther-ex, vc's for safety and proper RW use. Progression toward goals: 
[]      Improving appropriately and progressing toward goals [x]      Improving slowly and progressing toward goals 
[]      Not making progress toward goals and plan of care will be adjusted PLAN: 
Patient continues to benefit from skilled intervention to address the above impairments. Continue treatment per established plan of care. Discharge Recommendations:  Chris John Further Equipment Recommendations for Discharge:  rolling walker The patient stated 1/3 hip precautions. Reviewed all 3 with patient. OBJECTIVE DATA SUMMARY:  
Critical Behavior: 
Neurologic State: Alert Orientation Level: Oriented X4 Cognition: Follows commands Safety/Judgement: Awareness of environment, Fall prevention, Home safety Functional Mobility Training: 
Bed Mobility: 
Rolling: Stand-by assistance Supine to Sit: Stand-by assistance Scooting: Stand-by assistance Level of Assistance: Stand-by assistance Interventions: Verbal cues Transfers: 
Sit to Stand: Contact guard assistance;Assist x1 Stand to Sit: Contact guard assistance;Assist x1 Bed to Chair: Contact guard assistance;Assist x1 Interventions: Tactile cues; Verbal cues Level of Assistance: Contact guard assistance Balance: 
Sitting: Intact; Without support Sitting - Static: Good (unsupported) Sitting - Dynamic: Not tested Standing: Intact; With support Standing - Static: Good;Constant support Standing - Dynamic : Good Ambulation/Gait Training: 
Distance (ft): 120 Feet (ft) Assistive Device: Walker, rolling;Gait belt Ambulation - Level of Assistance: Contact guard assistance Gait Abnormalities: Antalgic;Decreased step clearance Right Side Weight Bearing: Full Left Side Weight Bearing: Full Base of Support: Widened Speed/Kimberlyn: Pace decreased (<100 feet/min) Step Length: Left shortened;Right shortened Interventions: Tactile cues; Verbal cues Stairs: 
Number of Stairs Trained: 5 Stairs - Level of Assistance: Contact guard assistance Rail Use: Left Therapeutic Exercises:  
sitting EXERCISE Sets Reps Active Active Assist  
Passive Comments Ankle pumps 1 10 [x] [] [] bilat Heel raises 1 10 [x] [] [] \" Toe tap 1 10 [x] [] [] \" Knee ext 1 10 [x] [] [] \" Hip flex 1 10 [x] [] [] \" Abd & Add 1 10 [x] [] [] \"  
 
Pain: 
Pain Scale 1: Numeric (0 - 10) Pain Intensity 1: 0 Activity Tolerance: fair After treatment:  
[x]  Patient left in no apparent distress sitting up in chair 
[]  Patient left in no apparent distress in bed 
[x]  Call bell left within reach [x]  Nursing notified 
[x]  Caregiver present 
[]  Bed alarm activated COMMUNICATION/COLLABORATION:  
The patients plan of care was discussed with: Registered Nurse Marcia Tena PTA Time Calculation: 25 mins

## 2019-01-23 NOTE — PROGRESS NOTES
Problem: Pressure Injury - Risk of 
Goal: *Prevention of pressure injury Document Lee Scale and appropriate interventions in the flowsheet. Outcome: Progressing Towards Goal 
Pressure Injury Interventions: 
Sensory Interventions: Keep linens dry and wrinkle-free, Minimize linen layers Activity Interventions: Increase time out of bed, PT/OT evaluation Mobility Interventions: HOB 30 degrees or less, PT/OT evaluation Nutrition Interventions: Document food/fluid/supplement intake Friction and Shear Interventions: Lift sheet, Minimize layers, Lift team/patient mobility team

## 2019-01-24 VITALS
HEIGHT: 68 IN | DIASTOLIC BLOOD PRESSURE: 56 MMHG | RESPIRATION RATE: 16 BRPM | WEIGHT: 139.11 LBS | OXYGEN SATURATION: 97 % | BODY MASS INDEX: 21.08 KG/M2 | HEART RATE: 72 BPM | SYSTOLIC BLOOD PRESSURE: 151 MMHG | TEMPERATURE: 98 F

## 2019-01-24 LAB
HCT VFR BLD AUTO: 26.8 % (ref 35–47)
HGB BLD-MCNC: 9 G/DL (ref 11.5–16)

## 2019-01-24 PROCEDURE — 36415 COLL VENOUS BLD VENIPUNCTURE: CPT

## 2019-01-24 PROCEDURE — 97116 GAIT TRAINING THERAPY: CPT

## 2019-01-24 PROCEDURE — 0QS604Z REPOSITION RIGHT UPPER FEMUR WITH INTERNAL FIXATION DEVICE, OPEN APPROACH: ICD-10-PCS | Performed by: ORTHOPAEDIC SURGERY

## 2019-01-24 PROCEDURE — 85018 HEMOGLOBIN: CPT

## 2019-01-24 PROCEDURE — 97110 THERAPEUTIC EXERCISES: CPT

## 2019-01-24 PROCEDURE — 74011250637 HC RX REV CODE- 250/637: Performed by: PHYSICIAN ASSISTANT

## 2019-01-24 PROCEDURE — 97530 THERAPEUTIC ACTIVITIES: CPT

## 2019-01-24 PROCEDURE — 74011250637 HC RX REV CODE- 250/637: Performed by: GENERAL ACUTE CARE HOSPITAL

## 2019-01-24 RX ORDER — FAMOTIDINE 10 MG/1
10 TABLET ORAL 2 TIMES DAILY
Qty: 60 TAB | Refills: 0 | Status: SHIPPED
Start: 2019-01-24 | End: 2020-05-08

## 2019-01-24 RX ORDER — GUAIFENESIN 100 MG/5ML
81 LIQUID (ML) ORAL 2 TIMES DAILY
Qty: 60 TAB | Refills: 0 | Status: SHIPPED
Start: 2019-01-24 | End: 2021-06-02

## 2019-01-24 RX ORDER — ACETAMINOPHEN 500 MG
500 TABLET ORAL
Qty: 30 TAB | Refills: 0 | Status: SHIPPED
Start: 2019-01-24 | End: 2019-02-12

## 2019-01-24 RX ORDER — HYDROCODONE BITARTRATE AND ACETAMINOPHEN 5; 325 MG/1; MG/1
1 TABLET ORAL
Qty: 35 TAB | Refills: 0 | Status: SHIPPED | OUTPATIENT
Start: 2019-01-24 | End: 2019-02-12 | Stop reason: SDUPTHER

## 2019-01-24 RX ADMIN — ACETAMINOPHEN 500 MG: 500 TABLET ORAL at 13:51

## 2019-01-24 RX ADMIN — ACETAMINOPHEN 500 MG: 500 TABLET ORAL at 01:46

## 2019-01-24 RX ADMIN — POLYETHYLENE GLYCOL 3350 17 G: 17 POWDER, FOR SOLUTION ORAL at 09:25

## 2019-01-24 RX ADMIN — ACETAMINOPHEN 500 MG: 500 TABLET ORAL at 06:45

## 2019-01-24 RX ADMIN — STANDARDIZED SENNA CONCENTRATE AND DOCUSATE SODIUM 1 TABLET: 8.6; 5 TABLET, FILM COATED ORAL at 09:26

## 2019-01-24 RX ADMIN — ASPIRIN 81 MG 81 MG: 81 TABLET ORAL at 09:26

## 2019-01-24 RX ADMIN — Medication 10 ML: at 13:52

## 2019-01-24 RX ADMIN — AMLODIPINE BESYLATE 10 MG: 5 TABLET ORAL at 09:25

## 2019-01-24 RX ADMIN — FAMOTIDINE 10 MG: 20 TABLET ORAL at 09:26

## 2019-01-24 RX ADMIN — METOPROLOL TARTRATE 25 MG: 25 TABLET, FILM COATED ORAL at 09:26

## 2019-01-24 RX ADMIN — ATORVASTATIN CALCIUM 20 MG: 20 TABLET, FILM COATED ORAL at 01:47

## 2019-01-24 RX ADMIN — Medication 10 ML: at 06:45

## 2019-01-24 NOTE — DISCHARGE SUMMARY
Hospitalist Discharge Summary     Patient ID:  Kindra Villafana  [id number]  80 y.o.  7/3/1932    PCP on record: Harjinder Macedo NP    Admit date: 1/20/2019  Discharge date and time: 1/24/2019      DISCHARGE DIAGNOSIS:  Acute Expected Blood Loss Anemia in settings og Hip Fx and Surgery  R Hip Fx, secondary to GLF  Hypokalemia  HTN  HLD    CONSULTATIONS:  IP CONSULT TO ORTHOPEDIC SURGERY  IP CONSULT TO ORTHOPEDIC SURGERY    Excerpted HPI from H&P of Dimas Gutierrez MD:  Gunner Bower is a 80 y.o.  female who presents with CC listed above. Pt states that she was trying to get out of her car this morning when she believes her feet \"got stuck or jumbled, I don't know\" and she fell on the ground, striking her right hip. Pt denies any LOC or striking her head. Pt denies any CP, SOB, or palpitation prior to, during or after the event. Since the fall however, pt has been unable to ambulate and only felt better after she was given IV pain medications by the ER. She is very pleasant and otherwise denies any complaints. Her family does endorse pt has been experiencing dizziness intermittently, and pt states that it is particularly when she rises from a seated position.     We were asked to admit for work up and evaluation of the above problems. ______________________________________________________________________  DISCHARGE SUMMARY/HOSPITAL COURSE:  for full details see H&P, daily progress notes, labs, consult notes. Acute Expected Blood Loss Anemia in settings og Hip Fx and Surgery  Hb now stable  Hemodynamically stable     R Hip Fx, secondary to GLF  S/p surgery this admission  Pain Mx and DVT Px as per ortho  PT/OT > plan for discharge to SNF     Hypokalemia  Resolved with repletion     HTN  BP stable  Continue Norvasc and BB    HLD  Continue Statins  _______________________________________________________________________  Patient seen and examined by me on discharge day.   Pertinent Findings:  Gen:    Not in distress  Chest: Clear lungs  CVS:   Regular rhythm. No edema  Abd:  Soft, not distended, not tender  Neuro:  Alert, non focal  _______________________________________________________________________  DISCHARGE MEDICATIONS:   Current Discharge Medication List      START taking these medications    Details   aspirin 81 mg chewable tablet Take 1 Tab by mouth two (2) times a day. Qty: 60 Tab, Refills: 0      famotidine (PEPCID) 10 mg tablet Take 1 Tab by mouth two (2) times a day. Qty: 60 Tab, Refills: 0      HYDROcodone-acetaminophen (NORCO) 5-325 mg per tablet Take 1 Tab by mouth every six (6) hours as needed. Max Daily Amount: 4 Tabs. Qty: 35 Tab, Refills: 0    Associated Diagnoses: Closed fracture of right hip, initial encounter ([facility] [address]         CONTINUE these medications which have CHANGED    Details   acetaminophen (TYLENOL) 500 mg tablet Take 1 Tab by mouth every six (6) hours as needed for Pain. Qty: 30 Tab, Refills: 0         CONTINUE these medications which have NOT CHANGED    Details   lidocaine (LIDODERM) 5 % 1 Patch by TransDERmal route daily as needed. Apply patch to the affected area for 12 hours a day and remove for 12 hours a day. polyethylene glycol (MIRALAX) 17 gram/dose powder Take 17 g by mouth daily as needed. capsicum oleoresin 0.025 % topical cream Apply  to affected area three (3) times daily. Qty: 60 g, Refills: 2      senna-docusate (SENNA PLUS) 8.6-50 mg per tablet Take 1 Tab by mouth daily as needed. atorvastatin (LIPITOR) 20 mg tablet TAKE 1 TABLET BY MOUTH IN THE EVENING  Qty: 90 Tab, Refills: 3      calcium-cholecalciferol, d3, (CALCIUM 600 + D) 600-125 mg-unit tab Take 1 Tab by mouth two (2) times a day. Qty: 180 Tab, Refills: 3      coenzyme Q-10 (CO Q-10) 200 mg capsule Take 1 Cap by mouth daily. Qty: 90 Cap, Refills: 3      glucosamine-chondroit-vit c-mn (GLUCOSAMINE 1500 COMPLEX) 500-400 mg capsule Take 1 Cap by mouth daily.   Qty: 90 Cap, Refills: 1      metoprolol tartrate (LOPRESSOR) 25 mg tablet TAKE 1 TAB BY MOUTH EVERY TWELVE (12) HOURS. HOLD FOR SBP LESS THAN 120 OR HR LESS THAN 65  Qty: 60 Tab, Refills: 3    Comments: NEW MULTI-DOSE PACKAGING PATIENT. MD PLEASE SEND AUTHORIZATIONS AT YOUR EARLIEST CONVENIENCE SO WE MAY PACK/SEND TO PATIENT. THANK YOU. Associated Diagnoses: Essential hypertension      multivit-minerals/folic acid (CENTRUM MULTIGUMMIES PO) Take 1 Tab by mouth daily. amLODIPine (NORVASC) 10 mg tablet TAKE 1 TABLET BY MOUTH EVERY MORNING  Qty: 90 Tab, Refills: 4         STOP taking these medications       naproxen sodium (ALEVE) 220 mg cap Comments:   Reason for Stopping:         aspirin delayed-release 81 mg tablet Comments:   Reason for Stopping:         polyethylene glycol (MIRALAX) 17 gram packet Comments:   Reason for Stopping:               My Recommended Diet, Activity, Wound Care, and follow-up labs are listed in the patient's Discharge Insturctions which I have personally completed and reviewed.     ______________________________________________________________________    Risk of deterioration: Low    Condition at Discharge:  Stable  ______________________________________________________________________    Disposition  SNF/LTC  ______________________________________________________________________    Care Plan discussed with:   Patient, RN, Care Manager, Consultant    ______________________________________________________________________    Code Status: Full Code  ______________________________________________________________________      Follow up with:   PCP : Nayan Barrett NP  Follow-up Information     Follow up With Specialties Details Why Contact Info    Amanda Corona MD Orthopedic Surgery In 2 weeks  [address]  [address] [facility] [address]      [address]   [address] [place] [address] [address]    Nayan Barrett NP Nurse Practitioner   [address]  [address]  [phone]      Shyla Su MD Orthopedic Surgery Schedule an appointment as soon as possible for a visit in 2 weeks  [address]  [facility]  [phone]                Total time in minutes spent coordinating this discharge (includes going over instructions, follow-up, prescriptions, and preparing report for sign off to her PCP) :  35 minutes    Signed:  Jennyfer Morse MD

## 2019-01-24 NOTE — PROGRESS NOTES
8:28 AM- CM left a voicemail for admissions at [facility] to follow up on status of referral. CM left HIPPA compliant voicemail, waiting on return phone call. 9:00 AM- CM spoke with Allana Phoenix from [facility]. Allana Phoenix states they are reviewing pt chart right now and will let CM know if/when they can accept and if/when auth has begun. 9:39 AM- CM informed by Allana Phoenix they can accept pt and authorization is expedited. 11:14 AM- CM informed pt has been authorized to go to [facility] today. Attending informed, attending will d/c. [facility] transportation arranged for 2:30 PM. [facility] paperwork placed on chart. RN call report number  [phone] and ask for wing 2. Going to room 204A. Pt aware, CM attempted to contact pt dtr Errolsa Smoker to inform and left HIPPA compliant voicemail. Waiting on return phone call. 1:15 PM- CM attempted again to inform pt dtr of discharge, no answer. Pt and pt  (in room) are both aware pt will d/c today to [facility] via [facility] transportation. Pt and pt  have no questions or concerns at this time. No further CM needs identified. Care Management Interventions PCP Verified by CM: Yes Mode of Transport at Discharge: BLS Transition of Care Consult (CM Consult): SNF Partner SNF: Yes MyChart Signup: No 
Discharge Durable Medical Equipment: No 
Health Maintenance Reviewed: Yes Physical Therapy Consult: Yes Occupational Therapy Consult: Yes Speech Therapy Consult: No 
Current Support Network: Own Home, Lives with Spouse Confirm Follow Up Transport: Family Plan discussed with Pt/Family/Caregiver: Yes Freedom of Choice Offered: Yes Discharge Location Discharge Placement: Skilled nursing facility JACOB Haile, CM [facility] [phone]

## 2019-01-24 NOTE — PROGRESS NOTES
Problem: Mobility Impaired (Adult and Pediatric) Goal: *Acute Goals and Plan of Care (Insert Text) Physical Therapy Goals Initiated 1/22/2019 1. Patient will move from supine to sit and sit to supine  in bed with independence within 4 days. 2. Patient will perform sit to stand with modified independence within 4 days. 3. Patient will ambulate with modified independence for 300 feet with the least restrictive device within 4 days. 4. Patient will ascend/descend 2 stairs with B handrail(s) with modified independence within 4 days. 5. Patient will verbalize and demonstrate understanding of posterior precautions per protocol within 4 days. 6. Patient will perform all home exercise program per protocol with independence within 4 days. physical Therapy TREATMENT Patient: Virginia Arzate (63 y.o. female) Date: 1/24/2019 Diagnosis: Hip fracture ([address] Procedure(s) (LRB): 
REVISION RIGHT HIP HEMIARTHROPLASTY (Right) 3 Days Post-Op Precautions: WBAT, Fall, Total hip Chart, physical therapy assessment, plan of care and goals were reviewed. ASSESSMENT:pt continues to progress, no LOB or SOB, bed mob and transfers are improving, did well with toilet transfers and ther-ex, vc's for safety and proper RW use. Progression toward goals: 
[]      Improving appropriately and progressing toward goals [x]      Improving slowly and progressing toward goals 
[]      Not making progress toward goals and plan of care will be adjusted PLAN: 
Patient continues to benefit from skilled intervention to address the above impairments. Continue treatment per established plan of care. Discharge Recommendations:  Chris John Further Equipment Recommendations for Discharge:  rolling walker OBJECTIVE DATA SUMMARY:  
Critical Behavior: 
Neurologic State: Alert, Appropriate for age Orientation Level: Appropriate for age, Oriented X4 Cognition: Appropriate decision making, Follows commands Safety/Judgement: Awareness of environment, Fall prevention, Home safety Functional Mobility Training: 
Bed Mobility: 
Supine to Sit: Stand-by assistance Scooting: Stand-by assistance Level of Assistance: Stand-by assistance Interventions: Verbal cues Transfers: 
Sit to Stand: Contact guard assistance;Assist x1 Stand to Sit: Stand-by assistance Bed to Chair: Contact guard assistance;Assist x1 Interventions: Tactile cues; Verbal cues Level of Assistance: Contact guard assistance Balance: 
Sitting: Intact; Without support Sitting - Static: Good (unsupported) Sitting - Dynamic: Not tested Standing: Intact; With support Standing - Static: Good;Constant support Standing - Dynamic : Good Ambulation/Gait Training: 
Distance (ft): 120 Feet (ft) Assistive Device: Walker, rolling;Gait belt Ambulation - Level of Assistance: Contact guard assistance Gait Abnormalities: Antalgic;Decreased step clearance Right Side Weight Bearing: Full Left Side Weight Bearing: Full Base of Support: Widened Step Length: Left shortened;Right shortened Interventions: Tactile cues; Verbal cues Therapeutic Exercises: 
sitting EXERCISE Sets Reps Active Active Assist  
Passive Comments Ankle pumps 1 10 [x] [] [] bilat Heel raises 1 10 [x] [] [] \" Toe tap 1 10 [x] [] [] \" Knee ext 1 10 [x] [] [] \" Hip flex 1 10 [x] [] [] \" Abd & Add 1 10 [x] [] [] \"  
 
Pain: 
Pain Scale 1: Numeric (0 - 10) Pain Intensity 1: 3 Pain Location 1: Hip Pain Orientation 1: Right Pain Description 1: Aching Pain Intervention(s) 1: Ice Activity Tolerance: fair After treatment:  
[x] Patient left in no apparent distress sitting up in chair 
[] Patient left in no apparent distress in bed 
[x] Call bell left within reach [x] Nursing notified 
[x] Caregiver present 
[] Bed alarm activated COMMUNICATION/COLLABORATION:  
The patients plan of care was discussed with: Registered Nurse Magan Ann PTA 
 Time Calculation: 30 mins

## 2019-01-24 NOTE — PROGRESS NOTES
Pt. Transport to [facility] ambulance, v/s stable. skin warm and dry. family at bedside, voiced no c/o pain.

## 2019-01-24 NOTE — OP NOTES
[address] REPORT    [facility] [place]  MR#: [id number]  : 1932  ACCOUNT #: [de-identified]   DATE OF SERVICE: 2019    PREOPERATIVE DIAGNOSIS:  Right hip periprosthetic femur fracture. POSTOPERATIVE DIAGNOSIS:  Right hip periprosthetic femur fracture. PROCEDURES PERFORMED:  1. Revision right hip hemiarthroplasty. 2.  Open reduction internal fixation trochanteric fracture. ANESTHESIA:  General.    SURGEON:  Janey Chicas MD    ASSISTANT: LIZA Rivas (Performing all or most of the following assistant-at-surgery services including but not limited to: proper patient positioning, sterile/prep and draping, placement of instruments, operative exposure, maintaining reduction during fixation, portions of hardware application, minor portions of bone / soft tissue excision, final irrigation and debridement, deep and superficial closure, application of final dressings)    IMPLANTS USED:  Exactech Alteon Monobloc press fit revision stem, Synthes trochanteric reattachment device. ESTIMATED BLOOD LOSS:  400 mL. DRAINS:  None. SPECIMENS REMOVED:  None. COMPLICATIONS:  None. CONDITION:  Stable to PACU. INDICATIONS:  A 75-year-old female who had previously undergone a right hip hemiarthroplasty for a femoral neck fracture. She suffered a fall resulting in a comminuted and displaced periprosthetic fracture. We discussed treatment options and recommended a revision to a long stem prosthesis and fixation of her fracture. She understood no guarantees can be given about the outcome and wished to proceed. DESCRIPTION OF PROCEDURE:  After being identified in the preoperative holding area and having the operative site marked the patient was brought back to the operating room and placed supine on a standard OR table. General anesthesia was induced without difficulty.   She was moved into the decubitus position with the right side up being sure to pad all bony prominences. The [place] hip positioner was utilized to stabilize the patient. The right lower extremity was then prepped and draped in the usual fashion using sterile technique. An appropriate timeout was performed. Preoperative antibiotics had been administered prior to incision. We utilized her previous posterolateral approach. The IT band was divided in line with its fibers. Blunt dissection through the gluteus saira was performed cauterizing crossing vessels as they were encountered. The short external rotators and posterior capsule were taken down was continuous sleeve. The hip was dislocated and the femoral component removed. We first turned our attention to revising the femoral component. We sequentially reamed for a 195 mm pressfit Monobloc stem. We placed a trial and reduced the hip. It was found to be reasonably stable even without the trochanteric fixation. We selected our final implant. It was inserted to the appropriate depth with appropriate anteversion. We trialled once more and selected a 50 mm bipolar head which was impacted on the trunnion and the hip was then reduced after thorough lavage. We then reduced the greater trochanteric fracture, a trochanteric reattachment device from Synthes was applied and cables were passed. These were sequentially tensioned. We then went back and retensioned each of these. We had excellent reduction of the trochanter in anatomic fashion. The cables were then crimped and cut short. Thorough lavage and routine closure in layers was performed. Sterile compressive dressings were applied. The patient was awakened, moved to stretcher, taken to the recovery room in satisfactory condition. At the conclusion of the procedure, all counts were correct. There were no immediate complications.       Emre Kathleen MD       [address] [place] [address] / DARRELL  D: 01/24/2019 09:57     T: 01/24/2019 10:16  JOB #: [id number]

## 2019-01-24 NOTE — PROGRESS NOTES
Bedside and Verbal shift change report given to Mari Gomes (oncoming nurse) by Rose Scanlon (offgoing nurse). Report included the following information SBAR, Kardex, Intake/Output and MAR.

## 2019-01-24 NOTE — PROGRESS NOTES
Problem: Falls - Risk of 
Goal: *Absence of Falls Document Arvind Abel Fall Risk and appropriate interventions in the flowsheet. Outcome: Progressing Towards Goal 
Fall Risk Interventions: 
Mobility Interventions: Communicate number of staff needed for ambulation/transfer, Patient to call before getting OOB, Utilize walker, cane, or other assistive device Medication Interventions: Assess postural VS orthostatic hypotension, Evaluate medications/consider consulting pharmacy, Patient to call before getting OOB Elimination Interventions: Call light in reach, Patient to call for help with toileting needs History of Falls Interventions: Door open when patient unattended, Investigate reason for fall Problem: Pressure Injury - Risk of 
Goal: *Prevention of pressure injury Document Lee Scale and appropriate interventions in the flowsheet. Outcome: Progressing Towards Goal 
Pressure Injury Interventions: 
Sensory Interventions: Assess changes in LOC, Keep linens dry and wrinkle-free, Pad between skin to skin Activity Interventions: Increase time out of bed, PT/OT evaluation, Pressure redistribution bed/mattress(bed type) Mobility Interventions: HOB 30 degrees or less, Pressure redistribution bed/mattress (bed type), PT/OT evaluation Nutrition Interventions: Document food/fluid/supplement intake Friction and Shear Interventions: Minimize layers

## 2019-01-24 NOTE — PROGRESS NOTES
ORTHO - Progress Note Post Op day: 3 Days Post-Op Jose Mcclendon     [id number]  female    80 y.o.    7/3/1932 Admit date: 2019 Date of Surgery: 2019 Procedures: Procedure(s): REVISION RIGHT HIP HEMIARTHROPLASTY Admitting Physician: Mai Ricardo MD  
Surgeon:  Mikaela Blum) and Role: 
   Emma Mc MD - Primary Consulting Physician(s): Treatment Team: Attending Provider: Evangelina Wellington MD; Consulting Provider: Shyla Su MD; Consulting Provider: Kira Eastman NP; Consulting Provider: Naila Jennings MD; Utilization Review: Karlos Aly RN; Care Manager: Wen Gomes SUBJECTIVE: 
  
Jose Mcclendon is a 80 y.o. female s/p a REVISION RIGHT HIP HEMIARTHROPLASTY resting in the bed  Patient has complaints of appropriate post-op pain with right hip motion, tolerating PO pain medications sparingly, PRN norco.  Denies F/C, nausea, vomiting, dizziness, lightheadedness, chest pain, or shortness of breath. OBJECTIVE: 
 
  
Physical Exam: 
General: alert, cooperative, no distress. Gastrointestinal:  non-distended . Cardiovascular: equal pulses in the lower extremities,  Brisk cap refill in all distal extremities Genitourinary: Voiding independently Respiratory: No respiratory distress Neurological:Neurovascular exam within normal limits. Senstion intact: LE bilat. Motor: + DF/PF/EHL. Musculoskeletal: Mohini's sign negative in bilateral lower extremities. Calves soft, supple, non-tender upon palpation or with passive stretch. Dressing/Wound:  Clean, dry and intact. No significant erythema or swelling. Vital Signs:  
  
  
Patient Vitals for the past 8 hrs: 
 BP Temp Pulse Resp SpO2  
19 0730 (!) 138/38 97.7 °F (36.5 °C) 79 18 96 % 19 0331 144/48 98.3 °F (36.8 °C) 72 16 95 % Temp (24hrs), Av.4 °F (36.9 °C), Min:97.7 °F (36.5 °C), Max:99 °F (37.2 °C) Labs:  
  
 
Recent Labs  
  19 
3327 HCT 26.8* HGB 9.0*  
 
PT/OT:  
 
  
Gait Base of Support: Widened Speed/Kimberlyn: Pace decreased (<100 feet/min) Step Length: Left shortened, Right shortened Gait Abnormalities: Antalgic, Decreased step clearance Ambulation - Level of Assistance: Contact guard assistance Distance (ft): 120 Feet (ft) Assistive Device: Walker, rolling, Gait belt Rail Use: Left Stairs - Level of Assistance: Contact guard assistance Number of Stairs Trained: 5 Interventions: Tactile cues, Verbal cues Interventions: Tactile cues, Verbal cues ASSESSMENT / PLAN:  
Active Problems: 
  Hip fracture (HCC) (1/20/2019) 
 
  
 
-  Continue PT/OT WBAT 
-  DVT prophylaxis- SCD w/ ASA 81BID 
-  DC planning - SNF- DC today Signed By: Torito Severino PA-C

## 2019-01-24 NOTE — PROGRESS NOTES
Hospitalist Progress Note NAME: Dillon Marie :  7/3/1932 MRN:  [id number] Assessment / Plan: 
Acute Expected Blood Loss Anemia in settings og Hip Fx and Surgery Hb now stable Hemodynamically stable R Hip Fx, secondary to [facility] S/p surgery this admission Pain Mx and DVT Px as per ortho PT/OT eval 
CM consult working on disposition 
  
Hypokalemia 
replete upon admission Will repeat lytes tomorrow 
  
HTN 
BP stable Continue Norvasc and BB HLD Continue Statins 
  
Code Status: Full Surrogate Decision Maker:  DVT Prophylaxis: Lovenox GI Prophylaxis: not indicated Baseline: Independent Subjective: Pt seen and examined at bedside. Continue to feel better. Overnight events d/w RN  
CHIEF COMPLAINT: f/u \"fall, hip pain\" Review of Systems: 
Symptom Y/N Comments  Symptom Y/N Comments Fever/Chills n   Chest Pain n   
Poor Appetite    Edema Cough n   Abdominal Pain n   
Sputum    Joint Pain SOB/KRAFT n   Pruritis/Rash Nausea/vomit    Tolerating PT/OT Diarrhea    Tolerating Diet y Constipation    Other Could NOT obtain due to:   
 
Objective: VITALS:  
Last 24hrs VS reviewed since prior progress note. Most recent are: 
Patient Vitals for the past 24 hrs: 
 Temp Pulse Resp BP SpO2  
19 0730 97.7 °F (36.5 °C) 79 18 (!) 138/38 96 % 19 0331 98.3 °F (36.8 °C) 72 16 144/48 95 % 19 0143 98.5 °F (36.9 °C) 70 16 (!) 169/36 96 % 19 2017 98.2 °F (36.8 °C) 64 16 139/54 95 % 19 1623 99 °F (37.2 °C) 69 16 139/65 95 % 19 1152 98.8 °F (37.1 °C) 70 18 154/43 96 % Intake/Output Summary (Last 24 hours) at 2019 1009 Last data filed at 2019 2017 Gross per 24 hour Intake 1560 ml Output 701 ml Net 859 ml PHYSICAL EXAM: 
General: WD, WN. Alert, cooperative, no acute distress   
EENT:  EOMI. Anicteric sclerae. MMM Resp:  CTA bilaterally, no wheezing or rales. No accessory muscle use CV:  Regular  rhythm,  No edema GI:  Soft, Non distended, Non tender.  +Bowel sounds Neurologic:  Alert and oriented X 3, normal speech, Psych:   Good insight. Not anxious nor agitated Skin:  No rashes. No jaundice Reviewed most current lab test results and cultures  YES Reviewed most current radiology test results   YES Review and summation of old records today    NO Reviewed patient's current orders and MAR    YES 
PMH/SH reviewed - no change compared to H&P 
________________________________________________________________________ Care Plan discussed with: 
  Comments Patient y Family RN y   
Care Manager y Consultant Multidiciplinary team rounds were held today with , nursing, pharmacist and clinical coordinator. Patient's plan of care was discussed; medications were reviewed and discharge planning was addressed. ________________________________________________________________________ Total NON critical care TIME:  25 Minutes Total CRITICAL CARE TIME Spent:   Minutes non procedure based Comments >50% of visit spent in counseling and coordination of care    
________________________________________________________________________ Meagan Sr MD  
 
Procedures: see electronic medical records for all procedures/Xrays and details which were not copied into this note but were reviewed prior to creation of Plan. LABS: 
I reviewed today's most current labs and imaging studies. Pertinent labs include: 
Recent Labs  
  01/24/19 
0503 01/22/19 0310 HGB 9.0* 9.0*  
HCT 26.8*  --   
 
Recent Labs  
  01/23/19 
0435 01/22/19 
0310 * 142  
K 3.8 3.4*  
 107 CO2 24 27 GLU 97 98 BUN 12 10 CREA 0.56 0.84 CA 8.3* 8.0* Signed: Meagan Sr MD

## 2019-01-28 ENCOUNTER — PATIENT OUTREACH (OUTPATIENT)
Dept: FAMILY MEDICINE CLINIC | Age: 84
End: 2019-01-28

## 2019-01-28 NOTE — PROGRESS NOTES
Transition of Care Coordination/Hospital to Post Acute Facility: 
  
Date/Time:  2019 3:37 PM 
 
Patient was admitted to [facility] on 19 and discharged on 19 for R Hip Fx, secondary to GLF. Patient was transferred to [facility]  for continuation of care. Inpatient RRAT score: 9 Top Challenges reviewed - D/C'd to [facility] - Needs Ortho f/u in 2 weeks Method of communication with care team :chart routing, phone Nurse Navigator(NN) spoke with SNF nurse Jeff Bearden to provide introduction to self and explanation of the Nurse Navigator Role. Verified name and  as patient identifiers. Discussed and reviewed  anticipated length of stay, follow up appointments, medication reconciliation. Call transferred to [facility] 429. Message left for Veronica Zhang ACP:  
Does the patient have a current ACP (including DDNR):  yes Does the post acute facility have a copy of the patients ACP:  unknown Medication(s):  
New Medications at Discharge:  
aspirin 81 mg chewable tablet Take 1 Tab by mouth two (2) times a day. Qty: 60 Tab, Refills: 0  
   
famotidine (PEPCID) 10 mg tablet Take 1 Tab by mouth two (2) times a day. Qty: 60 Tab, Refills: 0  
   
HYDROcodone-acetaminophen (NORCO) 5-325 mg per tablet Take 1 Tab by mouth every six (6) hours as needed. Max Daily Amount: 4 Tabs. Qty: 35 Tab, Refills: 0 Changed Medications at Discharge:  
acetaminophen (TYLENOL) 500 mg tablet Take 1 Tab by mouth every six (6) hours as needed for Pain. Qty: 30 Tab, Refills: 0  
   
 
Discontinued Medications at Discharge:  
Naproxen sodium (ALEVE) 220 mg cap     
Aspirin delayed-release 81 mg tablet    
Polyethylene glycol (MIRALAX) 17 gram packet PCP/Specialist follow up: No future appointments. Needs 2 week Ortho f/u appt. PCP f/u s/p SNF d/c. Opportunity to ask questions was provided.  Contact information was provided for future reference or further questions. Will continue to monitor.

## 2019-01-29 ENCOUNTER — PATIENT OUTREACH (OUTPATIENT)
Dept: FAMILY MEDICINE CLINIC | Age: 84
End: 2019-01-29
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
## 2019-02-05 ENCOUNTER — PATIENT OUTREACH (OUTPATIENT)
Dept: FAMILY MEDICINE CLINIC | Age: 84
End: 2019-02-05

## 2019-02-06 NOTE — PROGRESS NOTES
NN JOSHUA F/U Progress Note - Spoke w/ pt's dtr Sharon Golden ([address] Reported SNF Team Meeting held 1/31/19. [facility] [place] [facility]. - Pt attended 2/4/19 Ortho f/u. \"31 staples removed. Incision looks good\". Per Ortho pt physically ready to leave SNF. Amb w/ walker. SNF Rehab Staff to determine d/c date. No Ortho f/u scheduled. Return prn. 
 
- Per SNF rehab staff pt to d/c Fri 2/8/19. Discharge home w/ HH. Family requesting [facility]. 
 
- Dtr reported had meeting w/ \"[facility]\" staff re PCA couple days per week. Planning to interview a few more people. - Dtr to contact NN when pt d/c'd from SNF to schedule PCP f/u appt. NN to f/u w/ pt for DAVID AGUDELO s/p SNF d/c.

## 2019-02-12 ENCOUNTER — OFFICE VISIT (OUTPATIENT)
Dept: FAMILY MEDICINE CLINIC | Age: 84
End: 2019-02-12

## 2019-02-12 VITALS
OXYGEN SATURATION: 97 % | HEART RATE: 66 BPM | DIASTOLIC BLOOD PRESSURE: 52 MMHG | TEMPERATURE: 96.8 F | SYSTOLIC BLOOD PRESSURE: 114 MMHG | WEIGHT: 132 LBS | BODY MASS INDEX: 20 KG/M2 | RESPIRATION RATE: 18 BRPM | HEIGHT: 68 IN

## 2019-02-12 DIAGNOSIS — R41.3 MEMORY CHANGE: ICD-10-CM

## 2019-02-12 DIAGNOSIS — Z96.641 STATUS POST RIGHT HIP REPLACEMENT: Primary | ICD-10-CM

## 2019-02-12 RX ORDER — CYCLOBENZAPRINE HCL 10 MG
10 TABLET ORAL
COMMUNITY
Start: 2018-11-06 | End: 2019-02-12

## 2019-02-12 RX ORDER — AMLODIPINE BESYLATE 10 MG/1
10 TABLET ORAL
COMMUNITY
End: 2019-02-12 | Stop reason: SDUPTHER

## 2019-02-12 RX ORDER — MULTIVITAMIN/IRON/FOLIC ACID 18MG-0.4MG
1 TABLET ORAL
COMMUNITY
End: 2019-02-12 | Stop reason: SDUPTHER

## 2019-02-12 RX ORDER — ATORVASTATIN CALCIUM 20 MG/1
20 TABLET, FILM COATED ORAL
COMMUNITY
Start: 2018-08-25 | End: 2019-02-12 | Stop reason: SDUPTHER

## 2019-02-12 RX ORDER — PREDNISONE 10 MG/1
TABLET ORAL
COMMUNITY
Start: 2018-11-01 | End: 2019-02-12

## 2019-02-12 RX ORDER — BENAZEPRIL HYDROCHLORIDE 40 MG/1
40 TABLET ORAL
COMMUNITY
End: 2019-02-18 | Stop reason: SDUPTHER

## 2019-02-12 RX ORDER — LYSINE HCL 500 MG
1 TABLET ORAL
COMMUNITY
End: 2019-02-12 | Stop reason: SDUPTHER

## 2019-02-12 RX ORDER — HYDROCODONE BITARTRATE AND ACETAMINOPHEN 5; 325 MG/1; MG/1
1 TABLET ORAL
COMMUNITY
Start: 2019-01-24 | End: 2019-02-12

## 2019-02-12 RX ORDER — ACETAMINOPHEN 325 MG/1
650 TABLET ORAL
COMMUNITY
Start: 2018-07-16 | End: 2019-02-12 | Stop reason: SDUPTHER

## 2019-02-12 RX ORDER — CYCLOBENZAPRINE HCL 10 MG
TABLET ORAL
Refills: 0 | COMMUNITY
Start: 2018-11-06 | End: 2019-02-12 | Stop reason: SDUPTHER

## 2019-02-12 RX ORDER — ASPIRIN 325 MG
325 TABLET ORAL
COMMUNITY
End: 2019-02-12 | Stop reason: SDUPTHER

## 2019-02-12 RX ORDER — AA/PROT/LYSINE/METHIO/VIT C/B6 50-12.5 MG
200 TABLET ORAL
COMMUNITY
End: 2019-02-12 | Stop reason: SDUPTHER

## 2019-02-12 RX ORDER — MELATONIN
1000 DAILY
COMMUNITY

## 2019-02-12 RX ORDER — AMOXICILLIN 250 MG
1 CAPSULE ORAL DAILY
COMMUNITY

## 2019-02-12 RX ORDER — METOPROLOL TARTRATE 25 MG/1
25 TABLET, FILM COATED ORAL
COMMUNITY
Start: 2018-10-16 | End: 2019-02-12 | Stop reason: SDUPTHER

## 2019-02-12 NOTE — PATIENT INSTRUCTIONS
1) Good  nutrition is important for healing. Please make sure you are eating protein - necessary for good healing. If you aren't hungry, you can drink Ensure, Boost, [place] Instant breakfast, etc to get protein, minerals and vitamins. 2) Make sure you have good fluid intake for good blood pressure. IF YOUR BLOOD PRESSURE IS LESS THAN 120/65, THEN DO NOT TAKE YOUR METOPROLOL MEDICATION. Please check your blood pressure through the week at staggered times in the day. (If you check in the morning, it should be at least one hour after your morning blood pressure medications.)      Arm monitors are most accurate. If you use a wrist monitor, make sure your wrist is at heart level. You can bring your home monitor to your next visit and have it calibrated with the machine in the office to gauge your readings. Sit  with your feet uncrossed and relax for 5 minutes before taking your BP. Keep a written record of your blood pressure readings and bring it to each appointment. If your systolic (top) blood pressure is consistently greater than 140mmHg or less than [id number] of the diastolic (bottom) number is consistently greater than 90mmHg or less than 60mmHg then please schedule an office appointment. Cardiac symptoms that would need immediate attention include: uncomfortable pressure, squeezing, fullness or pain in the center of your chest. Pain or discomfort in one or both arms, the back, neck, jaw or stomach. Shortness of breath with or without chest discomfort, breaking out in a cold sweat, nausea or lightheadedness. 3) Surgical incision looks good. Have home health nurse check on it weekly until fully healed. 4) Referral for Dr. George Vargas to assess memory changes. Hip Replacement Surgery (Anterior): What to Expect at Home  Your Recovery    Anterior hip replacement surgery is done through a small incision in the front (anterior) of the hip.  This causes less damage to muscles and other soft tissues than the more common type of surgery. There is also a lower risk of the new ball on the thighbone slipping out of the hip socket (dislocation). As a result, healing is usually faster, and there are fewer limits on activity. After surgery, you will use crutches or a walker. You will need someone to help you at home for a few days or weeks or until you have more energy and can move around better. You may go to a rehab center if you don't have help at home. You will go home with a bandage and stitches, staples, tissue glue, or tape strips. You can remove the bandage when your doctor tells you to. If you have stitches or staples, your doctor will remove them 10 to 14 days after your surgery. Glue or tape strips will fall off on their own over time. You may have some mild pain and swelling after surgery. Your doctor may give you medicine for the pain. You will keep doing the physical therapy you started in the hospital. The better you do with your exercises, the sooner you will get your strength and movement back. Your doctor will tell you when you can go back to work or other activities. This will depend on what type of work and activities you do. This care sheet gives you a general idea about how long it will take for you to recover. But each person recovers at a different pace. Follow the steps below to get better as quickly as possible. How can you care for yourself at home? Activity    · For the first few months, your doctor may want you to avoid things that could dislocate your hip. If so, your therapist may suggest ideas such as:  ? Do not turn your leg too far out to the side. Keep your toes pointing forward or slightly in.  ? Do not step backwards or bend backwards. ? Do not cross your legs.     · Go slowly when you climb stairs. Make sure the lights are on, and have someone watch you, if possible. When you climb stairs:  ? Step up first with your unaffected leg.  Then bring the affected leg up to the same step. Bring your crutches or cane up. ? To go down stairs, reverse the order. First, put your crutches or cane on the lower step. Then bring the affected leg down to that step. Finally, step down with the unaffected leg.     · Rest when you feel tired. You may take a nap, but don't stay in bed all day.     · You may be able to take frequent short walks using crutches or a walker. You may use crutches or a walker for a couple of weeks, and then switch to a cane.     · Try not to sit for too long at one time. You will feel less stiff if you take a short walk about every hour.     · Ask your doctor when you can drive again.     · Ask your doctor when it is okay for you to have sex. Diet    · By the time you leave the hospital, you will probably be eating your normal diet. If your stomach is upset, try bland, low-fat foods like plain rice, broiled chicken, toast, and yogurt. Your doctor may recommend that you take iron and vitamin supplements.     · Eat healthy foods, and watch your portion sizes. Try to stay at your ideal weight. Controlling your weight will help your new hip joint last longer.     · If your bowel movements are not regular right after surgery, try to avoid constipation and straining. Drink plenty of water. Your doctor may suggest fiber, a stool softener, or a mild laxative. Medicines    · Be safe with medicines. Read and follow all instructions on the label. ? If the doctor gave you a prescription medicine for pain, take it as prescribed. ? If you are not taking a prescription pain medicine, ask your doctor if you can take an over-the-counter medicine.     · If your doctor prescribed antibiotics, take them as directed. Do not stop taking them just because you feel better. You need to take the full course of antibiotics.     · Your doctor will tell you if and when you can restart your medicines.  He or she will also give you instructions about taking any new medicines.     · If you take aspirin or some other blood thinner, be sure to talk to your doctor. He or she will tell you if and when to start taking this medicine again. Make sure that you understand exactly what your doctor wants you to do.     · Your doctor may give you a blood-thinning medicine to prevent blood clots for a few weeks after surgery. Be sure you get instructions about how to take your medicine safely. Blood thinners can cause serious bleeding problems. This medicine could be in pill form or as a shot (injection). If a shot is necessary, your doctor will tell you how to do this. Incision care    · If your doctor told you how to care for your cut (incision), follow your doctor's instructions. You will have a dressing over the cut. A dressing helps the incision heal and protects it. Your doctor will tell you how to take care of this.     · If you did not get instructions, follow this general advice:  ? If you have strips of tape on the cut the doctor made, leave the tape on for a week or until it falls off.  ? If you have stitches or staples, your doctor will tell you when to come back to have them removed. ? If you have skin adhesive on the cut, leave it on until it falls off. Skin adhesive is also called glue or liquid stitches. ? Change the bandage every day. ? Wash the area daily with warm water, and pat it dry. Don't use hydrogen peroxide or alcohol. They can slow healing. ? You may cover the area with a gauze bandage if it oozes fluid or rubs against clothing. ? You may shower 24 to 48 hours after surgery. Pat the incision dry. Don't swim or take a bath for the first 2 weeks, or until your doctor tells you it is okay. Exercise    · Your physical therapist will teach you exercises to do at home. Always do them as your therapist tells you.     · Your doctor may advise you to stay away from activities that put stress on the joint.  This includes sports such as tennis, football, and jogging.     · Avoid activities where you might fall. These include motorcycle or horseback riding, water-skiing, and mountain biking. Ice and elevation    · For pain, put ice or a cold pack on the area for 10 to 20 minutes at a time. Put a thin cloth between the ice and your skin.     · Your ankle may swell for a few weeks after hip surgery. Prop up your ankle when you ice your hip or anytime you sit or lie down. Try to keep it above the level of your heart. This will help reduce swelling. Other instructions    · Keep wearing your support stockings. These help to prevent blood clots. Your doctor will tell you how long you need to keep wearing them.     · Try to prevent falls. To avoid falling:  ? Arrange furniture so that you will not trip on it. ? Get rid of throw rugs, and move electrical cords out of the way. ? Put grab bars in showers and bathtubs. ? Wear shoes with sturdy, flat soles. ? Walk only in areas with plenty of light. ? Avoid icy or snowy sidewalks. Follow-up care is a key part of your treatment and safety. Be sure to make and go to all appointments, and call your doctor if you are having problems. It's also a good idea to know your test results and keep a list of the medicines you take. When should you call for help? Call 911 anytime you think you may need emergency care. For example, call if:    · You passed out (lost consciousness).     · You have severe trouble breathing.     · You have sudden chest pain and shortness of breath, or you cough up blood.    Call your doctor now or seek immediate medical care if:    · You have symptoms of a blood clot in your leg (called a deep vein thrombosis), such as:  ? Pain in your calf, back of the knee, thigh, or groin. ? Redness and swelling in your leg or groin.     · You have signs of infection, such as:  ? Increased pain, swelling, warmth, or redness. ? Red streaks leading from the incision. ? Pus draining from the incision. ?  A fever.     · Your leg or foot turns cold or changes color.     · You have tingling, weakness, or numbness in your leg or foot.     · You have signs that your hip may be dislocated, including:  ? Severe pain and not being able to stand. ? A crooked leg that looks like your hip is out of position. ? Not being able to bend or straighten your leg.     · You have pain that does not get better after you take pain medicine.     · You have loose stitches, or your incision comes open.    Watch closely for changes in your health, and be sure to contact your doctor if:    · You do not have a bowel movement after taking a laxative.     · You do not get better as expected. Where can you learn more? Go to http://radha-alex.info/. Enter [phone] in the search box to learn more about \"Hip Replacement Surgery (Anterior): What to Expect at Home. \"  Current as of: September 20, 2018  Content Version: 11.9  © 4201-5365 [employer]. Care instructions adapted under license by [employer] (which disclaims liability or warranty for this information). If you have questions about a medical condition or this instruction, always ask your healthcare professional. [address] any warranty or liability for your use of this information.

## 2019-02-12 NOTE — Clinical Note
Do you have a pharmacy recommendation who will do the pill packs? Both  And  Karla Aashish would like to do that. Thanks!

## 2019-02-12 NOTE — PROGRESS NOTES
Date of visit: 2/12/2019   Subjective:      History obtained from:  Daughter, Tani Ann,  and the patient. Adan Black is a 80 y.o. female who presents today for face-to face transition of care. Assessment/Plan:  1. Transition of Care  Status post right hip replacement  -revision s/p GLF  -surgical incision healing well, advised to have [place] nurse monitor for s/s infection  - REFERRAL TO HOME HEALTH    2. Memory change  -no official dx on dementia, but was on aricept in past, pt d/c med due [facility]    3. Low BP reading  -current therapy: amlodipine 10mg + metoprolol 25mg bid (hold for <120/65)   -BP today = 114/52  -discussed low BP can cause falls  -advised to monitor BP at home and keep log; if >140/90 or <120/60, make OV     Transition of Care documentation    Date of discharge:  1/24/19 (admitted:  1/20/19)  GLF - thinks it wasn't a tripping over her feet, could be     Hospital Course:    Right hip replacement - GLF  Staples removed a week ago     Home health instituted - [facility] - would like [place] but needs to be ordered by PCP - Mariaelena BROOKS    Left [facility] on Friday 2/8/19  PT, OT, skilled nursing - was doing 2 PT sessions a day at [facility]. to get up and out of bed, but didn't need  to lift leg back into bed     Concerns for today:   Falling - discussed fall precaution    HTN  Amlodipine 10mg + metoprolol 25mg bid (hold for <120/65)   BP today = 114/52  Weight loss: 132lbs today, 139 1/20/19 - pt doesn't have any appetite and the food at [facility] wasn't very     Blister packs not done yet - need to get that but advised to wait until pt's meds stabilize     Visiting [facility] to come 2x week, friend, Raisa Weller that will take to grocery store - working on [employer] both Mr. Mary Beth King and pt daily  Has spoken with [facility] to see what other services are out there.     Social history:   Nutrition: discussed good protein intake and fluids for healing    Social History     Tobacco Use   Smoking Status Never Smoker   Smokeless Tobacco Never Used     Social History     Substance and Sexual Activity   Alcohol Use No     Social History     Substance and Sexual Activity   Drug Use No       3 most recent PHQ Screens 2/12/2019   Little interest or pleasure in doing things Not at all   Feeling down, depressed, irritable, or hopeless Not at all   Total Score PHQ 2 0        I reviewed the following:   Patient Active Problem List    Diagnosis Date Noted    Hip fracture ([facility] 01/20/2019    Bradycardia 07/13/2018    Hypertension 07/12/2018    [facility] 07/12/2018    Hypokalemia 02/28/2015    OAB (overactive bladder) 02/15/2013    Hyperlipidemia 12/01/2011    HTN (hypertension) 05/27/2011     Current Outpatient Medications   Medication Sig Dispense Refill    benazepril (LOTENSIN) 40 mg tablet Take 40 mg by mouth.  cholecalciferol (VITAMIN D3) 1,000 unit tablet Take 1,000 Units by mouth.  senna-docusate (PERICOLACE) 8.6-50 mg per tablet Take 1 Tab by mouth.  aspirin 81 mg chewable tablet Take 1 Tab by mouth two (2) times a day. 60 Tab 0    famotidine (PEPCID) 10 mg tablet Take 1 Tab by mouth two (2) times a day. 60 Tab 0    polyethylene glycol (MIRALAX) 17 gram/dose powder Take 17 g by mouth daily as needed.  atorvastatin (LIPITOR) 20 mg tablet TAKE 1 TABLET BY MOUTH IN THE EVENING 90 Tab 3    coenzyme Q-10 (CO Q-10) 200 mg capsule Take 1 Cap by mouth daily. 90 Cap 3    glucosamine-chondroit-vit c-mn (GLUCOSAMINE 1500 COMPLEX) 500-400 mg capsule Take 1 Cap by mouth daily. 90 Cap 1    metoprolol tartrate (LOPRESSOR) 25 mg tablet TAKE 1 TAB BY MOUTH EVERY TWELVE (12) HOURS. HOLD FOR SBP LESS THAN 120 OR HR LESS THAN 65 60 Tab 3    multivit-minerals/folic acid (CENTRUM MULTIGUMMIES PO) Take 1 Tab by mouth daily.       amLODIPine (NORVASC) 10 mg tablet TAKE 1 TABLET BY MOUTH EVERY MORNING 90 Tab 4    cyclobenzaprine (FLEXERIL) 10 mg tablet Take 10 mg by mouth.  POLYETHYLENE GLYCOL 6980,RC, by Not Applicable route.  predniSONE (STERAPRED DS) 10 mg dose pack Take as directed.  HYDROcodone-acetaminophen (NORCO) 5-325 mg per tablet Take 1 Tab by mouth.  acetaminophen (TYLENOL) 500 mg tablet Take 1 Tab by mouth every six (6) hours as needed for Pain. 30 Tab 0    lidocaine (LIDODERM) 5 % 1 Patch by TransDERmal route daily as needed. Apply patch to the affected area for 12 hours a day and remove for 12 hours a day.  capsicum oleoresin 0.025 % topical cream Apply  to affected area three (3) times daily.  60 g 2     Allergies   Allergen Reactions    Codeine Nausea and Vomiting    Demerol (Pf) [Meperidine (Pf)] Nausea and Vomiting    Meperidine Nausea and Vomiting     Past Medical History:   Diagnosis Date    HTN (hypertension) 5/27/2011    Hyperlipidemia 12/1/2011    Hypertension     OAB (overactive bladder) 2/15/2013    Other ill-defined conditions(799.89)     hyperlipidemia    Other ill-defined conditions(799.89)     bladder problems    Other ill-defined conditions(799.89)     back pain     Past Surgical History:   Procedure Laterality Date    COLONOSCOPY N/A 3/12/2018    COLONOSCOPY performed by Ofelia Abbott MD at [address]  3/12/2018         HX APPENDECTOMY  1948    HX CATARACT REMOVAL Left     HX CORNEAL TRANSPLANT Left     HX DILATION AND CURETTAGE      x 2    HX HIP REPLACEMENT Right 01/20/2019    HX TONSILLECTOMY  1938     Family History   Problem Relation Age of Onset    Cancer Father         throat (smoker); mouth and gums    Heart Disease Mother         Age 52   [facility] Clotting Disorder Mother     Arthritis-osteo Paternal Aunt      Social History     Tobacco Use    Smoking status: Never Smoker    Smokeless tobacco: Never Used   Substance Use Topics    Alcohol use: No      Social History     Social History Narrative    Not on file        Review of Systems  - Constitutional Symptoms: no fevers, chills, +weight loss  - Cardiovascular: no chest pain or palpitations  - Respiratory: no cough or shortness of breath  - Gastrointestinal: no dysphagia or abdominal pain; no issues with bowel movements  - Integumentary: no rashes, PU's  - : no issues urination   ROS is negative otherwise. 3 most recent PHQ Screens 2/12/2019   Little interest or pleasure in doing things Not at all   Feeling down, depressed, irritable, or hopeless Not at all   Total Score PHQ 2 0        Objective:     Vitals:    02/12/19 1014   BP: 114/52   Pulse: 66   Resp: 18   Temp: 96.8 °F (36 °C)   TempSrc: Oral   SpO2: 97%   Weight: 132 lb (59.9 kg)   Height: 5' 8\" (1.727 m)       GENERAL: Donnald Phlegm is in no acute distress. Non-toxic. Well nourished. Well developed. Appropriately groomed. HEAD:  Normocephalic. Atraumatic. Non tender sinuses x 4. RESP: Breath sounds are symmetrical bilaterally. Unlabored without SOB. Speaking in full sentences. Clear to auscultation bilaterally anteriorly and posteriorly. No wheezes. No rales or rhonchi. CV: normal rate. Regular rhythm. S1, S2 audible. No murmur noted. No rubs, clicks or gallops noted. BACK: Visible deformities or curvature. Full ROM. No pain on palpation of the spinous processes in the cervical, thoracic, lumbar, sacral regions. No CVA tenderness. NEURO:  awake, alert and oriented to person, place, and time and event. Clear speech. Muscle strength is +5/5 x 4 extremities. Sensation is intact to light touch bilaterally. Steady gait. MUSC:  Intact x 4 extremities. Full ROM x 4 extremities. No pain with movement. HEME/LYMPH: peripheral pulses palpable 2+ x 4 extremities. No peripheral edema is noted. Surgical incision - 12cm, well approximated, mild erythema at top 2cm of incision  PSYCH: appropriate behavior, dress and thought processes. Good eye contact. Clear and coherent speech. Full affect. Good insight. Lab Results   Component Value Date/Time    Hemoglobin A1c 5.7 (H) 10/06/2017 08:45 AM     Lab Results   Component Value Date/Time    Cholesterol, total 129 10/06/2017 08:45 AM    Cholesterol (POC) 130 11/09/2016 02:33 PM    HDL Cholesterol 51 10/06/2017 08:45 AM    HDL Cholesterol (POC) 53 11/09/2016 02:33 PM    LDL Cholesterol (POC) 64 11/09/2016 02:33 PM    LDL, calculated 64 10/06/2017 08:45 AM    VLDL, calculated 14 10/06/2017 08:45 AM    Triglyceride 72 10/06/2017 08:45 AM    Triglycerides (POC) 64 11/09/2016 02:33 PM     Lab Results   Component Value Date/Time    Sodium 133 (L) 01/23/2019 04:35 AM    Potassium 3.8 01/23/2019 04:35 AM    Chloride 101 01/23/2019 04:35 AM    CO2 24 01/23/2019 04:35 AM    Anion gap 8 01/23/2019 04:35 AM    Glucose 97 01/23/2019 04:35 AM    BUN 12 01/23/2019 04:35 AM    Creatinine 0.56 01/23/2019 04:35 AM    BUN/Creatinine ratio 21 (H) 01/23/2019 04:35 AM    GFR est AA >60 01/23/2019 04:35 AM    GFR est non-AA >60 01/23/2019 04:35 AM    Calcium 8.3 (L) 01/23/2019 04:35 AM    Bilirubin, total 0.6 01/20/2019 02:30 PM    AST (SGOT) 29 01/20/2019 02:30 PM    Alk. phosphatase 91 01/20/2019 02:30 PM    Protein, total 7.9 01/20/2019 02:30 PM    Albumin 4.3 01/20/2019 02:30 PM    Globulin 3.6 01/20/2019 02:30 PM    A-G Ratio 1.2 01/20/2019 02:30 PM    ALT (SGPT) 27 01/20/2019 02:30 PM     Lab Results   Component Value Date/Time    WBC 7.8 01/20/2019 02:30 PM    HGB (POC) 13.9 09/08/2017 09:47 AM    HGB 9.0 (L) 01/24/2019 05:03 AM    HCT (POC) 42.3 09/08/2017 09:47 AM    HCT 26.8 (L) 01/24/2019 05:03 AM    PLATELET 508 43/83/0720 02:30 PM    MCV 94.2 01/20/2019 02:30 PM     Lab Results   Component Value Date/Time    TSH 2.49 07/12/2018 08:47 AM       _____________________________________________________________________  I have discussed the diagnosis with the patient and the intended plan as seen in the above orders.   The patient has received an after-visit summary and questions were answered concerning future plans. I have discussed medication side effects and warnings with the patient as well. Informed pt to return to the office if new symptoms arise. Urgent consultation with the nearest Emergency Department is strongly recommended if condition worsens.

## 2019-02-12 NOTE — PROGRESS NOTES
Cara Hughes  Identified pt with two pt identifiers(name and ). Chief Complaint   Patient presents with   [address]     rm 10/non fasting/post hip replacement       1. Have you been to the ER, urgent care clinic since your last visit? Yes, 2019Hospitalized since your last visit? 2019    2. Have you seen or consulted any other health care providers outside of the [address] [place] [address] since your last visit? Include any pap smears or colon screening. no      Advance Care Planning    In the event something were to happen to you and you were unable to speak on your behalf, do you have an Advance Directive/ Living Will in place stating your wishes? NO    If yes, do we have a copy on file NO    If no, would you like information NO    Medication reconciliation up to date and corrected with patient at this time. Today's provider has been notified of reason for visit, vitals and flowsheets obtained on patients. Reviewed record in preparation for visit, huddled with provider and have obtained necessary documentation. Health Maintenance Due   Topic    Shingrix Vaccine Age 50> (1 of 2)    GLAUCOMA SCREENING Q2Y     MEDICARE YEARLY EXAM        Wt Readings from Last 3 Encounters:   19 139 lb 1.8 oz (63.1 kg)   11/15/18 132 lb 9.6 oz (60.1 kg)   18 136 lb (61.7 kg)     Temp Readings from Last 3 Encounters:   19 98 °F (36.7 °C)   18 98.1 °F (36.7 °C) (Temporal)   18 97.8 °F (36.6 °C) (Temporal)     BP Readings from Last 3 Encounters:   19 151/56   18 128/70   18 133/72     Pulse Readings from Last 3 Encounters:   19 72   18 81   18 72     There were no vitals filed for this visit.       Learning Assessment:  :     Learning Assessment 2017   PRIMARY LEARNER Patient   HIGHEST LEVEL OF EDUCATION - PRIMARY LEARNER  SOME COLLEGE   BARRIERS PRIMARY LEARNER NONE   PRIMARY LANGUAGE ENGLISH   LEARNER PREFERENCE PRIMARY DEMONSTRATION   ANSWERED BY Seamus Peng   RELATIONSHIP SELF       Depression Screening:  :     3 most recent PHQ Screens 11/15/2018   Little interest or pleasure in doing things Not at all   Feeling down, depressed, irritable, or hopeless Not at all   Total Score PHQ 2 0       No flowsheet data found. Fall Risk Assessment:  :     Fall Risk Assessment, last 12 mths 11/15/2018   Able to walk? Yes   Fall in past 12 months? Yes   Fall with injury? Yes   Number of falls in past 12 months 2   Fall Risk Score 3       Abuse Screening:  :     Abuse Screening Questionnaire 11/15/2018 8/7/2018 9/27/2017   Do you ever feel afraid of your partner? N N N   Are you in a relationship with someone who physically or mentally threatens you? N N N   Is it safe for you to go home? Y Y Y       ADL Screening:  :     ADL Assessment 11/15/2018   Feeding yourself No Help Needed   Getting from bed to chair No Help Needed   Getting dressed No Help Needed   Bathing or showering No Help Needed   Walk across the room (includes cane/walker) No Help Needed   Using the telphone No Help Needed   Taking your medications No Help Needed   Preparing meals No Help Needed   Managing money (expenses/bills) No Help Needed   Moderately strenuous housework (laundry) No Help Needed   Shopping for personal items (toiletries/medicines) No Help Needed   Shopping for groceries No Help Needed   Driving No Help Needed   Climbing a flight of stairs No Help Needed   Getting to places beyond walking distances No Help Needed           BMI:  Weight Metrics 1/20/2019 11/15/2018 11/11/2018 11/6/2018 11/1/2018 9/17/2018 8/7/2018   Weight 139 lb 1.8 oz 132 lb 9.6 oz 136 lb 138 lb 1.6 oz 134 lb 136 lb 3.2 oz 140 lb 1.6 oz   BMI 21.15 kg/m2 23.49 kg/m2 24.09 kg/m2 24.46 kg/m2 23.74 kg/m2 21.33 kg/m2 21.94 kg/m2           Medication reconciliation up to date and corrected with patient at this time.

## 2019-02-13 ENCOUNTER — HOME HEALTH ADMISSION (OUTPATIENT)
Dept: HOME HEALTH SERVICES | Facility: HOME HEALTH | Age: 84
End: 2019-02-13
Payer: MEDICARE

## 2019-02-15 ENCOUNTER — HOME CARE VISIT (OUTPATIENT)
Dept: SCHEDULING | Facility: HOME HEALTH | Age: 84
End: 2019-02-15
Payer: MEDICARE

## 2019-02-15 VITALS
TEMPERATURE: 97.6 F | WEIGHT: 134 LBS | DIASTOLIC BLOOD PRESSURE: 60 MMHG | RESPIRATION RATE: 18 BRPM | OXYGEN SATURATION: 98 % | HEART RATE: 83 BPM | BODY MASS INDEX: 22.33 KG/M2 | SYSTOLIC BLOOD PRESSURE: 140 MMHG | HEIGHT: 65 IN

## 2019-02-15 VITALS
TEMPERATURE: 97.5 F | RESPIRATION RATE: 18 BRPM | HEART RATE: 74 BPM | SYSTOLIC BLOOD PRESSURE: 124 MMHG | DIASTOLIC BLOOD PRESSURE: 64 MMHG | OXYGEN SATURATION: 97 %

## 2019-02-15 PROCEDURE — G0151 HHCP-SERV OF PT,EA 15 MIN: HCPCS

## 2019-02-15 PROCEDURE — 400013 HH SOC

## 2019-02-15 PROCEDURE — G0299 HHS/HOSPICE OF RN EA 15 MIN: HCPCS

## 2019-02-18 ENCOUNTER — HOME CARE VISIT (OUTPATIENT)
Dept: SCHEDULING | Facility: HOME HEALTH | Age: 84
End: 2019-02-18
Payer: MEDICARE

## 2019-02-18 ENCOUNTER — PATIENT OUTREACH (OUTPATIENT)
Dept: FAMILY MEDICINE CLINIC | Age: 84
End: 2019-02-18

## 2019-02-18 ENCOUNTER — HOME CARE VISIT (OUTPATIENT)
Dept: HOME HEALTH SERVICES | Facility: HOME HEALTH | Age: 84
End: 2019-02-18
Payer: MEDICARE

## 2019-02-18 PROCEDURE — G0299 HHS/HOSPICE OF RN EA 15 MIN: HCPCS

## 2019-02-18 RX ORDER — BENAZEPRIL HYDROCHLORIDE 40 MG/1
40 TABLET ORAL DAILY
Qty: 90 TAB | Refills: 1 | Status: SHIPPED | OUTPATIENT
Start: 2019-02-18 | End: 2019-02-18

## 2019-02-18 NOTE — PROGRESS NOTES
NN JOSHUA F/U Progress Note - Per EMR review pt d/c'd from SNF 2/8/19 
 
- Pt seen by PCP for JOSHUA f/u 2/12/19 
 
- Concern for wt loss, low BP readings, Discussed importance of good nutrition, geri protein for healing, adequate po fluid intake to avoid dehydration. Given BP parameters for Metoprolol & when to contact PCP office - Opened to [facility]. SN & PT SOC visits done 2/15/19. SN plan 1x/1w, 2x/wkx2, 1x/wkx6. PT plan 1x/wk, 2x/wkx4, 1x/1wk - Recommendation for prepackaged meds re facilitation of med adherence. PCP My Chart message to pt re [facility]. Response from pt's dtr Brea Mercado. Stated [facility] also offers prepackaging program. Pt prefers to remain w/ [facility] 
 
- Next PCP f/u 3/19/19. Next Ortho f/u 7/9/19 
 
- NN spoke w/ pt's dtr Brea Mercado. See Goal below. Goals  Transitions of Care- collaboration & care coordination to prevent complications post hospitalization. 2/18/19- pt transitioned from SNF to home on 2/8/19. Pt seen for PCP JOSHUA f/u on 2/12/19. BS SN/PT/OT ordered. SN [facility] & PT eval visits on 2/15/19. Anticipate completing set goals within 6-8 wks. Per PCP & [facility] staff documentation right hip incision well healed w/o any s/s infections. Pt ambulating w/ walker. Has 2 walkers. 1 in [facility] which has 3 steps w/ secure handrails to go into kitchen, bathroom, bedroom level. Also has a walker on that level. Has Appetite picking up. Also drinking 1 can of Boost supplement daily. Trying to increase PO fluids. Currently have \" Dorcus Ache" coming into pt's home 2 hrs/day x 5 days week. Will assist w/ some IADLs ie cooking, cleaning, maintaining house. Will not assist with ADLs such as bathing, grooming, At present dtr takes pt for hair wash/cut every 2 weeks, & manicure/pedicure to manage nails. Looking into possible PCA. Plan to use [facility]. Dtr ordered \"wrist BP monitor device\". To be delivered today/tomorrow.  Plan is to have  monitor BP, keep log. Based on SBP & HR parameters will determine if pt to take Metoprolol. Next PCP f/u 3/19/19. Next Ortho f/u 7/9/19. Plan- pt to participate w/ [facility] HH staff to accomplish set goals. Pt to continue PO fluid intake along w/ Boost to supplement nutrition. Pt to have BP monitored along w/ log. Present to PCP for review @ next f/u. Family to arrange initiation of prepackaged med program thru [facility]. Family to pursue community resources ie [employer] re PCA. Dtr agreeable with NN CCM to start after JOSHUA period ends. Plan to resolve NN JOSHUA Episode ~ 2/22/19 & open to NN CCM Episode. NN to continue collaboration w/ pt's dtrs, PCP, [place] staff, & other  Care Team members as needed for continued care coordination. Next NN f/u ~ 4 days to resolve JOSHUA Episode-ID.

## 2019-02-19 ENCOUNTER — TELEPHONE (OUTPATIENT)
Dept: FAMILY MEDICINE CLINIC | Age: 84
End: 2019-02-19

## 2019-02-19 ENCOUNTER — HOME CARE VISIT (OUTPATIENT)
Dept: SCHEDULING | Facility: HOME HEALTH | Age: 84
End: 2019-02-19
Payer: MEDICARE

## 2019-02-19 VITALS
SYSTOLIC BLOOD PRESSURE: 130 MMHG | OXYGEN SATURATION: 98 % | DIASTOLIC BLOOD PRESSURE: 66 MMHG | TEMPERATURE: 97.1 F | HEART RATE: 74 BPM

## 2019-02-19 PROCEDURE — G0152 HHCP-SERV OF OT,EA 15 MIN: HCPCS

## 2019-02-19 NOTE — TELEPHONE ENCOUNTER
KBS 2/19/18 @ 8017  Spoke to Charisma Moon, pt's daughter - who verbalizes that pt's current therapy: amlodipine 10mg + metoprolol 25mg bid (hold for <120/65)   Per Charisma Moon - pill boxes are set up weekly so having difficulty determining whether or not to add metoprolol or not. Advised that [facility] should take BP each visit and can determine BP - Charisma Moon stated that [facility] not going daily. Charisma Moon has a cleaning lady, Rambo Craig, who cleans her mother's house and Christine Ponce . Danika Hart have agreed to have Rambo Craig come and help them 5 days a week. Will have Rambo Craig go in am from 10-12 and take BP and Barry Sarkar and Charisma Moon will go when Rambo Craig can't make it. Advised I will talk with HEAVEN Martini and see if there is another solution as well. Chan Werner understanding and agrees to the plan of care.

## 2019-02-20 ENCOUNTER — HOME CARE VISIT (OUTPATIENT)
Dept: SCHEDULING | Facility: HOME HEALTH | Age: 84
End: 2019-02-20
Payer: MEDICARE

## 2019-02-20 VITALS
OXYGEN SATURATION: 97 % | SYSTOLIC BLOOD PRESSURE: 150 MMHG | HEART RATE: 80 BPM | TEMPERATURE: 97.9 F | DIASTOLIC BLOOD PRESSURE: 74 MMHG

## 2019-02-20 PROCEDURE — G0157 HHC PT ASSISTANT EA 15: HCPCS

## 2019-02-21 ENCOUNTER — HOME CARE VISIT (OUTPATIENT)
Dept: SCHEDULING | Facility: HOME HEALTH | Age: 84
End: 2019-02-21
Payer: MEDICARE

## 2019-02-21 VITALS
OXYGEN SATURATION: 98 % | HEART RATE: 72 BPM | DIASTOLIC BLOOD PRESSURE: 80 MMHG | SYSTOLIC BLOOD PRESSURE: 132 MMHG | TEMPERATURE: 98 F | RESPIRATION RATE: 18 BRPM

## 2019-02-21 VITALS
HEART RATE: 72 BPM | DIASTOLIC BLOOD PRESSURE: 68 MMHG | OXYGEN SATURATION: 99 % | TEMPERATURE: 98.9 F | SYSTOLIC BLOOD PRESSURE: 142 MMHG

## 2019-02-21 PROCEDURE — G0300 HHS/HOSPICE OF LPN EA 15 MIN: HCPCS

## 2019-02-21 PROCEDURE — G0152 HHCP-SERV OF OT,EA 15 MIN: HCPCS

## 2019-02-22 ENCOUNTER — HOME CARE VISIT (OUTPATIENT)
Dept: SCHEDULING | Facility: HOME HEALTH | Age: 84
End: 2019-02-22
Payer: MEDICARE

## 2019-02-22 VITALS
DIASTOLIC BLOOD PRESSURE: 71 MMHG | HEART RATE: 69 BPM | OXYGEN SATURATION: 98 % | SYSTOLIC BLOOD PRESSURE: 140 MMHG | TEMPERATURE: 97.3 F

## 2019-02-22 VITALS
DIASTOLIC BLOOD PRESSURE: 66 MMHG | SYSTOLIC BLOOD PRESSURE: 140 MMHG | TEMPERATURE: 97.1 F | HEART RATE: 82 BPM | OXYGEN SATURATION: 98 %

## 2019-02-22 PROCEDURE — G0157 HHC PT ASSISTANT EA 15: HCPCS

## 2019-02-25 ENCOUNTER — HOME CARE VISIT (OUTPATIENT)
Dept: SCHEDULING | Facility: HOME HEALTH | Age: 84
End: 2019-02-25
Payer: MEDICARE

## 2019-02-25 VITALS
DIASTOLIC BLOOD PRESSURE: 60 MMHG | HEART RATE: 84 BPM | SYSTOLIC BLOOD PRESSURE: 136 MMHG | OXYGEN SATURATION: 97 % | TEMPERATURE: 97.9 F

## 2019-02-25 PROCEDURE — G0300 HHS/HOSPICE OF LPN EA 15 MIN: HCPCS

## 2019-02-25 PROCEDURE — G0157 HHC PT ASSISTANT EA 15: HCPCS

## 2019-02-27 ENCOUNTER — HOME CARE VISIT (OUTPATIENT)
Dept: SCHEDULING | Facility: HOME HEALTH | Age: 84
End: 2019-02-27
Payer: MEDICARE

## 2019-02-27 VITALS
HEART RATE: 84 BPM | TEMPERATURE: 97.9 F | RESPIRATION RATE: 18 BRPM | DIASTOLIC BLOOD PRESSURE: 62 MMHG | SYSTOLIC BLOOD PRESSURE: 130 MMHG

## 2019-02-27 PROCEDURE — G0152 HHCP-SERV OF OT,EA 15 MIN: HCPCS

## 2019-02-28 ENCOUNTER — HOME CARE VISIT (OUTPATIENT)
Dept: SCHEDULING | Facility: HOME HEALTH | Age: 84
End: 2019-02-28
Payer: MEDICARE

## 2019-02-28 VITALS
RESPIRATION RATE: 16 BRPM | TEMPERATURE: 98.7 F | DIASTOLIC BLOOD PRESSURE: 68 MMHG | HEART RATE: 62 BPM | SYSTOLIC BLOOD PRESSURE: 130 MMHG | OXYGEN SATURATION: 96 %

## 2019-02-28 VITALS
OXYGEN SATURATION: 98 % | HEART RATE: 60 BPM | SYSTOLIC BLOOD PRESSURE: 143 MMHG | TEMPERATURE: 97.1 F | DIASTOLIC BLOOD PRESSURE: 71 MMHG

## 2019-02-28 VITALS
RESPIRATION RATE: 18 BRPM | SYSTOLIC BLOOD PRESSURE: 122 MMHG | OXYGEN SATURATION: 98 % | HEART RATE: 68 BPM | DIASTOLIC BLOOD PRESSURE: 66 MMHG | TEMPERATURE: 98.7 F

## 2019-02-28 PROCEDURE — G0300 HHS/HOSPICE OF LPN EA 15 MIN: HCPCS

## 2019-02-28 PROCEDURE — G0157 HHC PT ASSISTANT EA 15: HCPCS

## 2019-03-01 ENCOUNTER — HOME CARE VISIT (OUTPATIENT)
Dept: SCHEDULING | Facility: HOME HEALTH | Age: 84
End: 2019-03-01
Payer: MEDICARE

## 2019-03-01 PROCEDURE — G0152 HHCP-SERV OF OT,EA 15 MIN: HCPCS

## 2019-03-02 VITALS
HEART RATE: 69 BPM | SYSTOLIC BLOOD PRESSURE: 138 MMHG | DIASTOLIC BLOOD PRESSURE: 70 MMHG | TEMPERATURE: 97.1 F | OXYGEN SATURATION: 99 %

## 2019-03-04 ENCOUNTER — HOME CARE VISIT (OUTPATIENT)
Dept: SCHEDULING | Facility: HOME HEALTH | Age: 84
End: 2019-03-04
Payer: MEDICARE

## 2019-03-04 VITALS
TEMPERATURE: 97.1 F | OXYGEN SATURATION: 98 % | HEART RATE: 66 BPM | SYSTOLIC BLOOD PRESSURE: 140 MMHG | DIASTOLIC BLOOD PRESSURE: 70 MMHG

## 2019-03-04 VITALS
OXYGEN SATURATION: 99 % | TEMPERATURE: 97.3 F | SYSTOLIC BLOOD PRESSURE: 151 MMHG | HEART RATE: 73 BPM | DIASTOLIC BLOOD PRESSURE: 75 MMHG

## 2019-03-04 VITALS
OXYGEN SATURATION: 98 % | TEMPERATURE: 98.2 F | RESPIRATION RATE: 18 BRPM | SYSTOLIC BLOOD PRESSURE: 116 MMHG | HEART RATE: 70 BPM | DIASTOLIC BLOOD PRESSURE: 80 MMHG

## 2019-03-04 PROCEDURE — G0152 HHCP-SERV OF OT,EA 15 MIN: HCPCS

## 2019-03-04 PROCEDURE — G0299 HHS/HOSPICE OF RN EA 15 MIN: HCPCS

## 2019-03-04 PROCEDURE — G0157 HHC PT ASSISTANT EA 15: HCPCS

## 2019-03-07 ENCOUNTER — HOME CARE VISIT (OUTPATIENT)
Dept: SCHEDULING | Facility: HOME HEALTH | Age: 84
End: 2019-03-07
Payer: MEDICARE

## 2019-03-07 VITALS
DIASTOLIC BLOOD PRESSURE: 75 MMHG | HEART RATE: 65 BPM | OXYGEN SATURATION: 98 % | TEMPERATURE: 97.5 F | SYSTOLIC BLOOD PRESSURE: 164 MMHG

## 2019-03-07 PROCEDURE — G0157 HHC PT ASSISTANT EA 15: HCPCS

## 2019-03-08 ENCOUNTER — HOME CARE VISIT (OUTPATIENT)
Dept: SCHEDULING | Facility: HOME HEALTH | Age: 84
End: 2019-03-08
Payer: MEDICARE

## 2019-03-08 PROCEDURE — G0152 HHCP-SERV OF OT,EA 15 MIN: HCPCS

## 2019-03-09 VITALS
DIASTOLIC BLOOD PRESSURE: 72 MMHG | SYSTOLIC BLOOD PRESSURE: 140 MMHG | TEMPERATURE: 97.3 F | OXYGEN SATURATION: 99 % | HEART RATE: 69 BPM

## 2019-03-11 ENCOUNTER — HOME CARE VISIT (OUTPATIENT)
Dept: SCHEDULING | Facility: HOME HEALTH | Age: 84
End: 2019-03-11
Payer: MEDICARE

## 2019-03-11 VITALS
SYSTOLIC BLOOD PRESSURE: 144 MMHG | DIASTOLIC BLOOD PRESSURE: 72 MMHG | HEART RATE: 59 BPM | OXYGEN SATURATION: 99 % | TEMPERATURE: 97.9 F

## 2019-03-11 VITALS — HEART RATE: 58 BPM | OXYGEN SATURATION: 98 % | SYSTOLIC BLOOD PRESSURE: 120 MMHG | DIASTOLIC BLOOD PRESSURE: 64 MMHG

## 2019-03-11 PROCEDURE — G0300 HHS/HOSPICE OF LPN EA 15 MIN: HCPCS

## 2019-03-11 PROCEDURE — G0152 HHCP-SERV OF OT,EA 15 MIN: HCPCS

## 2019-03-12 ENCOUNTER — HOME CARE VISIT (OUTPATIENT)
Dept: SCHEDULING | Facility: HOME HEALTH | Age: 84
End: 2019-03-12
Payer: MEDICARE

## 2019-03-12 VITALS
HEART RATE: 69 BPM | TEMPERATURE: 98.7 F | DIASTOLIC BLOOD PRESSURE: 76 MMHG | OXYGEN SATURATION: 98 % | SYSTOLIC BLOOD PRESSURE: 149 MMHG

## 2019-03-12 PROCEDURE — G0157 HHC PT ASSISTANT EA 15: HCPCS

## 2019-03-13 ENCOUNTER — HOME CARE VISIT (OUTPATIENT)
Dept: SCHEDULING | Facility: HOME HEALTH | Age: 84
End: 2019-03-13
Payer: MEDICARE

## 2019-03-13 VITALS
HEART RATE: 69 BPM | TEMPERATURE: 97.2 F | DIASTOLIC BLOOD PRESSURE: 68 MMHG | OXYGEN SATURATION: 97 % | SYSTOLIC BLOOD PRESSURE: 140 MMHG

## 2019-03-13 PROCEDURE — G0152 HHCP-SERV OF OT,EA 15 MIN: HCPCS

## 2019-03-14 ENCOUNTER — HOME CARE VISIT (OUTPATIENT)
Dept: HOME HEALTH SERVICES | Facility: HOME HEALTH | Age: 84
End: 2019-03-14
Payer: MEDICARE

## 2019-03-14 ENCOUNTER — HOME CARE VISIT (OUTPATIENT)
Dept: SCHEDULING | Facility: HOME HEALTH | Age: 84
End: 2019-03-14
Payer: MEDICARE

## 2019-03-14 VITALS
HEART RATE: 78 BPM | DIASTOLIC BLOOD PRESSURE: 68 MMHG | RESPIRATION RATE: 18 BRPM | OXYGEN SATURATION: 98 % | SYSTOLIC BLOOD PRESSURE: 140 MMHG | TEMPERATURE: 98.2 F

## 2019-03-14 PROCEDURE — G0151 HHCP-SERV OF PT,EA 15 MIN: HCPCS

## 2019-03-18 ENCOUNTER — HOME CARE VISIT (OUTPATIENT)
Dept: SCHEDULING | Facility: HOME HEALTH | Age: 84
End: 2019-03-18
Payer: MEDICARE

## 2019-03-18 PROCEDURE — G0300 HHS/HOSPICE OF LPN EA 15 MIN: HCPCS

## 2019-03-19 VITALS
TEMPERATURE: 98.6 F | OXYGEN SATURATION: 99 % | HEART RATE: 58 BPM | DIASTOLIC BLOOD PRESSURE: 70 MMHG | SYSTOLIC BLOOD PRESSURE: 140 MMHG

## 2019-03-20 ENCOUNTER — HOME CARE VISIT (OUTPATIENT)
Dept: SCHEDULING | Facility: HOME HEALTH | Age: 84
End: 2019-03-20
Payer: MEDICARE

## 2019-03-20 VITALS
HEART RATE: 62 BPM | OXYGEN SATURATION: 98 % | SYSTOLIC BLOOD PRESSURE: 141 MMHG | DIASTOLIC BLOOD PRESSURE: 71 MMHG | TEMPERATURE: 98.3 F

## 2019-03-20 PROCEDURE — G0157 HHC PT ASSISTANT EA 15: HCPCS

## 2019-03-22 ENCOUNTER — HOME CARE VISIT (OUTPATIENT)
Dept: SCHEDULING | Facility: HOME HEALTH | Age: 84
End: 2019-03-22
Payer: MEDICARE

## 2019-03-22 PROCEDURE — G0157 HHC PT ASSISTANT EA 15: HCPCS

## 2019-03-24 VITALS
DIASTOLIC BLOOD PRESSURE: 77 MMHG | HEART RATE: 61 BPM | SYSTOLIC BLOOD PRESSURE: 158 MMHG | OXYGEN SATURATION: 98 % | TEMPERATURE: 98.3 F

## 2019-03-25 ENCOUNTER — HOME CARE VISIT (OUTPATIENT)
Dept: SCHEDULING | Facility: HOME HEALTH | Age: 84
End: 2019-03-25
Payer: MEDICARE

## 2019-03-25 VITALS
DIASTOLIC BLOOD PRESSURE: 62 MMHG | HEART RATE: 64 BPM | TEMPERATURE: 98.2 F | SYSTOLIC BLOOD PRESSURE: 138 MMHG | OXYGEN SATURATION: 98 %

## 2019-03-25 VITALS
HEART RATE: 68 BPM | OXYGEN SATURATION: 99 % | SYSTOLIC BLOOD PRESSURE: 138 MMHG | DIASTOLIC BLOOD PRESSURE: 62 MMHG | RESPIRATION RATE: 18 BRPM

## 2019-03-25 PROCEDURE — G0157 HHC PT ASSISTANT EA 15: HCPCS

## 2019-03-25 PROCEDURE — G0300 HHS/HOSPICE OF LPN EA 15 MIN: HCPCS

## 2019-03-27 ENCOUNTER — HOME CARE VISIT (OUTPATIENT)
Dept: SCHEDULING | Facility: HOME HEALTH | Age: 84
End: 2019-03-27
Payer: MEDICARE

## 2019-03-27 VITALS
OXYGEN SATURATION: 98 % | SYSTOLIC BLOOD PRESSURE: 139 MMHG | TEMPERATURE: 98.5 F | DIASTOLIC BLOOD PRESSURE: 72 MMHG | HEART RATE: 74 BPM

## 2019-03-27 PROCEDURE — G0157 HHC PT ASSISTANT EA 15: HCPCS

## 2019-04-01 ENCOUNTER — HOME CARE VISIT (OUTPATIENT)
Dept: SCHEDULING | Facility: HOME HEALTH | Age: 84
End: 2019-04-01
Payer: MEDICARE

## 2019-04-01 PROCEDURE — G0300 HHS/HOSPICE OF LPN EA 15 MIN: HCPCS

## 2019-04-02 VITALS
OXYGEN SATURATION: 98 % | HEART RATE: 60 BPM | SYSTOLIC BLOOD PRESSURE: 122 MMHG | DIASTOLIC BLOOD PRESSURE: 68 MMHG | RESPIRATION RATE: 16 BRPM

## 2019-04-03 ENCOUNTER — HOME CARE VISIT (OUTPATIENT)
Dept: SCHEDULING | Facility: HOME HEALTH | Age: 84
End: 2019-04-03
Payer: MEDICARE

## 2019-04-03 VITALS
OXYGEN SATURATION: 98 % | DIASTOLIC BLOOD PRESSURE: 66 MMHG | SYSTOLIC BLOOD PRESSURE: 145 MMHG | TEMPERATURE: 98 F | HEART RATE: 69 BPM

## 2019-04-03 PROCEDURE — G0157 HHC PT ASSISTANT EA 15: HCPCS

## 2019-04-04 ENCOUNTER — PATIENT OUTREACH (OUTPATIENT)
Dept: FAMILY MEDICINE CLINIC | Age: 84
End: 2019-04-04

## 2019-04-04 NOTE — PROGRESS NOTES
NN CCM Note Goals  Facilitate transitions of care during progression of chronic conditions 4/4/19- spoke w. Pt's dtr Dimas Greco. Reported pt's \"executive, & good decision- making functions gone. Obsessing about weird stuff. Alert but confused. Some agitation. Physically doing ok. Mental, emotional decline very apparent\". Dtr reported Visiting [facility] 2 days per week (Tues/Thurs) during afternoons/evenings. Assists w/ showering, light housekeeping, laundry, dinner prep & clean-up. Also takes & records pt's BP. \"BP hasn't been below threshold, so she has continued taking Metoprolol\". Dtr reported pt will need all new Rxs to enroll pt in pre-packaged med program [facility]. Dtr reported pt amb w/ walker. \"Seems to be able to get around. Seems more lop-sided than before\". HH PT still coming. Doing HEP. Dtr afraid when [facility] stops pt won't do HEP. Safety concerns discussed. Pt & spouse are in home alone during night. NN discussed possibility of home video surveillance. Dtr reported family considering. Dtr requested PCP discuss benefits w/ pt during 4/5/19 ov. Plan- pt to attend  4/5/19 PCP f/u. Dtr Dimas Greco to accompany pt. PCP to provide new Rxs for all meds for enrollment in [facility]. Family to look into home video surveillance system. NN to continuation collaboration w/ pt's dtr, PCP & other Care Team Members as needed for care coordination. Next NN f/u ~ 1 month. Dtr to contact NN sooner if needed-ID.

## 2019-04-04 NOTE — Clinical Note
Kayleigh Dapper- no mention of Dementia, Diminshed Mental Capacity in Problem List. Should something be listed related to diminished mental status? Dtr will need all new Rxs for [facility]. Family thinking about home video system. Would like you to mention it to pt during visit tomorrow. Seems pt may be resistant. Dtr reported BP not below parameters, pt has continued taking Metoprolol. Pd CG 2x/week afternoon/evning.

## 2019-04-05 ENCOUNTER — OFFICE VISIT (OUTPATIENT)
Dept: FAMILY MEDICINE CLINIC | Age: 84
End: 2019-04-05

## 2019-04-05 ENCOUNTER — HOME CARE VISIT (OUTPATIENT)
Dept: SCHEDULING | Facility: HOME HEALTH | Age: 84
End: 2019-04-05
Payer: MEDICARE

## 2019-04-05 VITALS
OXYGEN SATURATION: 97 % | SYSTOLIC BLOOD PRESSURE: 145 MMHG | TEMPERATURE: 96.8 F | HEIGHT: 65 IN | WEIGHT: 134.8 LBS | HEART RATE: 67 BPM | BODY MASS INDEX: 22.46 KG/M2 | DIASTOLIC BLOOD PRESSURE: 66 MMHG | RESPIRATION RATE: 18 BRPM

## 2019-04-05 DIAGNOSIS — I10 ESSENTIAL HYPERTENSION: ICD-10-CM

## 2019-04-05 DIAGNOSIS — Z79.899 POLYPHARMACY: ICD-10-CM

## 2019-04-05 DIAGNOSIS — Z96.641 STATUS POST RIGHT HIP REPLACEMENT: ICD-10-CM

## 2019-04-05 DIAGNOSIS — G47.00 INSOMNIA, UNSPECIFIED TYPE: Primary | ICD-10-CM

## 2019-04-05 PROCEDURE — G0157 HHC PT ASSISTANT EA 15: HCPCS

## 2019-04-05 RX ORDER — POLYETHYLENE GLYCOL 1000
POWDER (GRAM) MISCELLANEOUS
COMMUNITY
End: 2019-04-05 | Stop reason: SDUPTHER

## 2019-04-05 RX ORDER — METOPROLOL TARTRATE 25 MG/1
TABLET, FILM COATED ORAL
Qty: 180 TAB | Refills: 3 | Status: CANCELLED | OUTPATIENT
Start: 2019-04-05

## 2019-04-05 RX ORDER — AMLODIPINE BESYLATE 10 MG/1
TABLET ORAL
Qty: 90 TAB | Refills: 3 | Status: CANCELLED | OUTPATIENT
Start: 2019-04-05

## 2019-04-05 NOTE — PROGRESS NOTES
Jesus Marcus  Identified pt with two pt identifiers(name and ). Chief Complaint   Patient presents with    Medication Evaluation     rm 10/non fasting/blood pressure evaluation       1. Have you been to the ER, urgent care clinic since your last visit?  no Hospitalized since your last visit? no    2. Have you seen or consulted any other health care providers outside of the [address] [place] [address] since your last visit? Include any pap smears or colon screening. no      Advance Care Planning    In the event something were to happen to you and you were unable to speak on your behalf, do you have an Advance Directive/ Living Will in place stating your wishes? NO    If yes, do we have a copy on file NO    If no, would you like information NO    Medication reconciliation up to date and corrected with patient at this time. Today's provider has been notified of reason for visit, vitals and flowsheets obtained on patients. Reviewed record in preparation for visit, huddled with provider and have obtained necessary documentation. Health Maintenance Due   Topic    Shingrix Vaccine Age 50> (1 of 2)    GLAUCOMA SCREENING Q2Y     MEDICARE YEARLY EXAM        Wt Readings from Last 3 Encounters:   02/15/19 134 lb (60.8 kg)   19 132 lb (59.9 kg)   19 139 lb 1.8 oz (63.1 kg)     Temp Readings from Last 3 Encounters:   19 98 °F (36.7 °C) (Temporal)   19 98.5 °F (36.9 °C) (Temporal)   19 98.2 °F (36.8 °C)     BP Readings from Last 3 Encounters:   19 145/66   19 122/68   19 139/72     Pulse Readings from Last 3 Encounters:   19 69   19 60   19 74     There were no vitals filed for this visit.       Learning Assessment:  :     Learning Assessment 2017   PRIMARY LEARNER Patient   HIGHEST LEVEL OF EDUCATION - PRIMARY LEARNER  SOME COLLEGE   BARRIERS PRIMARY LEARNER NONE   PRIMARY LANGUAGE ENGLISH   LEARNER PREFERENCE PRIMARY DEMONSTRATION ANSWERED BY Nicolette Mccall   RELATIONSHIP SELF       Depression Screening:  :     3 most recent PHQ Screens 2/12/2019   Little interest or pleasure in doing things Not at all   Feeling down, depressed, irritable, or hopeless Not at all   Total Score PHQ 2 0       No flowsheet data found. Fall Risk Assessment:  :     Fall Risk Assessment, last 12 mths 2/12/2019   Able to walk? Yes   Fall in past 12 months? Yes   Fall with injury? Yes   Number of falls in past 12 months -   Fall Risk Score -       Abuse Screening:  :     Abuse Screening Questionnaire 2/12/2019 11/15/2018 8/7/2018 9/27/2017   Do you ever feel afraid of your partner? N N N N   Are you in a relationship with someone who physically or mentally threatens you? N N N N   Is it safe for you to go home? Y Y Y Y       ADL Screening:  :     ADL Assessment 2/12/2019   Feeding yourself No Help Needed   Getting from bed to chair No Help Needed   Getting dressed No Help Needed   Bathing or showering No Help Needed   Walk across the room (includes cane/walker) No Help Needed   Using the telphone No Help Needed   Taking your medications No Help Needed   Preparing meals No Help Needed   Managing money (expenses/bills) No Help Needed   Moderately strenuous housework (laundry) No Help Needed   Shopping for personal items (toiletries/medicines) No Help Needed   Shopping for groceries No Help Needed   Driving No Help Needed   Climbing a flight of stairs No Help Needed   Getting to places beyond walking distances No Help Needed           BMI:  Weight Metrics 2/15/2019 2/12/2019 1/20/2019 11/15/2018 11/11/2018 11/6/2018 11/1/2018   Weight 134 lb 132 lb 139 lb 1.8 oz 132 lb 9.6 oz 136 lb 138 lb 1.6 oz 134 lb   BMI 22.3 kg/m2 20.07 kg/m2 21.15 kg/m2 23.49 kg/m2 24.09 kg/m2 24.46 kg/m2 23.74 kg/m2           Medication reconciliation up to date and corrected with patient at this time.

## 2019-04-05 NOTE — PATIENT INSTRUCTIONS
1) Blood pressure looks well controlled. Goal is <150/90  Please check your blood pressure through the week at staggered times in the day. (If you check in the morning, it should be at least one hour after your morning blood pressure medications.)      Arm monitors are most accurate. If you use a wrist monitor, make sure your wrist is at heart level. You can bring your home monitor to your next visit and have it calibrated with the machine in the office to gauge your readings. Sit  with your feet uncrossed and relax for 5 minutes before taking your BP. Keep a written record of your blood pressure readings and bring it to each appointment. If your systolic (top) blood pressure is consistently greater than 140mmHg or less than [id number] of the diastolic (bottom) number is consistently greater than 90mmHg or less than 60mmHg then please schedule an office appointment. Work on healthy eating - no salt diet - and incorporating daily exercise into your routine. Cardiac symptoms that would need immediate attention include: uncomfortable pressure, squeezing, fullness or pain in the center of your chest. Pain or discomfort in one or both arms, the back, neck, jaw or stomach. Shortness of breath with or without chest discomfort, breaking out in a cold sweat, nausea or lightheadedness. 2) [facility]. You will be responsible for dispensing the daily supplements - but most important to get your prescribed medication consistent    3) Good Sleep Hygiene  · Maintain a routine of regular bedtime and awakening time 7 days a weeks. Get up about the same time every day, regardless of what time you fell asleep. · Establish a regular, relaxing bedtime routine. Relaxing rituals prior to bedtime may include a warm bath or shower, aroma therapy, reading, or listening to soothing music. · Sleep in a room that is dark, quiet, comfortable, and cool;  sleep on comfortable mattress and pillows.   · Do not look at screens - computer, TV, phone, etc - within an hour of going to sleep. The blue light from the screen resets the circadian rhythm and prevents you from falling asleep. · Finish eating at least 2-3 hours prior to your regular bedtime. · Avoid caffeine within 6 hours; Alcohol and smoking within 2 hours of bedtime. · Exercise regularly; Finish a few hours before bedtime. · Avoid naps. · Go to bed only when sleepy. Merrick Racer in bed only for sleeping, not for work or watching TV. · Designate another time to write down problems & possible solutions in the late afternoon or early evening, not close to bedtime. · After 10-15 minutes of not being able to get to sleep, go to another room to read or watch TV until sleepy; Then repeat your normal \"going to bed\" routine (brushing your teeth, voiding, etc.) before getting into bed. You can try melantonin supplements to help with falling asleep. Melatonin is a naturally occurring hormone in the body that influences sleep and circadian rhythm. In the [address] [facility], melantonin is considered a supplement, not a medication, and is available without a prescription. Recommended dosage is 1-2mg nightly, no more than 4mg/night. Peter for smart phone:   Marisol Calderón is a quick and simple way for patients to log their sleep and improve their sleep hygiene. Learning About High Blood Pressure  What is high blood pressure? Blood pressure is a measure of how hard the blood pushes against the walls of your arteries. It's normal for blood pressure to go up and down throughout the day, but if it stays up, you have high blood pressure. Another name for high blood pressure is hypertension. Two numbers tell you your blood pressure. The first number is the systolic pressure. It shows how hard the blood pushes when your heart is pumping. The second number is the diastolic pressure.  It shows how hard the blood pushes between heartbeats, when your heart is relaxed and filling with blood. Your doctor will give you a goal for your blood pressure. Your goal will be based on your health and your age. High blood pressure (hypertension) means that the top number stays high, or the bottom number stays high, or both. High blood pressure increases the risk of stroke, heart attack, and other problems. You and your doctor will talk about your risks of these problems based on your blood pressure. What happens when you have high blood pressure? · Blood flows through your arteries with too much force. Over time, this damages the walls of your arteries. But you can't feel it. High blood pressure usually doesn't cause symptoms. · Fat and calcium start to build up in your arteries. This buildup is called plaque. Plaque makes your arteries narrower and stiffer. Blood can't flow through them as easily. · This lack of good blood flow starts to damage some of the organs in your body. This can lead to problems such as coronary artery disease and heart attack, heart failure, stroke, kidney failure, and eye damage. How can you prevent high blood pressure? · Stay at a healthy weight. · Try to limit how much sodium you eat to less than 2,300 milligrams (mg) a day. If you limit your sodium to 1,500 mg a day, you can lower your blood pressure even more. ? Buy foods that are labeled \"unsalted,\" \"sodium-free,\" or \"low-sodium. \" Foods labeled \"reduced-sodium\" and \"light sodium\" may still have too much sodium. ? Flavor your food with garlic, lemon juice, onion, vinegar, herbs, and spices instead of salt. Do not use soy sauce, steak sauce, onion salt, garlic salt, mustard, or ketchup on your food. ? Use less salt (or none) when recipes call for it. You can often use half the salt a recipe calls for without losing flavor. · Be physically active. Get at least 30 minutes of exercise on most days of the week. Walking is a good choice.  You also may want to do other activities, such as running, swimming, cycling, or playing tennis or team sports. · Limit alcohol to 2 drinks a day for men and 1 drink a day for women. · Eat plenty of fruits, vegetables, and low-fat dairy products. Eat less saturated and total fats. How is high blood pressure treated? · Your doctor will suggest making lifestyle changes. For example, your doctor may ask you to eat healthy foods, quit smoking, lose extra weight, and be more active. · If lifestyle changes don't help enough, your doctor may recommend that you take medicine. · When blood pressure is very high, medicines are needed to lower it. Follow-up care is a key part of your treatment and safety. Be sure to make and go to all appointments, and call your doctor if you are having problems. It's also a good idea to know your test results and keep a list of the medicines you take. Where can you learn more? Go to http://radha-alex.info/. Enter P501 in the search box to learn more about \"Learning About High Blood Pressure. \"  Current as of: July 22, 2018  Content Version: 11.9  © 0428-3645 [employer]. Care instructions adapted under license by [employer] (which disclaims liability or warranty for this information). If you have questions about a medical condition or this instruction, always ask your healthcare professional. [address] any warranty or liability for your use of this information.

## 2019-04-05 NOTE — PROGRESS NOTES
S: Isaías Lauren is a 80 y.o. female who presents for follow up with daughter, Yayo John    Assessment/Plan:   1. Essential hypertension  -current therapy: metoprolol 25mg bid + amlodipine 10mg   -BP well controlled, no hypotension episodes  -advised to monitor BP at home and keep log; goal<150/90 make OV if consistently above goal or <110/60    2. Polypharmacy  -pt agrees to use [facility] and pt will manage supplements; daughters will monitor    3. S/p right hip revision  -PT until next week; going well, no falls  -has [facility] coming 2x week indefinitely to help with meals, bathing and med mgmt     4.  Insomnia  -will reduce naps, try melatonin and eat earlier in evening    RTC 3 months for HTN/med check       HPI:  Currently daughters are filling pill box weekly 0 sometimes there are a few pills left when daughters   [facility]  - help with showering, fix supper and clean up after supper and make sure they take evening pills  Discussed using pill packing even though can't include supplements    Will see Dr. Sarmad Ochoa in July 2019 for dementia assessment     Social History:  Nutrition: overall healthy  Does eat late - 9pm on avg - interferring with sleep habits   Physical: doing PT with PT and on her own   Social: lives with  - pt states things are going well with   Sleeping: worries about what she needs to do the next day, PT will be leaving next week; has had a lot of relatives in town; [facility] and having a party     Social History     Tobacco Use   Smoking Status Never Smoker   Smokeless Tobacco Never Used     Social History     Substance and Sexual Activity   Alcohol Use No     Social History     Substance and Sexual Activity   Drug Use No        Review of Systems:  - Constitutional Symptoms: no fevers, chills, weight loss  - Cardiovascular: no chest pain or palpitations  - Respiratory: no cough or shortness of breath  - Gastrointestinal: no dysphagia or abdominal pain  ROS is negative otherwise. 3 most recent PHQ Screens 4/5/2019   Little interest or pleasure in doing things Not at all   Feeling down, depressed, irritable, or hopeless Not at all   Total Score PHQ 2 0       I reviewed the following:  Past Medical History:   Diagnosis Date    HTN (hypertension) 5/27/2011    Hyperlipidemia 12/1/2011    Hypertension     OAB (overactive bladder) 2/15/2013    Other ill-defined conditions(799.89)     hyperlipidemia    Other ill-defined conditions(799.89)     bladder problems    Other ill-defined conditions(799.89)     back pain       Current Outpatient Medications   Medication Sig Dispense Refill    calcium carbonate (CALTREX) 600 mg calcium (1,500 mg) tablet Take 600 mg by mouth two (2) times a day.  cholecalciferol (VITAMIN D3) 1,000 unit tablet Take 1,000 Units by mouth daily.  senna-docusate (PERICOLACE) 8.6-50 mg per tablet Take 1 Tab by mouth daily.  aspirin 81 mg chewable tablet Take 1 Tab by mouth two (2) times a day. 60 Tab 0    famotidine (PEPCID) 10 mg tablet Take 1 Tab by mouth two (2) times a day. (Patient taking differently: Take 1 Tab by mouth two (2) times daily as needed for Other (reflux). ) 60 Tab 0    polyethylene glycol (MIRALAX) 17 gram/dose powder Take 17 g by mouth daily as needed for Other (constipation).  atorvastatin (LIPITOR) 20 mg tablet TAKE 1 TABLET BY MOUTH IN THE EVENING 90 Tab 3    coenzyme Q-10 (CO Q-10) 200 mg capsule Take 1 Cap by mouth daily. 90 Cap 3    glucosamine-chondroit-vit c-mn (GLUCOSAMINE 1500 COMPLEX) 500-400 mg capsule Take 1 Cap by mouth daily. 90 Cap 1    metoprolol tartrate (LOPRESSOR) 25 mg tablet TAKE 1 TAB BY MOUTH EVERY TWELVE (12) HOURS. HOLD FOR SBP LESS THAN 120 OR HR LESS THAN 65 60 Tab 3    multivit-minerals/folic acid (CENTRUM MULTIGUMMIES PO) Take 1 Tab by mouth daily.       amLODIPine (NORVASC) 10 mg tablet TAKE 1 TABLET BY MOUTH EVERY MORNING 90 Tab 4       Allergies   Allergen Reactions    Codeine Nausea and Vomiting    Demerol (Pf) [Meperidine (Pf)] Nausea and Vomiting    Meperidine Nausea and Vomiting        O: VS:   Visit Vitals  /66 (BP 1 Location: Right arm, BP Patient Position: Sitting)   Pulse 67   Temp 96.8 °F (36 °C) (Oral)   Resp 18   Ht 5' 5\" (1.651 m)   Wt 134 lb 12.8 oz (61.1 kg)   SpO2 97%   BMI 22.43 kg/m²     GENERAL: Brea Bergeron is in no acute distress. Non-toxic. Well nourished. Well developed. Appropriately groomed. RESP: Breath sounds are symmetrical bilaterally. Unlabored without SOB. Speaking in full sentences. Clear to auscultation bilaterally anteriorly and posteriorly. No wheezes. No rales or rhonchi. CV: normal rate. Regular rhythm. S1, S2 audible. No murmur noted. No rubs, clicks or gallops noted. HEME/LYMPH: peripheral pulses palpable 2+ x 4 extremities. Right ankle - Nonpitting peripheral edema is noted. PSYCH: appropriate behavior, dress and thought processes. Good eye contact. Clear and coherent speech. Full affect. Good insight.   ___________________________________________________________________  I spent >25 minutes face to face with patient with >50% of time spent in counseling and coordinating care. Patient education was done. Advised on nutrition, physical activity, weight management, tobacco, alcohol and safety. Counseling included discussion of diagnosis, differentials, treatment options, prescribed treatment, warning signs and follow up. Medication risks/benefits, interactions and alternatives discussed with patient.      Patient verbalized understanding and agreed to plan of care. Patient was given an after visit summary which included current diagnoses, medications and vital signs. Follow up as directed.

## 2019-04-06 VITALS
SYSTOLIC BLOOD PRESSURE: 158 MMHG | HEART RATE: 70 BPM | OXYGEN SATURATION: 98 % | DIASTOLIC BLOOD PRESSURE: 77 MMHG | TEMPERATURE: 97.3 F

## 2019-04-07 ENCOUNTER — HOME CARE VISIT (OUTPATIENT)
Dept: SCHEDULING | Facility: HOME HEALTH | Age: 84
End: 2019-04-07
Payer: MEDICARE

## 2019-04-07 VITALS
SYSTOLIC BLOOD PRESSURE: 128 MMHG | HEART RATE: 72 BPM | OXYGEN SATURATION: 98 % | RESPIRATION RATE: 18 BRPM | DIASTOLIC BLOOD PRESSURE: 80 MMHG | TEMPERATURE: 97.6 F

## 2019-04-07 PROCEDURE — G0299 HHS/HOSPICE OF RN EA 15 MIN: HCPCS

## 2019-04-08 ENCOUNTER — HOME CARE VISIT (OUTPATIENT)
Dept: SCHEDULING | Facility: HOME HEALTH | Age: 84
End: 2019-04-08
Payer: MEDICARE

## 2019-04-08 VITALS
OXYGEN SATURATION: 98 % | DIASTOLIC BLOOD PRESSURE: 65 MMHG | TEMPERATURE: 98.1 F | HEART RATE: 71 BPM | SYSTOLIC BLOOD PRESSURE: 145 MMHG

## 2019-04-08 PROCEDURE — G0157 HHC PT ASSISTANT EA 15: HCPCS

## 2019-04-09 ENCOUNTER — HOME CARE VISIT (OUTPATIENT)
Dept: HOME HEALTH SERVICES | Facility: HOME HEALTH | Age: 84
End: 2019-04-09
Payer: MEDICARE

## 2019-04-10 ENCOUNTER — HOME CARE VISIT (OUTPATIENT)
Dept: SCHEDULING | Facility: HOME HEALTH | Age: 84
End: 2019-04-10
Payer: MEDICARE

## 2019-04-10 VITALS
TEMPERATURE: 98 F | HEART RATE: 78 BPM | OXYGEN SATURATION: 98 % | DIASTOLIC BLOOD PRESSURE: 78 MMHG | RESPIRATION RATE: 18 BRPM | SYSTOLIC BLOOD PRESSURE: 130 MMHG

## 2019-04-10 PROCEDURE — G0151 HHCP-SERV OF PT,EA 15 MIN: HCPCS

## 2019-04-14 DIAGNOSIS — I10 ESSENTIAL HYPERTENSION: ICD-10-CM

## 2019-04-15 ENCOUNTER — TELEPHONE (OUTPATIENT)
Dept: FAMILY MEDICINE CLINIC | Age: 84
End: 2019-04-15

## 2019-04-15 DIAGNOSIS — I10 ESSENTIAL HYPERTENSION: ICD-10-CM

## 2019-04-15 RX ORDER — METOPROLOL TARTRATE 25 MG/1
TABLET, FILM COATED ORAL
Qty: 10 TAB | Refills: 0 | Status: SHIPPED | OUTPATIENT
Start: 2019-04-15 | End: 2019-11-12 | Stop reason: SDUPTHER

## 2019-04-15 RX ORDER — METOPROLOL TARTRATE 25 MG/1
TABLET, FILM COATED ORAL
Qty: 60 TAB | Refills: 3 | Status: SHIPPED | OUTPATIENT
Start: 2019-04-15 | End: 2019-04-19 | Stop reason: SDUPTHER

## 2019-04-15 NOTE — TELEPHONE ENCOUNTER
Writer called, left message for patient to call office regarding pill packs. Patient needs to call [facility] at [phone]. Priscilla Leong already have her rx, so they need her to pay any copays and tell them where to mail pill packs.  my chart message also sent.

## 2019-04-15 NOTE — TELEPHONE ENCOUNTER
Patient called into the office returning a call. Informed patient that the nurse was calling to give patient the phone number to [facility] so the patient can verify information. Per nurse the medication has been refilled by pcp and patient needs to call pharmacy. Patient was given pharmacy phone number. Patient verbally understood information that was given.

## 2019-04-15 NOTE — TELEPHONE ENCOUNTER
Patient daughter called Macy Pringle ([facility]), stated that her mother is out of the metoprolol 25mg requested that she have 10 pills refilled until the pill packs are completed. Refill was sent to Dr Juany King.

## 2019-04-15 NOTE — TELEPHONE ENCOUNTER
PCP: Jessie Camp NP    Last appt: 4/5/2019  Future Appointments   Date Time Provider Eusebio Babcock   7/18/2019  3:00 PM Maicol Felix  [facility]       Requested Prescriptions      No prescriptions requested or ordered in this encounter       Prior labs and Blood pressures:  BP Readings from Last 3 Encounters:   04/10/19 130/78   04/08/19 145/65   04/07/19 128/80     Lab Results   Component Value Date/Time    Sodium 133 (L) 01/23/2019 04:35 AM    Potassium 3.8 01/23/2019 04:35 AM    Chloride 101 01/23/2019 04:35 AM    CO2 24 01/23/2019 04:35 AM    Anion gap 8 01/23/2019 04:35 AM    Glucose 97 01/23/2019 04:35 AM    BUN 12 01/23/2019 04:35 AM    Creatinine 0.56 01/23/2019 04:35 AM    BUN/Creatinine ratio 21 (H) 01/23/2019 04:35 AM    GFR est AA >60 01/23/2019 04:35 AM    GFR est non-AA >60 01/23/2019 04:35 AM    Calcium 8.3 (L) 01/23/2019 04:35 AM     Lab Results   Component Value Date/Time    Hemoglobin A1c 5.7 (H) 10/06/2017 08:45 AM     Lab Results   Component Value Date/Time    Cholesterol, total 129 10/06/2017 08:45 AM    Cholesterol (POC) 130 11/09/2016 02:33 PM    HDL Cholesterol 51 10/06/2017 08:45 AM    HDL Cholesterol (POC) 53 11/09/2016 02:33 PM    LDL Cholesterol (POC) 64 11/09/2016 02:33 PM    LDL, calculated 64 10/06/2017 08:45 AM    VLDL, calculated 14 10/06/2017 08:45 AM    Triglyceride 72 10/06/2017 08:45 AM    Triglycerides (POC) 64 11/09/2016 02:33 PM     No results found for: SUMMER Resendiz    Lab Results   Component Value Date/Time    TSH 2.49 07/12/2018 08:47 AM

## 2019-04-15 NOTE — TELEPHONE ENCOUNTER
PCP: Rahul García NP    Last appt: 4/5/2019  Future Appointments   Date Time Provider Eusebio Babcock   7/18/2019  3:00 PM Lorri Richardson  [facility]       Requested Prescriptions     Pending Prescriptions Disp Refills    metoprolol tartrate (LOPRESSOR) 25 mg tablet [Pharmacy Med Name: METOPROLOL TARTRATE 25 MG TAB] 60 Tab 3     Sig: TAKE 1 TAB BY MOUTH EVERY TWELVE (12) HOURS.  HOLD FOR SBP LESS THAN 120 OR HR LESS THAN 65       Prior labs and Blood pressures:  BP Readings from Last 3 Encounters:   04/10/19 130/78   04/08/19 145/65   04/07/19 128/80     Lab Results   Component Value Date/Time    Sodium 133 (L) 01/23/2019 04:35 AM    Potassium 3.8 01/23/2019 04:35 AM    Chloride 101 01/23/2019 04:35 AM    CO2 24 01/23/2019 04:35 AM    Anion gap 8 01/23/2019 04:35 AM    Glucose 97 01/23/2019 04:35 AM    BUN 12 01/23/2019 04:35 AM    Creatinine 0.56 01/23/2019 04:35 AM    BUN/Creatinine ratio 21 (H) 01/23/2019 04:35 AM    GFR est AA >60 01/23/2019 04:35 AM    GFR est non-AA >60 01/23/2019 04:35 AM    Calcium 8.3 (L) 01/23/2019 04:35 AM     Lab Results   Component Value Date/Time    Hemoglobin A1c 5.7 (H) 10/06/2017 08:45 AM     Lab Results   Component Value Date/Time    Cholesterol, total 129 10/06/2017 08:45 AM    Cholesterol (POC) 130 11/09/2016 02:33 PM    HDL Cholesterol 51 10/06/2017 08:45 AM    HDL Cholesterol (POC) 53 11/09/2016 02:33 PM    LDL Cholesterol (POC) 64 11/09/2016 02:33 PM    LDL, calculated 64 10/06/2017 08:45 AM    VLDL, calculated 14 10/06/2017 08:45 AM    Triglyceride 72 10/06/2017 08:45 AM    Triglycerides (POC) 64 11/09/2016 02:33 PM     No results found for: SUMMER Raymundo    Lab Results   Component Value Date/Time    TSH 2.49 07/12/2018 08:47 AM

## 2019-04-19 ENCOUNTER — DOCUMENTATION ONLY (OUTPATIENT)
Dept: FAMILY MEDICINE CLINIC | Age: 84
End: 2019-04-19

## 2019-04-19 DIAGNOSIS — I10 ESSENTIAL HYPERTENSION: ICD-10-CM

## 2019-04-19 RX ORDER — METOPROLOL TARTRATE 25 MG/1
TABLET, FILM COATED ORAL
Qty: 60 TAB | Refills: 3 | Status: SHIPPED | OUTPATIENT
Start: 2019-04-19 | End: 2019-07-30 | Stop reason: SDUPTHER

## 2019-04-19 NOTE — TELEPHONE ENCOUNTER
Yamilex Acevedo   Requested Prescriptions     Pending Prescriptions Disp Refills    metoprolol tartrate (LOPRESSOR) 25 mg tablet 60 Tab 3

## 2019-04-19 NOTE — TELEPHONE ENCOUNTER
Writer called [facility] pharmacy at [address], two patient identifiers used for patient verification, name and , spoke with pharmacist Caryle Dienes did verbal order for metoprolol 25 mg, take 1 tab every 12 hours hold SBP less than 120 or HR less than 65, order re-read and verified.

## 2019-07-09 ENCOUNTER — PATIENT OUTREACH (OUTPATIENT)
Dept: FAMILY MEDICINE CLINIC | Age: 84
End: 2019-07-09

## 2019-07-09 NOTE — PROGRESS NOTES
NN CCM F/U Progreess Note    Goals      Facilitate transitions of care during progression of chronic conditions      7/9/19- call from pt's dtr [facility]. Discussed concern re pt's mental status. Requests Neuro eval for \"Dementia\". Last PCP f/u 4/5/2019. Psych referral given. Dtr reported pt had appt scheduled for 7/2/19 but appt cancelled. Received letter in mail informing of Dr Anusha Boateng 's nursing home. PCP f/u scheduled for 7/30/19. Plan- pt to attend scheduled. Family to discuss Neuro referral @ that time. Next NN f/u ~ 1 month-ID.    4/4/19- spoke w/ pt's dtr Cherri Bob. Reported pt's \"executive, & good decision- making functions gone. Obsessing about weird stuff. Alert but confused. Some agitation. Physically doing ok. Mental, emotional decline very apparent\". Dtr reported [facility] 2 days per week (Tues/Thurs) during afternoons/evenings. Assists w/ showering, light housekeeping, laundry, dinner prep & clean-up. Also takes & records pt's BP. \"BP hasn't been below threshold, so she has continued taking Metoprolol\". Dtr reported pt will need all new Rxs to enroll pt in pre-packaged med program [facility]. Dtr reported pt amb w/ walker. \"Seems to be able to get around. Seems more lop-sided than before\". HH PT still coming. Doing HEP. Dtr afraid when [facility] stops pt won't do HEP. Safety concerns discussed. Pt & spouse are in home alone during night. NN discussed possibility of home video surveillance. Dtr reported family considering. Dtr requested PCP discuss benefits w/ pt during 4/5/19 ov. Plan- pt to attend  4/5/19 PCP f/u. Dtr Cherri Bob to accompany pt. PCP to provide new Rxs for all meds for enrollment [facility]. Family to look into home video surveillance system. NN to continuation collaboration w/ pt's dtr, PCP & other Care Team Members as needed for care coordination. Next NN f/u ~ 1 month. Dtr to contact NN sooner if needed-ID.

## 2019-07-30 ENCOUNTER — OFFICE VISIT (OUTPATIENT)
Dept: FAMILY MEDICINE CLINIC | Age: 84
End: 2019-07-30

## 2019-07-30 VITALS
DIASTOLIC BLOOD PRESSURE: 57 MMHG | BODY MASS INDEX: 23.94 KG/M2 | HEIGHT: 65 IN | SYSTOLIC BLOOD PRESSURE: 132 MMHG | WEIGHT: 143.7 LBS | OXYGEN SATURATION: 96 % | TEMPERATURE: 96.1 F | RESPIRATION RATE: 18 BRPM | HEART RATE: 62 BPM

## 2019-07-30 DIAGNOSIS — F03.90 DEMENTIA WITHOUT BEHAVIORAL DISTURBANCE, UNSPECIFIED DEMENTIA TYPE: Primary | ICD-10-CM

## 2019-07-30 DIAGNOSIS — M79.89 SWELLING OF LOWER LEG: ICD-10-CM

## 2019-07-30 DIAGNOSIS — N32.81 OAB (OVERACTIVE BLADDER): ICD-10-CM

## 2019-07-30 NOTE — PATIENT INSTRUCTIONS
1) Leg swelling - reduce salt in diet and see if leg swelling goes away. Amlodipine can cause leg swelling, so may have to lower or discontinue this medication. Lower leg swelling (edema) happens when fluid collects in small spaces around tissues and organs inside the body. Swelling in the legs, hands, and belly can be uncomfortable and can be a symptom of a more serious condition. Swelling in the lungs can be life-threatening, because it is usually a symptom of a serious heart problem. Lower leg edema is particularly frequent in older adults, and is usually chronic. It is often due to chronic venous disease, heart failure, medications, or pulmonary hypertension. Treatment of edema includes several components:    Reduce salt (sodium) in your diet -- Sodium, which is found in table salt and processed foods, can worsen edema. Reducing the amount of salt you consume can help to reduce edema, especially if you also take a diuretic. Compression stockings - apply pressure at the ankle and gradually decrease the pressure up the leg. These stockings are available with varying degrees of compression. ? Stockings with small amounts of compression can be purchased at pharmacies and surgical supply stores without a prescription. ? People with moderate to severe edema, those on their feet a lot, and those with ulcers usually require prescription stockings. ? The white \"antiembolism\" stockings commonly given in the hospital do not apply enough pressure at the ankle and are not adequate treatment for edema. Body positioning -- Leg, ankle, and foot edema can be improved by elevating the legs above heart level for 30 minutes three or four times per day. Elevating the legs may be sufficient to reduce or eliminate edema for people with mild venous disease. Medications:  Diuretics are a type of medication that causes the kidneys to excrete more water and sodium, which can reduce edema.  Diuretics must be used with care because removing too much fluid too quickly can lower the blood pressure, cause lightheadedness or fainting, and impair kidney function. 2) Look for counselors for MrSukumar Osborn involves meeting with a mental health counselor to talk about your feelings, relationships, and worries. Therapy can help you find new ways of thinking about your situation so that you feel less anxious. In therapy, you might also learn new skills to reduce anxiety. You can go to Psychology Today's website to find a counselor. · Go to www. [employer]. [employer]  · Enter your zip code. · Click on your insurance carrier (usually on left side of screen). · Then click any other parameters you desire. This will result in a list of providers. Click on any provider to learn more about them or see the contact information. Please choose a provider, call them, and schedule an appointment. Phone apps that can help with anxiety:  CALM - Use the principles of mindfulness and meditation to ease your mind and keep anxiety at [address]. The Sample6ene interface is just the beginning. Once it opens, there are relaxing sounds and sleep stories. Enjoy guided meditations at various lengths to help with everything from building self-esteem to calming anxiety. JALEESA - Self-help for Anxiety Management - range of self-help methods. Helps you understand what causes your anxiety, monitor your anxious thoughts and behavior over time and manage your anxiety through self-help exercises and private reflection. BOOSTERBUDDY   This helder offers a novel way for teens and young adults to improve their resilience and work toward being healthier both physically and emotionally. 7 CUPS  7 Cups uses trained, volunteer, active listeners to provide free, anonymous, and confidential emotional support to people needing help coping with acute stressors and long-term mental health issues.     [address] AID   The MoodTools and FearTools apps provide people with quick resources for tracking their cognitive distortions, activities, and safety plan in case of an emergency. SLEEPBOT   SleepBot is a quick and simple way for people to log their sleep and improve their sleep hygiene. [place] HELDER   The Whats Up? helder helps people monitor their mood and uses principles of CBT and acceptance commitment therapy (ACT) to help people reframe their thoughts and cognitive distortions. [address]: [phone]  Crisis: [address]: [phone]  Crisis: [address]: [phone]  Crisis: [phone]    3) Go to ortho follow up and request PT - need to reassess strength in hip. Carefull moving around without walker as you are a fall risk! 4) Incontinence - Use diaper wipes at night in the genital area to get the urine off   If urine is causing a rash, can use a barrier cream (like Desitin). If the underwear elastic is cutting into skin, then you may need different underwear. 5) Please check your blood pressure through the week at staggered times in the day. (If you check in the morning, it should be at least one hour after your morning blood pressure medications.)  Remember, you are to hold metoprolol dose if your BP is lower than 904 systolic (top) or 65 diastolic (bottom)    Arm monitors are most accurate. If you use a wrist monitor, make sure your wrist is at heart level. You can bring your home monitor to your next visit and have it calibrated with the machine in the office to gauge your readings. Sit  with your feet uncrossed and relax for 5 minutes before taking your BP. Keep a written record of your blood pressure readings and bring it to each appointment.   If your systolic (top) blood pressure is consistently greater than 140mmHg or less than 360BCFI of the diastolic (bottom) number is consistently greater than 90mmHg or less than 60mmHg then please schedule an office appointment. Work on healthy eating - no salt diet - and incorporating daily exercise into your routine. Cardiac symptoms that would need immediate attention include: uncomfortable pressure, squeezing, fullness or pain in the center of your chest. Pain or discomfort in one or both arms, the back, neck, jaw or stomach. Shortness of breath with or without chest discomfort, breaking out in a cold sweat, nausea or lightheadedness. Bladder Training: Care Instructions  Your Care Instructions    Bladder training is used to treat urge incontinence and stress incontinence. Urge incontinence means that the need to urinate comes on so fast that you can't get to a toilet in time. Stress incontinence means that you leak urine because of pressure on your bladder. For example, it may happen when you laugh, cough, or lift something heavy. Bladder training can increase how long you can wait before you have to urinate. It can also help your bladder hold more urine. And it can give you better control over the urge to urinate. It is important to remember that bladder training takes a few weeks to a few months to make a difference. You may not see results right away, but don't give up. Follow-up care is a key part of your treatment and safety. Be sure to make and go to all appointments, and call your doctor if you are having problems. It's also a good idea to know your test results and keep a list of the medicines you take. How can you care for yourself at home? Work with your doctor to come up with a bladder training program that is right for you. You may use one or more of the following methods. Delayed urination  · In the beginning, try to keep from urinating for 5 minutes after you first feel the need to go. · While you wait, take deep, slow breaths to relax.  Joyce exercises can also help you delay the need to go to the bathroom. · After some practice, when you can easily wait 5 minutes to urinate, try to wait 10 minutes before you urinate. · Slowly increase the waiting period until you are able to control when you have to urinate. Scheduled urination  · Empty your bladder when you first wake up in the morning. · Schedule times throughout the day when you will urinate. · Start by going to the bathroom every hour, even if you don't need to go. · Slowly increase the time between trips to the bathroom. · When you have found a schedule that works well for you, keep doing it. · If you wake up during the night and have to urinate, do it. Apply your schedule to waking hours only. Kegel exercises  These tighten and strengthen pelvic muscles, which can help you control the flow of urine. To do Kegel exercises:  · Squeeze the same muscles you would use to stop your urine. Your belly and thighs should not move. · Hold the squeeze for 3 seconds, and then relax for 3 seconds. · Start with 3 seconds. Then add 1 second each week until you are able to squeeze for 10 seconds. · Repeat the exercise 10 to 15 times a session. Do three or more sessions a day. When should you call for help? Watch closely for changes in your health, and be sure to contact your doctor if:    · Your incontinence is getting worse.     · You do not get better as expected. Where can you learn more? Go to http://radha-alex.info/. Enter G144 in the search box to learn more about \"Bladder Training: Care Instructions. \"  Current as of: December 19, 2018  Content Version: 12.1  © 0164-7659 [employer]. Care instructions adapted under license by [employer] (which disclaims liability or warranty for this information).  If you have questions about a medical condition or this instruction, always ask your healthcare professional. Robin Coelho any warranty or liability for your use of this information.

## 2019-07-30 NOTE — PROGRESS NOTES
Aron Jones  Identified pt with two pt identifiers(name and ). Chief Complaint   Patient presents with    Follow-up     rm 9/non fasting/bi lateral ankle swelling/referral to neurologist/rash in groin from poise pads       1. Have you been to the ER, urgent care clinic since your last visit?  n o Hospitalized since your last visit? no    2. Have you seen or consulted any other health care providers outside of the [address] [place] [address] since your last visit? Include any pap smears or colon screening. no      Advance Care Planning    In the event something were to happen to you and you were unable to speak on your behalf, do you have an Advance Directive/ Living Will in place stating your wishes? yes    If yes, do we have a copy on file yes        Medication reconciliation up to date and corrected with patient at this time. Today's provider has been notified of reason for visit, vitals and flowsheets obtained on patients. Reviewed record in preparation for visit, huddled with provider and have obtained necessary documentation. Health Maintenance Due   Topic    Shingrix Vaccine Age 50> (1 of 2)    GLAUCOMA SCREENING Q2Y     MEDICARE YEARLY EXAM        Wt Readings from Last 3 Encounters:   19 134 lb 12.8 oz (61.1 kg)   02/15/19 134 lb (60.8 kg)   19 132 lb (59.9 kg)     Temp Readings from Last 3 Encounters:   04/10/19 98 °F (36.7 °C)   19 98.1 °F (36.7 °C) (Temporal)   19 97.6 °F (36.4 °C)     BP Readings from Last 3 Encounters:   04/10/19 130/78   19 145/65   19 128/80     Pulse Readings from Last 3 Encounters:   04/10/19 78   19 71   19 72     There were no vitals filed for this visit.       Learning Assessment:  :     Learning Assessment 2017   PRIMARY LEARNER Patient   HIGHEST LEVEL OF EDUCATION - PRIMARY LEARNER  SOME COLLEGE   BARRIERS PRIMARY LEARNER NONE   PRIMARY LANGUAGE ENGLISH   LEARNER PREFERENCE PRIMARY DEMONSTRATION ANSWERED BY Dusitn Jung   RELATIONSHIP SELF       Depression Screening:  :     3 most recent PHQ Screens 4/5/2019   Little interest or pleasure in doing things Not at all   Feeling down, depressed, irritable, or hopeless Not at all   Total Score PHQ 2 0       No flowsheet data found. Fall Risk Assessment:  :     Fall Risk Assessment, last 12 mths 4/5/2019   Able to walk? Yes   Fall in past 12 months? No   Fall with injury? -   Number of falls in past 12 months -   Fall Risk Score -       Abuse Screening:  :     Abuse Screening Questionnaire 4/5/2019 2/12/2019 11/15/2018 8/7/2018 9/27/2017   Do you ever feel afraid of your partner? N N N N N   Are you in a relationship with someone who physically or mentally threatens you? N N N N N   Is it safe for you to go home? Y Y Y Y Y       ADL Screening:  :     ADL Assessment 4/5/2019   Feeding yourself No Help Needed   Getting from bed to chair No Help Needed   Getting dressed No Help Needed   Bathing or showering No Help Needed   Walk across the room (includes cane/walker) No Help Needed   Using the telphone No Help Needed   Taking your medications No Help Needed   Preparing meals No Help Needed   Managing money (expenses/bills) No Help Needed   Moderately strenuous housework (laundry) No Help Needed   Shopping for personal items (toiletries/medicines) Help Needed   Shopping for groceries Help Needed   Driving Help Needed   Climbing a flight of stairs Help Needed   Getting to places beyond walking distances Help Needed           BMI:  Weight Metrics 4/5/2019 2/15/2019 2/12/2019 1/20/2019 11/15/2018 11/11/2018 11/6/2018   Weight 134 lb 12.8 oz 134 lb 132 lb 139 lb 1.8 oz 132 lb 9.6 oz 136 lb 138 lb 1.6 oz   BMI 22.43 kg/m2 22.3 kg/m2 20.07 kg/m2 21.15 kg/m2 23.49 kg/m2 24.09 kg/m2 24.46 kg/m2           Medication reconciliation up to date and corrected with patient at this time.

## 2019-07-30 NOTE — PROGRESS NOTES
S: Prem Mg is a 80 y.o. female who presents for follow up accompanied by Kain Olivera, daughter     Assessment/Plan:   1. Dementia without behavioral disturbance, unspecified dementia type  -previously referred to Dr. Sandee Smith who is retiring -   - [place] (daughter states [address] is not too far)    2. OAB (overactive bladder)  -urge and stress incontinence; advised can use diaper wipes to clean area before bedtime  -advised to change pad in underwear frequently and monitor for signs of rash, none present today  - REFERRAL TO FEMALE PELVIC MEDICINE AND RECONSTRUCTIVE SURGERY    3. Swelling of lower leg  -monitor, pt is on 10mg amlodipine, but also has high intake of salt  -supportive tx given; advised to reduce soup intake, or buy low sodium soup  -if swelling persists despite diet change, will consider amlodipine dosage adjustment    4. Hypertension  -current therapy: amlodipine 10mg + metoprolol 25mg bid (hold for <120 or <65) + asa 81mg  -BP today = 132/57  -reminded pt and daughter to take BP and if <120 or <65, to hold metoprolol  -info on hypotension given    5.  Reduced ROM right hip s/p replacement x2  -seeing ortho next week and will ask for PT as she is still walking with walker and cannot put sock on foot  -reviewed fall risk         HPI:  Needs referral for neuro - previously referred to Dr. Jonny Zarate but he is retiring    bilat leg swellling   Left>right  Does eat soup nightly     Still using walker - feels unsteady around house - won't use walker in house bc too big; will hold onto furniture -discuss safety  Can't put sock on right foot   Denies lightheaded/dizziness    Rash from incontinence pad     Social History:  Nutrition: eating well   Physical: does stretches 2x week   Social: lives with , accompanied by daughter, Kain Olivera    Social History     Tobacco Use   Smoking Status Never Smoker   Smokeless Tobacco Never Used     Social History     Substance and Sexual Activity   Alcohol Use No     Social History     Substance and Sexual Activity   Drug Use No        Review of Systems:  - Constitutional Symptoms: no fevers, chills  - Cardiovascular: no chest pain or palpitations  - Respiratory: no cough or shortness of breath  ROS is negative otherwise. 3 most recent PHQ Screens 7/30/2019   Little interest or pleasure in doing things Not at all   Feeling down, depressed, irritable, or hopeless Not at all   Total Score PHQ 2 0       I reviewed the following:  Past Medical History:   Diagnosis Date    HTN (hypertension) 5/27/2011    Hyperlipidemia 12/1/2011    Hypertension     OAB (overactive bladder) 2/15/2013    Other ill-defined conditions(799.89)     hyperlipidemia    Other ill-defined conditions(799.89)     bladder problems    Other ill-defined conditions(799.89)     back pain       Current Outpatient Medications   Medication Sig Dispense Refill    metoprolol tartrate (LOPRESSOR) 25 mg tablet TAKE 1 TAB BY MOUTH EVERY TWELVE (12) HOURS. HOLD FOR SBP LESS THAN 120 OR HR LESS THAN 65 10 Tab 0    calcium carbonate (CALTREX) 600 mg calcium (1,500 mg) tablet Take 600 mg by mouth two (2) times a day.  cholecalciferol (VITAMIN D3) 1,000 unit tablet Take 1,000 Units by mouth daily.  senna-docusate (PERICOLACE) 8.6-50 mg per tablet Take 1 Tab by mouth daily.  aspirin 81 mg chewable tablet Take 1 Tab by mouth two (2) times a day. 60 Tab 0    polyethylene glycol (MIRALAX) 17 gram/dose powder Take 17 g by mouth daily as needed for Other (constipation).  atorvastatin (LIPITOR) 20 mg tablet TAKE 1 TABLET BY MOUTH IN THE EVENING 90 Tab 3    coenzyme Q-10 (CO Q-10) 200 mg capsule Take 1 Cap by mouth daily. 90 Cap 3    glucosamine-chondroit-vit c-mn (GLUCOSAMINE 1500 COMPLEX) 500-400 mg capsule Take 1 Cap by mouth daily. 90 Cap 1    multivit-minerals/folic acid (CENTRUM MULTIGUMMIES PO) Take 1 Tab by mouth daily.       amLODIPine (NORVASC) 10 mg tablet TAKE 1 TABLET BY MOUTH EVERY MORNING 90 Tab 4    famotidine (PEPCID) 10 mg tablet Take 1 Tab by mouth two (2) times a day. (Patient taking differently: Take 1 Tab by mouth two (2) times daily as needed for Other (reflux). ) 60 Tab 0       Allergies   Allergen Reactions    Codeine Nausea and Vomiting    Demerol (Pf) [Meperidine (Pf)] Nausea and Vomiting    Meperidine Nausea and Vomiting        O: VS:   Visit Vitals  /57 (BP 1 Location: Left leg, BP Patient Position: Sitting)   Pulse 62   Temp 96.1 °F (35.6 °C) (Oral)   Resp 18   Ht 5' 5\" (1.651 m)   Wt 143 lb 11.2 oz (65.2 kg)   SpO2 96%   BMI 23.91 kg/m²       GENERAL: Blair Hobson is in no acute distress. Non-toxic. Well nourished. Well developed. Appropriately groomed. HEAD:  Normocephalic. Atraumatic. Non tender sinuses x 4. RESP: Breath sounds are symmetrical bilaterally. Unlabored without SOB. Speaking in full sentences. Clear to auscultation bilaterally anteriorly and posteriorly. No wheezes. No rales or rhonchi. CV: normal rate. Regular rhythm. S1, S2 audible. No murmur noted. No rubs, clicks or gallops noted. MUSC:  Intact x 4 extremities. Full ROM x 4 extremities. No pain with movement. : Ext genitalia intact without inflammation, ulceration, lesions or discharge. No cysts. No tenderness. Vagina is pink, moist with ruggae. No areas of erythema or ulceration. Vaginal discharge is not noted. No rash visible. HEME/LYMPH: peripheral pulses palpable 2+ x 4 extremities. Right LE +1 pitting peripheral edema is noted. Left LE mild peripheral edema  ___________________________________________________________________  I spent >25 minutes face to face with patient with >50% of time spent in counseling and coordinating care. Patient education was done. Advised on nutrition, physical activity, weight management, tobacco, alcohol and safety.   Counseling included discussion of diagnosis, differentials, treatment options, prescribed treatment, warning signs and follow up. Medication risks/benefits, interactions and alternatives discussed with patient.      Patient verbalized understanding and agreed to plan of care. Patient was given an after visit summary which included current diagnoses, medications and vital signs. Follow up as directed.

## 2019-08-07 NOTE — TELEPHONE ENCOUNTER
Patient's daughter is calling for a refill of Amlodipine besylate 10 mg tablets. She also points out that this prescription has Dr Kojo Abdullahi on it but needs to be changed to Edgardo Hernandez NP.    [address] [facility] on [address] [facility] confirmed.

## 2019-08-08 NOTE — TELEPHONE ENCOUNTER
PCP: Anand Storey NP    Last appt: 7/30/2019  Future Appointments   Date Time Provider Eusebio Babcock   10/31/2019 10:30 AM Anand Storey NP [facility]   2/13/2020 10:40 AM Yina Hong 27       Requested Prescriptions      No prescriptions requested or ordered in this encounter       Prior labs and Blood pressures:  BP Readings from Last 3 Encounters:   07/30/19 132/57   04/10/19 130/78   04/08/19 145/65     Lab Results   Component Value Date/Time    Sodium 133 (L) 01/23/2019 04:35 AM    Potassium 3.8 01/23/2019 04:35 AM    Chloride 101 01/23/2019 04:35 AM    CO2 24 01/23/2019 04:35 AM    Anion gap 8 01/23/2019 04:35 AM    Glucose 97 01/23/2019 04:35 AM    BUN 12 01/23/2019 04:35 AM    Creatinine 0.56 01/23/2019 04:35 AM    BUN/Creatinine ratio 21 (H) 01/23/2019 04:35 AM    GFR est AA >60 01/23/2019 04:35 AM    GFR est non-AA >60 01/23/2019 04:35 AM    Calcium 8.3 (L) 01/23/2019 04:35 AM     Lab Results   Component Value Date/Time    Hemoglobin A1c 5.7 (H) 10/06/2017 08:45 AM     Lab Results   Component Value Date/Time    Cholesterol, total 129 10/06/2017 08:45 AM    Cholesterol (POC) 130 11/09/2016 02:33 PM    HDL Cholesterol 51 10/06/2017 08:45 AM    HDL Cholesterol (POC) 53 11/09/2016 02:33 PM    LDL Cholesterol (POC) 64 11/09/2016 02:33 PM    LDL, calculated 64 10/06/2017 08:45 AM    VLDL, calculated 14 10/06/2017 08:45 AM    Triglyceride 72 10/06/2017 08:45 AM    Triglycerides (POC) 64 11/09/2016 02:33 PM     No results found for: Duane Freeman, [id number]    Lab Results   Component Value Date/Time    TSH 2.49 07/12/2018 08:47 AM

## 2019-08-09 RX ORDER — AMLODIPINE BESYLATE 10 MG/1
TABLET ORAL
Qty: 90 TAB | Refills: 4 | OUTPATIENT
Start: 2019-08-09

## 2019-10-16 ENCOUNTER — PATIENT OUTREACH (OUTPATIENT)
Dept: FAMILY MEDICINE CLINIC | Age: 84
End: 2019-10-16

## 2019-10-16 NOTE — PROGRESS NOTES
Patient has graduated from the Complex Case Management  program on 10/16/19. Patient's symptoms are stable at this time. Patient/family has the ability to self-manage. Care management goals have been completed at this time. No further nurse navigator follow up scheduled. Goals Addressed                 This Visit's Progress     COMPLETED: Facilitate transitions of care during progression of chronic conditions        10/16/19- spoke w/ dtr Julia Suarez. Dtr reported pt has appt w/ [facility] 2/13/2020 to evaluate for Dementia. Earliest available appt for new pt. Dtr reported pt & spouse have [facility] insurance. Family has visited [facility]. Top choices [address]. Both would allow them to move to different levels of care as needed. Family plans to make a decision within next month. Hope is to have them move into a facility in Jan 2020\". Dtr reported no longer utilizing [facility]. Family assisting w/ transportation to medical appts, grocery store & other needs. \"A young lady comes in ~ 10 hrs/week to help w/ cooking, cleaning, & laundry\". Dtr reported looked into several pre-packaged med programs. Decided would be easier for dtr to continue filling/prepping pt's med box weekly & continue getting meds from local retail pharm. Dtr reported pt scheduled for PCP f/u 10/31/19. Dtr was informed NN would be out on FMLA from 10/23/19 thru Jan 2020. Dtr agreeable at present time pt stable & family has ability to manage pt's care. Plan- [facility] & spouse. Family to continue filling/prepping pt's pill box weekly. Family to continue assisting pt w/ ADLs & IADLs, in addition to private pay Care Assistance 10 hrs/week. Dtr agreeable w/ NN CCM Episode being resolved. Dtr verbalized understanding to contact PCP office w/ any questions/concerns. No further scheduled NN f/u-ID.    7/9/19- call from pt's dtr Julia Suarez.  Discussed concern re pt's mental status. Requests Neuro eval for \"Dementia\". Last PCP f/u 4/5/2019. Psych referral given. Dtr reported pt had appt scheduled for 7/2/19 but appt cancelled. Received letter in mail informing of Dr Ludy Browning 's correction. PCP f/u scheduled for 7/30/19. Plan- pt to attend scheduled. Family to discuss Neuro referral @ that time. Next NN f/u ~ 1 month-ID.    4/4/19- spoke w/ pt's dtr Enriqueta Georges. Reported pt's \"executive, & good decision- making functions gone. Obsessing about weird stuff. Alert but confused. Some agitation. Physically doing ok. Mental, emotional decline very apparent\". Dtr reported [facility] 2 days per week (Tues/Thurs) during afternoons/evenings. Assists w/ showering, light housekeeping, laundry, dinner prep & clean-up. Also takes & records pt's BP. \"BP hasn't been below threshold, so she has continued taking Metoprolol\". Dtr reported pt will need all new Rxs to enroll pt in pre-packaged med program w/ [facility]. Dtr reported pt amb w/ walker. \"Seems to be able to get around. Seems more lop-sided than before\". HH PT still coming. Doing HEP. Dtr afraid when [facility] stops pt won't do HEP. Safety concerns discussed. Pt & spouse are in home alone during night. NN discussed possibility of home video surveillance. Dtr reported family considering. Dtr requested PCP discuss benefits w/ pt during 4/5/19 ov. Plan- pt to attend  4/5/19 PCP f/u. Dtr Enriqueta Georges to accompany pt. PCP to provide new Rxs for all meds for enrollment in [facility] pre-packaged med program. Family to look into home video surveillance system. NN to continuation collaboration w/ pt's dtr, PCP & other Care Team Members as needed for care coordination. Next NN f/u ~ 1 month. Dtr to contact NN sooner if needed-ID. Pt has nurse navigator's contact information for any further questions, concerns, or needs.   Patients upcoming visits:    Future Appointments   Date Time Provider Eusebio Babcock   10/31/2019 10:30 AM [facility]   2/13/2020 10:40 AM Rosy Reid [address]

## 2019-10-31 ENCOUNTER — OFFICE VISIT (OUTPATIENT)
Dept: FAMILY MEDICINE CLINIC | Age: 84
End: 2019-10-31

## 2019-10-31 VITALS
HEART RATE: 54 BPM | WEIGHT: 145.8 LBS | OXYGEN SATURATION: 95 % | BODY MASS INDEX: 24.29 KG/M2 | RESPIRATION RATE: 14 BRPM | HEIGHT: 65 IN | SYSTOLIC BLOOD PRESSURE: 145 MMHG | TEMPERATURE: 95.6 F | DIASTOLIC BLOOD PRESSURE: 55 MMHG

## 2019-10-31 DIAGNOSIS — F03.90 DEMENTIA WITHOUT BEHAVIORAL DISTURBANCE, UNSPECIFIED DEMENTIA TYPE: Primary | ICD-10-CM

## 2019-10-31 NOTE — PROGRESS NOTES
S: Deya Frederick is a 80 y.o. WHITE OR  female who presents for HTN/blood pressure follow up. Assessment/Plan:  1. Dementia without behavioral disturbance, unspecified dementia type ([address]  -couldn't get appt with Dr. Alissa Fang until 4/2020, requesting alternative referral  - Jennyfer Person - Dr. Cathleen Benavidez, also given names of Dr. Anna Nolasco and [facility]    2. Essential Hypertension  -current therapy: amlodipine 10mg + metoprolol 25mg   -BP today:  152/99  -advised to monitor BP at home and keep log; goal<140/90 make OV if consistently above goal or <110/60    RTC 3 months for HTN/med check      Looking at assisted living  Looking for a continuing care [facility] don't offer that option - liked [facility] is too expensive, but that would be their #1  Has LTC insurance but won't cover unless can't 2/6 ADLs or chronically ill w/clinical dx of dementia  No longer using [facility] - bc [employer] not covering service; have [place] woman is coming to help (from [employer]) but not working out well - Daryl Conley think she doesn't know how to do things, and pt won't tell her how to do things a certain way. Satish [place] and pt like to cook together so [employer] either    Deya Frederick has gained 2lbs since 7/30/19  Current therapy: amlodipine 10mg + metoprolol 25mg   Taking as prescribed   BP today:  152/99  No chest pain or discomfort, elevated heart rate,  palpitations or edema in extremities  No SOB  No Fatigue - feeling very good   No Dizziness    Using walker outside of house, but not inside house  Has a limp still  Had had right hip replacement and revision    Got flu vaccine 10/14/19    Review of Systems:  - Constitutional symptoms: no fevers/chills  - Eyes: no blurry vision or double vision   - Respiratory: no SOB, difficulty or pain with breathing, wheezes, or cough. - Gastrointestinal: no dysphagia or abdominal pain  ROS is negative otherwise.     Social History:  Nutrition: eating well - other daughter concerned about salt and sugar intake   Physical: not doing a lot   Social:  Accompanied by daughter, Ronny Katz    Social History     Tobacco Use   Smoking Status Never Smoker   Smokeless Tobacco Never Used     Social History     Substance and Sexual Activity   Alcohol Use No     Social History     Substance and Sexual Activity   Drug Use No       3 most recent PHQ Screens 7/30/2019   Little interest or pleasure in doing things Not at all   Feeling down, depressed, irritable, or hopeless Not at all   Total Score PHQ 2 0       I reviewed the following:  Past Medical History:   Diagnosis Date    HTN (hypertension) 5/27/2011    Hyperlipidemia 12/1/2011    Hypertension     OAB (overactive bladder) 2/15/2013    Other ill-defined conditions(799.89)     hyperlipidemia    Other ill-defined conditions(799.89)     bladder problems    Other ill-defined conditions(799.89)     back pain       Current Outpatient Medications   Medication Sig Dispense Refill    amLODIPine (NORVASC) 10 mg tablet TAKE 1 TABLET BY MOUTH EVERY DAY IN THE MORNING 90 Tab 0    metoprolol tartrate (LOPRESSOR) 25 mg tablet TAKE 1 TAB BY MOUTH EVERY TWELVE (12) HOURS. HOLD FOR SBP LESS THAN 120 OR HR LESS THAN 65 10 Tab 0    calcium carbonate (CALTREX) 600 mg calcium (1,500 mg) tablet Take 600 mg by mouth two (2) times a day.  cholecalciferol (VITAMIN D3) 1,000 unit tablet Take 1,000 Units by mouth daily.  senna-docusate (PERICOLACE) 8.6-50 mg per tablet Take 1 Tab by mouth daily.  aspirin 81 mg chewable tablet Take 1 Tab by mouth two (2) times a day. 60 Tab 0    famotidine (PEPCID) 10 mg tablet Take 1 Tab by mouth two (2) times a day. (Patient taking differently: Take 1 Tab by mouth two (2) times daily as needed for Other (reflux). ) 60 Tab 0    polyethylene glycol (MIRALAX) 17 gram/dose powder Take 17 g by mouth daily as needed for Other (constipation).       atorvastatin (LIPITOR) 20 mg tablet TAKE 1 TABLET BY MOUTH IN THE EVENING 90 Tab 3    coenzyme Q-10 (CO Q-10) 200 mg capsule Take 1 Cap by mouth daily. 90 Cap 3    glucosamine-chondroit-vit c-mn (GLUCOSAMINE 1500 COMPLEX) 500-400 mg capsule Take 1 Cap by mouth daily. 90 Cap 1    multivit-minerals/folic acid (CENTRUM MULTIGUMMIES PO) Take 1 Tab by mouth daily. Allergies   Allergen Reactions    Codeine Nausea and Vomiting    Demerol (Pf) [Meperidine (Pf)] Nausea and Vomiting    Meperidine Nausea and Vomiting       O: VS:   Visit Vitals  /55   Pulse (!) 54   Temp 95.6 °F (35.3 °C) (Oral)   Resp 14   Ht 5' 5\" (1.651 m)   Wt 145 lb 12.8 oz (66.1 kg)   SpO2 95%   BMI 24.26 kg/m²     · A1C:      Lab Results   Component Value Date/Time    Hemoglobin A1c 5.7 (H) 10/06/2017 08:45 AM     · Lipids:  Lab Results   Component Value Date/Time    Cholesterol, total 129 10/06/2017 08:45 AM    Cholesterol (POC) 130 11/09/2016 02:33 PM    HDL Cholesterol 51 10/06/2017 08:45 AM    HDL Cholesterol (POC) 53 11/09/2016 02:33 PM    LDL Cholesterol (POC) 64 11/09/2016 02:33 PM    LDL, calculated 64 10/06/2017 08:45 AM    VLDL, calculated 14 10/06/2017 08:45 AM    Triglyceride 72 10/06/2017 08:45 AM    Triglycerides (POC) 64 11/09/2016 02:33 PM       GENERAL: Luana Factor is in no acute distress. EYE: PERRLA. EOMs intact. Sclera anicteric without injection. RESP: Unlabored without SOB. Speaking in full sentences. Breath sounds are symmetrical bilaterally. Clear to auscultation to all fields. No wheezes. No rales or rhonchi. CV: Normal rate. Regular rhythm. S1, S2 audible. No murmur noted. No rubs, clicks or gallops noted. NEURO:  awake, alert and oriented to person, place, and time and event. Clear speech. Steady gait. HEME/LYMPH: Pedal pulses palpable 2+ x 4 extremities. Nonpitting mild bilateral peripheral edema is noted. SKIN: Skin is warm and dry. Turgor is normal. No petechiae, no purpura, no rash. No cyanosis.  No mottling, jaundice or pallor. ____________________________________________________________________  I spent >35 minutes face to face with patient with >50% of time spent in counseling and coordinating care. Reviewed medications and side effects. Reviewed warning signs of hypertension, stroke and heart attack. Advised on nutrition, physical activity, weight management, tobacco, and alcohol.     Patient verbalized understanding and agreed to plan of care. Follow up in 3-6 months as directed.

## 2019-10-31 NOTE — PROGRESS NOTES
Chief Complaint   Patient presents with    Hypertension    Peripheral Edema     legs         1. Have you been to the ER, urgent care clinic since your last visit? Hospitalized since your last visit? no    2. Have you seen or consulted any other health care providers outside of the [address] [place] [address] since your last visit? Include any pap smears or colon screening.   no

## 2019-10-31 NOTE — PATIENT INSTRUCTIONS
1) Please check your blood pressure through the week at staggered times in the day. (If you check in the morning, it should be at least one hour after your morning blood pressure medications.)      Arm monitors are most accurate. If you use a wrist monitor, make sure your wrist is at heart level. You can bring your home monitor to your next visit and have it calibrated with the machine in the office to gauge your readings. Sit  with your feet uncrossed and relax for 5 minutes before taking your BP. Keep a written record of your blood pressure readings and bring it to each appointment. If your systolic (top) blood pressure is consistently greater than 140mmHg or less than 027BLLP of the diastolic (bottom) number is consistently greater than 90mmHg or less than 60mmHg then please schedule an office appointment. Work on healthy eating - no salt diet, more potassium (helps flush out sodium,making healthier heart and arteries) - and incorporating daily exercise into your routine. Cardiac symptoms that would need immediate attention include: uncomfortable pressure, squeezing, fullness or pain in the center of your chest. Pain or discomfort in one or both arms, the back, neck, jaw or stomach. Shortness of breath with or without chest discomfort, breaking out in a cold sweat, nausea or lightheadedness. 2) Clinical depression is a medical condition that goes beyond everyday sadness. Depression may cause serious, long-lasting symptoms and often disrupts a persons ability to perform routine tasks. The disorder is the most common psychiatric disorder worldwide. In the [place], about one in six people experiences a bout of clinical depression in their lifetime. There are multiple therapies available to help with depression including psychotherapy, exercise (aerobic exercise and yoga are highly recommended), proper diet and sleep as well as medications.      Research shows that psychotherapy and antidepressants may be the best therapies for depression. Please look for a therapist.  Check with your insurance company for referrals for providers in the area that will be covered under your plan. · Keep the numbers for these national suicide hotlines: 7-469-128-TALK (6-389.350.2250) and 5-350-KBSZDNF (5-426.634.3538). If you or someone you know talks about suicide or feeling hopeless, get help right away. ·   Steps you can do on your own to feel better:  Deep breathing exercises: Deep breathing triggers a relaxation response, helping to change from the \"fight-or-flight\" response anxiety brings on. Inhale slowly to a count of 4, starting at your belly and then moving into your chest.  Gently hold your breath for 4 counts, then slowly exhale to 4 counts. \"Clench\" exercise - clench various zones in your body for 30 seconds, then an overall body clench  Exercise can help many people feel less anxious. Regular cardiovascular exercise (such as fast walking,) release endorphins - the \"feel good\" hormone in our body - and can reduce anxiety. Proper sleep:  Sleep is important to overall health. Not getting enough sleep can cause fatigue, inattention, and irritabililty, causing anxiety levels to increase. Healthy Diet: a healthy diet rich in whole grains, vegetables and fruits is healthier than simple carbohydrates found in processed foods. Skipping meals can cause blood sugar to drop and cause jittery feelings that could worsening underlying anxiety. It also a good idea to cut down on or stop drinking coffee and other sources of caffeine. Caffeine can make anxiety worse. Find \"Me\" time - it is important to take some time just to focus on you and help alleviate stress in daily life. This could be daily exercise, walking the dog, sitting in a quiet place without distraction, meditation, etc.     Medical treatments include:  ? Psychotherapy - Psychotherapy involves meeting with a mental health counselor to talk about your feelings, relationships, and worries. Therapy can help you find new ways of thinking about your situation so that you feel less anxious. In therapy, you might also learn new skills to reduce anxiety. You can go to Psychology Today's website to find a counselor. · Go to www. [employer]. [employer]  · Enter your zip code. · Click on your insurance carrier (usually on left side of screen). · Then click any other parameters you desire. This will result in a list of providers. Click on any provider to learn more about them or see the contact information. Please choose a provider, call them, and schedule an appointment. ?Medicines - Medicines used to treat depression and can relieve anxiety. Some people have psychotherapy and take medicines at the same time. Phone apps that can help with anxiety:  CALM - Use the principles of mindfulness and meditation to ease your mind and keep anxiety at [facility]. The Jugo interface is just the beginning. Once it opens, there are relaxing sounds and sleep stories. Enjoy guided meditations at various lengths to help with everything from building self-esteem to calming anxiety. JALEESA - Self-help for Anxiety Management - range of self-help methods. Helps you understand what causes your anxiety, monitor your anxious thoughts and behavior over time and manage your anxiety through self-help exercises and private reflection. SIMPLE HABIT - guided meditation for anxiety relief    BOOSTERBUDDY   This helder offers a novel way for teens and young adults to improve their resilience and work toward being healthier both physically and emotionally. 7 CUPS  7 Cups uses trained, volunteer, active listeners to provide free, anonymous, and confidential emotional support to people needing help coping with acute stressors and long-term mental health issues.     [address]   The MoodTools and FearTools apps provide people with quick resources for tracking their cognitive distortions, activities, and safety plan in case of an emergency. SLEEPBOT   SleepBot is a quick and simple way for people to log their sleep and improve their sleep hygiene. [place] HELDER   The Whats Up? helder helps people monitor their mood and uses principles of CBT and acceptance commitment therapy (ACT) to help people reframe their thoughts and cognitive distortions. [address]: [phone]  Crisis: [address] [place] [address] Avenue: [phone]  Crisis: [address]: [phone]  Crisis: [phone]    3) You may wish to consider seeing the nutritionist at [facility] ([phone]), [facility] ([phone]) or [place] ([phone]). Alternatively, you can dial [employer] at [phone] (Monday - Friday 9am - 5pm) for a complimentary (free) conversation about your diet. Meal plans, grocery list and tips for healthy eating can be sent to you upon request.          DASH Diet: Care Instructions  Your Care Instructions    The DASH diet is an eating plan that can help lower your blood pressure. DASH stands for Dietary Approaches to Stop Hypertension. Hypertension is high blood pressure. The DASH diet focuses on eating foods that are high in calcium, potassium, and magnesium. These nutrients can lower blood pressure. The foods that are highest in these nutrients are fruits, vegetables, low-fat dairy products, nuts, seeds, and legumes. But taking calcium, potassium, and magnesium supplements instead of eating foods that are high in those nutrients does not have the same effect. The DASH diet also includes whole grains, fish, and poultry. The DASH diet is one of several lifestyle changes your doctor may recommend to lower your high blood pressure.  Your doctor may also want you to decrease the amount of sodium in your diet. Lowering sodium while following the DASH diet can lower blood pressure even further than just the DASH diet alone. Follow-up care is a key part of your treatment and safety. Be sure to make and go to all appointments, and call your doctor if you are having problems. It's also a good idea to know your test results and keep a list of the medicines you take. How can you care for yourself at home? Following the DASH diet  · Eat 4 to 5 servings of fruit each day. A serving is 1 medium-sized piece of fruit, ½ cup chopped or canned fruit, 1/4 cup dried fruit, or 4 ounces (½ cup) of fruit juice. Choose fruit more often than fruit juice. · Eat 4 to 5 servings of vegetables each day. A serving is 1 cup of lettuce or raw leafy vegetables, ½ cup of chopped or cooked vegetables, or 4 ounces (½ cup) of vegetable juice. Choose vegetables more often than vegetable juice. · Get 2 to 3 servings of low-fat and fat-free dairy each day. A serving is 8 ounces of milk, 1 cup of yogurt, or 1 ½ ounces of cheese. · Eat 6 to 8 servings of grains each day. A serving is 1 slice of bread, 1 ounce of dry cereal, or ½ cup of cooked rice, pasta, or cooked cereal. Try to choose whole-grain products as much as possible. · Limit lean meat, poultry, and fish to 2 servings each day. A serving is 3 ounces, about the size of a deck of cards. · Eat 4 to 5 servings of nuts, seeds, and legumes (cooked dried beans, lentils, and split peas) each week. A serving is 1/3 cup of nuts, 2 tablespoons of seeds, or ½ cup of cooked beans or peas. · Limit fats and oils to 2 to 3 servings each day. A serving is 1 teaspoon of vegetable oil or 2 tablespoons of salad dressing. · Limit sweets and added sugars to 5 servings or less a week. A serving is 1 tablespoon jelly or jam, ½ cup sorbet, or 1 cup of lemonade. · Eat less than 2,300 milligrams (mg) of sodium a day.  If you limit your sodium to 1,500 mg a day, you can lower your blood pressure even more. Tips for success  · Start small. Do not try to make dramatic changes to your diet all at once. You might feel that you are missing out on your favorite foods and then be more likely to not follow the plan. Make small changes, and stick with them. Once those changes become habit, add a few more changes. · Try some of the following:  ? Make it a goal to eat a fruit or vegetable at every meal and at snacks. This will make it easy to get the recommended amount of fruits and vegetables each day. ? Try yogurt topped with fruit and nuts for a snack or healthy dessert. ? Add lettuce, tomato, cucumber, and onion to sandwiches. ? Combine a ready-made pizza crust with low-fat mozzarella cheese and lots of vegetable toppings. Try using tomatoes, squash, spinach, broccoli, carrots, cauliflower, and onions. ? Have a variety of cut-up vegetables with a low-fat dip as an appetizer instead of chips and dip. ? Sprinkle sunflower seeds or chopped almonds over salads. Or try adding chopped walnuts or almonds to cooked vegetables. ? Try some vegetarian meals using beans and peas. Add garbanzo or kidney beans to salads. Make burritos and tacos with mashed pan beans or black beans. Where can you learn more? Go to http://radha-alex.info/. Enter N155 in the search box to learn more about \"DASH Diet: Care Instructions. \"  Current as of: April 9, 2019  Content Version: 12.2  © 1527-4969 [employer]. Care instructions adapted under license by [employer] (which disclaims liability or warranty for this information). If you have questions about a medical condition or this instruction, always ask your healthcare professional. [address] any warranty or liability for your use of this information.

## 2019-11-12 DIAGNOSIS — I10 ESSENTIAL HYPERTENSION: ICD-10-CM

## 2019-11-12 RX ORDER — METOPROLOL TARTRATE 25 MG/1
TABLET, FILM COATED ORAL
Qty: 180 TAB | Refills: 0 | Status: SHIPPED | OUTPATIENT
Start: 2019-11-12 | End: 2020-02-04 | Stop reason: SDUPTHER

## 2019-11-21 RX ORDER — ATORVASTATIN CALCIUM 20 MG/1
TABLET, FILM COATED ORAL
Qty: 90 TAB | Refills: 3 | Status: SHIPPED | OUTPATIENT
Start: 2019-11-21 | End: 2020-12-29 | Stop reason: SDUPTHER

## 2020-01-29 ENCOUNTER — HOSPITAL ENCOUNTER (OUTPATIENT)
Dept: MRI IMAGING | Age: 85
Discharge: HOME | End: 2020-01-29
Attending: PSYCHIATRY & NEUROLOGY
Payer: MEDICARE

## 2020-01-29 DIAGNOSIS — F03.90 DEMENTIA WITHOUT BEHAVIORAL DISTURBANCE, UNSPECIFIED DEMENTIA TYPE: ICD-10-CM

## 2020-01-29 PROCEDURE — 70551 MRI BRAIN STEM W/O DYE: CPT

## 2020-02-04 ENCOUNTER — OFFICE VISIT (OUTPATIENT)
Dept: FAMILY MEDICINE CLINIC | Age: 85
End: 2020-02-04

## 2020-02-04 VITALS
TEMPERATURE: 96.8 F | DIASTOLIC BLOOD PRESSURE: 59 MMHG | WEIGHT: 147 LBS | RESPIRATION RATE: 16 BRPM | OXYGEN SATURATION: 94 % | SYSTOLIC BLOOD PRESSURE: 121 MMHG | BODY MASS INDEX: 24.49 KG/M2 | HEART RATE: 65 BPM | HEIGHT: 65 IN

## 2020-02-04 DIAGNOSIS — N32.81 OAB (OVERACTIVE BLADDER): ICD-10-CM

## 2020-02-04 DIAGNOSIS — R73.09 ELEVATED GLUCOSE: ICD-10-CM

## 2020-02-04 DIAGNOSIS — Z00.00 MEDICARE ANNUAL WELLNESS VISIT, SUBSEQUENT: ICD-10-CM

## 2020-02-04 DIAGNOSIS — E78.00 PURE HYPERCHOLESTEROLEMIA: ICD-10-CM

## 2020-02-04 DIAGNOSIS — I10 ESSENTIAL HYPERTENSION: ICD-10-CM

## 2020-02-04 DIAGNOSIS — M85.80 OSTEOPENIA, UNSPECIFIED LOCATION: Primary | ICD-10-CM

## 2020-02-04 RX ORDER — AMLODIPINE BESYLATE 10 MG/1
10 TABLET ORAL DAILY
Qty: 90 TAB | Refills: 2 | Status: SHIPPED | OUTPATIENT
Start: 2020-02-04 | End: 2020-11-13 | Stop reason: SDUPTHER

## 2020-02-04 RX ORDER — METOPROLOL TARTRATE 25 MG/1
TABLET, FILM COATED ORAL
Qty: 180 TAB | Refills: 1 | Status: SHIPPED | OUTPATIENT
Start: 2020-02-04 | End: 2020-08-14

## 2020-02-04 NOTE — PROGRESS NOTES
Identified pt with two pt identifiers(name and ). Reviewed record in preparation for visit and have obtained necessary documentation. Chief Complaint   Patient presents with    Hypertension     3mo f/u        Health Maintenance Due   Topic    Shingrix Vaccine Age 50> (1 of 2)    GLAUCOMA SCREENING Q2Y     MEDICARE YEARLY EXAM        Coordination of Care Questionnaire:  :   1) Have you been to an emergency room, urgent care, or hospitalized since your last visit? If yes, where when, and reason for visit? no       2. Have seen or consulted any other health care provider since your last visit? If yes, where when, and reason for visit? Yes  - Dr. Fernando Quiles (neuro)  - MRI of brain last week    Patient is accompanied by friend and  I have received verbal consent from Agustin Rojas to discuss any/all medical information while they are present in the room.

## 2020-02-04 NOTE — PROGRESS NOTES
S: Jorge Cheema is a 80 y.o. WHITE OR  female who presents for MWV, HTN/blood pressure follow up. Assessment/Plan:  1. Medicare annual wellness visit, subsequent  -discussed healthy diet and increasing daily exercise  -labs today: CBC, CMP, urinalysis, A1c, lipid, microalb  -HM: dexa ordered    2. Essential hypertension  -current therapy: amlodipine 10mg + metoprolol 25mg   Taking as prescribed  BP today: 135/60, 121/59   -advised to monitor BP at home and keep log; goal<140/90 make OV if consistently above goal or <110/60    RTC 3 months HTN/med check      HTN  current therapy: amlodipine 10mg + metoprolol 25mg   Taking as prescribed  BP today: 135/60, 121/59   Checking BP at home   No chest pain or discomfort, elevated heart rate,  palpitations or edema in extremities  No SOB  No Fatigue  No Dizziness    Dementia  Neurology - saw Dr. Lewis Mention 1/14/20 (was a high stress day bc  went to ED that day she was in the waiting room)   Srikanth Cognitive Assessment Exam - 25/30 - if it's dementia, it's early   MRI - wnl  TSH/Vit B - wnl    Has started to clean out stuff from house in preparation of moving to smaller home/ snf home - still waivering about [facility] or [place] but  is very adamant about moving to [place] and not snf living     Review of Systems:  - Constitutional symptoms: no fevers/chills  - Eyes: no blurry vision or double vision   - Respiratory: no SOB, difficulty or pain with breathing, wheezes, or cough. - Gastrointestinal: no dysphagia or abdominal pain  ROS is negative otherwise.     Social History     Tobacco Use   Smoking Status Never Smoker   Smokeless Tobacco Never Used     Social History     Substance and Sexual Activity   Alcohol Use No     Social History     Substance and Sexual Activity   Drug Use No       3 most recent PHQ Screens 2/4/2020   Little interest or pleasure in doing things Not at all   Feeling down, depressed, irritable, or hopeless Not at all   Total Score PHQ 2 0       I reviewed the following:  Past Medical History:   Diagnosis Date    HTN (hypertension) 5/27/2011    Hyperlipidemia 12/1/2011    Hypertension     OAB (overactive bladder) 2/15/2013    Other ill-defined conditions(799.89)     hyperlipidemia    Other ill-defined conditions(799.89)     bladder problems    Other ill-defined conditions(799.89)     back pain       Current Outpatient Medications   Medication Sig Dispense Refill    atorvastatin (LIPITOR) 20 mg tablet TAKE 1 TABLET BY MOUTH EVERY DAY IN THE EVENING 90 Tab 3    amLODIPine (NORVASC) 10 mg tablet Take 1 Tab by mouth daily. 90 Tab 2    metoprolol tartrate (LOPRESSOR) 25 mg tablet TAKE 1 TAB BY MOUTH EVERY TWELVE (12) HOURS. HOLD FOR SBP LESS THAN 120 OR HR LESS THAN 65 180 Tab 0    calcium carbonate (CALTREX) 600 mg calcium (1,500 mg) tablet Take 600 mg by mouth two (2) times a day.  cholecalciferol (VITAMIN D3) 1,000 unit tablet Take 1,000 Units by mouth daily.  senna-docusate (PERICOLACE) 8.6-50 mg per tablet Take 1 Tab by mouth daily.  aspirin 81 mg chewable tablet Take 1 Tab by mouth two (2) times a day. 60 Tab 0    polyethylene glycol (MIRALAX) 17 gram/dose powder Take 17 g by mouth daily as needed for Other (constipation).  coenzyme Q-10 (CO Q-10) 200 mg capsule Take 1 Cap by mouth daily. 90 Cap 3    glucosamine-chondroit-vit c-mn (GLUCOSAMINE 1500 COMPLEX) 500-400 mg capsule Take 1 Cap by mouth daily. 90 Cap 1    multivit-minerals/folic acid (CENTRUM MULTIGUMMIES PO) Take 1 Tab by mouth daily.  famotidine (PEPCID) 10 mg tablet Take 1 Tab by mouth two (2) times a day. (Patient taking differently: Take 1 Tab by mouth two (2) times daily as needed for Other (reflux). ) 60 Tab 0       Allergies   Allergen Reactions    Codeine Nausea and Vomiting    Demerol (Pf) [Meperidine (Pf)] Nausea and Vomiting    Meperidine Nausea and Vomiting       O: VS:   Visit Vitals  /60   Pulse 65 Temp 96.8 °F (36 °C) (Oral)   Resp 16   Ht 5' 5\" (1.651 m)   Wt 147 lb (66.7 kg)   SpO2 94%   BMI 24.46 kg/m²     Data Reviewed:     · A1C:      Lab Results   Component Value Date/Time    Hemoglobin A1c 5.7 (H) 10/06/2017 08:45 AM     · Lipids:  Lab Results   Component Value Date/Time    Cholesterol, total 129 10/06/2017 08:45 AM    Cholesterol (POC) 130 11/09/2016 02:33 PM    HDL Cholesterol 51 10/06/2017 08:45 AM    HDL Cholesterol (POC) 53 11/09/2016 02:33 PM    LDL Cholesterol (POC) 64 11/09/2016 02:33 PM    LDL, calculated 64 10/06/2017 08:45 AM    VLDL, calculated 14 10/06/2017 08:45 AM    Triglyceride 72 10/06/2017 08:45 AM    Triglycerides (POC) 64 11/09/2016 02:33 PM       GENERAL: Budd Lank is in no acute distress. EYE: PERRLA. EOMs intact. Sclera anicteric without injection. RESP: Unlabored without SOB. Speaking in full sentences. Breath sounds are symmetrical bilaterally. Clear to auscultation to all fields. No wheezes. No rales or rhonchi. CV: Normal rate. Regular rhythm. S1, S2 audible. No murmur noted. No rubs, clicks or gallops noted. NEURO:  awake, alert and oriented to person, place, and time and event. Clear speech. Steady gait. HEME/LYMPH: Pedal pulses palpable 2+ x 4 extremities. No peripheral edema is noted. SKIN: Skin is warm and dry. Turgor is normal. No petechiae, no purpura, no rash. No cyanosis. No mottling, jaundice or pallor. Medicare Wellness:   History obtained from: spouse, child and the patient. Patient lives: independently with 81yo spouse    Cognitive Impairment concerns: yes  Clock drawing: wnl  Evaluated by neurology, Dr. Edwin Jain Questions   -During the past 4 weeks:   How would you rate your health in general? Good  How much have you been bothered by bodily pain? mildly  Has your physical and emotional health limited your social activities with family or friends? no    Emotional Health Questions     3 most recent PHQ Screens 2/4/2020   Little interest or pleasure in doing things Not at all   Feeling down, depressed, irritable, or hopeless Not at all   Total Score PHQ 2 0     Health Habits   Please describe your diet habits: overall healthy  Do you get 5 servings of fruits or vegetables daily? no  Do you exercise regularly? no    Alcohol and Tobacco Risk Factor Screening:     Social History     Tobacco Use   Smoking Status Never Smoker   Smokeless Tobacco Never Used     Social History     Substance and Sexual Activity   Alcohol Use No         Activities of Daily Living and Functional Status   Do you need help with eating, walking, dressing, bathing, toileting, the phone, transportation, shopping, preparing meals, housework, laundry, or medications:  no  -Do you need help managing money? no  -In the past four weeks, was someone available to help you if you needed and wanted help with anything? yes  -Are you confident are you that you can control and manage most of your health problems? no  -Have you been given information to help you keep track of your medications? yes  -How often do you have trouble taking your medications as prescribed? never    Hearing Loss   Have you noticed any hearing difficulties? no    Fall Risk and Home Safety   How often have you been bothered by feeling dizzy when standing up? never  Have you fallen 2 or more times in the past year? yes  Does your home have good lighting, grab bars in the bathroom, handrails on the stairs, good lighting? yes  Does you home have throw rugs on floor or clutter you can trip over? no  Do you have smoke detectors and check them regularly?  yes  Do you have difficulties driving a car? no - doesn't drive  Do you always fasten your seat belt when you are in a car? yes    Abuse Screen   Patient is not abused    Evaluation of Cognitive Function   Mood/affect:  happy  Appearance: age appropriate  Family member/caregiver input: daughter, Prabhu, and spouse, Elizabeth Faye  Patient's timed Up & Go test steady or shorter than 30 seconds? yes  - with walker    Advice/Referrals/Counseling (as indicated)   Education and counseling provided for any problems identified above:   Screenings   Vaccines  Annual Flu shot  Healthy eating  Daily exercise    Preventive Services       (Preventive care checklist to be included in patient instructions)  Discussed today Done Previously Not Needed    x   Glaucoma screening     x Colorectal cancer screening (colonoscopy)   x   Osteoporosis Screening (DEXA scan)     x Breast cancer Screening (Mammogram)     x Cervical cancer Screening (Pap smear)     x Prostate cancer Screening   x   Cardiovascular Screening (Lipid panel)   x   Diabetes screening test (Hgb A1c)     x Abdominal ultrasound for AAA (65-75 male smokers)     x Low-dose CT for lung cancer screening    10/2019  Flu vaccine     x Hepatitis B vaccine (if at risk)    2007 2017  Pneumococcal 23  Prevnar 13    2010  Tdap vaccine   x   Shingles vaccine     x Screening for Hepatitis C (b [id number])     Discussion of Advance Directive     Patient was offered the opportunity to discuss advance care planning:  yes     Does patient have an Advance Directive:  yes  Not on file, pt to bring copy in next week   If no, did you provide information on [facility]ctions? yes     ____________________________________________________________________  I spent >35 minutes face to face with patient with >50% of time spent in counseling and coordinating care. Reviewed medications and side effects. Reviewed warning signs of hypertension, stroke and heart attack. Advised on nutrition, physical activity, weight management, tobacco, and alcohol.     Patient verbalized understanding and agreed to plan of care.

## 2020-02-04 NOTE — PATIENT INSTRUCTIONS
1) Blood pressure looks good today - 121/59. Keep taking the amlodipine 10mg and metoprolol 25mg  Please check your blood pressure through the week at staggered times in the day. (If you check in the morning, it should be at least one hour after your morning blood pressure medications.)      Arm monitors are most accurate. If you use a wrist monitor, make sure your wrist is at heart level. You can bring your home monitor to your next visit and have it calibrated with the machine in the office to gauge your readings. Sit  with your feet uncrossed and relax for 5 minutes before taking your BP. Keep a written record of your blood pressure readings and bring it to each appointment. If your systolic (top) blood pressure is consistently greater than 140mmHg or less than [id number] of the diastolic (bottom) number is consistently greater than 90mmHg or less than 60mmHg then please schedule an office appointment. Work on healthy eating - no salt diet, more potassium (helps flush out sodium,making healthier heart and arteries) - and incorporating daily exercise into your routine. Cardiac symptoms that would need immediate attention include: uncomfortable pressure, squeezing, fullness or pain in the center of your chest. Pain or discomfort in one or both arms, the back, neck, jaw or stomach. Shortness of breath with or without chest discomfort, breaking out in a cold sweat, nausea or lightheadedness. 2)   Schedule of Personalized Health Plan    The best way to stay healthy is to live a healthy lifestyle. A healthy lifestyle includes regular exercise, eating a well-balanced diet, keeping a healthy weight and not smoking. Regular physical exams and screening tests are another important way to take care of yourself. Preventive exams provided by health care providers can find health problems early when treatment works best and can keep you from getting certain diseases or illnesses.  Preventive services include exams, lab tests, screening tests, shots, and learning information to help you take care of your own health. The CDC recommends pneumonia vaccines for anyone 72 years and older. These vaccines are usually only needed once in a lifetime unless your healthcare provider decides differently. The 2 pneumonia shots available presently are PCV 13 (Prevnar 13) and PPSV23 (Pneumovax 23). Adults 72 years or older who have not previously received PCV13, should receive a dose of PCV13 first, followed 1 year later by a dose of PPSV23. All people over 65 should have a yearly flu vaccine. People over 65 are at medium to high risk for Hepatitis B. Hepatitis B is transmitted through body fluids with a common source being sexual activity or IV drug use. Three shots are needed for complete protection. The CDC recommends the herpes zoster (shingles) vaccine for all adults 61 and older, regardless if a prior episode of zoster has been reported. In addition to your physical exam, some screening tests are recommended:    Osteoporosis screening -There are no signs or symptoms of osteoporosis (weakening of bones). You might not know you have the disease until you break a bone. Thats why its so important to get a bone density test to measure your bone strength. Bone mass measurement is taken with a Dexa scan and is recommended every two years after 72years old or if you have certain risk factors, such as personal history of vertebral fracture or chronic steroid medication use. Diabetes Mellitus screening is recommended every year. This is a blood test, called a hemoglobin A1c, which measures the average blood sugar over a 3 month period. Glaucoma is an eye disease caused by high pressure in the eye. An eye exam is recommended every year.      Cardiovascular screening tests that check your cholesterol and other blood fat (lipid) levels are recommended every five years or yearly if you are on medications for cardiovascular disease. Colorectal Cancer screening tests help to find pre-cancerous polyps (growths in the colon) so they can be removed before they turn into cancer. Screening tests are recommended starting at age 48 or earlier if you have a certain risk factors, such as a family history of colon cancer. Breast Cancer screening is done with a mammogram, a low dose x-ray that looks at breast tissue. It is recommended by the [address] that women 54 years and older get a mammogram every 2 years. After the age of 76, recommendations are based on life expectancy. Cervical Cancer screening is done by a PAP smear during a pelvic exam.  The American College of Obstetricians and Gynecologists states that screening can be discontinued after 72years old if the person has had adequate negative prior screening results and no abnormal history (abnormal = CIN2 or higher level). Here is a list of your current Health Maintenance items including a date when each one is due next:  Health Maintenance   Topic Date Due    Shingrix Vaccine Age 50> (1 of 2) 07/03/1982    GLAUCOMA SCREENING Q2Y  12/31/2017    MEDICARE YEARLY EXAM  09/28/2018    DTaP/Tdap/Td series (2 - Td) 05/27/2020    Influenza Age 9 to Adult  Completed    Pneumococcal 65+ years  Completed    Bone Densitometry (Dexa) Screening  Addressed       You do not have an [address] [place] [address]. [facility] on file. 3) Labs  The following blood work was ordered today. Once the tests are completed, you will receive a letter, email or phone call with the results. If you have not heard from us within 10-14 days, please call the office for the results. Complete Blood Count (CBC) helps evaluate your overall health, including hemoglobin and red blood cells that carry oxygen, white blood cells that fight infection and platelets that help with clotting.       Complete Metabolic Panel (CMP) assesses electrolytes, kidney and liver function.  (A Basic Metabolic Panel (BMP) does not include liver function.)  Electrolytes include sodium, potassium, calcium, and chloride. These are necessary for cell function and an imbalance can cause serious problems. BUN and creatinine are markers of kidney function. ALT and AST are markers of liver function. Hemoglobin A1c is a 3 month average of your blood sugar and used as a marker of your diabetes control. A normal value is less than 5.7%. Total Cholesterol is the total number of cholesterol particles in your blood, which includes triglycerides, HDL and LDL. A small amount of cholesterol is needed for the body's cells, hormones, and metabolism. Goal is less than 200. Triglycerides are the short term fats in your blood which are used for energy. Goal is less than 150. High Density Lipids (HDL) is the \"good\" cholesterol in your blood. HDL helps remove bad cholesterol from your blood. Goal is more than 40. Low Density Lipids (LDL) is the \"bad\" cholesterol in your blood. LDL can stick to your arteries, raising the risk for heart attack and stroke. Goal is less than 100    Urinalysis - this is a test of your urine to check your overall health. It is recommended as a part of a routine check up and screens for a variety of disorders, such as urinary tract infections, kidney disease and diabetes. Urine Analysis for Microalbumin/creatinine ratio is a marker of the amount of protein in your urine. Certain health conditions, such as diabetes and hypertension, can injury kidney function. A normal value is less than 30.      4) Osteoporosis screening -There are no signs or symptoms of osteoporosis (weakening of bones). You might not know you have the disease until you break a bone. Thats why its so important to get a bone density test to measure your bone strength.  Bone mass measurement is taken with a Dexa scan and is recommended every two years after 72years old or if you have certain risk factors, such as personal history of vertebral fracture or chronic steroid medication use. The Dexa Scan compares your bone density to women of the same age (Z score) as well as a healthy 27year old (T score). The lower the score (more negative), the lower your bone density. A T score of -2.5 or lower indicates osteoporosis (bone loss). A T-score between -1.0 to -2.5 indicates osteopenia (bone thinning). A T-score above -1.0 is normal.         Learning About the Mediterranean Diet  What is the Mediterranean diet? The Mediterranean diet is a style of eating rather than a diet plan. It features foods eaten in [place], [place], [place] and [place], and other countries along the [facility]. It emphasizes eating foods like fish, fruits, vegetables, beans, high-fiber breads and whole grains, nuts, and olive oil. This style of eating includes limited red meat, cheese, and sweets. Why choose the Mediterranean diet? A Mediterranean-style diet may improve heart health. It contains more fat than other heart-healthy diets. But the fats are mainly from nuts, unsaturated oils (such as fish oils and olive oil), and certain nut or seed oils (such as canola, soybean, or flaxseed oil). These fats may help protect the heart and blood vessels. How can you get started on the Mediterranean diet? Here are some things you can do to switch to a more Mediterranean way of eating. What to eat  · Eat a variety of fruits and vegetables each day, such as grapes, blueberries, tomatoes, broccoli, peppers, figs, olives, spinach, eggplant, beans, lentils, and chickpeas. · Eat a variety of whole-grain foods each day, such as oats, brown rice, and whole wheat bread, pasta, and couscous. · Eat fish at least 2 times a week. Try tuna, salmon, mackerel, lake trout, herring, or sardines. · Eat moderate amounts of low-fat dairy products, such as milk, cheese, or yogurt. · Eat moderate amounts of poultry and eggs.   · Choose healthy (unsaturated) fats, such as nuts, olive oil, and certain nut or seed oils like canola, soybean, and flaxseed. · Limit unhealthy (saturated) fats, such as butter, palm oil, and coconut oil. And limit fats found in animal products, such as meat and dairy products made with whole milk. Try to eat red meat only a few times a month in very small amounts. · Limit sweets and desserts to only a few times a week. This includes sugar-sweetened drinks like soda. The Mediterranean diet may also include red wine with your meal--1 glass each day for women and up to 2 glasses a day for men. Tips for eating at home  · Use herbs, spices, garlic, lemon zest, and citrus juice instead of salt to add flavor to foods. · Add avocado slices to your sandwich instead of amaral. · Have fish for lunch or dinner instead of red meat. Brush the fish with olive oil, and broil or grill it. · Sprinkle your salad with seeds or nuts instead of cheese. · Cook with olive or canola oil instead of butter or oils that are high in saturated fat. · Switch from 2% milk or whole milk to 1% or fat-free milk. · Dip raw vegetables in a vinaigrette dressing or hummus instead of dips made from mayonnaise or sour cream.  · Have a piece of fruit for dessert instead of a piece of cake. Try baked apples, or have some dried fruit. Tips for eating out  · Try broiled, grilled, baked, or poached fish instead of having it fried or breaded. · Ask your  to have your meals prepared with olive oil instead of butter. · Order dishes made with marinara sauce or sauces made from olive oil. Avoid sauces made from cream or mayonnaise. · Choose whole-grain breads, whole wheat pasta and pizza crust, brown rice, beans, and lentils. · Cut back on butter or margarine on bread. Instead, you can dip your bread in a small amount of olive oil. · Ask for a side salad or grilled vegetables instead of french fries or chips. Where can you learn more?   Go to http://radha-alex.info/. Enter [phone] in the search box to learn more about \"Learning About the Mediterranean Diet. \"  Current as of: November 7, 2018  Content Version: 12.2  © 5707-2891 [employer]. Care instructions adapted under license by [employer] (which disclaims liability or warranty for this information). If you have questions about a medical condition or this instruction, always ask your healthcare professional. [address] any warranty or liability for your use of this information.

## 2020-02-09 ENCOUNTER — APPOINTMENT (OUTPATIENT)
Dept: GENERAL RADIOLOGY | Age: 85
End: 2020-02-09
Attending: PHYSICIAN ASSISTANT
Payer: MEDICARE

## 2020-02-09 ENCOUNTER — HOSPITAL ENCOUNTER (EMERGENCY)
Age: 85
Discharge: HOME | End: 2020-02-09
Attending: STUDENT IN AN ORGANIZED HEALTH CARE EDUCATION/TRAINING PROGRAM | Admitting: STUDENT IN AN ORGANIZED HEALTH CARE EDUCATION/TRAINING PROGRAM
Payer: MEDICARE

## 2020-02-09 VITALS
BODY MASS INDEX: 24.16 KG/M2 | RESPIRATION RATE: 16 BRPM | HEART RATE: 54 BPM | SYSTOLIC BLOOD PRESSURE: 158 MMHG | WEIGHT: 145 LBS | OXYGEN SATURATION: 98 % | DIASTOLIC BLOOD PRESSURE: 65 MMHG | HEIGHT: 65 IN | TEMPERATURE: 98.7 F

## 2020-02-09 DIAGNOSIS — S22.42XA CLOSED FRACTURE OF MULTIPLE RIBS OF LEFT SIDE, INITIAL ENCOUNTER: Primary | ICD-10-CM

## 2020-02-09 PROCEDURE — 99282 EMERGENCY DEPT VISIT SF MDM: CPT

## 2020-02-09 PROCEDURE — 71101 X-RAY EXAM UNILAT RIBS/CHEST: CPT

## 2020-02-09 NOTE — ED PROVIDER NOTES
Patient is a 72-year-old female who presents with left-sided rib pain after a fall sustained 3 days ago. She states she was walking at home and her toe tripped over the threshold and she fell forward landing on the arm rest of a chair into the left rib and then slowly fell onto the floor. She states her face grazed the carpet but she denies blunt head injury, LOC, vomiting, headache, vision changes, neck or back pain. She has been having intermittent left-sided rib pain that has improved since the fall. She states currently she has no pain at rest in the rib, pain is reproducible only with turning in bed, with coughing, and sneezing. She is not anticoagulated. She denies cough, fever, chills, myalgias. She uses a walker when out but at home does not use her walker as she has things to hold onto. She lives with her . She presents with her daughter who is a caregiver for her and states she is at her mental baseline. Patient told daughter yesterday and due to pain today they felt she should be evaluated. No history of rib fracture, has had 2 hip replacements, has ambulated since the fall at her baseline and has no extremity pain, abdominal pain. Patient declines offer for pain medicine including Tylenol or ice pack.            Past Medical History:   Diagnosis Date    HTN (hypertension) 5/27/2011    Hyperlipidemia 12/1/2011    Hypertension     OAB (overactive bladder) 2/15/2013    Other ill-defined conditions(799.89)     hyperlipidemia    Other ill-defined conditions(799.89)     bladder problems    Other ill-defined conditions(799.89)     back pain       Past Surgical History:   Procedure Laterality Date    COLONOSCOPY N/A 3/12/2018    [facility]COPY    COLONOSCOPY,DIAGNOSTIC  3/12/2018         HX APPENDECTOMY  1948    HX CATARACT REMOVAL Left     HX CORNEAL TRANSPLANT Left     HX DILATION AND CURETTAGE      x 2    HX HIP REPLACEMENT Right 01/20/2019    HX TONSILLECTOMY  1938         Family History:   Problem Relation Age of Onset    Cancer Father         throat (smoker); mouth and gums    Heart Disease Mother         Age 52   [place] Tigist Clotting Disorder Mother     Arthritis-osteo Paternal Aunt        Social History     Socioeconomic History    Marital status:      Spouse name: Not on file    Number of children: Not on file    Years of education: Not on file    Highest education level: Not on file   Occupational History    Not on file   Social Needs    Financial resource strain: Not on file    Food insecurity:     Worry: Not on file     Inability: Not on file    Transportation needs:     Medical: Not on file     Non-medical: Not on file   Tobacco Use    Smoking status: Never Smoker    Smokeless tobacco: Never Used   Substance and Sexual Activity    Alcohol use: No    Drug use: No    Sexual activity: Not Currently   Lifestyle    Physical activity:     Days per week: Not on file     Minutes per session: Not on file    Stress: Not on file   Relationships    Social connections:     Talks on phone: Not on file     Gets together: Not on file     Attends [employer] service: Not on file     Active member of club or organization: Not on file     Attends meetings of clubs or organizations: Not on file     Relationship status: Not on file    Intimate partner violence:     Fear of current or ex partner: Not on file     Emotionally abused: Not on file     Physically abused: Not on file     Forced sexual activity: Not on file   Other Topics Concern    Not on file   Social History Narrative    Not on file         ALLERGIES: Codeine; Demerol (pf) [meperidine (pf)]; and Meperidine    Review of Systems   Constitutional: Negative. Negative for activity change, chills, fatigue and unexpected weight change. HENT: Negative for trouble swallowing. Respiratory: Negative for cough, chest tightness, shortness of breath and wheezing. Cardiovascular: Negative. Negative for chest pain and palpitations. Gastrointestinal: Negative. Negative for abdominal pain, diarrhea, nausea and vomiting. Genitourinary: Negative. Negative for dysuria, flank pain, frequency and hematuria. Musculoskeletal: Negative. Negative for arthralgias, back pain, neck pain and neck stiffness. L sided rib pain   Skin: Negative. Negative for color change and rash. Neurological: Negative. Negative for dizziness, numbness and headaches. All other systems reviewed and are negative. Vitals:    02/09/20 1242   BP: 158/65   Pulse: (!) 54   Resp: 16   Temp: 98.7 °F (37.1 °C)   SpO2: 98%   Weight: 65.8 kg (145 lb)   Height: 5' 5\" (1.651 m)            Physical Exam  Vitals signs and nursing note reviewed. Constitutional:       General: She is not in acute distress. Appearance: She is well-developed. She is not toxic-appearing or diaphoretic. Comments: Thin WF   HENT:      Head: Normocephalic and atraumatic. Eyes:      General:         Right eye: No discharge. Left eye: No discharge. Conjunctiva/sclera: Conjunctivae normal.      Pupils: Pupils are equal, round, and reactive to light. Neck:      Musculoskeletal: Full passive range of motion without pain and normal range of motion. Trachea: No tracheal tenderness. Comments: No midline spinous process TTP throughout. Thoracic kyphosis and left lateral curvature appreciated, non-tender. Cardiovascular:      Rate and Rhythm: Normal rate and regular rhythm. Pulses: Normal pulses. Heart sounds: Normal heart sounds. No murmur. No friction rub. No gallop. Pulmonary:      Effort: Pulmonary effort is normal. No respiratory distress. Breath sounds: Normal breath sounds. No wheezing or rales. Chest:      Chest wall: Tenderness present. Abdominal:      General: Bowel sounds are normal. There is no distension. Palpations: Abdomen is soft.       Tenderness: There is no abdominal tenderness. There is no guarding or rebound. Comments: No bruising to chest/abd   Musculoskeletal: Normal range of motion. General: No tenderness. Skin:     General: Skin is warm and dry. Capillary Refill: Capillary refill takes less than 2 seconds. Findings: No abrasion, erythema or rash. Neurological:      Mental Status: She is alert and oriented to person, place, and time. Cranial Nerves: No cranial nerve deficit. Sensory: No sensory deficit. Coordination: Coordination normal.   Psychiatric:         Speech: Speech normal.         Behavior: Behavior normal.          MDM  Number of Diagnoses or Management Options  Diagnosis management comments:   Ddx: rib frx, contusion, sprain, PTX       Amount and/or Complexity of Data Reviewed  Tests in the radiology section of CPT®: ordered and reviewed  Review and summarize past medical records: yes  Discuss the patient with other providers: yes    Patient Progress  Patient progress: stable         Procedures    I discussed patient's PMH, exam findings as well as careplan with the ER attending who agrees with care plan. Alia Lee PA-C      DISCHARGE NOTE:  1:08 PM  The patient's results have been reviewed with them and/or available family. Patient and/or family verbally conveyed their understanding and agreement of the patient's signs, symptoms, diagnosis, treatment and prognosis and additionally agree to follow up as recommended in the discharge instructions or to return to the Emergency Room should their condition change prior to their follow-up appointment. The patient/family verbally agrees with the care-plan and verbally conveys that all of their questions have been answered.  The discharge instructions have also been provided to the patient and/or family with some educational information regarding the patient's diagnosis as well a list of reasons why the patient would want to return to the ER prior to their follow-up appointment, should their condition change. Plan:  1. F/U with pcp  2. Declines offer for pain meds, will continue Tylenol  3.  Incentive spirometer given with instruction  Return precautions discussed and advised to return to ER if worse

## 2020-02-09 NOTE — ED TRIAGE NOTES
Pt reports she got the toe of her shoe caught on th edge of a rug causing her to fall. Pt C/O left upper rib pain and states it hurts when she sneezes. Pt denies hitting head or having LOC.

## 2020-02-09 NOTE — DISCHARGE INSTRUCTIONS
Patient Education        Broken Rib: Care Instructions  Your Care Instructions    A broken rib is a crack or break in one of the bones of the rib cage. Breathing can be very painful because the muscles used for breathing pull on the rib. In most cases, a broken rib will heal on its own. You can take pain medicine while the rib mends. Pain relief allows you to take deep breaths. In the past, doctors recommended taping or wrapping broken ribs. This is no longer done because taping makes it hard for you to take deep breaths. Taking deep breaths may help prevent pneumonia or a partial collapse of a lung. Your rib will heal in about 6 weeks. You heal best when you take good care of yourself. Eat a variety of healthy foods, and don't smoke. Follow-up care is a key part of your treatment and safety. Be sure to make and go to all appointments, and call your doctor if you are having problems. It's also a good idea to know your test results and keep a list of the medicines you take. How can you care for yourself at home? · Be safe with medicines. Read and follow all instructions on the label. ? If the doctor gave you a prescription medicine for pain, take it as prescribed. ? If you are not taking a prescription pain medicine, ask your doctor if you can take an over-the-counter medicine. · Even if it hurts, try to cough or take the deepest breath you can at least once every hour. This will get air deeply into your lungs. This may reduce your chance of getting pneumonia or a partial collapse of a lung. Hold a pillow against your chest to make this less painful. · Put ice or a cold pack on the area for 10 to 20 minutes at a time. Put a thin cloth between the ice and your skin. When should you call for help? Call 911 anytime you think you may need emergency care.  For example, call if:    · You have severe trouble breathing.    Call your doctor now or seek immediate medical care if:    · You have some trouble breathing.     · You have a fever.     · You have a new or worse cough.    Watch closely for changes in your health, and be sure to contact your doctor if:    · You have pain even after taking your medicine.     · You do not get better as expected. Where can you learn more? Go to http://radha-alex.info/. Enter M135 in the search box to learn more about \"Broken Rib: Care Instructions. \"  Current as of: June 26, 2019  Content Version: 12.2  © 1491-2585 [employer]. Care instructions adapted under license by [employer] (which disclaims liability or warranty for this information). If you have questions about a medical condition or this instruction, always ask your healthcare professional. [address] any warranty or liability for your use of this information.

## 2020-02-12 LAB
ALBUMIN SERPL-MCNC: 4.7 G/DL (ref 3.6–4.6)
ALBUMIN/GLOB SERPL: 1.7 {RATIO} (ref 1.2–2.2)
ALP SERPL-CCNC: 91 IU/L (ref 39–117)
ALT SERPL-CCNC: 16 IU/L (ref 0–32)
AST SERPL-CCNC: 22 IU/L (ref 0–40)
BASOPHILS # BLD AUTO: 0.1 X10E3/UL (ref 0–0.2)
BASOPHILS NFR BLD AUTO: 1 %
BILIRUB SERPL-MCNC: 0.6 MG/DL (ref 0–1.2)
BUN SERPL-MCNC: 14 MG/DL (ref 8–27)
BUN/CREAT SERPL: 18 (ref 12–28)
CALCIUM SERPL-MCNC: 10.3 MG/DL (ref 8.7–10.3)
CHLORIDE SERPL-SCNC: 99 MMOL/L (ref 96–106)
CHOLEST SERPL-MCNC: 151 MG/DL (ref 100–199)
CO2 SERPL-SCNC: 28 MMOL/L (ref 20–29)
CREAT SERPL-MCNC: 0.78 MG/DL (ref 0.57–1)
CREAT UR-MCNC: NORMAL MG/DL
EOSINOPHIL # BLD AUTO: 0.2 X10E3/UL (ref 0–0.4)
EOSINOPHIL NFR BLD AUTO: 3 %
ERYTHROCYTE [DISTWIDTH] IN BLOOD BY AUTOMATED COUNT: 11.8 % (ref 11.7–15.4)
EST. AVERAGE GLUCOSE BLD GHB EST-MCNC: 114 MG/DL
GLOBULIN SER CALC-MCNC: 2.7 G/DL (ref 1.5–4.5)
GLUCOSE SERPL-MCNC: 97 MG/DL (ref 65–99)
GLUCOSE UR QL: NORMAL
HBA1C MFR BLD: 5.6 % (ref 4.8–5.6)
HCT VFR BLD AUTO: 42.4 % (ref 34–46.6)
HDLC SERPL-MCNC: 55 MG/DL
HGB BLD-MCNC: 14.5 G/DL (ref 11.1–15.9)
IMM GRANULOCYTES # BLD AUTO: 0 X10E3/UL (ref 0–0.1)
IMM GRANULOCYTES NFR BLD AUTO: 0 %
INTERPRETATION, 910389: NORMAL
KETONES UR QL STRIP: NORMAL
LDLC SERPL CALC-MCNC: 68 MG/DL (ref 0–99)
LYMPHOCYTES # BLD AUTO: 2 X10E3/UL (ref 0.7–3.1)
LYMPHOCYTES NFR BLD AUTO: 27 %
MCH RBC QN AUTO: 31.4 PG (ref 26.6–33)
MCHC RBC AUTO-ENTMCNC: 34.2 G/DL (ref 31.5–35.7)
MCV RBC AUTO: 92 FL (ref 79–97)
MICROALBUMIN UR-MCNC: NORMAL
MONOCYTES # BLD AUTO: 0.6 X10E3/UL (ref 0.1–0.9)
MONOCYTES NFR BLD AUTO: 8 %
NEUTROPHILS # BLD AUTO: 4.5 X10E3/UL (ref 1.4–7)
NEUTROPHILS NFR BLD AUTO: 61 %
PH UR STRIP: NORMAL [PH]
PLATELET # BLD AUTO: 189 X10E3/UL (ref 150–450)
POTASSIUM SERPL-SCNC: 4.5 MMOL/L (ref 3.5–5.2)
PROT SERPL-MCNC: 7.4 G/DL (ref 6–8.5)
PROT UR QL STRIP: NORMAL
RBC # BLD AUTO: 4.62 X10E6/UL (ref 3.77–5.28)
SODIUM SERPL-SCNC: 141 MMOL/L (ref 134–144)
SP GR UR: NORMAL
TRIGL SERPL-MCNC: 139 MG/DL (ref 0–149)
VLDLC SERPL CALC-MCNC: 28 MG/DL (ref 5–40)
WBC # BLD AUTO: 7.4 X10E3/UL (ref 3.4–10.8)

## 2020-02-21 ENCOUNTER — HOSPITAL ENCOUNTER (OUTPATIENT)
Dept: MAMMOGRAPHY | Age: 85
Discharge: HOME | End: 2020-02-21
Attending: NURSE PRACTITIONER
Payer: MEDICARE

## 2020-02-21 DIAGNOSIS — M85.80 OSTEOPENIA, UNSPECIFIED LOCATION: ICD-10-CM

## 2020-02-21 PROCEDURE — 77080 DXA BONE DENSITY AXIAL: CPT

## 2020-02-24 PROBLEM — M80.00XD AGE-RELATED OSTEOPOROSIS WITH CURRENT PATHOLOGICAL FRACTURE WITH ROUTINE HEALING: Status: ACTIVE | Noted: 2020-02-24

## 2020-02-24 RX ORDER — ALENDRONATE SODIUM 35 MG/1
35 TABLET ORAL
Qty: 12 TAB | Refills: 3 | Status: SHIPPED | OUTPATIENT
Start: 2020-02-24 | End: 2021-02-12 | Stop reason: SDUPTHER

## 2020-03-27 ENCOUNTER — TELEPHONE (OUTPATIENT)
Dept: FAMILY MEDICINE CLINIC | Age: 85
End: 2020-03-27

## 2020-03-27 DIAGNOSIS — L60.0 INGROWING TOENAIL: Primary | ICD-10-CM

## 2020-03-27 NOTE — TELEPHONE ENCOUNTER
Called Josh Angry to let her know that the referral for podiatry care has been mailed out per Jason Alas NP. Please call the number to find out if they are open during this quarantine and make your own appointment. No answer message left.

## 2020-05-08 ENCOUNTER — VIRTUAL VISIT (OUTPATIENT)
Dept: FAMILY MEDICINE CLINIC | Age: 85
End: 2020-05-08

## 2020-05-08 DIAGNOSIS — S72.001A CLOSED FRACTURE OF RIGHT HIP, INITIAL ENCOUNTER (HCC): ICD-10-CM

## 2020-05-08 DIAGNOSIS — I10 ESSENTIAL HYPERTENSION: Primary | ICD-10-CM

## 2020-05-08 NOTE — PATIENT INSTRUCTIONS
1) Please check your blood pressure through the week at staggered times in the day. (If you check in the morning, it should be at least one hour after your morning blood pressure medications.) Arm monitors are most accurate. If you use a wrist monitor, make sure your wrist is at heart level. You can bring your home monitor to your next visit and have it calibrated with the machine in the office to gauge your readings. Sit  with your feet uncrossed and relax for 5 minutes before taking your BP. Keep a written record of your blood pressure readings and bring it to each appointment. If your systolic (top) blood pressure is consistently greater than 140mmHg or less than [id number], OR the diastolic (bottom) number is consistently greater than 90mmHg or less than 60mmHg, then please schedule an office appointment. Work on healthy eating - no salt diet, more potassium (helps flush out sodium,making healthier heart and arteries) - and incorporating daily exercise into your routine. Cardiac symptoms that would need immediate attention include: uncomfortable pressure, squeezing, fullness or pain in the center of your chest. Pain or discomfort in one or both arms, the back, neck, jaw or stomach. Shortness of breath with or without chest discomfort, breaking out in a cold sweat, nausea or lightheadedness. 2) Make an appt with Dr. Julio Dupree to further evaluation of increasing hip pain 3) COVID-19 is caused by a coronavirus called SARS-CoV-2. Coronaviruses are a large family of viruses that are common in people and may different species of animals, including camels, cattle, cats, and bats. Rarely, animal coronaviruses can infect people and then spread between people. This occurred with MERS-CoV and SARS-CoV, and now with the virus that causes COVID-19.  
 
The COVID-19 virus is thought to spread mainly from person to person, most commonly through respiratory droplets produced when an infected person coughs or sneezes. These droplets can land in the mouths or noses of people who are nearby or possibly be inhaled into the lungs. Spread is more likely when people are in close contact with one another (within about 6 feet). All the surfaces where these respiratory droplets land are infectious for about 3-5 days on average; therefore, everything that is associated with infected people will be contaminated and potentially infectious. Information from the ongoing COVID-19 pandemic suggest that this virus is spreading more efficiently than influenza, but not as efficiently as measles, which is highly contagious. Primary symptoms = fever, cough, and/or shortness of breath. Some people report fatigue, muscle aches, headache and loss of smell/taste Symptoms may appear 2-14 days after exposure. People are thought to be infectious 2 days before showing symptoms. If you think you have been exposed to COVID 19, have travelled to a high risk area or have symptoms, [place] [address] has a COVID 19 hotline to answer questions (ph: [phone]). [facility]'s [place] [address] hotline number is [phone]. Tips for preventing spread of infection: 
 
1) NO HANDSHAKING! Use ONLY your knuckle to touch light switches. elevator buttons, etc.. Lift the gasoline dispenser with a paper towel or use a disposable glove. Open doors with your closed fist or hip - do not grasp the handle with your hand, unless there is no other way to open the door. Especially important on bathroom and post office/commercial doors. 2) Social distancing - keep 6 feet away from others when out in public. If someone is experiencing symptoms of COVID19, they need to self quarantine for 14 days to prevent spreading the infection to others. 3) Clean and disinfect frequently touched surfaces daily, such as tables, doorknobs, light switches, remotes, phones. 4) Do not share personal household items, such as dishes, drinking glasses, cups, utensils, towels or bedding with other people who show signs of infection. 5) Wash your hands with soap for 10-20 seconds and/or use a greater than 60% alcohol-based hand  whenever you return home from ANY activity that involves locations where other people have been. 6) Avoid touching your eyes, nose and mouth with unwashed hands. Even though only N95 masks actually stop the virus from passing through,  non-N95 masks are used to prevent you from touching your face and inoculating yourself. 7) If possible, cough or sneeze into a disposable tissue and discard. Use your elbow only if you have to. The clothing on your elbow will contain infectious virus that can be passed on. 8) Zinc lozenges have scientific evidence that show they are effective in blocking coronavirus (and most other viruses) from multiplying in your throat and nasopharynx. Use as directed several times each day when you begin to feel any \"cold-like\" symptoms beginning. It is best to lie down and let the lozenge dissolve in the back of your throat and nasopharynx. Cold-Eeze lozenges is one brand available, but there are other brands available. 9) CDC recommends wearing cloth face coverings in public settings where other social distancing measures are difficult to maintain (e.g., grocery stores and pharmacies) especially in areas of significant community-based transmission. CDC also advises the use of simple cloth face coverings to slow the spread of the virus and help people who may have the virus and do not know it from transmitting it to others. Cloth face coverings fashioned from household items or made at home from common materials at low cost can be used as an additional, voluntary public health measure. Please note:  The cloth face coverings recommended by the CDC are not surgical or N-95 masks. Those are critical supplies that must continue to be reserved for health care workers and other medical first responders, as recommended by current CDC guidance. KEEP IN MIND: THERE IS NO TREATMENT FOR THIS INFECTION. People who are mildly ill and think they may have the infection, should keep away from other people (self quarantine) for 14 days and care for themselves at home. (Rest, drink plenty of fluids, stay home.) If you are SICK,  then you should wear of face mask to help prevent the spread of the virus. As of 4/13/2020, people can get tested for COVID 19 at the [facility] flu clinics on a walk-in basis, depending on availability of testing kits/supplies and current guidelines. This policy is subject to change daily. If you have confirmed or suspected COVID 19 and have been directed to isolate at home, you can stop home isolation if: 
1) you have had no fever for at least 3 full days (72 hours) without the use of medications AND 2) no respiratory symptoms in the 3 days (no shortness of breath, coughing reduced) AND 3) at least 10 days have passed since your symptoms first appeared Updated: 4/18/20 Osteoporosis: Care Instructions Your Care Instructions Osteoporosis causes bones to become thin and weak. It is much more common in women than in men. Osteoporosis may be very advanced before you know you have it. Sometimes the first sign is a broken bone in the hip, spine, or wrist or sudden pain in your middle or lower back. Follow-up care is a key part of your treatment and safety. Be sure to make and go to all appointments, and call your doctor if you are having problems. It's also a good idea to know your test results and keep a list of the medicines you take. How can you care for yourself at home? · Your doctor may prescribe a bisphosphonate, such as risedronate (Actonel) or alendronate (Fosamax), for osteoporosis.  If you are taking one of these medicines by mouth: 
? Take your medicine with a full glass of water when you first get up in the morning. ? Do not lie down, eat, drink a beverage, or take any other medicine for at least 30 minutes after taking the drug. This helps prevent stomach problems. ? Do not take your medicine late in the day if you forgot to take it in the morning. Skip it, and take the usual dose the next morning. ? If you have side effects, tell your doctor. He or she may prescribe another medicine. · Get enough calcium and vitamin D. The [place] of Medicine recommends adults younger than age 46 need 1,000 mg of calcium and 600 IU of vitamin D each day. Women ages 46 to 79 need 1,200 mg of calcium and 600 IU of vitamin D each day. Men ages 46 to 79 need 1,000 mg of calcium and 600 IU of vitamin D each day. Adults 71 and older need 1,200 mg of calcium and 800 IU of vitamin D each day. It's not clear if people who already have osteoporosis need more calcium and vitamin D than this. Talk to your doctor about what's right for you. 
? Eat foods rich in calcium, like yogurt, cheese, milk, and dark green vegetables. This is a good way to get the calcium you need. You can get vitamin D from eggs, fatty fish, cereal, and milk. ? Ask your doctor if you need to take a calcium plus vitamin D supplement. You may be able to get enough calcium and vitamin D through your diet. Be careful with supplements. Adults ages 23 to 48 should not get more than 2,500 mg of calcium and 4,000 IU of vitamin D each day, whether it is from supplements and/or food. Adults ages 46 and older should not get more than 2,000 mg of calcium and 4,000 IU of vitamin D each day from supplements and/or food. · Limit alcohol to 2 drinks a day for men and 1 drink a day for women. Too much alcohol can cause health problems. · Do not smoke. Smoking puts you at a much higher risk for osteoporosis. If you need help quitting, talk to your doctor about stop-smoking programs and medicines. These can increase your chances of quitting for good. · Get regular bone-building exercise. Weight-bearing and resistance exercises keep bones healthy by working the muscles and bones against gravity. Start out at an exercise level that feels right for you. Add a little at a time until you can do the following: ? Do 30 minutes of weight-bearing exercise on most days of the week. Walking, jogging, stair climbing, and dancing are good choices. ? Do resistance exercises with weights or elastic bands 2 to 3 days a week. · Reduce your risk of falls: 
? Wear supportive shoes with low heels and nonslip soles. ? Use a cane or walker, if you need it. Use shower chairs and bath benches. Put in handrails on stairways, around your shower or tub area, and near the toilet. ? Keep stairs, porches, and walkways well lit. Use night-lights. ? Remove throw rugs and other objects that are in the way. ? Avoid icy, wet, or slippery surfaces. ? Keep a cordless phone and a flashlight with new batteries by your bed. When should you call for help? Watch closely for changes in your health, and be sure to contact your doctor if you have any problems. Where can you learn more? Go to http://radha-alex.info/ Enter K100 in the search box to learn more about \"Osteoporosis: Care Instructions. \" Current as of: August 6, 2019Content Version: 12.4 © 9580-8076 [employer]. Care instructions adapted under license by [email or web address] (which disclaims liability or warranty for this information). If you have questions about a medical condition or this instruction, always ask your healthcare professional. [address] any warranty or liability for your use of this information.

## 2020-05-08 NOTE — PROGRESS NOTES
S: Fariba Awad is a 80 y.o. female who presents for follow up     Assessment/Plan: Virtual Visit    1. Essential hypertension  -current therapy: amlodipine 10mg + metoprolol tartrate 25mg (holding for BP < s 120 or d 65)  -BP at home 130's  -advised to monitor BP at home and keep log; goal<140/90 make OV if consistently above goal or <110/60    2. Bilateral hip pain  -R>L, hx of bilat hip replacements  -not taking alendronate, but agrees to start  -advised to f/u with Dr. Julius Berkowitz for further eval of hip pain    21 or more minutes were spent on the digital evaluation and management of this patient. RTC 3 months HTN check        HPI:  Ron Zaragoza and Bladimir Segundo on virtual call    Having pain with walking at times - at hips  Walking differently - needs to \"touch\" things as walk  Eating ok, not having dizziness when standing up     Hasn't seen Dr. Prince Stafford for left toenail - will make an appt    BP at home - 130's  No issues    Bilat hip pain  Hx of bilat hip replacement, revision on right hip by Dr. Geeta White  Pain increasing  Not taking aldendronate - states she will start. Discussed taking meds in am with water w/o anything else and staying upright for at least 1/2 hour afterward; pt does not need refill sent in  Taking Ca 1200mg daily + 1,000 units D3, advised to increase D3 to 2000units daily     Social History:  Nutrition: eating healthy   Physical: walking around house  Social: lives with ,     Social History     Tobacco Use   Smoking Status Never Smoker   Smokeless Tobacco Never Used     Social History     Substance and Sexual Activity   Alcohol Use No     Social History     Substance and Sexual Activity   Drug Use No        Review of Systems:  - Constitutional Symptoms: no fevers, chills  - Cardiovascular: no chest pain or palpitations  - Respiratory: no cough or shortness of breath  ROS is negative otherwise.     3 most recent PHQ Screens 2/4/2020   Little interest or pleasure in doing things Not at all Feeling down, depressed, irritable, or hopeless Not at all   Total Score PHQ 2 0       I reviewed the following:  Past Medical History:   Diagnosis Date    Fracture 2018    Pt reports right hip fracture & surgery following a fall    Fracture 2020    left rib fractures; pt fell     HTN (hypertension) 5/27/2011    Hyperlipidemia 12/1/2011    Hypertension     OAB (overactive bladder) 2/15/2013    Other ill-defined conditions(799.89)     hyperlipidemia    Other ill-defined conditions(799.89)     bladder problems    Other ill-defined conditions(799.89)     back pain       Current Outpatient Medications   Medication Sig Dispense Refill    metoprolol tartrate (LOPRESSOR) 25 mg tablet TAKE 1 TAB BY MOUTH EVERY TWELVE (12) HOURS. HOLD FOR SBP LESS THAN 120 OR HR LESS THAN 65 180 Tab 1    amLODIPine (NORVASC) 10 mg tablet Take 1 Tab by mouth daily. 90 Tab 2    atorvastatin (LIPITOR) 20 mg tablet TAKE 1 TABLET BY MOUTH EVERY DAY IN THE EVENING 90 Tab 3    calcium carbonate (CALTREX) 600 mg calcium (1,500 mg) tablet Take 600 mg by mouth two (2) times a day.  cholecalciferol (VITAMIN D3) 1,000 unit tablet Take 1,000 Units by mouth daily.  senna-docusate (PERICOLACE) 8.6-50 mg per tablet Take 1 Tab by mouth daily.  famotidine (PEPCID) 10 mg tablet Take 1 Tab by mouth two (2) times a day. (Patient taking differently: Take 1 Tab by mouth two (2) times daily as needed for Other (reflux). ) 60 Tab 0    polyethylene glycol (MIRALAX) 17 gram/dose powder Take 17 g by mouth daily as needed for Other (constipation).  coenzyme Q-10 (CO Q-10) 200 mg capsule Take 1 Cap by mouth daily. 90 Cap 3    glucosamine-chondroit-vit c-mn (GLUCOSAMINE 1500 COMPLEX) 500-400 mg capsule Take 1 Cap by mouth daily. 90 Cap 1    multivit-minerals/folic acid (CENTRUM MULTIGUMMIES PO) Take 1 Tab by mouth daily.  alendronate (FOSAMAX) 35 mg tablet Take 1 Tab by mouth every seven (7) days.  12 Tab 3    aspirin 81 mg chewable tablet Take 1 Tab by mouth two (2) times a day. 60 Tab 0       Allergies   Allergen Reactions    Codeine Nausea and Vomiting    Demerol (Pf) [Meperidine (Pf)] Nausea and Vomiting    Meperidine Nausea and Vomiting        O: VS: There were no vitals taken for this visit. GENERAL: Shaquille Sibley is in no acute distress. Non-toxic. Well nourished. Well developed. Appropriately groomed. PSYCH: appropriate behavior. Good eye contact. Clear and coherent speech.       ___________________________________________________________________  Patient education was done. Advised on nutrition, physical activity, weight management, tobacco, alcohol and safety. Counseling included discussion of diagnosis, differentials, treatment options, prescribed treatment, warning signs and follow up. Medication risks/benefits, interactions and alternatives discussed with patient.      Patient verbalized understanding and agreed to plan of care. Patient was given an after visit summary which included current diagnoses, medications and vital signs. Follow up as directed. Shaquille Sibley is a 80 y.o. female who was seen by synchronous (real-time) audio-video technology on 5/8/2020. Consent: Shaquille Sibley, who was seen by synchronous (real-time) audio-video technology, and/or her healthcare decision maker, is aware that this patient-initiated, Telehealth encounter on 5/8/2020 is a billable service, with coverage as determined by her insurance carrier. She is aware that she may receive a bill and has provided verbal consent to proceed: Yes. Shaquille Sibley is a 80 y.o. female who was evaluated by a video visit encounter for concerns as above. Patient identification was verified prior to start of the visit. A caregiver was present when appropriate.  Due to this being a TeleHealth encounter (During [facility]-19 public health emergency), evaluation of the following organ systems was limited: Vitals/Constitutional/EENT/Resp/CV/GI//MS/Neuro/Skin/Heme-Lymph-Imm. Pursuant to the emergency declaration under the [address] [place] [address], Replaced by [facility] waiver authority and the [employer] Act, this Virtual  Visit was conducted, with patient's (and/or legal guardian's) consent, to reduce the patient's risk of exposure to COVID-19 and provide necessary medical care. Services were provided through a video synchronous discussion virtually to substitute for in-person clinic visit. Patient and provider were located at their individual homes.       Vahid Jin NP

## 2020-05-08 NOTE — PROGRESS NOTES
Chief Complaint   Patient presents with    Follow-up     Fall     1. Have you been to the ER, urgent care clinic since your last visit? Hospitalized since your last visit? Yes, [facility]'s, Late February for fall    2. Have you seen or consulted any other health care providers outside of the [address] [place] [address] since your last visit? Include any pap smears or colon screening. No      Doxy. me [phone]

## 2020-10-21 ENCOUNTER — TELEPHONE (OUTPATIENT)
Dept: FAMILY MEDICINE CLINIC | Age: 85
End: 2020-10-21

## 2020-10-21 DIAGNOSIS — I10 ESSENTIAL HYPERTENSION: ICD-10-CM

## 2020-10-21 NOTE — TELEPHONE ENCOUNTER
----- Message from Álvaro Devries sent at 10/21/2020  2:49 PM EDT -----  Regarding: AZRA Stoll/telephone  Appointment not available    Caller's first and last name and relationship to patient (if not the patient): Dora Aguilar (daughter)      Best contact number: [phone]      Preferred date and time: As soon as possible. Scheduled appointment date and time: 11/13/20 at 11:30 am.      Reason for appointment: Pt is experiencing severe pain from 2 hip replacements on her right side, she is having trouble moving around. Details to clarify the request: Pt has dementia.        Álvaro Devries

## 2020-10-22 RX ORDER — METOPROLOL TARTRATE 25 MG/1
TABLET, FILM COATED ORAL
Qty: 180 TAB | Refills: 0 | Status: SHIPPED | OUTPATIENT
Start: 2020-10-22 | End: 2020-12-29 | Stop reason: SDUPTHER

## 2020-10-22 NOTE — TELEPHONE ENCOUNTER
Returned phone call to pts daughter, Landon Georges. Advised that if pt is having severe pain to be seen at a Urgent Care center or Er to be evaluated and that she could also keep her appointment for a follow up visit. Ms. Esperanza Merino verified understanding. Prabhu states that she will use Aspercreme to see if that may help her and if not she will take her to be assessed. Verified understanding.

## 2020-11-01 ENCOUNTER — HOSPITAL ENCOUNTER (EMERGENCY)
Age: 85
Discharge: HOME | End: 2020-11-01
Attending: EMERGENCY MEDICINE | Admitting: EMERGENCY MEDICINE
Payer: MEDICARE

## 2020-11-01 ENCOUNTER — APPOINTMENT (OUTPATIENT)
Dept: GENERAL RADIOLOGY | Age: 85
End: 2020-11-01
Attending: EMERGENCY MEDICINE
Payer: MEDICARE

## 2020-11-01 VITALS
OXYGEN SATURATION: 97 % | TEMPERATURE: 98 F | RESPIRATION RATE: 16 BRPM | SYSTOLIC BLOOD PRESSURE: 171 MMHG | HEART RATE: 61 BPM | DIASTOLIC BLOOD PRESSURE: 81 MMHG

## 2020-11-01 DIAGNOSIS — M25.551 ACUTE RIGHT HIP PAIN: Primary | ICD-10-CM

## 2020-11-01 DIAGNOSIS — Z96.641 HISTORY OF RIGHT HIP REPLACEMENT: ICD-10-CM

## 2020-11-01 PROCEDURE — 99283 EMERGENCY DEPT VISIT LOW MDM: CPT

## 2020-11-01 PROCEDURE — 73502 X-RAY EXAM HIP UNI 2-3 VIEWS: CPT

## 2020-11-01 PROCEDURE — 74011250636 HC RX REV CODE- 250/636: Performed by: EMERGENCY MEDICINE

## 2020-11-01 PROCEDURE — 96372 THER/PROPH/DIAG INJ SC/IM: CPT

## 2020-11-01 PROCEDURE — 74011000250 HC RX REV CODE- 250: Performed by: EMERGENCY MEDICINE

## 2020-11-01 RX ORDER — KETOROLAC TROMETHAMINE 10 MG/1
10 TABLET, FILM COATED ORAL
Qty: 20 TAB | Refills: 0 | Status: SHIPPED | OUTPATIENT
Start: 2020-11-01 | End: 2020-11-06

## 2020-11-01 RX ORDER — KETOROLAC TROMETHAMINE 30 MG/ML
30 INJECTION, SOLUTION INTRAMUSCULAR; INTRAVENOUS
Status: COMPLETED | OUTPATIENT
Start: 2020-11-01 | End: 2020-11-01

## 2020-11-01 RX ORDER — LIDOCAINE 4 G/100G
1 PATCH TOPICAL
Status: DISCONTINUED | OUTPATIENT
Start: 2020-11-01 | End: 2020-11-01 | Stop reason: HOSPADM

## 2020-11-01 RX ADMIN — KETOROLAC TROMETHAMINE 30 MG: 30 INJECTION, SOLUTION INTRAMUSCULAR at 11:09

## 2020-11-01 NOTE — ED TRIAGE NOTES
Triage: Pt arrives from home with CC of right hip pain. Pt denies any recent injury or trauma to the hip but did have two previous fractures to that side. Pt reports the pain has been progressively worsening over the course of a week or so.

## 2020-11-01 NOTE — ED PROVIDER NOTES
59-year-old female presents from home via EMS with a complaint of right hip pain. She denies any recent falls or injuries. She has had surgery on the right hip as recently as earlier this year for hip replacement. She states over the past couple of days she is noticed increased pain in the area worsened with weightbearing and ambulation. She has no pain as long as she is resting in bed. Denies any redness or swelling. She is been taking over-the-counter medication for the symptoms but no improvement. Denies any knee pain or other injuries.            Past Medical History:   Diagnosis Date    Fracture 2018    Pt reports right hip fracture & surgery following a fall    Fracture 2020    left rib fractures; pt fell     HTN (hypertension) 5/27/2011    Hyperlipidemia 12/1/2011    Hypertension     OAB (overactive bladder) 2/15/2013    Other ill-defined conditions(799.89)     hyperlipidemia    Other ill-defined conditions(799.89)     bladder problems    Other ill-defined conditions(799.89)     back pain       Past Surgical History:   Procedure Laterality Date    COLONOSCOPY N/A 3/12/2018    COLONOSCOPY performed by Kauhsik Pozo MD at [facility]  3/12/2018         HX APPENDECTOMY  1948    HX CATARACT REMOVAL Left     HX CORNEAL TRANSPLANT Left     HX DILATION AND CURETTAGE      x 2    HX HIP REPLACEMENT Right 01/20/2019    HX TONSILLECTOMY  1938         Family History:   Problem Relation Age of Onset    Cancer Father         throat (smoker); mouth and gums    Heart Disease Mother         Age 52   [facility] Clotting Disorder Mother     Arthritis-osteo Paternal Aunt        Social History     Socioeconomic History    Marital status:      Spouse name: Not on file    Number of children: Not on file    Years of education: Not on file    Highest education level: Not on file   Occupational History    Not on file   Social Needs    Financial resource strain: Not on file   [facility] Food insecurity     Worry: Not on file     Inability: Not on file    Transportation needs     Medical: Not on file     Non-medical: Not on file   Tobacco Use    Smoking status: Never Smoker    Smokeless tobacco: Never Used   Substance and Sexual Activity    Alcohol use: No    Drug use: No    Sexual activity: Not Currently   Lifestyle    Physical activity     Days per week: Not on file     Minutes per session: Not on file    Stress: Not on file   Relationships    Social connections     Talks on phone: Not on file     Gets together: Not on file     Attends Evangelical service: Not on file     Active member of club or organization: Not on file     Attends meetings of clubs or organizations: Not on file     Relationship status: Not on file    Intimate partner violence     Fear of current or ex partner: Not on file     Emotionally abused: Not on file     Physically abused: Not on file     Forced sexual activity: Not on file   Other Topics Concern    Not on file   Social History Narrative    Not on file         ALLERGIES: Codeine; Demerol (pf) [meperidine (pf)]; and Meperidine    Review of Systems   Constitutional: Negative for fever. HENT: Negative for facial swelling. Eyes: Negative for visual disturbance. Respiratory: Negative for chest tightness. Cardiovascular: Negative for chest pain. Gastrointestinal: Negative for abdominal pain. Genitourinary: Negative for difficulty urinating and dysuria. Musculoskeletal: Negative for arthralgias. Skin: Negative for rash. Neurological: Negative for headaches. Hematological: Negative for adenopathy. Psychiatric/Behavioral: Negative for suicidal ideas. Vitals:    11/01/20 0920 11/01/20 0930   BP: (!) 171/81    Pulse: 61    Resp: 16    Temp: 98 °F (36.7 °C)    SpO2: 97% 97%            Physical Exam  Vitals signs and nursing note reviewed. Constitutional:       General: She is not in acute distress. Appearance: She is well-developed. HENT:      Head: Normocephalic and atraumatic. Eyes:      General: No scleral icterus. Conjunctiva/sclera: Conjunctivae normal.      Pupils: Pupils are equal, round, and reactive to light. Neck:      Musculoskeletal: Normal range of motion and neck supple. Cardiovascular:      Rate and Rhythm: Normal rate. Heart sounds: No murmur. Pulmonary:      Effort: Pulmonary effort is normal. No respiratory distress. Abdominal:      General: There is no distension. Musculoskeletal: Normal range of motion. Skin:     General: Skin is warm and dry. Findings: No rash. Neurological:      Mental Status: She is alert and oriented to person, place, and time. MDM  Number of Diagnoses or Management Options  Acute right hip pain:   History of right hip replacement:   Diagnosis management comments: A:  Xray unremarkable. Atraumatic R hip pain. Exam reassuring with no evidence of infection, swelling, ecchymosis. Stable for discharge home to f/u with ortho.        Amount and/or Complexity of Data Reviewed  Tests in the radiology section of CPT®: reviewed           Procedures

## 2020-11-03 ENCOUNTER — TELEPHONE (OUTPATIENT)
Dept: FAMILY MEDICINE CLINIC | Age: 85
End: 2020-11-03

## 2020-11-03 NOTE — TELEPHONE ENCOUNTER
FYI Message:    Patient's daughter called in stating that the pt went to [facility] for severe hip and back pain. Have an appointment with the orthopedics on November 5th and then will be following up with AZRA Miller on the 13th. Will call back if any other changes occur.

## 2020-11-13 ENCOUNTER — OFFICE VISIT (OUTPATIENT)
Dept: FAMILY MEDICINE CLINIC | Age: 85
End: 2020-11-13
Payer: MEDICARE

## 2020-11-13 VITALS
HEIGHT: 65 IN | WEIGHT: 150.2 LBS | HEART RATE: 60 BPM | SYSTOLIC BLOOD PRESSURE: 134 MMHG | OXYGEN SATURATION: 95 % | RESPIRATION RATE: 17 BRPM | DIASTOLIC BLOOD PRESSURE: 74 MMHG | TEMPERATURE: 97 F | BODY MASS INDEX: 25.02 KG/M2

## 2020-11-13 DIAGNOSIS — K59.00 CONSTIPATION, UNSPECIFIED CONSTIPATION TYPE: ICD-10-CM

## 2020-11-13 DIAGNOSIS — G89.29 CHRONIC RIGHT HIP PAIN: Primary | ICD-10-CM

## 2020-11-13 DIAGNOSIS — M25.551 CHRONIC RIGHT HIP PAIN: Primary | ICD-10-CM

## 2020-11-13 PROCEDURE — 99213 OFFICE O/P EST LOW 20 MIN: CPT | Performed by: NURSE PRACTITIONER

## 2020-11-13 PROCEDURE — G8536 NO DOC ELDER MAL SCRN: HCPCS | Performed by: NURSE PRACTITIONER

## 2020-11-13 PROCEDURE — G8427 DOCREV CUR MEDS BY ELIG CLIN: HCPCS | Performed by: NURSE PRACTITIONER

## 2020-11-13 PROCEDURE — 1090F PRES/ABSN URINE INCON ASSESS: CPT | Performed by: NURSE PRACTITIONER

## 2020-11-13 PROCEDURE — G8420 CALC BMI NORM PARAMETERS: HCPCS | Performed by: NURSE PRACTITIONER

## 2020-11-13 PROCEDURE — 1101F PT FALLS ASSESS-DOCD LE1/YR: CPT | Performed by: NURSE PRACTITIONER

## 2020-11-13 PROCEDURE — G8432 DEP SCR NOT DOC, RNG: HCPCS | Performed by: NURSE PRACTITIONER

## 2020-11-13 RX ORDER — MEMANTINE HYDROCHLORIDE 5 MG/1
10 TABLET ORAL
COMMUNITY
Start: 2020-10-19

## 2020-11-13 RX ORDER — AMLODIPINE BESYLATE 10 MG/1
10 TABLET ORAL DAILY
Qty: 90 TAB | Refills: 1 | Status: SHIPPED | OUTPATIENT
Start: 2020-11-13 | End: 2021-05-17 | Stop reason: SDUPTHER

## 2020-11-13 RX ORDER — LIDOCAINE 50 MG/G
1 PATCH TOPICAL DAILY
COMMUNITY
Start: 2020-11-05 | End: 2020-12-05

## 2020-11-13 RX ORDER — KETOROLAC TROMETHAMINE 10 MG/1
10 TABLET, FILM COATED ORAL
COMMUNITY
End: 2021-02-23 | Stop reason: SDUPTHER

## 2020-11-13 NOTE — PROGRESS NOTES
August Childers is a 80 y.o. female    HIPAA verified by two patient identifiers. Chief Complaint   Patient presents with    Hip Pain     Right hip, 2 hip replacements     Foot Pain     Left foot pain     Constipation     Pts daughter states that she has problems with being constipated sometimes and sometimes diarrhea     Medication Refill     Amlodipine      1. Have you been to the ER, urgent care clinic since your last visit? Hospitalized since your last visit? Yes, [facility]'s     2. Have you seen or consulted any other health care providers outside of the [address] [place] [address] since your last visit? Include any pap smears or colon screening.  No    .  Visit Vitals  /74 (BP 1 Location: Right arm, BP Patient Position: Sitting)   Pulse 60   Temp 97 °F (36.1 °C) (Temporal)   Resp 17   Ht 5' 5\" (1.651 m)   Wt 150 lb 3.2 oz (68.1 kg)   SpO2 95%   BMI 24.99 kg/m²       Pain Scale: 2/10  Pain Location: Hip (L foot )

## 2020-11-13 NOTE — PATIENT INSTRUCTIONS
1) Follow up with physical therapy to get exercises to strengthen hip area    2) Stop taking pericolace (senna/docusate) daily. Make this as needed when you are experiencing constipation onlly. 3) Make sure you are drinking at least 64 oz of water and walking around hourly. Need to keep physically active. Constipation: Care Instructions  Your Care Instructions     Constipation means that you have a hard time passing stools (bowel movements). People pass stools from 3 times a day to once every 3 days. What is normal for you may be different. Constipation may occur with pain in the rectum and cramping. The pain may get worse when you try to pass stools. Sometimes there are small amounts of bright red blood on toilet paper or the surface of stools. This is because of enlarged veins near the rectum (hemorrhoids). A few changes in your diet and lifestyle may help you avoid ongoing constipation. Your doctor may also prescribe medicine to help loosen your stool. Some medicines can cause constipation. These include pain medicines and antidepressants. Tell your doctor about all the medicines you take. Your doctor may want to make a medicine change to ease your symptoms. Follow-up care is a key part of your treatment and safety. Be sure to make and go to all appointments, and call your doctor if you are having problems. It's also a good idea to know your test results and keep a list of the medicines you take. How can you care for yourself at home? · Drink plenty of fluids, enough so that your urine is light yellow or clear like water. If you have kidney, heart, or liver disease and have to limit fluids, talk with your doctor before you increase the amount of fluids you drink. · Include high-fiber foods in your diet each day. These include fruits, vegetables, beans, and whole grains. · Get at least 30 minutes of exercise on most days of the week. Walking is a good choice.  You also may want to do other activities, such as running, swimming, cycling, or playing tennis or team sports. · Take a fiber supplement, such as Citrucel or Metamucil, every day. Read and follow all instructions on the label. · Schedule time each day for a bowel movement. A daily routine may help. Take your time having your bowel movement. · Support your feet with a small step stool when you sit on the toilet. This helps flex your hips and places your pelvis in a squatting position. · Your doctor may recommend an over-the-counter laxative to relieve your constipation. Examples are Milk of Magnesia and MiraLax. Read and follow all instructions on the label. Do not use laxatives on a long-term basis. When should you call for help? Call your doctor now or seek immediate medical care if:    · You have new or worse belly pain.     · You have new or worse nausea or vomiting.     · You have blood in your stools. Watch closely for changes in your health, and be sure to contact your doctor if:    · Your constipation is getting worse.     · You do not get better as expected. Where can you learn more? Go to http://www.Fan[employer].com/  Enter P343 in the search box to learn more about \"Constipation: Care Instructions. \"  Current as of: June 26, 2019               Content Version: 12.6  © 9881-5169 [employer]. Care instructions adapted under license by [employer] (which disclaims liability or warranty for this information). If you have questions about a medical condition or this instruction, always ask your healthcare professional. [place] [address] any warranty or liability for your use of this information.

## 2020-11-13 NOTE — PROGRESS NOTES
S: Lizzeth Connor is a 80 y.o. female who presents for follow up     Assessment/Plan:     1. Chronic right hip pain  -11/1/20 - ED - xray = no acute findings  -11/5/20 - saw ortho, referred to PT  -advised to move every hour, do chair yoga and PT exercises as instructed    2. Constipation, unspecified constipation type  -pt c/o cycling diarrhea and constipation  -advised to d/c daily pericolace and use prn if no BM in 3 days    RTC 3 months for MWV/fasting labs       HPI:  11/1/20 - ED for right hip pain   INDICATION: Right hip pain, previous surgery. [place] FINDINGS: AP view of the pelvis and a frogleg lateral view of the right hip demonstrate right hip hemiarthroplasty with wire fixation of the greater trochanter. There is no apparent acute fracture and no hip dislocation. Small chronic appearing ossific fragments are present adjacent to the greater and lesser trochanters. Pelvic ring appears intact. Went to ortho 11/5/20 - was told ROM was good, thinks it is more muscular deconditioning  Daughter states not walking more than 750feet a day   Was told use lidocaine patches and tramadol    Having issues with numbness in foot     Constipation and diarrhea - cycles between 2  Still taking pericolace daily - advised to stop and take only if no BM in 3 days  Increase water intake and daily exercise/movement    Social History:  Nutrition: still eating dinner late  Physical: none  Social: accompanied by daughter to appt    Social History     Tobacco Use   Smoking Status Never Smoker   Smokeless Tobacco Never Used     Social History     Substance and Sexual Activity   Alcohol Use No     Social History     Substance and Sexual Activity   Drug Use No        Review of Systems:  - Constitutional Symptoms: no fevers, chills,   - Cardiovascular: no chest pain or palpitations  - Respiratory: no cough or shortness of breath  ROS is negative otherwise.     3 most recent PHQ Screens 2/4/2020   Little interest or pleasure in doing things Not at all   Feeling down, depressed, irritable, or hopeless Not at all   Total Score PHQ 2 0       I reviewed the following:  Past Medical History:   Diagnosis Date    Fracture 2018    Pt reports right hip fracture & surgery following a fall    Fracture 2020    left rib fractures; pt fell     HTN (hypertension) 5/27/2011    Hyperlipidemia 12/1/2011    Hypertension     OAB (overactive bladder) 2/15/2013    Other ill-defined conditions(799.89)     hyperlipidemia    Other ill-defined conditions(799.89)     bladder problems    Other ill-defined conditions(799.89)     back pain       Current Outpatient Medications   Medication Sig Dispense Refill    ketorolac (TORADOL) 10 mg tablet Take 10 mg by mouth every six (6) hours as needed.  lidocaine (LIDODERM) 5 % Apply 1 Patch to affected area daily.  memantine (NAMENDA) 5 mg tablet INCREASE BY 1 TABLET WEEKLY UNTIL TAKING 2 TABLETS TWICE DAILY ORALLY 30 DAY(S)      metoprolol tartrate (LOPRESSOR) 25 mg tablet TAKE 1 TAB BY MOUTH EVERY TWELVE (12) HOURS. HOLD FOR SBP LESS THAN 120 OR HR LESS THAN 65 180 Tab 0    alendronate (FOSAMAX) 35 mg tablet Take 1 Tab by mouth every seven (7) days. 12 Tab 3    amLODIPine (NORVASC) 10 mg tablet Take 1 Tab by mouth daily. 90 Tab 2    atorvastatin (LIPITOR) 20 mg tablet TAKE 1 TABLET BY MOUTH EVERY DAY IN THE EVENING 90 Tab 3    calcium carbonate (CALTREX) 600 mg calcium (1,500 mg) tablet Take 600 mg by mouth two (2) times a day.  cholecalciferol (VITAMIN D3) 1,000 unit tablet Take 1,000 Units by mouth daily.  senna-docusate (PERICOLACE) 8.6-50 mg per tablet Take 1 Tab by mouth daily.  aspirin 81 mg chewable tablet Take 1 Tab by mouth two (2) times a day. 60 Tab 0    polyethylene glycol (MIRALAX) 17 gram/dose powder Take 17 g by mouth daily as needed for Other (constipation).  coenzyme Q-10 (CO Q-10) 200 mg capsule Take 1 Cap by mouth daily.  90 Cap 3    glucosamine-chondroit-vit c-mn (GLUCOSAMINE 1500 COMPLEX) 500-400 mg capsule Take 1 Cap by mouth daily. 90 Cap 1    multivit-minerals/folic acid (CENTRUM MULTIGUMMIES PO) Take 1 Tab by mouth daily. Allergies   Allergen Reactions    Codeine Nausea and Vomiting    Demerol (Pf) [Meperidine (Pf)] Nausea and Vomiting    Meperidine Nausea and Vomiting        O: VS:   Visit Vitals  /74 (BP 1 Location: Right arm, BP Patient Position: Sitting)   Pulse 60   Temp 97 °F (36.1 °C) (Temporal)   Resp 17   Ht 5' 5\" (1.651 m)   Wt 150 lb 3.2 oz (68.1 kg)   SpO2 95%   BMI 24.99 kg/m²       Data Reviewed:    GENERAL: Lizett Urena is in no acute distress. Non-toxic. Well nourished. Well developed. Appropriately groomed. RESP: Breath sounds are symmetrical bilaterally. Unlabored without SOB. Speaking in full sentences. Clear to auscultation bilaterally anteriorly and posteriorly. No wheezes. No rales or rhonchi. CV: normal rate. Regular rhythm. S1, S2 audible. No murmur noted. No rubs, clicks or gallops noted. HEME/LYMPH: peripheral pulses palpable 2+ x 4 extremities. No peripheral edema is noted. SKIN: Skin is warm and dry. Turgor is normal.      ___________________________________________________________________  I spent >25 minutes face to face with patient with >50% of time spent in counseling and coordinating care. Patient education was done. Advised on nutrition, physical activity, weight management, tobacco, alcohol and safety. Counseling included discussion of diagnosis, differentials, treatment options, prescribed treatment, warning signs and follow up. Medication risks/benefits, interactions and alternatives discussed with patient.      Patient verbalized understanding and agreed to plan of care. Patient was given an after visit summary which included current diagnoses, medications and vital signs. Follow up as directed.

## 2020-11-18 ENCOUNTER — HOSPITAL ENCOUNTER (OUTPATIENT)
Dept: PHYSICAL THERAPY | Age: 85
Discharge: HOME | End: 2020-11-18
Payer: MEDICARE

## 2020-11-18 PROCEDURE — 97110 THERAPEUTIC EXERCISES: CPT | Performed by: PHYSICAL THERAPIST

## 2020-11-18 PROCEDURE — 97161 PT EVAL LOW COMPLEX 20 MIN: CPT | Performed by: PHYSICAL THERAPIST

## 2020-11-18 NOTE — PROGRESS NOTES
PT INITIAL EVALUATION NOTE - [facility] 2-15    Patient Name: Yadira Carrillo  Date:2020  : 7/3/1932  [x]  Patient  Verified  Payor: AARP MEDICARE COMPLETE / [facility] [place] [facility] / Product Type: Managed Care Medicare /    In time:11:05  Out time: 11:55  Total Treatment Time (min): 50  Total Timed Codes (min): 10  1:1 Treatment Time ( only): 10   Visit #: 1    Treatment Area: Low back pain [M54.5]  Right hip pain [M25.551]    SUBJECTIVE  Pain Level (0-10 scale): 1  Any medication changes, allergies to medications, adverse drug reactions, diagnosis change, or new procedure performed?: [] No    [x] Yes (see summary sheet for update)  Subjective:    Patient is an 80year old female who presents with pain around the back and front of her right hip during movement. She went to the emergency room on  due to the pain, imaging did not reveal any abnormalities. She also complains of a shooting pain into her lateral left foot. Patient also mentions a sensation of abnormal movement in the hip. Patient's daughter reports that the patient has been experiencing a difficult adjustment period after moving out of her home and into a smaller apartment. She lives with her  and has help from two children who live nearby. Patient has been using a walker or a rollator in the home for most of  and used a SPC prior. Daughter reports that patient has a tendency to neglect her assistive devices and furniture walk.       PLOF: Walking with RW  Mechanism of Injury:   Previous Treatment/Compliance:   PMHx/Surgical Hx: Right hip hemiarthroplasty 2018, Revision 2019  Work Hx: Not employed  Living Situation: Dependent on children for help with IADLs  Pt Goals: Stop strong pain, increase strength, help with groceries  Barriers: None  Motivation: Excellent  Substance use: Not reported  FABQ Score:   Cognition: A & O x 4 - some issue with long term recall, assisted required from daughter during subjective. OBJECTIVE/EXAMINATION  Posture: Increased kyphosis, L structural scoliosis  Other Observations:    Gait and Functional Mobility:  R hip drop, L knee flexed approximately 20 degrees during midstance.  5x STS - 18.9s with use of UE to push up  Palpation: TTP R gluteals / piriformis      R Hip ROM:   AROM     Flexion   112            Hip ER/IR limited R>L        LOWER QUARTER   MUSCLE STRENGTH  KEY       R  L  0 - No Contraction  Knee ext  4  4  1 - Trace            flex  4-  4-  2 - Poor   Hip ext   NT  NT  3 - Fair          flex   4  4  4 - Good         abd  4- (seated) 4- (seated)  5 - Normal         add  4- (seated) 4- (seated)      Ankle DF  5  5                PF  5  5              Neurological: Reflexes / Sensations: NT  Special Tests: Trendelenberg: L                             H.S. SLR: -                  Modality rationale: decrease pain and increase tissue extensibility to improve the patients ability to perform ADLs   Min Type Additional Details    [] Estim: []Att   []Unatt        []TENS instruct                  []IFC  []Premod   []NMES                     []Other:  []w/US   []w/ice   []w/heat  Position:  Location:    []  Traction: [] Cervical       []Lumbar                       [] Prone          []Supine                       []Intermittent   []Continuous Lbs:  [] before manual  [] after manual  []w/heat    []  Ultrasound: []Continuous   [] Pulsed at:                           []1MHz   []3MHz Location:  W/cm2:    [] Paraffin         Location:   []w/heat   12 []  Ice     [x]  Heat  []  Ice massage Position: Supine  Location: Lumbar    []  Laser  []  Other: Position:  Location:      []  Vasopneumatic Device Pressure:       [] lo [] med [] hi   Temperature:      [x] Skin assessment post-treatment:  [x]intact []redness- no adverse reaction    []redness  adverse reaction:     10 min Therapeutic Exercise:  [] See flow sheet :   Rationale: increase ROM, increase strength, improve coordination, improve balance and increase proprioception to improve the patients ability to participate in ADLs            With   [] TE   [] TA   [] neuro   [] other: Patient Education: [x] Review HEP    [] Progressed/Changed HEP based on:   [] positioning   [] body mechanics   [] transfers   [] heat/ice application    [] other:      Other Objective/Functional Measures: 5x STS 18.9s with use of UE    Pain Level (0-10 scale) post treatment: 0  Treatment was performed with direct supervision by Tiffanie Carrillo, PT,  DPT.     ASSESSMENT/Changes in Function:     [x]  See Plan of Piotr Rodriguez [address] 1257, SPT 11/18/2020

## 2020-11-18 NOTE — PROGRESS NOTES
Physical Therapy at [facility],   a part of [address] [place] [address], [address] [place] [address],  [place] Hunt  Phone: [phone]  Fax: [phone]      Plan of Care/Statement of Necessity for Physical Therapy Services  2-15    Patient name: Frank Baeza  : 7/3/1932  Provider#: [id number]  Referral source: Hank Edge MD      Medical/Treatment Diagnosis: Low back pain [M54.5]  Right hip pain [M25.551]     Prior Hospitalization: see medical history     Comorbidities: Dementia, Osteoporosis, HTN  Prior Level of Function: Walking with rolling walker  Medications: Verified on Patient Summary List  Start of Care: 2020      Onset Date: 2020    The Plan of Care and following information is based on the information from the initial evaluation. Assessment/ key information: Patient is a 80year old female presenting with right hip and back pain, as well as shooting pain into the lateral L foot. She walks with a hip drop to the right and her L knee remains flexed in midstance while using a RW for ambulation. She is able to perform 5x sit-to- 18.97 sec with the use of her arms. She is TTP along her R gluteals and piriformis. She demonstrates fair to good strength with all tested muscle groups. Standing balance and lumbar range of motion to be further evaluated in subsequent visits. Patient would benefit from skilled physical therapy to address her above impairments.     Evaluation Complexity History MEDIUM  Complexity : 1-2 comorbidities / personal factors will impact the outcome/ POC ; Examination LOW Complexity : 1-2 Standardized tests and measures addressing body structure, function, activity limitation and / or participation in recreation  ;Presentation LOW Complexity : Stable, uncomplicated    Overall Complexity Rating: LOW     Problem List: pain affecting function, decrease ROM, decrease strength, impaired gait/ balance, decrease ADL/ functional abilitiies, decrease activity tolerance, decrease flexibility/ joint mobility and decrease transfer abilities   Treatment Plan may include any combination of the following: Therapeutic exercise, Therapeutic activities, Neuromuscular re-education, Physical agent/modality, Gait/balance training, Manual therapy, Patient education, Functional mobility training and Home safety training  Patient / Family readiness to learn indicated by: Interest, asking questions, willingness to try exercises. Persons(s) to be included in education: Patient, Daughter  Barriers to Learning/Limitations: Dementia Dx  Patient Goal (s):  Relieve strong pain. Be able to help with IADLs  Patient Self Reported Health Status: Good  Rehabilitation Potential: Excellent    Short Term Goals: To be accomplished in 4 weeks:   Patient will be able to perform 5x sit to  15 seconds with use of arms to promote functional mobility   Patient will demonstrate good safety awareness with assistive device while in the clinic  Long Term Goals: To be accomplished in 8 weeks:   Patient will be able to assist with putting away the groceries without any increase in s/s   Patient will be able to prepare her food standing without any increase in s/s  Frequency / Duration: Patient to be seen 2 times per week for 8 weeks. Patient/ Caregiver education and instruction: activity modification and exercises    [x]  Plan of care has been reviewed with PTA      Certification Period: 11/18/20- 2/18/2021    Tonio Antunez, [facility] 11/18/2020     ________________________________________________________________________    I certify that the above Therapy Services are being furnished while the patient is under my care. I agree with the treatment plan and certify that this therapy is necessary.     [de-identified] Signature:____________________  Date:____________Time: _________

## 2020-11-25 ENCOUNTER — HOSPITAL ENCOUNTER (OUTPATIENT)
Dept: PHYSICAL THERAPY | Age: 85
Discharge: HOME | End: 2020-11-25
Payer: MEDICARE

## 2020-11-25 PROCEDURE — 97110 THERAPEUTIC EXERCISES: CPT

## 2020-11-25 PROCEDURE — 97140 MANUAL THERAPY 1/> REGIONS: CPT

## 2020-11-25 NOTE — PROGRESS NOTES
PT DAILY TREATMENT NOTE - [facility] 2-15    Patient Name: Fabian Milian  Date:2020  : 7/3/1932  [x]  Patient  Verified  Payor: AARP MEDICARE COMPLETE / Plan: [facility] MEDICARE COMPLETE / Product Type: Managed Care Medicare /    In time:9:00A  Out time:10:10A  Total Treatment Time (min): 70  Total Timed Codes (min): 60  1:1 Treatment Time ( only): 54   Visit #:  2    Treatment Area: Low back pain [M54.5]  Right hip pain [M25.551]    SUBJECTIVE  Pain Level (0-10 scale): 3/10  Any medication changes, allergies to medications, adverse drug reactions, diagnosis change, or new procedure performed?: [x] No    [] Yes (see summary sheet for update)  Subjective functional status/changes:   [] No changes reported  Pt reported she is having some pain in her lower back and pain down her R leg.     OBJECTIVE    Modality rationale: decrease pain and increase muscle contraction/control to improve the patients ability to decrease R hip pain   Min Type Additional Details       [] Estim: []Att   []Unatt    []TENS instruct                  []IFC  []Premod   []NMES                     []Other:  []w/US   []w/ice   []w/heat  Position:  Location:       []  Traction: [] Cervical       []Lumbar                       [] Prone          []Supine                       []Intermittent   []Continuous Lbs:  [] before manual  [] after manual  []w/heat    []  Ultrasound: []Continuous   [] Pulsed                       at: []1MHz   []3MHz Location:  W/cm2:    [] Paraffin         Location:   []w/heat   10 post []  Ice     [x]  Heat  []  Ice massage Position: L S/L  Location: R hip    []  Laser  []  Other: Position:  Location:      []  Vasopneumatic Device Pressure:       [] lo [] med [] hi   Temperature:      [x] Skin assessment post-treatment:  [x]intact []redness- no adverse reaction    []redness  adverse reaction:     45 min Therapeutic Exercise:  [x] See flow sheet :   Rationale: increase ROM, increase strength and improve coordination to improve the patients ability to increase function and mobility    15 min Manual Therapy: STM/MFR R glut, QL lumbar paraspinals. Rationale: decrease pain, increase ROM, increase tissue extensibility and decrease trigger points to improve the patients ability to decrease back and hip pain    With   [] TE   [] TA   [] neuro   [] other: Patient Education: [x] Review HEP    [] Progressed/Changed HEP based on:   [] positioning   [] body mechanics   [] transfers   [] heat/ice application    [] other:      Other Objective/Functional Measures: FOTO 44     Pain Level (0-10 scale) post treatment: 2/10    ASSESSMENT/Changes in Function:   Pt was challenged with standing hip abduction requiring cues on proper foot placement and exercise form. Pt did report some neural tension with LAQ on L leg today. Patient will continue to benefit from skilled PT services to modify and progress therapeutic interventions, address functional mobility deficits, address ROM deficits, address strength deficits, analyze and address soft tissue restrictions, analyze and cue movement patterns, analyze and modify body mechanics/ergonomics and assess and modify postural abnormalities to attain remaining goals. []  See Plan of Care  []  See progress note/recertification  []  See Discharge Summary         Progress towards goals / Updated goals:  Short Term Goals: To be accomplished in 4 weeks:              Patient will be able to perform 5x sit to  15 seconds with use of arms to promote functional mobility              Patient will demonstrate good safety awareness with assistive device while in the clinic  Long Term Goals: To be accomplished in 8 weeks:              Patient will be able to assist with putting away the groceries without any increase in s/s              Patient will be able to prepare her food standing without any increase in s/s  Frequency / Duration: Patient to be seen 2 times per week for 8 weeks.     PLAN  [] Upgrade activities as tolerated     []  Continue plan of care  []  Update interventions per flow sheet       []  Discharge due to:_  []  Other:_      Bandar Sinclair PTA, [facility] 11/25/2020

## 2020-11-30 ENCOUNTER — HOSPITAL ENCOUNTER (OUTPATIENT)
Dept: PHYSICAL THERAPY | Age: 85
Discharge: HOME | End: 2020-11-30
Payer: MEDICARE

## 2020-11-30 PROCEDURE — 97140 MANUAL THERAPY 1/> REGIONS: CPT | Performed by: PHYSICAL THERAPIST

## 2020-11-30 PROCEDURE — 97110 THERAPEUTIC EXERCISES: CPT | Performed by: PHYSICAL THERAPIST

## 2020-11-30 NOTE — PROGRESS NOTES
PT DAILY TREATMENT NOTE - [facility] 2-15    Patient Name: Tory Nixon  Date:2020  : 7/3/1932  [x]  Patient  Verified  Payor: AARP MEDICARE COMPLETE / Plan: [facility] AARP MEDICARE COMPLETE / Product Type: Managed Care Medicare /    In time:9:17A  Out time:10:27A  Total Treatment Time (min): 70  Total Timed Codes (min): 60  1:1 Treatment Time ( only): 54   Visit #:  3    Treatment Area: Low back pain [M54.5]  Right hip pain [M25.551]    SUBJECTIVE  Pain Level (0-10 scale): 3/10  Any medication changes, allergies to medications, adverse drug reactions, diagnosis change, or new procedure performed?: [x] No    [] Yes (see summary sheet for update)  Subjective functional status/changes:   [] No changes reported  Pt reports she still experiences some discomfort in her left calf and foot while standing and cutting food. She has been feeling tired and sore with all of the activity she has been performing.     OBJECTIVE    Modality rationale: decrease pain and increase muscle contraction/control to improve the patients ability to decrease R hip pain   Min Type Additional Details       [] Estim: []Att   []Unatt    []TENS instruct                  []IFC  []Premod   []NMES                     []Other:  []w/US   []w/ice   []w/heat  Position:  Location:       []  Traction: [] Cervical       []Lumbar                       [] Prone          []Supine                       []Intermittent   []Continuous Lbs:  [] before manual  [] after manual  []w/heat    []  Ultrasound: []Continuous   [] Pulsed                       at: []1MHz   []3MHz Location:  W/cm2:    [] Paraffin         Location:   []w/heat   10 post []  Ice     [x]  Heat  []  Ice massage Position: L S/L  Location: R hip    []  Laser  []  Other: Position:  Location:      []  Vasopneumatic Device Pressure:       [] lo [] med [] hi   Temperature:      [x] Skin assessment post-treatment:  [x]intact []redness- no adverse reaction    []redness  adverse reaction:     50 min Therapeutic Exercise:  [x] See flow sheet :   Rationale: increase ROM, increase strength and improve coordination to improve the patients ability to increase function and mobility    10 min Manual Therapy: STM/MFR R glut, QL lumbar paraspinals. Rationale: decrease pain, increase ROM, increase tissue extensibility and decrease trigger points to improve the patients ability to decrease back and hip pain    With   [] TE   [] TA   [] neuro   [] other: Patient Education: [x] Review HEP    [] Progressed/Changed HEP based on:   [] positioning   [] body mechanics   [] transfers   [] heat/ice application    [] other:      Other Objective/Functional Measures: FOTO 44     Pain Level (0-10 scale) post treatment: 2/10    ASSESSMENT/Changes in Function:   Pt tolerated 3 standing exercises today with seated rest breaks in between exercises. She continued to require some cuing with standing abduction. She reported some discomfort across the back bilaterally toward the end of our standing activity. Added supine clamshells and seated hamstring stretch to HEP. Patient will continue to benefit from skilled PT services to modify and progress therapeutic interventions, address functional mobility deficits, address ROM deficits, address strength deficits, analyze and address soft tissue restrictions, analyze and cue movement patterns, analyze and modify body mechanics/ergonomics and assess and modify postural abnormalities to attain remaining goals. Treatment was performed with direct supervision by Cherelle Patterson, PT,  DPT. []  See Plan of Care  []  See progress note/recertification  []  See Discharge Summary         Progress towards goals / Updated goals:  Short Term Goals:  To be accomplished in 4 weeks:              Patient will be able to perform 5x sit to  15 seconds with use of arms to promote functional mobility              Patient will demonstrate good safety awareness with assistive device while in the clinic  Long Term Goals: To be accomplished in 8 weeks:              Patient will be able to assist with putting away the groceries without any increase in s/s              Patient will be able to prepare her food standing without any increase in s/s  Frequency / Duration: Patient to be seen 2 times per week for 8 weeks.     PLAN  []  Upgrade activities as tolerated     []  Continue plan of care  []  Update interventions per flow sheet       []  Discharge due to:_  []  Other:_      Chris Lan, [facility] 11/30/2020

## 2020-12-02 ENCOUNTER — APPOINTMENT (OUTPATIENT)
Dept: PHYSICAL THERAPY | Age: 85
End: 2020-12-02
Payer: MEDICARE

## 2020-12-03 ENCOUNTER — HOSPITAL ENCOUNTER (OUTPATIENT)
Dept: PHYSICAL THERAPY | Age: 85
Discharge: HOME | End: 2020-12-03
Payer: MEDICARE

## 2020-12-03 PROCEDURE — 97110 THERAPEUTIC EXERCISES: CPT | Performed by: PHYSICAL THERAPIST

## 2020-12-03 NOTE — PROGRESS NOTES
PT DAILY TREATMENT NOTE - [facility] 2-15    Patient Name: Fidelina Oshea  Date:12/3/2020  : 7/3/1932  [x]  Patient  Verified  Payor: AARP MEDICARE COMPLETE / Plan: [facility] MEDICARE COMPLETE / Product Type: Managed Care Medicare /    In time:11:00A  Out time:12:05A  Total Treatment Time (min): 65  Total Timed Codes (min): 65  1:1 Treatment Time ( only): 54   Visit #:  4    Treatment Area: Low back pain [M54.5]  Right hip pain [M25.551]    SUBJECTIVE  Pain Level (0-10 scale): 3/10  Any medication changes, allergies to medications, adverse drug reactions, diagnosis change, or new procedure performed?: [x] No    [] Yes (see summary sheet for update)  Subjective functional status/changes:   [] No changes reported  Pt reports that she has still been struggling with pain radiating down into her L foot.     OBJECTIVE    Modality rationale: decrease pain and increase muscle contraction/control to improve the patients ability to decrease R hip pain   Min Type Additional Details       [] Estim: []Att   []Unatt    []TENS instruct                  []IFC  []Premod   []NMES                     []Other:  []w/US   []w/ice   []w/heat  Position:  Location:       []  Traction: [] Cervical       []Lumbar                       [] Prone          []Supine                       []Intermittent   []Continuous Lbs:  [] before manual  [] after manual  []w/heat    []  Ultrasound: []Continuous   [] Pulsed                       at: []1MHz   []3MHz Location:  W/cm2:    [] Paraffin         Location:   []w/heat    []  Ice     [x]  Heat  []  Ice massage Position: L S/L  Location: R hip    []  Laser  []  Other: Position:  Location:      []  Vasopneumatic Device Pressure:       [] lo [] med [] hi   Temperature:      [x] Skin assessment post-treatment:  [x]intact []redness- no adverse reaction    []redness  adverse reaction:     57 min Therapeutic Exercise:  [x] See flow sheet :   Rationale: increase ROM, increase strength and improve coordination to improve the patients ability to increase function and mobility    8 min Manual Therapy: Manual Nerve glides     Rationale: decrease pain, increase ROM, increase tissue extensibility and decrease trigger points to improve the patients ability to decrease back and hip pain    With   [] TE   [] TA   [] neuro   [] other: Patient Education: [x] Review HEP    [] Progressed/Changed HEP based on:   [] positioning   [] body mechanics   [] transfers   [] heat/ice application    [] other:      Other Objective/Functional Measures: FOTO 44     Pain Level (0-10 scale) post treatment: 2/10    ASSESSMENT/Changes in Function:   Pt tolerated low-elevation bridges. Added these with limited reps to her HEP. Educated patient on neural tension and performed manual nerve glides. Patient was able to perform nerve glides sitting with L knee extended and PF/DF the ankle. Patient will continue to benefit from skilled PT services to modify and progress therapeutic interventions, address functional mobility deficits, address ROM deficits, address strength deficits, analyze and address soft tissue restrictions, analyze and cue movement patterns, analyze and modify body mechanics/ergonomics and assess and modify postural abnormalities to attain remaining goals. Treatment was performed with direct supervision by Lio Resendez, PT,  DPT. []  See Plan of Care  []  See progress note/recertification  []  See Discharge Summary         Progress towards goals / Updated goals:  Short Term Goals: To be accomplished in 4 weeks:              Patient will be able to perform 5x sit to  15 seconds with use of arms to promote functional mobility              Patient will demonstrate good safety awareness with assistive device while in the clinic  Long Term Goals:  To be accomplished in 8 weeks:              Patient will be able to assist with putting away the groceries without any increase in s/s              Patient will be able to prepare her food standing without any increase in s/s  Frequency / Duration: Patient to be seen 2 times per week for 8 weeks.     PLAN  []  Upgrade activities as tolerated     []  Continue plan of care  []  Update interventions per flow sheet       []  Discharge due to:_  []  Other:_      Preeti Murdock, SPT 12/3/2020

## 2020-12-08 ENCOUNTER — APPOINTMENT (OUTPATIENT)
Dept: PHYSICAL THERAPY | Age: 85
End: 2020-12-08
Payer: MEDICARE

## 2020-12-08 ENCOUNTER — TELEPHONE (OUTPATIENT)
Dept: FAMILY MEDICINE CLINIC | Age: 85
End: 2020-12-08

## 2020-12-08 ENCOUNTER — HOSPITAL ENCOUNTER (EMERGENCY)
Age: 85
Discharge: HOME | End: 2020-12-08
Attending: EMERGENCY MEDICINE | Admitting: EMERGENCY MEDICINE
Payer: MEDICARE

## 2020-12-08 ENCOUNTER — APPOINTMENT (OUTPATIENT)
Dept: CT IMAGING | Age: 85
End: 2020-12-08
Attending: EMERGENCY MEDICINE
Payer: MEDICARE

## 2020-12-08 VITALS
SYSTOLIC BLOOD PRESSURE: 174 MMHG | DIASTOLIC BLOOD PRESSURE: 63 MMHG | HEART RATE: 57 BPM | HEIGHT: 68 IN | RESPIRATION RATE: 18 BRPM | TEMPERATURE: 97.8 F | OXYGEN SATURATION: 96 % | BODY MASS INDEX: 22.84 KG/M2
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
## 2020-12-08 DIAGNOSIS — S70.01XA CONTUSION OF RIGHT HIP, INITIAL ENCOUNTER: ICD-10-CM

## 2020-12-08 DIAGNOSIS — S22.31XA CLOSED FRACTURE OF ONE RIB OF RIGHT SIDE, INITIAL ENCOUNTER: ICD-10-CM

## 2020-12-08 PROCEDURE — 99283 EMERGENCY DEPT VISIT LOW MDM: CPT

## 2020-12-08 PROCEDURE — 72192 CT PELVIS W/O DYE: CPT

## 2020-12-08 PROCEDURE — 71250 CT THORAX DX C-: CPT

## 2020-12-08 PROCEDURE — 72131 CT LUMBAR SPINE W/O DYE: CPT

## 2020-12-08 NOTE — ED PROVIDER NOTES
HPI patient is an 75-year-old female with past medical history significant for previous right hip fracture with total right hip replacement x 2, hypertension, hyperlipidemia, overactive bladder, and back pain who presents to the ED reporting she took a fall in her home 2 days ago. She got up during the night to use the bedside commode and lost her balance falling between the bed frame in the commode. She reports right hip area and right rib area pain with active range of motion of the right leg and right arm. Skin integrity is intact. There is no obvious new deformity, bruising or erythema. Good neurovascular sensation. No apparent tendon or nerve injury. Patient walks steady with use of her walker. She has been taking Tylenol with some relief noted. Denies any fever, difficulty breathing, difficulty swallowing, SOB or chest pain. She denies any head or neck injuries.         Past Medical History:   Diagnosis Date    Fracture 2018    Pt reports right hip fracture & surgery following a fall    Fracture 2020    left rib fractures; pt fell     HTN (hypertension) 5/27/2011    Hyperlipidemia 12/1/2011    Hypertension     OAB (overactive bladder) 2/15/2013    Other ill-defined conditions(799.89)     hyperlipidemia    Other ill-defined conditions(799.89)     bladder problems    Other ill-defined conditions(799.89)     back pain       Past Surgical History:   Procedure Laterality Date    COLONOSCOPY N/A 3/12/2018    COL[facility] performed [facility] at [facility]  3/12/2018         HX APPENDECTOMY  1948    HX CATARACT REMOVAL Left     HX CORNEAL TRANSPLANT Left     HX DILATION AND CURETTAGE      x 2    HX HIP REPLACEMENT Right 01/20/2019    HX TONSILLECTOMY  1938         Family History:   Problem Relation Age of Onset    Cancer Father         throat (smoker); mouth and gums    Heart Disease Mother         Age 52    Clotting Disorder Mother     Arthritis-osteo Paternal Aunt        Social History     Socioeconomic History    Marital status:      Spouse name: Not on file    Number of children: Not on file    Years of education: Not on file    Highest education level: Not on file   Occupational History    Not on file   Social Needs    Financial resource strain: Not on file    Food insecurity     Worry: Not on file     Inability: Not on file    Transportation needs     Medical: Not on file     Non-medical: Not on file   Tobacco Use    Smoking status: Never Smoker    Smokeless tobacco: Never Used   Substance and Sexual Activity    Alcohol use: No    Drug use: No    Sexual activity: Not Currently   Lifestyle    Physical activity     Days per week: Not on file     Minutes per session: Not on file    Stress: Not on file   Relationships    Social connections     Talks on phone: Not on file     Gets together: Not on file     Attends Protestant service: Not on file     Active member of club or organization: Not on file     Attends meetings of clubs or organizations: Not on file     Relationship status: Not on file    Intimate partner violence     Fear of current or ex partner: Not on file     Emotionally abused: Not on file     Physically abused: Not on file     Forced sexual activity: Not on file   Other Topics Concern    Not on file   Social History Narrative    Not on file         ALLERGIES: Codeine; Demerol (pf) [meperidine (pf)]; and Meperidine    Review of Systems   Constitutional: Positive for activity change. Negative for appetite change, fever and unexpected weight change. HENT: Negative for rhinorrhea, sore throat and trouble swallowing. Eyes: Negative for visual disturbance. Respiratory: Negative for cough and shortness of breath. Cardiovascular: Negative for chest pain, palpitations and leg swelling. Gastrointestinal: Negative for abdominal pain, diarrhea, nausea and vomiting. Genitourinary: Negative for dysuria.    Musculoskeletal: Positive for arthralgias and back pain. Negative for neck pain and neck stiffness. Skin: Negative for rash. Neurological: Negative for dizziness, light-headedness and headaches. All other systems reviewed and are negative. Vitals:    12/08/20 1306   BP: (!) 171/92   Pulse: (!) 58   Resp: 18   Temp: 97.8 °F (36.6 °C)   SpO2: 95%   Height: 5' 8\" (1.727 m)            Physical Exam  Vitals signs and nursing note reviewed. Constitutional:       General: She is not in acute distress. Appearance: Normal appearance. She is not ill-appearing, toxic-appearing or diaphoretic. Comments: Elderly white female; non smoker;    HENT:      Head: Normocephalic. Neck:      Musculoskeletal: Normal range of motion and neck supple. Cardiovascular:      Rate and Rhythm: Normal rate and regular rhythm. Pulmonary:      Effort: Pulmonary effort is normal.      Breath sounds: Normal breath sounds. Comments: Lower right lateral rib area tenderness with palpation and active range of motion of the right arm; Skin integrity is intact. There is no obvious new deformity, rash, bruising or erythema. Good neurovascular sensation. No apparent tendon or nerve injury. Chest:      Chest wall: Tenderness present. Abdominal:      General: Bowel sounds are normal.      Palpations: Abdomen is soft. Musculoskeletal:         General: Tenderness and signs of injury present. No deformity. Right lower leg: No edema. Left lower leg: No edema. Comments: Reports right hip area pain with active range of motion of the right leg and with weightbearing; Skin integrity is intact. There is no obvious new deformity, bruising, rash or erythema. Good neurovascular sensation. No apparent tendon or nerve injury. Skin:     General: Skin is warm and dry. Findings: No rash. Neurological:      General: No focal deficit present. Mental Status: She is alert and oriented to person, place, and time. Psychiatric:         Mood and Affect: Mood normal.         Behavior: Behavior normal.          MDM       Procedures        Ct Chest Wo Cont    Addendum Date: 12/8/2020    Addendum: There is a fracture of the right ninth rib laterally with slight displacement. Result Date: 12/8/2020  IMPRESSION: No acute finding. Ct Spine Lumb Wo Cont    Result Date: 12/8/2020  IMPRESSION: 1. Fracture of the right ninth rib. 2. No acute lumbar fracture. 3. Lumbar degenerative disc disease and scoliosis. Ct Pelv Wo Cont    Result Date: 12/8/2020  IMPRESSION: 1. Osteopenia. Right hip hemiarthroplasty. No evidence of dislocation or acute fracture     Patient has been reexamined and denies any new complaints of pain or discomfort; breathing easy. Patient has an incentive spirometer at home from previous rib fracture several years ago. She was instructed in the use of the incentive spirometer and encouraged to do so. She is going to continue with her lidocaine patches and plain Tylenol for pain control. Recommend close follow-up with orthopedic specialist for further evaluation. 5:22 PM  Patient's results and plan of care have been reviewed with her and her daughter. Patient and/or family have verbally conveyed their understanding and agreement of the patient's signs, symptoms, diagnosis, treatment and prognosis and additionally agree to follow up as recommended or return to the Emergency Room should her condition change prior to follow-up. Discharge instructions have also been provided to the patient with some educational information regarding her diagnosis as well a list of reasons why she would want to return to the ER prior to her follow-up appointment should her condition change. Darrion Barker NP

## 2020-12-08 NOTE — DISCHARGE INSTRUCTIONS
Patient Education        Preventing Falls: Care Instructions  Your Care Instructions    Getting around your home safely can be a challenge if you have injuries or health problems that make it easy for you to fall. Loose rugs and furniture in walkways are among the dangers for many older people who have problems walking or who have poor eyesight. People who have conditions such as arthritis, osteoporosis, or dementia also have to be careful not to fall. You can make your home safer with a few simple measures. Follow-up care is a key part of your treatment and safety. Be sure to make and go to all appointments, and call your doctor if you are having problems. It's also a good idea to know your test results and keep a list of the medicines you take. How can you care for yourself at home? Taking care of yourself  · You may get dizzy if you do not drink enough water. To prevent dehydration, drink plenty of fluids, enough so that your urine is light yellow or clear like water. Choose water and other caffeine-free clear liquids. If you have kidney, heart, or liver disease and have to limit fluids, talk with your doctor before you increase the amount of fluids you drink. · Exercise regularly to improve your strength, muscle tone, and balance. Walk if you can. Swimming may be a good choice if you cannot walk easily. · Have your vision and hearing checked each year or any time you notice a change. If you have trouble seeing and hearing, you might not be able to avoid objects and could lose your balance. · Know the side effects of the medicines you take. Ask your doctor or pharmacist whether the medicines you take can affect your balance. Sleeping pills or sedatives can affect your balance. · Limit the amount of alcohol you drink. Alcohol can impair your balance and other senses. · Ask your doctor whether calluses or corns on your feet need to be removed.  If you wear loose-fitting shoes because of calluses or corns, you can lose your balance and fall. · Talk to your doctor if you have numbness in your feet. Preventing falls at home  · Remove raised doorway thresholds, throw rugs, and clutter. Repair loose carpet or raised areas in the floor. · Move furniture and electrical cords to keep them out of walking paths. · Use nonskid floor wax, and wipe up spills right away, especially on ceramic tile floors. · If you use a walker or cane, put rubber tips on it. If you use crutches, clean the bottoms of them regularly with an abrasive pad, such as steel wool. · Keep your house well lit, especially [place], and outside walkways. Use night-lights in areas such as hallways and bathrooms. Add extra light switches or use remote switches (such as switches that go on or off when you clap your hands) to make it easier to turn lights on if you have to get up during the night. · Install sturdy handrails on stairways. · Move items in your cabinets so that the things you use a lot are on the lower shelves (about waist level). · Keep a cordless phone and a flashlight with new batteries by your bed. If possible, put a phone in each of the main rooms of your house, or carry a cell phone in case you fall and cannot reach a phone. Or, you can wear a device around your neck or wrist. You push a button that sends a signal for help. · Wear low-heeled shoes that fit well and give your feet good support. Use footwear with nonskid soles. Check the heels and soles of your shoes for wear. Repair or replace worn heels or soles. · Do not wear socks without shoes on wood floors. · Walk on the grass when the sidewalks are slippery. If you live in an area that gets snow and ice in the winter, sprinkle salt on slippery steps and sidewalks. Preventing falls in the bath  · Install grab bars and nonskid mats inside and outside your shower or tub and near the toilet and sinks. · Use shower chairs and bath benches.   · Use a hand-held shower head that will allow you to sit while showering. · Get into a tub or shower by putting the weaker leg in first. Get out of a tub or shower with your strong side first.  · Repair loose toilet seats and consider installing a raised toilet seat to make getting on and off the toilet easier. · Keep your bathroom door unlocked while you are in the shower. Where can you learn more? Go to http://www.lubin.com/. Enter [phone] [id number] [phone] in the search box to learn more about \"Preventing Falls: Care Instructions. \"  Current as of: March 16, 2018  Content Version: 11.8  © 7109-7917 [employer]. Care instructions adapted under license by [employer] (which disclaims liability or warranty for this information). If you have questions about a medical condition or this instruction, always ask your healthcare professional. [place] [address] any warranty or liability for your use of this information. Patient Education        Broken Rib: Care Instructions  Your Care Instructions     A broken rib is a crack or break in one of the bones of the rib cage. Breathing can be very painful because the muscles used for breathing pull on the rib. In most cases, a broken rib will heal on its own. You can take pain medicine while the rib mends. Pain relief allows you to take deep breaths. In the past, doctors recommended taping or wrapping broken ribs. This is no longer done because taping makes it hard for you to take deep breaths. Taking deep breaths may help prevent pneumonia or a partial collapse of a lung. Your rib will heal in about 6 weeks. You heal best when you take good care of yourself. Eat a variety of healthy foods, and don't smoke. Follow-up care is a key part of your treatment and safety. Be sure to make and go to all appointments, and call your doctor if you are having problems. It's also a good idea to know your test results and keep a list of the medicines you take.   How can you care for yourself at home? · Be safe with medicines. Read and follow all instructions on the label. ? If the doctor gave you a prescription medicine for pain, take it as prescribed. ? If you are not taking a prescription pain medicine, ask your doctor if you can take an over-the-counter medicine. · Even if it hurts, try to cough or take the deepest breath you can at least once every hour. This will get air deeply into your lungs. This may reduce your chance of getting pneumonia or a partial collapse of a lung. Hold a pillow against your chest to make this less painful. · Put ice or a cold pack on the area for 10 to 20 minutes at a time. Put a thin cloth between the ice and your skin. When should you call for help? Call 911 anytime you think you may need emergency care. For example, call if:    · You have severe trouble breathing. Call your doctor now or seek immediate medical care if:    · You have some trouble breathing.     · You have a fever.     · You have a new or worse cough. Watch closely for changes in your health, and be sure to contact your doctor if:    · You have pain even after taking your medicine.     · You do not get better as expected. Where can you learn more? Go to http://www.gray.com/  Enter M135 in the search box to learn more about \"Broken Rib: Care Instructions. \"  Current as of: March 2, 2020               Content Version: 12.6  © 5847-9854 [employer]. Care instructions adapted under license by [employer] (which disclaims liability or warranty for this information). If you have questions about a medical condition or this instruction, always ask your healthcare professional. [place] [address] any warranty or liability for your use of this information.        Patient Education        Hip Pain: Care Instructions  Your Care Instructions     Hip pain may be caused by many things, including overuse, a fall, or a twisting movement. Another cause of hip pain is arthritis. Your pain may increase when you stand up, walk, or squat. The pain may come and go or may be constant. Home treatment can help relieve hip pain, swelling, and stiffness. If your pain is ongoing, you may need more tests and treatment. Follow-up care is a key part of your treatment and safety. Be sure to make and go to all appointments, and call your doctor if you are having problems. It's also a good idea to know your test results and keep a list of the medicines you take. How can you care for yourself at home? · Take pain medicines exactly as directed. ? If the doctor gave you a prescription medicine for pain, take it as prescribed. ? If you are not taking a prescription pain medicine, ask your doctor if you can take an over-the-counter medicine. · Rest and protect your hip. Take a break from any activity, including standing or walking, that may cause pain. · Put ice or a cold pack against your hip for 10 to 20 minutes at a time. Try to do this every 1 to 2 hours for the next 3 days (when you are awake) or until the swelling goes down. Put a thin cloth between the ice and your skin. · Sleep on your healthy side with a pillow between your knees, or sleep on your back with pillows under your knees. · If there is no swelling, you can put moist heat, a heating pad, or a warm cloth on your hip. Do gentle stretching exercises to help keep your hip flexible. · Learn how to prevent falls. Have your vision and hearing checked regularly. Wear slippers or shoes with a nonskid sole. · Stay at a healthy weight. · Wear comfortable shoes. When should you call for help? Call 911 anytime you think you may need emergency care.  For example, call if:    · You have sudden chest pain and shortness of breath, or you cough up blood.     · You are not able to stand or walk or bear weight.     · Your buttocks, legs, or feet feel numb or tingly.     · Your leg or foot is cool or pale or changes color.     · You have severe pain. Call your doctor now or seek immediate medical care if:    · You have signs of infection, such as:  ? Increased pain, swelling, warmth, or redness in the hip area. ? Red streaks leading from the hip area. ? Pus draining from the hip area. ? A fever.     · You have signs of a blood clot, such as:  ? Pain in your calf, back of the knee, thigh, or groin. ? Redness and swelling in your leg or groin.     · You are not able to bend, straighten, or move your leg normally.     · You have trouble urinating or having bowel movements. Watch closely for changes in your health, and be sure to contact your doctor if:    · You do not get better as expected. Where can you learn more? Go to http://www.gray.com/  Enter Z720 in the search box to learn more about \"Hip Pain: Care Instructions. \"  Current as of: June 26, 2019               Content Version: 12.6  © 5339-5245 [employer]. Care instructions adapted under license by [employer] (which disclaims liability or warranty for this information). If you have questions about a medical condition or this instruction, always ask your healthcare professional. [address] any warranty or liability for your use of this information.

## 2020-12-08 NOTE — TELEPHONE ENCOUNTER
Spoke with daughter, Eduardo Polanco. She relayed that her mother had a fall on Saturday night December 5th. Since that time she has been in pain. I advised her to go to the ER to have Ms. Hurtado checked out. She recently had another fracture.

## 2020-12-08 NOTE — ED NOTES
Pt had a fall 2 days ago and hit the R side of her back on the bed frame and is c/o pain in that area with movement. Pt has chronic back and R hip pain with hx of R hip replacement. Pt denies LOC.

## 2020-12-08 NOTE — TELEPHONE ENCOUNTER
The patient daughter called and would like to inform Urszula Song that Her mother fell last night and hit the corner of her bed frame with her back. The patient daughter said that she believes that she may have cracked her rib and will like to know if Urszula Song would like her to take her to the ER to get evaluated for her back since she is still in pain.      Call transferred to [address] [place] [address]

## 2020-12-10 ENCOUNTER — APPOINTMENT (OUTPATIENT)
Dept: PHYSICAL THERAPY | Age: 85
End: 2020-12-10
Payer: MEDICARE

## 2020-12-14 ENCOUNTER — APPOINTMENT (OUTPATIENT)
Dept: PHYSICAL THERAPY | Age: 85
End: 2020-12-14
Payer: MEDICARE

## 2020-12-16 ENCOUNTER — HOSPITAL ENCOUNTER (OUTPATIENT)
Dept: PHYSICAL THERAPY | Age: 85
Discharge: HOME | End: 2020-12-16
Payer: MEDICARE

## 2020-12-16 PROCEDURE — 97110 THERAPEUTIC EXERCISES: CPT | Performed by: PHYSICAL THERAPIST

## 2020-12-16 NOTE — PROGRESS NOTES
PT DAILY TREATMENT NOTE - [facility] 2-15    Patient Name: Lizett Urena  Date:2020  : 7/3/1932  [x]  Patient  Verified  Payor: AARP MEDICARE COMPLETE / Plan: [facility] MEDICARE COMPLETE / Product Type: Managed Care Medicare /    In time:11:00A  Out time:1155A  Total Treatment Time (min): 55  Total Timed Codes (min): 55  1:1 Treatment Time ([facility] only): 50  Visit #:  5    Treatment Area: Low back pain [M54.5]  Right hip pain [M25.551]    SUBJECTIVE  Pain Level (0-10 scale): 3/10  Any medication changes, allergies to medications, adverse drug reactions, diagnosis change, or new procedure performed?: [x] No    [] Yes (see summary sheet for update)  Subjective functional status/changes:   [] No changes reported  Pt reports that her right is doing better. Fractured ribs after a fall last . Went to the ER and had CT on lumbar, hip and chest.     OBJECTIVE      55 min Therapeutic Exercise:  [x] See flow sheet : Manual stretching HS and piriformis   Rationale: increase ROM, increase strength and improve coordination to improve the patients ability to increase function and mobility      With   [] TE   [] TA   [] neuro   [] other: Patient Education: [x] Review HEP    [] Progressed/Changed HEP based on:   [] positioning   [] body mechanics   [] transfers   [] heat/ice application    [] other:      Other Objective/Functional Measures: FOTO 44     Pain Level (0-10 scale) post treatment: 2/10    ASSESSMENT/Changes in Function:   Pt tolerated session well. Improvement in tightness along lower leg with nerve glides, felt good with stretches and appropriately challenged with exercises. Educated on shoewear safety to minimize fall risk.   Patient will continue to benefit from skilled PT services to modify and progress therapeutic interventions, address functional mobility deficits, address ROM deficits, address strength deficits, analyze and address soft tissue restrictions, analyze and cue movement patterns, analyze and modify body mechanics/ergonomics and assess and modify postural abnormalities to attain remaining goals. [x]  See Plan of Care  []  See progress note/recertification  []  See Discharge Summary         Progress towards goals / Updated goals:  Short Term Goals: To be accomplished in 4 weeks:              Patient will be able to perform 5x sit to  15 seconds with use of arms to promote functional mobility              Patient will demonstrate good safety awareness with assistive device while in the clinic  Long Term Goals: To be accomplished in 8 weeks:              Patient will be able to assist with putting away the groceries without any increase in s/s              Patient will be able to prepare her food standing without any increase in s/s  Frequency / Duration: Patient to be seen 2 times per week for 8 weeks.     PLAN  []  Upgrade activities as tolerated     [x]  Continue plan of care  []  Update interventions per flow sheet       []  Discharge due to:_  []  Other:_      Arnold العراقي, PT, DPT,12/16/2020

## 2020-12-21 ENCOUNTER — HOSPITAL ENCOUNTER (OUTPATIENT)
Dept: PHYSICAL THERAPY | Age: 85
Discharge: HOME | End: 2020-12-21
Payer: MEDICARE

## 2020-12-21 PROCEDURE — 97110 THERAPEUTIC EXERCISES: CPT | Performed by: PHYSICAL THERAPIST

## 2020-12-21 NOTE — PROGRESS NOTES
PT DAILY TREATMENT NOTE - [facility] 2-15    Patient Name: Wilian Quinteros  Date:2020  : 7/3/1932  [x]  Patient  Verified  Payor: [facility] MEDICARE COMPLETE / Plan: [facility] MEDICARE COMPLETE / Product Type: Managed Care Medicare /    In time:11:04A  Out time:1154A  Total Treatment Time (min): 50  Total Timed Codes (min): 46  1 on 1 time: 41  Visit #:  6    Treatment Area: Low back pain [M54.5]  Right hip pain [M25.551]    SUBJECTIVE  Pain Level (0-10 scale): 3/10  Any medication changes, allergies to medications, adverse drug reactions, diagnosis change, or new procedure performed?: [x] No    [] Yes (see summary sheet for update)  Subjective functional status/changes:   [] No changes reported  Reporting some mild hip pain today. OBJECTIVE      50 min Therapeutic Exercise:  [x] See flow sheet : Manual stretching HS and piriformis   Rationale: increase ROM, increase strength and improve coordination to improve the patients ability to increase function and mobility      With   [] TE   [] TA   [] neuro   [] other: Patient Education: [x] Review HEP    [] Progressed/Changed HEP based on:   [] positioning   [] body mechanics   [] transfers   [] heat/ice application    [] other:      Other Objective/Functional Measures: FOTO 44     Pain Level (0-10 scale) post treatment: 2/10    ASSESSMENT/Changes in Function:   The pt did well today and able to add standing hip abduction 2 x 5. Educated on sit to stand and safety of pushing up off chair vs walker. Also recommended a more stable shoe for safety. Patient will continue to benefit from skilled PT services to modify and progress therapeutic interventions, address functional mobility deficits, address ROM deficits, address strength deficits, analyze and address soft tissue restrictions, analyze and cue movement patterns, analyze and modify body mechanics/ergonomics and assess and modify postural abnormalities to attain remaining goals.     [x]  See Plan of Care  [] See progress note/recertification  []  See Discharge Summary         Progress towards goals / Updated goals:  Short Term Goals: To be accomplished in 4 weeks:              Patient will be able to perform 5x sit to  15 seconds with use of arms to promote functional mobility NOT MET              Patient will demonstrate good safety awareness with assistive device while in the clinic Progressing  Long Term Goals: To be accomplished in 8 weeks:              Patient will be able to assist with putting away the groceries without any increase in s/s Progressing              Patient will be able to prepare her food standing without any increase in s/s NOT MET  Frequency / Duration: Patient to be seen 2 times per week for 8 weeks.     PLAN  []  Upgrade activities as tolerated     [x]  Continue plan of care  []  Update interventions per flow sheet       []  Discharge due to:_  []  Other:_      Lani Kurtz PT, DPT,12/21/2020

## 2020-12-29 DIAGNOSIS — I10 ESSENTIAL HYPERTENSION: ICD-10-CM

## 2020-12-29 RX ORDER — ATORVASTATIN CALCIUM 20 MG/1
TABLET, FILM COATED ORAL
Qty: 90 TAB | Refills: 0 | Status: SHIPPED | OUTPATIENT
Start: 2020-12-29 | End: 2021-03-22 | Stop reason: SDUPTHER

## 2020-12-29 RX ORDER — METOPROLOL TARTRATE 25 MG/1
25 TABLET, FILM COATED ORAL 2 TIMES DAILY
Qty: 180 TAB | Refills: 0 | Status: SHIPPED | OUTPATIENT
Start: 2020-12-29 | End: 2021-03-22 | Stop reason: SDUPTHER

## 2020-12-29 NOTE — TELEPHONE ENCOUNTER
----- Message from Nichol Felix sent at 12/29/2020 12:06 PM EST -----  Regarding: AZRA Stoll/refill  Medication Refill    Caller (if not patient): Serg Guzman       Relationship of caller (if not patient daughter      Best contact number(s): [phone]      Name of medication and dosage if known: atorvastatin calcium 20 mg, metoprolol tartrate 25 mg (twice daily)      Is patient out of this medication (yes/no): yes      Pharmacy name: [address] [place] [address] listed in chart? (yes/no): yes  Pharmacy phone number: n/a      Details to clarify the request: Requesting a 90 day supply.        Nichol Felix

## 2020-12-29 NOTE — TELEPHONE ENCOUNTER
PCP: Humberto John NP    Last appt: 11/13/2020  Future Appointments   Date Time Provider Eusebio Babcock   1/4/2021 11:30 AM Vinicius Mohr PTA [facility]   1/6/2021 11:00 AM Sabine Pozo, PT, DPT [facility]   1/11/2021 11:00 AM Sabine Pozo PT, DPT [facility]       Requested Prescriptions     Pending Prescriptions Disp Refills    atorvastatin (LIPITOR) 20 mg tablet 90 Tab 3     Sig: TAKE 1 TABLET BY MOUTH EVERY DAY IN THE EVENING    metoprolol tartrate (LOPRESSOR) 25 mg tablet 180 Tab 0       Prior labs and Blood pressures:  BP Readings from Last 3 Encounters:   12/08/20 (!) 174/63   11/13/20 134/74   11/01/20 (!) 171/81     Lab Results   Component Value Date/Time    Sodium 141 02/11/2020 12:00 AM    Potassium 4.5 02/11/2020 12:00 AM    Chloride 99 02/11/2020 12:00 AM    CO2 28 02/11/2020 12:00 AM    Anion gap 8 01/23/2019 04:35 AM    Glucose 97 02/11/2020 12:00 AM    BUN 14 02/11/2020 12:00 AM    Creatinine 0.78 02/11/2020 12:00 AM    BUN/Creatinine ratio 18 02/11/2020 12:00 AM    GFR est AA 79 02/11/2020 12:00 AM    GFR est non-AA 69 02/11/2020 12:00 AM    Calcium 10.3 02/11/2020 12:00 AM     Lab Results   Component Value Date/Time    Hemoglobin A1c 5.6 02/11/2020 12:00 AM     Lab Results   Component Value Date/Time    Cholesterol, total 151 02/11/2020 12:00 AM    Cholesterol (POC) 130 11/09/2016 02:33 PM    HDL Cholesterol 55 02/11/2020 12:00 AM    HDL Cholesterol (POC) 53 11/09/2016 02:33 PM    LDL Cholesterol (POC) 64 11/09/2016 02:33 PM    LDL, calculated 68 02/11/2020 12:00 AM    VLDL, calculated 28 02/11/2020 12:00 AM    Triglyceride 139 02/11/2020 12:00 AM    Triglycerides (POC) 64 11/09/2016 02:33 PM     No results found for: Chiquis Caldwell, VD3RIA    Lab Results   Component Value Date/Time    TSH 2.49 07/12/2018 08:47 AM

## 2020-12-29 NOTE — TELEPHONE ENCOUNTER
----- Message from Cam Espinosa sent at 12/29/2020 12:06 PM EST -----  Regarding: AZRA Stoll/refill  Medication Refill    Caller (if not patient): Aide Cohn       Relationship of caller (if not patient daughter      Best contact number(s): [phone]      Name of medication and dosage if known: atorvastatin calcium 20 mg, metoprolol tartrate 25 mg (twice daily)      Is patient out of this medication (yes/no): yes      Pharmacy name: [address] [place] [address] listed in chart? (yes/no): yes  Pharmacy phone number: n/a      Details to clarify the request: Requesting a 90 day supply.        Cam Espinosa

## 2021-01-04 ENCOUNTER — HOSPITAL ENCOUNTER (OUTPATIENT)
Dept: PHYSICAL THERAPY | Age: 86
Discharge: HOME | End: 2021-01-04
Payer: MEDICARE

## 2021-01-04 PROCEDURE — 97110 THERAPEUTIC EXERCISES: CPT

## 2021-01-04 NOTE — PROGRESS NOTES
PT DAILY TREATMENT NOTE - [facility] 2-15    Patient Name: Karla Amor  Date:2021  : 7/3/1932  [x]  Patient  Verified  Payor: [employer] MEDICARE COMPLETE / [facility] / Product Type: Managed Care Medicare /    In time:11:30A  Out time:12:25P  Total Treatment Time (min): 55  Total Timed Codes (min): 55  1:1 Treatment Time ( only): 54   Visit #:  7    Treatment Area: Low back pain [M54.5]  Right hip pain [M25.551]    SUBJECTIVE  Pain Level (0-10 scale): 3/10  Any medication changes, allergies to medications, adverse drug reactions, diagnosis change, or new procedure performed?: [x]? No    []? Yes (see summary sheet for update)  Subjective functional status/changes:   []? No changes reported    Pt reported doing well. Reports some pain in arch of L foot when standing to prepare her cat's food.       OBJECTIVE       55 min Therapeutic Exercise:  [x]? See flow sheet : Manual stretching HS and piriformis   Rationale: increase ROM, increase strength and improve coordination to improve the patients ability to increase function and mobility        With   []? TE   []? TA   []? neuro   []? other: Patient Education: [x]? Review HEP    []? Progressed/Changed HEP based on:   []? positioning   []? body mechanics   []? transfers   []? heat/ice application    []? other:       Other Objective/Functional Measures: --      Pain Level (0-10 scale) post treatment: 2/10     ASSESSMENT/Changes in Function:   Discussed pt regarding stable shoes for wearing inside the house to provide more arch support. Able to progress repetitions on several exercises today.    Patient will continue to benefit from skilled PT services to modify and progress therapeutic interventions, address functional mobility deficits, address ROM deficits, address strength deficits, analyze and address soft tissue restrictions, analyze and cue movement patterns, analyze and modify body mechanics/ergonomics and assess and modify postural abnormalities to attain remaining goals.     [x]? See Plan of Care  []? See progress note/recertification  []? See Discharge Summary         Progress towards goals / Updated goals:  Short Term Goals: To be accomplished in 4 weeks:              Patient will be able to perform 5x sit to  15 seconds with use of arms to promote functional mobility              Patient will demonstrate good safety awareness with assistive device while in the clinic  Long Term Goals: To be accomplished in 8 weeks:              Patient will be able to assist with putting away the groceries without any increase in s/s              Patient will be able to prepare her food standing without any increase in s/s  Frequency / Duration: Patient to be seen 2 times per week for 8 weeks.     PLAN  []? Upgrade activities as tolerated     [x]? Continue plan of care  []? Update interventions per flow sheet       []? Discharge due to:_  []?   Other:_        Jared Valadez PTA, OPT[facility] 1/4/2021

## 2021-01-06 ENCOUNTER — HOSPITAL ENCOUNTER (OUTPATIENT)
Dept: PHYSICAL THERAPY | Age: 86
Discharge: HOME | End: 2021-01-06
Payer: MEDICARE

## 2021-01-06 PROCEDURE — 97140 MANUAL THERAPY 1/> REGIONS: CPT | Performed by: PHYSICAL THERAPIST

## 2021-01-06 PROCEDURE — 97110 THERAPEUTIC EXERCISES: CPT | Performed by: PHYSICAL THERAPIST

## 2021-01-06 NOTE — PROGRESS NOTES
PT DAILY TREATMENT NOTE - [facility] 2-15    Patient Name: Ranjeet Chisholm  Date:2021  : 7/3/1932  [x]  Patient  Verified  Payor: [employer] MEDICARE COMPLETE / [facility] / Product Type: Managed Care Medicare /    In time:11:00A  Out time:1155P  Total Treatment Time (min): 50  Total Timed Codes (min): 50  1:1 Treatment Time ( only): 50   Visit #:  8    Treatment Area: Low back pain [M54.5]  Right hip pain [M25.551]    SUBJECTIVE  Pain Level (0-10 scale): 3/10  Any medication changes, allergies to medications, adverse drug reactions, diagnosis change, or new procedure performed?: [x]? No    []? Yes (see summary sheet for update)  Subjective functional status/changes:   []? No changes reported    Pt reported doing well. Reports some pain in her right hip with walking. Daughter reports she is a little worked up from this morning frustrations with her  that has dementia and was rushing to get out of the house. Requested we take her BP      OBJECTIVE       40 min Therapeutic Exercise:  [x]? See flow sheet : Manual stretching HS and piriformis   Rationale: increase ROM, increase strength and improve coordination to improve the patients ability to increase function and mobility       10 min Manual Therapy: STM to right pirformis and deep hip rotators in left sidelying   Rationale: increase ROM, increase strength and improve coordination to improve the patients ability to increase function and mobility      With   []? TE   []? TA   []? neuro   []? other: Patient Education: [x]? Review HEP    []? Progressed/Changed HEP based on:   []? positioning   []? body mechanics   []? transfers   []? heat/ice application    []? other:       Other Objective/Functional Measures: BP after the Bike 154/73mmHg, BP after rest 145/74mmHg     Pain Level (0-10 scale) post treatment: 0/10     ASSESSMENT/Changes in Function:   Pt is wearing tennis shoes with better support today.  Better balance noted, but daughter said she had a hard time getting them on. Held more standing exercises to determine if manual tehrapy and stretches helped the hip, and with BP elevated slightly today. Improvement in symptoms and level of improvement was reported to be over 50% since initial session. Will continue 4 more weeks. Work more on sit to stand next session   Patient will continue to benefit from skilled PT services to modify and progress therapeutic interventions, address functional mobility deficits, address ROM deficits, address strength deficits, analyze and address soft tissue restrictions, analyze and cue movement patterns, analyze and modify body mechanics/ergonomics and assess and modify postural abnormalities to attain remaining goals.     [x]? See Plan of Care  []? See progress note/recertification  []? See Discharge Summary         Progress towards goals / Updated goals:  Short Term Goals: To be accomplished in 4 weeks:              Patient will be able to perform 5x sit to  15 seconds with use of arms to promote functional mobility PROGRESSING              Patient will demonstrate good safety awareness with assistive device while in the clinic MET  Long Term Goals: To be accomplished in 8 weeks:              Patient will be able to assist with putting away the groceries without any increase in s/s PROGRESSIN              Patient will be able to prepare her food standing without any increase in s/s PROGRESSING  Frequency / Duration: Patient to be seen 2 times per week for 8 weeks.     PLAN  []? Upgrade activities as tolerated     [x]? Continue plan of care  []? Update interventions per flow sheet       []? Discharge due to:_  []?   Other:_        Christie Sarmiento, PT, DPT1/6/2021

## 2021-01-06 NOTE — PROGRESS NOTES
Physical Therapy at [facility],   a part of [address] [place] [address]  [address] [address], [address] [place] [address], [address] [place] [address]  Phone: [phone] Fax: [phone]    Progress Note    Name: Devyn Cleaning   : 7/3/1932   MD: Macario Longoria MD       Treatment Diagnosis: Low back pain [M54.5]  Right hip pain [M25.551]  Start of Care: 21    Visits from Start of Care: 8  Missed Visits: 3    Pt has been seen 8 sessions and has shown improvement in symptoms and function. Level of improvement was reported to be over 50% since initial session. Will continue 4 more weeks to progress strength and balance to maximize safety at home. Plan to work more on sit to stand next session and standing strengthening. Patient will continue to benefit from skilled PT services to modify and progress therapeutic interventions, address functional mobility deficits, address ROM deficits, address strength deficits, analyze and address soft tissue restrictions, analyze and cue movement patterns, analyze and modify body mechanics/ergonomics and assess and modify postural abnormalities to attain remaining goals.            Progress towards goals / Updated goals:  Short Term Goals: To be accomplished in 4 weeks:              Patient will be able to perform 5x sit to  15 seconds with use of arms to promote functional mobility PROGRESSING              Patient will demonstrate good safety awareness with assistive device while in the clinic MET  Long Term Goals: To be accomplished in 8 weeks:              Patient will be able to assist with putting away the groceries without any increase in s/s PROGRESSIN              Patient will be able to prepare her food standing without any increase in s/s PROGRESSING        Anil Gutiérrez, PT, DPT 2021     ________________________________________________________________________  NOTE TO PHYSICIAN:  Please complete the following and fax to:   [facility]: Fax: [phone]. Christina Lujan Retain this original for your records. If you are unable to process this request in 24 hours, please contact our office.        ____ I have read the above report and request that my patient continue therapy with the following changes/special instructions:  ____ I have read the above report and request that my patient be discharged from therapy    Physician's Signature:_________________ Date:___________Time:__________

## 2021-01-11 ENCOUNTER — HOSPITAL ENCOUNTER (OUTPATIENT)
Dept: PHYSICAL THERAPY | Age: 86
Discharge: HOME | End: 2021-01-11
Payer: MEDICARE

## 2021-01-11 PROCEDURE — 97110 THERAPEUTIC EXERCISES: CPT | Performed by: PHYSICAL THERAPIST

## 2021-01-11 PROCEDURE — 97140 MANUAL THERAPY 1/> REGIONS: CPT | Performed by: PHYSICAL THERAPIST

## 2021-01-11 NOTE — PROGRESS NOTES
PT DAILY TREATMENT NOTE - [facility] 2-15    Patient Name: Florian Merritt  Date:2021  : 7/3/1932  [x]  Patient  Verified  Payor: [employer] MEDICARE COMPLETE / Plan: [facility] MEDICARE COMPLETE / Product Type: Managed Care Medicare /    In time:11:00A  Out time:1200P  Total Treatment Time (min): 60  Total Timed Codes (min): 50  1:1 Treatment Time ( only): 30  Visit #:  9    Treatment Area: Low back pain [M54.5]  Right hip pain [M25.551]    SUBJECTIVE  Pain Level (0-10 scale): 3/10  Any medication changes, allergies to medications, adverse drug reactions, diagnosis change, or new procedure performed?: [x]? No    []? Yes (see summary sheet for update)  Subjective functional status/changes:   []? No changes reported    Pt reported she didn't get much sleep this weekend bc her  was having anxiety attacks at night.       OBJECTIVE     Modality rationale: decrease inflammation, decrease pain and increase tissue extensibility to improve the patients ability to perform daily activities pain free. Min Type Additional Details       [] Estim: []Att   []Unatt    []TENS instruct                  []IFC  []Premod   []NMES                     []Other:  []w/US   []w/ice   []w/heat  Position:  Location:       []  Traction: [] Cervical       []Lumbar                       [] Prone          []Supine                       []Intermittent   []Continuous Lbs:  [] before manual  [] after manual  []w/heat    []  Ultrasound: []Continuous   [] Pulsed                       at: []1MHz   []3MHz Location:  W/cm2:    [] Paraffin         Location:   []w/heat   10 []  Ice     [x]  Heat  []  Ice massage Position: sidelying  Location: right hip, low back    []  Laser  []  Other: Position:  Location:      []  Vasopneumatic Device Pressure:       [] lo [] med [] hi   Temperature:      [x] Skin assessment post-treatment:  [x]intact []redness- no adverse reaction    []redness  adverse reaction:     40 min Therapeutic Exercise:  [x]?  See flow sheet : Manual stretching HS and piriformis   Rationale: increase ROM, increase strength and improve coordination to improve the patients ability to increase function and mobility       10 min Manual Therapy: STM to right pirformis and deep hip rotators in left sidelying   Rationale: increase ROM, increase strength and improve coordination to improve the patients ability to increase function and mobility      With   []? TE   []? TA   []? neuro   []? other: Patient Education: [x]? Review HEP    []? Progressed/Changed HEP based on:   []? positioning   []? body mechanics   []? transfers   []? heat/ice application    []? other:       Other Objective/Functional Measures: BP after the Bike 154/73mmHg, BP after rest 145/74mmHg     Pain Level (0-10 scale) post treatment: 0/10     ASSESSMENT/Changes in Function:   Progressed sit to stand without UE. Standing marches and side stepping today. Fatigued but tolerated well. Improved pain following manual interventions to right hip musculature. Patient will continue to benefit from skilled PT services to modify and progress therapeutic interventions, address functional mobility deficits, address ROM deficits, address strength deficits, analyze and address soft tissue restrictions, analyze and cue movement patterns, analyze and modify body mechanics/ergonomics and assess and modify postural abnormalities to attain remaining goals.     [x]? See Plan of Care  []? See progress note/recertification  []?   See Discharge Summary         Progress towards goals / Updated goals:  Short Term Goals: To be accomplished in 4 weeks:              Patient will be able to perform 5x sit to  15 seconds with use of arms to promote functional mobility PROGRESSING              Patient will demonstrate good safety awareness with assistive device while in the clinic MET  Long Term Goals: To be accomplished in 8 weeks:              Patient will be able to assist with putting away the groceries without any increase in s/s PROGRESSIN              Patient will be able to prepare her food standing without any increase in s/s PROGRESSING  Frequency / Duration: Patient to be seen 2 times per week for 8 weeks.     PLAN  []? Upgrade activities as tolerated     [x]? Continue plan of care  []? Update interventions per flow sheet       []? Discharge due to:_  []?   Other:_        Barbara , PT, DPT1/11/2021

## 2021-01-13 ENCOUNTER — APPOINTMENT (OUTPATIENT)
Dept: PHYSICAL THERAPY | Age: 86
End: 2021-01-13
Payer: MEDICARE

## 2021-01-18 ENCOUNTER — HOSPITAL ENCOUNTER (OUTPATIENT)
Dept: PHYSICAL THERAPY | Age: 86
Discharge: HOME | End: 2021-01-18
Payer: MEDICARE

## 2021-01-18 PROCEDURE — 97110 THERAPEUTIC EXERCISES: CPT | Performed by: PHYSICAL THERAPIST

## 2021-01-18 PROCEDURE — 97140 MANUAL THERAPY 1/> REGIONS: CPT | Performed by: PHYSICAL THERAPIST

## 2021-01-18 NOTE — PROGRESS NOTES
PT DAILY TREATMENT NOTE - [facility] -15    Patient Name: Macey Seaman  Date:2021  : 7/3/1932  [x]  Patient  Verified  Payor: Bryant Peñaloza / Plan: [facility] MEDICARE COMPLETE / Product Type: Managed Care Medicare /    In time:11:00A  Out time:1150P  Total Treatment Time (min): 50  Total Timed Codes (min): 50  1:1 Treatment Time ( only): 50  Visit #:  10    Treatment Area: Low back pain [M54.5]  Right hip pain [M25.551]    SUBJECTIVE  Pain Level (0-10 scale): 3/10  Any medication changes, allergies to medications, adverse drug reactions, diagnosis change, or new procedure performed?: [x]? No    []? Yes (see summary sheet for update)  Subjective functional status/changes:   []? No changes reported    Pt daughter reports that she has  Not been able to rest much due to her husbands anxiety attacks. Slept on the recliner last Wednesday and woke up with increased right hip pain. Has had to put patches on daily in order to manage her pain. Woke up this morning with pain as well. Incresaed pain with walking and putting weight through her leg.      OBJECTIVE     Modality rationale: decrease inflammation, decrease pain and increase tissue extensibility to improve the patients ability to perform daily activities pain free.     Min Type Additional Details       [] Estim: []Att   []Unatt    []TENS instruct                  []IFC  []Premod   []NMES                     []Other:  []w/US   []w/ice   []w/heat  Position:  Location:       []  Traction: [] Cervical       []Lumbar                       [] Prone          []Supine                       []Intermittent   []Continuous Lbs:  [] before manual  [] after manual  []w/heat    []  Ultrasound: []Continuous   [] Pulsed                       at: []1MHz   []3MHz Location:  W/cm2:    [] Paraffin         Location:   []w/heat   - []  Ice     [x]  Heat  []  Ice massage Position: sidelying  Location: right hip, low back    []  Laser  []  Other: Position:  Location: []  Vasopneumatic Device Pressure:       [] lo [] med [] hi   Temperature:      [x] Skin assessment post-treatment:  [x]intact []redness- no adverse reaction    []redness  adverse reaction:     40 min Therapeutic Exercise:  [x]? See flow sheet : Manual stretching HS and piriformis   Rationale: increase ROM, increase strength and improve coordination to improve the patients ability to increase function and mobility       10 min Manual Therapy: STM to right pirformis and deep hip rotators in left sidelying   Rationale: increase ROM, increase strength and improve coordination to improve the patients ability to increase function and mobility      With   []? TE   []? TA   []? neuro   []? other: Patient Education: [x]? Review HEP    []? Progressed/Changed HEP based on:   []? positioning   []? body mechanics   []? transfers   []? heat/ice application    []? other:       Other Objective/Functional Measures: pain with grind, passive hip flexion and tenderness to palpation along deep hip rotators.      Pain Level (0-10 scale) post treatment: 0/10     ASSESSMENT/Changes in Function:   Pt felt improvement following exercises today, less pain with standing following. Gave new printout of HEP to perform daily. Has not been able to perform any exercises on her own. Held heat due to her lido patch on today. Patient will continue to benefit from skilled PT services to modify and progress therapeutic interventions, address functional mobility deficits, address ROM deficits, address strength deficits, analyze and address soft tissue restrictions, analyze and cue movement patterns, analyze and modify body mechanics/ergonomics and assess and modify postural abnormalities to attain remaining goals.     [x]? See Plan of Care  []? See progress note/recertification  []?   See Discharge Summary         Progress towards goals / Updated goals:  Short Term Goals: To be accomplished in 4 weeks:              Patient will be able to perform 5x sit to  15 seconds with use of arms to promote functional mobility PROGRESSING              Patient will demonstrate good safety awareness with assistive device while in the clinic MET  Long Term Goals: To be accomplished in 8 weeks:              Patient will be able to assist with putting away the groceries without any increase in s/s PROGRESSIN              Patient will be able to prepare her food standing without any increase in s/s PROGRESSING  Frequency / Duration: Patient to be seen 2 times per week for 8 weeks.     PLAN  []? Upgrade activities as tolerated     [x]? Continue plan of care  []? Update interventions per flow sheet       []? Discharge due to:_  []?   Other:_        Steve Galdamez, PT, DPT1/18/2021

## 2021-01-21 ENCOUNTER — HOSPITAL ENCOUNTER (OUTPATIENT)
Dept: PHYSICAL THERAPY | Age: 86
Discharge: HOME | End: 2021-01-21
Payer: MEDICARE

## 2021-01-21 PROCEDURE — 97110 THERAPEUTIC EXERCISES: CPT | Performed by: PHYSICAL THERAPIST

## 2021-01-21 PROCEDURE — 97140 MANUAL THERAPY 1/> REGIONS: CPT | Performed by: PHYSICAL THERAPIST

## 2021-01-21 NOTE — PROGRESS NOTES
PT DAILY TREATMENT NOTE - [facility] 2-15    Patient Name: Jesus Pablo  Date:2021  : 7/3/1932  [x]  Patient  Verified  Payor: Toy Fleischer / Plan: [facility] MEDICARE COMPLETE / Product Type: Managed Care Medicare /    In time:11:00A  Out time:1145P  Total Treatment Time (min): 45  Total Timed Codes (min): 45  1:1 Treatment Time ( only): 40  Visit #:  11    Treatment Area: Low back pain [M54.5]  Right hip pain [M25.551]    SUBJECTIVE  Pain Level (0-10 scale): 3/10  Any medication changes, allergies to medications, adverse drug reactions, diagnosis change, or new procedure performed?: [x]? No    []? Yes (see summary sheet for update)  Subjective functional status/changes:   []? No changes reported    Pt is having a hard time with her  at home. His anxiety and alzheimer's is limiting his ability to sleep and causing increased stress.       OBJECTIVE     38 min Therapeutic Exercise:  [x]? See flow sheet : Manual stretching HS and piriformis   Rationale: increase ROM, increase strength and improve coordination to improve the patients ability to increase function and mobility       8 min Manual Therapy: STM to right pirformis and deep hip rotators in left sidelying   Rationale: increase ROM, increase strength and improve coordination to improve the patients ability to increase function and mobility      With   []? TE   []? TA   []? neuro   []? other: Patient Education: [x]? Review HEP    []? Progressed/Changed HEP based on:   []? positioning   []? body mechanics   []? transfers   []? heat/ice application    []? other:       Other Objective/Functional Measures: -     Pain Level (0-10 scale) post treatment: 3/10     ASSESSMENT/Changes in Function:   Pt continues to have pain into her right hip. It is better compared to last session. TOlerated all exercises today and able to add back in side stepping, and marches. Working on sit to stand form to maximize safety.    Patient will continue to benefit from skilled PT services to modify and progress therapeutic interventions, address functional mobility deficits, address ROM deficits, address strength deficits, analyze and address soft tissue restrictions, analyze and cue movement patterns, analyze and modify body mechanics/ergonomics and assess and modify postural abnormalities to attain remaining goals.     [x]? See Plan of Care  []? See progress note/recertification  []? See Discharge Summary         Progress towards goals / Updated goals:  Short Term Goals: To be accomplished in 4 weeks:              Patient will be able to perform 5x sit to  15 seconds with use of arms to promote functional mobility PROGRESSING              Patient will demonstrate good safety awareness with assistive device while in the clinic MET  Long Term Goals: To be accomplished in 8 weeks:              Patient will be able to assist with putting away the groceries without any increase in s/s PROGRESSIN              Patient will be able to prepare her food standing without any increase in s/s PROGRESSING  Frequency / Duration: Patient to be seen 2 times per week for 8 weeks.     PLAN  []? Upgrade activities as tolerated     [x]? Continue plan of care  []? Update interventions per flow sheet       []? Discharge due to:_  []?   Other:_        Abisai Coffey, PT, DPT1/21/2021

## 2021-01-25 ENCOUNTER — HOSPITAL ENCOUNTER (OUTPATIENT)
Dept: PHYSICAL THERAPY | Age: 86
Discharge: HOME | End: 2021-01-25
Payer: MEDICARE

## 2021-01-25 PROCEDURE — 97140 MANUAL THERAPY 1/> REGIONS: CPT | Performed by: PHYSICAL THERAPIST

## 2021-01-25 PROCEDURE — 97530 THERAPEUTIC ACTIVITIES: CPT | Performed by: PHYSICAL THERAPIST

## 2021-01-25 PROCEDURE — 97110 THERAPEUTIC EXERCISES: CPT | Performed by: PHYSICAL THERAPIST

## 2021-01-25 NOTE — PROGRESS NOTES
PT DAILY TREATMENT NOTE - [facility] 2-15    Patient Name: Josiah Elam  Date:2021  : 7/3/1932  [x]  Patient  Verified  Payor: [facility] MEDICARE COMPLETE / Plan: [facility] MEDICARE COMPLETE / Product Type: Managed Care Medicare /    In time:11:00A  Out time:1210P  Total Treatment Time (min): 70  Total Timed Codes (min): 60  1:1 Treatment Time ( only): 60  Visit #:  12    Treatment Area: Low back pain [M54.5]  Right hip pain [M25.551]    SUBJECTIVE  Pain Level (0-10 scale): 2/10  Any medication changes, allergies to medications, adverse drug reactions, diagnosis change, or new procedure performed?: [x]? No    []? Yes (see summary sheet for update)  Subjective functional status/changes:   []? No changes reported  Pt reports hip is feeling better. Just feels it some and does not have a pain patch on today. Still having some left hip pain and foot and leg pain with cutting her cat food.       OBJECTIVE     37 min Therapeutic Exercise:  [x]? See flow sheet : Manual stretching HS and piriformis   Rationale: increase ROM, increase strength and improve coordination to improve the patients ability to increase function and mobility    15 min Therapeutic Activity:  [x]  See flow sheet : cutting at the counter standing training without UE support, performed cone reaches at counter and side stepping to work on her mobility and standing time while preparing cat food    Rationale: increase ROM, increase strength, improve coordination, improve balance and increase proprioception  to improve the patients ability to increase function and safety at home. 8 min Manual Therapy: STM to right pirformis and deep hip rotators in left sidelying   Rationale: increase ROM, increase strength and improve coordination to improve the patients ability to increase function and mobility     Modality rationale: decrease pain and increase tissue extensibility to improve the patients ability to perform daily activities pain free.     Radha Jiménez Type Additional Details       [] Estim: []Att   []Unatt    []TENS instruct                  []IFC  []Premod   []NMES                     []Other:  []w/US   []w/ice   []w/heat  Position:  Location:       []  Traction: [] Cervical       []Lumbar                       [] Prone          []Supine                       []Intermittent   []Continuous Lbs:  [] before manual  [] after manual  []w/heat    []  Ultrasound: []Continuous   [] Pulsed                       at: []1MHz   []3MHz Location:  W/cm2:    [] Paraffin         Location:   []w/heat   10 []  Ice     [x]  Heat  []  Ice massage Position:supine  Location: back/hip    []  Laser  []  Other: Position:  Location:      []  Vasopneumatic Device Pressure:       [] lo [] med [] hi   Temperature:      [x] Skin assessment post-treatment:  [x]intact []redness- no adverse reaction    []redness  adverse reaction:     With   []? TE   []? TA   []? neuro   []? other: Patient Education: [x]? Review HEP    []? Progressed/Changed HEP based on:   []? positioning   []? body mechanics   []? transfers   []? heat/ice application    []? other:       Other Objective/Functional Measures: -     Pain Level (0-10 scale) post treatment: 1/10     ASSESSMENT/Changes in Function:   Pt doing better today. Able to address standing and working on cutting her cat's food by watching her cut with a knife and perform standing without UE support for 5 minutes at a time. Worked on function at the counter with cone reaches and side stepping. One more session scheduled. Will plan to transition to HEP at this time.    Patient will continue to benefit from skilled PT services to modify and progress therapeutic interventions, address functional mobility deficits, address ROM deficits, address strength deficits, analyze and address soft tissue restrictions, analyze and cue movement patterns, analyze and modify body mechanics/ergonomics and assess and modify postural abnormalities to attain remaining goals.     [x]? See Plan of Care  []? See progress note/recertification  []? See Discharge Summary         Progress towards goals / Updated goals:  Short Term Goals: To be accomplished in 4 weeks:              Patient will be able to perform 5x sit to  15 seconds with use of arms to promote functional mobility PROGRESSING              Patient will demonstrate good safety awareness with assistive device while in the clinic MET  Long Term Goals: To be accomplished in 8 weeks:              Patient will be able to assist with putting away the groceries without any increase in s/s PROGRESSING              Patient will be able to prepare her food standing without any increase in s/s PROGRESSING  Frequency / Duration: Patient to be seen 2 times per week for 8 weeks.     PLAN  []? Upgrade activities as tolerated     [x]? Continue plan of care  []? Update interventions per flow sheet       []? Discharge due to:_  []?   Other:_        Julianne Burger, PT, DPT1/25/2021

## 2021-01-27 ENCOUNTER — HOSPITAL ENCOUNTER (OUTPATIENT)
Dept: PHYSICAL THERAPY | Age: 86
Discharge: HOME | End: 2021-01-27
Payer: MEDICARE

## 2021-01-27 PROCEDURE — 97110 THERAPEUTIC EXERCISES: CPT | Performed by: PHYSICAL THERAPIST

## 2021-01-27 PROCEDURE — 97530 THERAPEUTIC ACTIVITIES: CPT | Performed by: PHYSICAL THERAPIST

## 2021-01-27 PROCEDURE — 97140 MANUAL THERAPY 1/> REGIONS: CPT | Performed by: PHYSICAL THERAPIST

## 2021-01-28 NOTE — PROGRESS NOTES
PT DAILY TREATMENT NOTE - [facility] 2-15    Patient Name: Sheila Tobar  Date:2021  : 7/3/1932  [x]  Patient  Verified  Payor: [address] [place] [address] / Plan: [facility] MEDICARE COMPLETE / Product Type: Managed Care Medicare /    In time: 12:45p Out time:1:33p  Total Treatment Time (min): 48  Total Timed Codes (min): 48  1:1 Treatment Time ( only): 37  Visit #:  13    Treatment Area: Low back pain [M54.5]  Right hip pain [M25.551]    SUBJECTIVE  Pain Level (0-10 scale): 2/10  Any medication changes, allergies to medications, adverse drug reactions, diagnosis change, or new procedure performed?: [x]? No    []? Yes (see summary sheet for update)  Subjective functional status/changes:   []? No changes reported  Pt and daughter are reporting improvement in her energy level and less complaint of back pain and hip pain. She has been able to do her exercises with the visiting dashawn 3x per week.       OBJECTIVE     20 min Therapeutic Exercise:  [x]? See flow sheet : Manual stretching HS and piriformis   Rationale: increase ROM, increase strength and improve coordination to improve the patients ability to increase function and mobility    15 min Therapeutic Activity:  [x]  See flow sheet : cutting at the counter standing training without UE support, performed cone reaches at counter and side stepping to work on her mobility and standing time while preparing cat food    Rationale: increase ROM, increase strength, improve coordination, improve balance and increase proprioception  to improve the patients ability to increase function and safety at home. 8 min Manual Therapy: STM to right pirformis and deep hip rotators in left sidelying   Rationale: increase ROM, increase strength and improve coordination to improve the patients ability to increase function and mobility     Modality rationale: decrease pain and increase tissue extensibility to improve the patients ability to perform daily activities pain free. Min Type Additional Details       [] Estim: []Att   []Unatt    []TENS instruct                  []IFC  []Premod   []NMES                     []Other:  []w/US   []w/ice   []w/heat  Position:  Location:       []  Traction: [] Cervical       []Lumbar                       [] Prone          []Supine                       []Intermittent   []Continuous Lbs:  [] before manual  [] after manual  []w/heat    []  Ultrasound: []Continuous   [] Pulsed                       at: []1MHz   []3MHz Location:  W/cm2:    [] Paraffin         Location:   []w/heat   10 []  Ice     [x]  Heat  []  Ice massage Position:supine  Location: back/hip    []  Laser  []  Other: Position:  Location:      []  Vasopneumatic Device Pressure:       [] lo [] med [] hi   Temperature:      [x] Skin assessment post-treatment:  [x]intact []redness- no adverse reaction    []redness  adverse reaction:     With   []? TE   []? TA   []? neuro   []? other: Patient Education: [x]? Review HEP    []? Progressed/Changed HEP based on:   []? positioning   []? body mechanics   []? transfers   []? heat/ice application    []? other:       Other Objective/Functional Measures: -     Pain Level (0-10 scale) post treatment: 1/10     ASSESSMENT/Changes in Function:   Pt is progressing tolerance with exercises, bike, and is able to perform more standing houshold activities with improved confidence and energy. Will plan on continuing in PT for 4 more weeks  Patient will continue to benefit from skilled PT services to modify and progress therapeutic interventions, address functional mobility deficits, address ROM deficits, address strength deficits, analyze and address soft tissue restrictions, analyze and cue movement patterns, analyze and modify body mechanics/ergonomics and assess and modify postural abnormalities to attain remaining goals.     [x]? See Plan of Care  []? See progress note/recertification  []?   See Discharge Summary         Progress towards goals / Updated goals:  Short Term Goals: To be accomplished in 4 weeks:              Patient will be able to perform 5x sit to  15 seconds with use of arms to promote functional mobility PROGRESSING              Patient will demonstrate good safety awareness with assistive device while in the clinic MET  Long Term Goals: To be accomplished in 8 weeks:              Patient will be able to assist with putting away the groceries without any increase in s/s PROGRESSING              Patient will be able to prepare her food standing without any increase in s/s PROGRESSING  Frequency / Duration: Patient to be seen 2 times per week for 8 weeks.     PLAN  []? Upgrade activities as tolerated     [x]? Continue plan of care  []? Update interventions per flow sheet       []? Discharge due to:_  []?   Other:_        Aleja Coles, PT, DPT1/28/2021

## 2021-02-01 ENCOUNTER — HOSPITAL ENCOUNTER (OUTPATIENT)
Dept: PHYSICAL THERAPY | Age: 86
Discharge: HOME | End: 2021-02-01
Payer: MEDICARE

## 2021-02-01 PROCEDURE — 97530 THERAPEUTIC ACTIVITIES: CPT

## 2021-02-01 PROCEDURE — 97110 THERAPEUTIC EXERCISES: CPT

## 2021-02-01 PROCEDURE — 97140 MANUAL THERAPY 1/> REGIONS: CPT

## 2021-02-01 NOTE — PROGRESS NOTES
PT DAILY TREATMENT NOTE - [facility] 2-15    Patient Name: Berta Esqueda  Date:2021  : 7/3/1932  [x]  Patient  Verified  Payor: [employer] MEDICARE COMPLETE / [facility] / Product Type: Managed Care Medicare /    In time:11:25A  Out time:12:30P  Total Treatment Time (min): 65  Total Timed Codes (min): 65  1:1 Treatment Time ( [address] only): 60   Visit #:  14    Treatment Area: Low back pain [M54.5]  Right hip pain [M25.551]    SUBJECTIVE  Pain Level (0-10 scale): 0/10  Any medication changes, allergies to medications, adverse drug reactions, diagnosis change, or new procedure performed?: [x] No    [] Yes (see summary sheet for update)  Subjective functional status/changes:   [] No changes reported  Pt reports no ain in her hip today. Does not hurt when she walks. OBJECTIVE      40 min Therapeutic Exercise:  [x]? ? See flow sheet : Manual stretching HS and piriformis   Rationale: increase ROM, increase strength and improve coordination to improve the patients ability to increase function and mobility     15 min Therapeutic Activity:  [x]?   See flow sheet : cutting at the counter standing training without UE support, performed cone reaches at counter and side stepping to work on her mobility and standing time while preparing cat food    Rationale: increase ROM, increase strength, improve coordination, improve balance and increase proprioception  to improve the patients ability to increase function and safety at home.      10 min Manual Therapy: STM to right pirformis and deep hip rotators in left sidelying   Rationale: increase ROM, increase strength and improve coordination to improve the patients ability to increase function and mobility    With   [] TE   [] TA   [] Neuro   [] SC   [] other: Patient Education: [x] Review HEP    [] Progressed/Changed HEP based on:   [] positioning   [] body mechanics   [] transfers   [] heat/ice application    [] other:      Other Objective/Functional Measures: --     Pain Level (0-10 scale) post treatment: 0/10    ASSESSMENT/Changes in Function:   Pt tolerated session well today. Added foam marches and foam standing with cone reaches to work on balance work today. Pt tolerated well. Patient will continue to benefit from skilled PT services to modify and progress therapeutic interventions, address functional mobility deficits, address ROM deficits, address strength deficits, analyze and address soft tissue restrictions, analyze and cue movement patterns, analyze and modify body mechanics/ergonomics and assess and modify postural abnormalities to attain remaining goals. []  See Plan of Care  []  See progress note/recertification  []  See Discharge Summary         Progress towards goals / Updated goals:  Progress towards goals / Updated goals:  Short Term Goals: To be accomplished in 4 weeks:              Patient will be able to perform 5x sit to  15 seconds with use of arms to promote functional mobility PROGRESSING              Patient will demonstrate good safety awareness with assistive device while in the clinic MET  Long Term Goals: To be accomplished in 8 weeks:              Patient will be able to assist with putting away the groceries without any increase in s/s PROGRESSING              Patient will be able to prepare her food standing without any increase in s/s PROGRESSING  Frequency / Duration: Patient to be seen 2 times per week for 8 weeks.     PLAN  [x]  Upgrade activities as tolerated     [x]  Continue plan of care  []  Update interventions per flow sheet       []  Discharge due to:_  []  Other:_      Kade Johns PTA, [facility] 2/1/2021

## 2021-02-03 ENCOUNTER — HOSPITAL ENCOUNTER (OUTPATIENT)
Dept: PHYSICAL THERAPY | Age: 86
Discharge: HOME | End: 2021-02-03
Payer: MEDICARE

## 2021-02-03 PROCEDURE — 97110 THERAPEUTIC EXERCISES: CPT | Performed by: PHYSICAL THERAPIST

## 2021-02-03 NOTE — PROGRESS NOTES
PT DAILY TREATMENT NOTE - [facility] 2-15    Patient Name: Toney Fabry  Date:2/3/2021  : 7/3/1932  [x]  Patient  Verified  Payor: [employer]ARP MEDICARE COMPLETE / [facility] / Product Type: Managed Care Medicare /    In time:11:00A  Out time:1144A  Total Treatment Time (min): 44  Total Timed Codes (min): 44  1:1 Treatment Time ( only): 34  Visit #:  15    Treatment Area: Low back pain [M54.5]  Right hip pain [M25.551]    SUBJECTIVE  Pain Level (0-10 scale): 0/10  Any medication changes, allergies to medications, adverse drug reactions, diagnosis change, or new procedure performed?: [x] No    [] Yes (see summary sheet for update)  Subjective functional status/changes:   [] No changes reported  Pt reports is is doing good. HIp feels pretty good. No wearing a pain patch. OBJECTIVE      40 min Therapeutic Exercise:  [x]? ? See flow sheet : Manual stretching HS and piriformis   Rationale: increase ROM, increase strength and improve coordination to improve the patients ability to increase function and mobility          4 min Manual Therapy: STM to right pirformis and deep hip rotators in left sidelying   Rationale: increase ROM, increase strength and improve coordination to improve the patients ability to increase function and mobility    With   [] TE   [] TA   [] Neuro   [] SC   [] other: Patient Education: [x] Review HEP    [] Progressed/Changed HEP based on:   [] positioning   [] body mechanics   [] transfers   [] heat/ice application    [] other:      Other Objective/Functional Measures: --     Pain Level (0-10 scale) post treatment: 0/10    ASSESSMENT/Changes in Function:   Pt was fatigued following session, improvement noted with side stepping and sit to stand. COntinue to progress exercises to appropriately challenge balance and strength.    Patient will continue to benefit from skilled PT services to modify and progress therapeutic interventions, address functional mobility deficits, address ROM deficits, address strength deficits, analyze and address soft tissue restrictions, analyze and cue movement patterns, analyze and modify body mechanics/ergonomics and assess and modify postural abnormalities to attain remaining goals. []  See Plan of Care  []  See progress note/recertification  []  See Discharge Summary         Progress towards goals / Updated goals:  Progress towards goals / Updated goals:  Short Term Goals: To be accomplished in 4 weeks:              Patient will be able to perform 5x sit to  15 seconds with use of arms to promote functional mobility PROGRESSING              Patient will demonstrate good safety awareness with assistive device while in the clinic MET  Long Term Goals: To be accomplished in 8 weeks:              Patient will be able to assist with putting away the groceries without any increase in s/s PROGRESSING              Patient will be able to prepare her food standing without any increase in s/s PROGRESSING  Frequency / Duration: Patient to be seen 2 times per week for 8 weeks.     PLAN  [x]  Upgrade activities as tolerated     [x]  Continue plan of care  []  Update interventions per flow sheet       []  Discharge due to:_  []  Other:_      Tia Varghese PT, DPT,  2/3/2021

## 2021-02-08 ENCOUNTER — HOSPITAL ENCOUNTER (OUTPATIENT)
Dept: PHYSICAL THERAPY | Age: 86
Discharge: HOME | End: 2021-02-08
Payer: MEDICARE

## 2021-02-08 PROCEDURE — 97110 THERAPEUTIC EXERCISES: CPT | Performed by: PHYSICAL THERAPIST

## 2021-02-08 NOTE — PROGRESS NOTES
PT DAILY TREATMENT NOTE - [facility] 2-15    Patient Name: Dilcia Handler  Date:2021  : 7/3/1932  [x]  Patient  Verified  Payor: [employer] MEDICARE COMPLETE / Plan: [facility] MEDICARE COMPLETE / Product Type: Managed Care Medicare /    In time:11:00A  Out time:1152A  Total Treatment Time (min): 52  Total Timed Codes (min): 52  1:1 Treatment Time ( only): 52  Visit #:  16    Treatment Area: Low back pain [M54.5]  Right hip pain [M25.551]    SUBJECTIVE  Pain Level (0-10 scale): 0/10  Any medication changes, allergies to medications, adverse drug reactions, diagnosis change, or new procedure performed?: [x] No    [] Yes (see summary sheet for update)  Subjective functional status/changes:   [] No changes reported  Pt reports she is doing good, hip is feeling better, overall feeling stronger. OBJECTIVE      52 min Therapeutic Exercise:  [x]? ? See flow sheet : Manual stretching HS and piriformis   Rationale: increase ROM, increase strength and improve coordination to improve the patients ability to increase function and mobility          With   [] TE   [] TA   [] Neuro   [] SC   [] other: Patient Education: [x] Review HEP    [] Progressed/Changed HEP based on:   [] positioning   [] body mechanics   [] transfers   [] heat/ice application    [] other:      Other Objective/Functional Measures:  Sit to stand test 5 reps was completed without use of her UE today in 35 seconds.       LOWER QUARTER                            MUSCLE STRENGTH  KEY                                                                             R                      L  0 - No Contraction                   Knee ext                      5                        5  1 - Trace                                           flex                     5                        5  2 - Poor                                   Hip ext                         NT                   NT  3 - Fair                                           flex                         4                      4  4 - Good                                        abd                        5 (seated)        5(seated)  5 - Normal                                     add                        5 (seated)        5 (seated)                                                  Ankle DF                     5                      5                                                            PF                     5                      5     Pain Level (0-10 scale) post treatment: 0/10    ASSESSMENT/Changes in Function:   Level of improvement was reported to be over 70% since initial session. She reports ease of getting up and down from bed at night with increased tolerance preparing food and car food at the kitchen counter. She has progressed well with strengthening and compliance with HEP. She is able to perform the recumbent bike for 5 minutes without rest, as she was unable to perform initially. Her daughter is pleased with progress. Sit to stand test 5 reps was completed without use of her UE today in 35 seconds. She was unable tot stand without pushing off the first day. Overall seeing significant improvement in strength, function, and pain. I would recommend her to continue for 3-4 more weeks in order to maximize her strength gains and safety at home. Patient will continue to benefit from skilled PT services to modify and progress therapeutic interventions, address functional mobility deficits, address ROM deficits, address strength deficits, analyze and address soft tissue restrictions, analyze and cue movement patterns, analyze and modify body mechanics/ergonomics and assess and modify postural abnormalities to attain remaining goals.      []  See Plan of Care  []  See progress note/recertification  []  See Discharge Summary       Progress towards goals / Updated goals:  Short Term Goals: To be accomplished in 4 weeks:              LETTY will be able to perform 5x sit to  15 seconds with use of arms to promote functional mobility MET (Able to stand without UE support 35 seconds 5 reps)              Patient will demonstrate good safety awareness with assistive device while in the clinic MET  Long Term Goals: To be accomplished in 8 weeks:              Patient will be able to assist with putting away the groceries without any increase in s/s PROGRESSING              Patient will be able to prepare her food standing without any increase in s/s METFrequency / Duration: Patient to be seen 2 times per week for 8 weeks.     PLAN  [x]  Upgrade activities as tolerated     [x]  Continue plan of care  []  Update interventions per flow sheet       []  Discharge due to:_  []  Other:_      Christopher Render, PT, DPT,  2/8/2021

## 2021-02-08 NOTE — PROGRESS NOTES
Physical Therapy at [facility],   a part of [address], [address]  [place] [address]  Phone: [phone] Fax: [phone]    Continued Plan of Care/ Re-certification for Physical Therapy Services    Name: Stephany Carrera   : 7/3/1932   MD: Omar Sullivan MD       Treatment Diagnosis: Low back pain [M54.5]  Right hip pain [M25.551]  Start of Care: 21    Visits from Start of Care: 16  Missed Visits: 3    Pt has been seen 16 sessions and has shown improvement in symptoms and function. Level of improvement was reported to be over 70% since initial session. She reports ease of getting up and down from bed at night with increased tolerance preparing food and car food at the kitchen counter. She has progressed well with strengthening and compliance with HEP.  She is able to perform the recumbent bike for 5 minutes without rest, as she was unable to perform initially. Her daughter is pleased with progress. Sit to stand test 5 reps was completed without use of her UE today in 35 seconds. She was unable tot stand without pushing off the first day. Overall seeing significant improvement in strength, function, and pain. I would recommend her to continue for 3-4 more weeks in order to maximize her strength gains and safety at home.  Patient will continue to benefit from skilled PT services to modify and progress therapeutic interventions, address functional mobility deficits, address ROM deficits, address strength deficits, analyze and address soft tissue restrictions, analyze and cue movement patterns, analyze and modify body mechanics/ergonomics and assess and modify postural abnormalities to attain remaining goals.            LOWER QUARTER                            MUSCLE STRENGTH  KEY                                                                             R                      L  0 - No Contraction                   Knee ext                 5                        5  1 - Trace                                           flex                     5                        5  2 - Poor                                   Hip ext                         NT                   NT  3 - Fair                                           flex                         4                      4  4 - Good                                        abd                        5 (seated)        5(seated)  5 - Normal                                     add                        5 (seated)        5 (seated)                                                  Ankle DF                     5                      5                                                            PF                     5                      5    Progress towards goals / Updated goals:  Short Term Goals: To be accomplished in 4 weeks:              BHUPINDER will be able to perform 5x sit to  15 seconds with use of arms to promote functional mobility MET (Able to stand without UE support 35 seconds 5 reps)              Patient will demonstrate good safety awareness with assistive device while in the clinic MET  Long Term Goals: To be accomplished in 8 weeks:              Patient will be able to assist with putting away the groceries without any increase in s/s PROGRESSING              Patient will be able to prepare her food standing without any increase in s/s MET    Key functional changes: Improved sit to stand and negotiation of apartment with safety. Able to prepare food at counter without increased pain.           Problem List: pain affecting function, decrease ROM, decrease strength, impaired gait/ balance, decrease ADL/ functional abilitiies, decrease activity tolerance, decrease flexibility/ joint mobility and decrease transfer abilities    Treatment Plan: Therapeutic exercise, Therapeutic activities, Neuromuscular re-education, Physical agent/modality, Gait/balance training, Manual therapy, Patient education, Self Care training, Functional mobility training and Home safety training     Goals for this certification period to be accomplished in 8 treatments:  1)Pt will be Independent with HEP  2) Pt will be able to perform sit to stand without UE  In < 30 seconds  3) Pt will be able to bike for 7 minutes without rest  4) Pt will be able to stand NBOS 1 minute without UE support. 5) pt will help unload groceries with her daughter consistently without pain. Frequency / Duration: Patient to be seen 2 times per week for 4 weeks:      Certification Period: 2/8/2021 -5/8/2021    Justice Amos PT, DPT 2/8/2021    ________________________________________________________________________  I certify that the above Therapy Services are being furnished while the patient is under my care. I agree with the treatment plan and certify that this therapy is necessary. Y or N I have read the above and request that my patient continue as recommended.   Y or N I have read the above report and request that my patient continue therapy with the following changes/special instructions  Y or N I have read the above report and request that my patient be discharged from therapy    Physician's Signature:_________________ Date:___________Time:__________

## 2021-02-09 NOTE — PROGRESS NOTES
Physical Therapy at [facility],   a part of [facility], [address] [place] [address], [address] [place] [address]  Phone: [phone] Fax: [phone]    Progress Note    Name: Natasha Franco   : 7/3/1932   MD: Brendan Montgomery MD       Treatment Diagnosis: Low back pain [M54.5]  Right hip pain [M25.551]  Start of Care: 21    Visits from Start of Care: 6  Missed Visits: 3    Pt has been seen 6 sessions and has shown improvement in symptoms and function. Level of improvement was reported to be over 40% since initial session. There was a delay in pt care due to pt falling and fracturing ribs. She is starting to progress back to more standing exercises as she tolerates. Overall she is feeling PT is very helpful and daughter is seeing a difference in her mobility at home.  Patient will continue to benefit from skilled PT services to modify and progress therapeutic interventions, address functional mobility deficits, address ROM deficits, address strength deficits, analyze and address soft tissue restrictions, analyze and cue movement patterns, analyze and modify body mechanics/ergonomics and assess and modify postural abnormalities to attain remaining goals.            Progress towards goals / Updated goals:  Short Term Goals: To be accomplished in 4 weeks:              Patient will be able to perform 5x sit to  15 seconds with use of arms to promote functional mobility Progressing (working on sit to stand without UE)              Patient will demonstrate good safety awareness with assistive device while in the clinic MET  Long Term Goals: To be accomplished in 8 weeks:              Patient will be able to assist with putting away the groceries without any increase in s/s PROGRESSING              Patient will be able to prepare her food standing without any increase in s/s PROGRESSING        Devonte Mullins, PT, DPT 2021 ________________________________________________________________________  NOTE TO PHYSICIAN:  Please complete the following and fax to: [facility]: Fax: [phone]. Marcio Fairchild Retain this original for your records. If you are unable to process this request in 24 hours, please contact our office.        ____ I have read the above report and request that my patient continue therapy with the following changes/special instructions:  ____ I have read the above report and request that my patient be discharged from therapy    Physician's Signature:_________________ Date:___________Time:__________

## 2021-02-10 ENCOUNTER — APPOINTMENT (OUTPATIENT)
Dept: PHYSICAL THERAPY | Age: 86
End: 2021-02-10
Payer: MEDICARE

## 2021-02-12 RX ORDER — ALENDRONATE SODIUM 35 MG/1
35 TABLET ORAL
Qty: 4 TAB | Refills: 0 | Status: SHIPPED | OUTPATIENT
Start: 2021-02-12 | End: 2021-04-02

## 2021-02-12 NOTE — TELEPHONE ENCOUNTER
Patient has an appointment with Dr. Anna Bravo on 02/23/2021. Can patient have a refill until established care with Dr. Cullen Buck? Last visit 11/13/2020 AZ[facility] Chowdhury   Next appointment 02/23/2021 Dr. Cullen Buck   Previous refill encounter(s)   02/24/2020 Fosamax #12 with 3 refills     Requested Prescriptions     Pending Prescriptions Disp Refills    alendronate (FOSAMAX) 35 mg tablet 4 Tab 0     Sig: Take 1 Tab by mouth every seven (7) days.

## 2021-02-15 ENCOUNTER — HOSPITAL ENCOUNTER (OUTPATIENT)
Dept: PHYSICAL THERAPY | Age: 86
Discharge: HOME | End: 2021-02-15
Payer: MEDICARE

## 2021-02-15 PROCEDURE — 97110 THERAPEUTIC EXERCISES: CPT | Performed by: PHYSICAL THERAPIST

## 2021-02-15 PROCEDURE — 97112 NEUROMUSCULAR REEDUCATION: CPT | Performed by: PHYSICAL THERAPIST

## 2021-02-15 NOTE — PROGRESS NOTES
PT DAILY TREATMENT NOTE - [facility] 2-15    Patient Name: Huyen Davis  Date:2/15/2021  : 7/3/1932  [x]  Patient  Verified  Payor: Mango Mariano / Plan: [facility] [employer] MEDICARE COMPLETE / Product Type: Managed Care Medicare /    In time:11:00A  Out time:1148A  Total Treatment Time (min): 48  Total Timed Codes (min): 48  1:1 Treatment Time ([facility] only): 40  Visit #:  17    Treatment Area: Low back pain [M54.5]  Right hip pain [M25.551]    SUBJECTIVE  Pain Level (0-10 scale): 0/10  Any medication changes, allergies to medications, adverse drug reactions, diagnosis change, or new procedure performed?: [x] No    [] Yes (see summary sheet for update)  Subjective functional status/changes:   [] No changes reported  Pt reports she doesn't have much pain in her hip with walking currently. OBJECTIVE      38 min Therapeutic Exercise:  [x]? ? See flow sheet : Manual stretching HS and piriformis   Rationale: increase ROM, increase strength and improve coordination to improve the patients ability to increase function and mobility     10 min Neuromuscular Re-education: NBOS balance on foam, rockerboard balance and rock   Rationale: increase ROM, increase strength and improve coordination to improve the patients ability to increase function and mobility        With   [] TE   [] TA   [] Neuro   [] SC   [] other: Patient Education: [x] Review HEP    [] Progressed/Changed HEP based on:   [] positioning   [] body mechanics   [] transfers   [] heat/ice application    [] other:      Other Objective/Functional Measures: -     Pain Level (0-10 scale) post treatment: 0/10    ASSESSMENT/Changes in Function:   Pt did well today, added rockerboard balance and rock for proprioception and  Stabilization. Increased reps of sit to stand, fatigued following.    Patient will continue to benefit from skilled PT services to modify and progress therapeutic interventions, address functional mobility deficits, address ROM deficits, address strength deficits, analyze and address soft tissue restrictions, analyze and cue movement patterns, analyze and modify body mechanics/ergonomics and assess and modify postural abnormalities to attain remaining goals. []  See Plan of Care  []  See progress note/recertification  []  See Discharge Summary       Progress towards goals / Updated goals:  Short Term Goals: To be accomplished in 4 weeks:              Patient will be able to perform 5x sit to  15 seconds with use of arms to promote functional mobility MET (Able to stand without UE support 35 seconds 5 reps)              Patient will demonstrate good safety awareness with assistive device while in the clinic MET  Long Term Goals: To be accomplished in 8 weeks:              Patient will be able to assist with putting away the groceries without any increase in s/s PROGRESSING              Patient will be able to prepare her food standing without any increase in s/s MET  Frequency / Duration: Patient to be seen 2 times per week for 8 weeks.     PLAN  [x]  Upgrade activities as tolerated     [x]  Continue plan of care  []  Update interventions per flow sheet       []  Discharge due to:_  []  Other:_      Aleja Coles, PT, DPT,  2/15/2021

## 2021-02-17 ENCOUNTER — HOSPITAL ENCOUNTER (OUTPATIENT)
Dept: PHYSICAL THERAPY | Age: 86
Discharge: HOME | End: 2021-02-17
Payer: MEDICARE

## 2021-02-17 PROCEDURE — 97112 NEUROMUSCULAR REEDUCATION: CPT | Performed by: PHYSICAL THERAPIST

## 2021-02-17 PROCEDURE — 97110 THERAPEUTIC EXERCISES: CPT | Performed by: PHYSICAL THERAPIST

## 2021-02-17 NOTE — PROGRESS NOTES
PT DAILY TREATMENT NOTE - [facility] 2-15    Patient Name: Bishop Nash  Date:2021  : 7/3/1932  [x]  Patient  Verified  Payor: [facility] MEDICARE COMPLETE / Plan: [facility] MEDICARE COMPLETE / Product Type: Managed Care Medicare /    In time:11:00A  Out time:1145A  Total Treatment Time (min): 45  Total Timed Codes (min): 45  1:1 Treatment Time ([facility] only): 40  Visit #:  18    Treatment Area: Low back pain [M54.5]  Right hip pain [M25.551]    SUBJECTIVE  Pain Level (0-10 scale): 0/10  Any medication changes, allergies to medications, adverse drug reactions, diagnosis change, or new procedure performed?: [x] No    [] Yes (see summary sheet for update)  Subjective functional status/changes:   [] No changes reported  Pt reports she is doing well. OBJECTIVE      35 min Therapeutic Exercise:  [x]? ? See flow sheet : Manual stretching HS and piriformis   Rationale: increase ROM, increase strength and improve coordination to improve the patients ability to increase function and mobility     10 min Neuromuscular Re-education: NBOS balance on foam, rockerboard balance and rock   Rationale: increase ROM, increase strength and improve coordination to improve the patients ability to increase function and mobility        With   [] TE   [] TA   [] Neuro   [] SC   [] other: Patient Education: [x] Review HEP    [] Progressed/Changed HEP based on:   [] positioning   [] body mechanics   [] transfers   [] heat/ice application    [] other:      Other Objective/Functional Measures: -     Pain Level (0-10 scale) post treatment: 0/10    ASSESSMENT/Changes in Function:   Pt did well today, added side stepping without UE support. Had increased difficulty going to the left but improved with verbal cueing and visual cues from the mirror. Overall pt doing better with daily activities.    Patient will continue to benefit from skilled PT services to modify and progress therapeutic interventions, address functional mobility deficits, address ROM deficits, address strength deficits, analyze and address soft tissue restrictions, analyze and cue movement patterns, analyze and modify body mechanics/ergonomics and assess and modify postural abnormalities to attain remaining goals. [x]  See Plan of Care  []  See progress note/recertification  []  See Discharge Summary       Progress towards goals / Updated goals:  Short Term Goals: To be accomplished in 4 weeks:              Patient will be able to perform 5x sit to  15 seconds with use of arms to promote functional mobility MET (Able to stand without UE support 35 seconds 5 reps)              Patient will demonstrate good safety awareness with assistive device while in the clinic MET  Long Term Goals: To be accomplished in 8 weeks:              Patient will be able to assist with putting away the groceries without any increase in s/s PROGRESSING              Patient will be able to prepare her food standing without any increase in s/s MET  Frequency / Duration: Patient to be seen 2 times per week for 8 weeks.     PLAN  [x]  Upgrade activities as tolerated     [x]  Continue plan of care  []  Update interventions per flow sheet       []  Discharge due to:_  []  Other:_      Dhaval Graves, PT, DPT,  2/17/2021

## 2021-02-22 ENCOUNTER — HOSPITAL ENCOUNTER (OUTPATIENT)
Dept: PHYSICAL THERAPY | Age: 86
Discharge: HOME | End: 2021-02-22
Payer: MEDICARE

## 2021-02-22 PROCEDURE — 97110 THERAPEUTIC EXERCISES: CPT | Performed by: PHYSICAL THERAPIST

## 2021-02-22 PROCEDURE — 97112 NEUROMUSCULAR REEDUCATION: CPT | Performed by: PHYSICAL THERAPIST

## 2021-02-22 NOTE — PROGRESS NOTES
PT DAILY TREATMENT NOTE - [facility] 2-15    Patient Name: Kimberlyn Cancel  Date:2021  : 7/3/1932  [x]  Patient  Verified  Payor: Katty Arguello / Plan: [facility] MEDICARE COMPLETE / Product Type: Managed Care Medicare /    In time:11:00A  Out time:1157A  Total Treatment Time (min): 57  Total Timed Codes (min): 57  1:1 Treatment Time (MC only): 47  Visit #:  19    Treatment Area: Low back pain [M54.5]  Right hip pain [M25.551]    SUBJECTIVE  Pain Level (0-10 scale): 0/10  Any medication changes, allergies to medications, adverse drug reactions, diagnosis change, or new procedure performed?: [x] No    [] Yes (see summary sheet for update)  Subjective functional status/changes:   [] No changes reported  Pt reports she is doing well. OBJECTIVE      42 min Therapeutic Exercise:  [x]? ? See flow sheet : Manual stretching HS and piriformis   Rationale: increase ROM, increase strength and improve coordination to improve the patients ability to increase function and mobility     15 min Neuromuscular Re-education: NBOS balance on foam, rockerboard balance and rock   Rationale: increase ROM, increase strength and improve coordination to improve the patients ability to increase function and mobility        With   [] TE   [] TA   [] Neuro   [] SC   [] other: Patient Education: [x] Review HEP    [] Progressed/Changed HEP based on:   [] positioning   [] body mechanics   [] transfers   [] heat/ice application    [] other:      Other Objective/Functional Measures: -     Pain Level (0-10 scale) post treatment: 0/10    ASSESSMENT/Changes in Function:   Pt able to complete 30 minutes of standing exercises today and did well. Encouraged her to actively squeeze her glut when standing to improve trunk posture and LE strength and stability. Right stance in SL improved with active contraction. One more session prior to discharge.    Patient will continue to benefit from skilled PT services to modify and progress therapeutic interventions, address functional mobility deficits, address ROM deficits, address strength deficits, analyze and address soft tissue restrictions, analyze and cue movement patterns, analyze and modify body mechanics/ergonomics and assess and modify postural abnormalities to attain remaining goals. [x]  See Plan of Care  []  See progress note/recertification  []  See Discharge Summary       Progress towards goals / Updated goals:  Short Term Goals: To be accomplished in 4 weeks:              Patient will be able to perform 5x sit to  15 seconds with use of arms to promote functional mobility MET (Able to stand without UE support 35 seconds 5 reps)              Patient will demonstrate good safety awareness with assistive device while in the clinic MET  Long Term Goals: To be accomplished in 8 weeks:              Patient will be able to assist with putting away the groceries without any increase in s/s PROGRESSING              Patient will be able to prepare her food standing without any increase in s/s MET  Frequency / Duration: Patient to be seen 2 times per week for 8 weeks.     PLAN  [x]  Upgrade activities as tolerated     [x]  Continue plan of care  []  Update interventions per flow sheet       []  Discharge due to:_  []  Other:_      Joslyn Milian, PT, DPT,  2/22/2021

## 2021-02-23 ENCOUNTER — OFFICE VISIT (OUTPATIENT)
Dept: INTERNAL MEDICINE CLINIC | Age: 86
End: 2021-02-23
Payer: MEDICARE

## 2021-02-23 VITALS
BODY MASS INDEX: 22.37 KG/M2 | OXYGEN SATURATION: 95 % | HEIGHT: 68 IN | TEMPERATURE: 97.9 F | SYSTOLIC BLOOD PRESSURE: 132 MMHG | RESPIRATION RATE: 18 BRPM | HEART RATE: 59 BPM | WEIGHT: 147.6 LBS | DIASTOLIC BLOOD PRESSURE: 60 MMHG

## 2021-02-23 DIAGNOSIS — R26.81 GAIT INSTABILITY: ICD-10-CM

## 2021-02-23 DIAGNOSIS — N32.81 OAB (OVERACTIVE BLADDER): ICD-10-CM

## 2021-02-23 DIAGNOSIS — Z96.641 HISTORY OF RIGHT HIP REPLACEMENT: ICD-10-CM

## 2021-02-23 DIAGNOSIS — M15.9 PRIMARY OSTEOARTHRITIS INVOLVING MULTIPLE JOINTS: ICD-10-CM

## 2021-02-23 DIAGNOSIS — E78.00 PURE HYPERCHOLESTEROLEMIA: ICD-10-CM

## 2021-02-23 DIAGNOSIS — R41.3 MEMORY IMPAIRMENT: ICD-10-CM

## 2021-02-23 DIAGNOSIS — M80.00XD AGE-RELATED OSTEOPOROSIS WITH CURRENT PATHOLOGICAL FRACTURE WITH ROUTINE HEALING: ICD-10-CM

## 2021-02-23 DIAGNOSIS — I10 ESSENTIAL HYPERTENSION: Primary | ICD-10-CM

## 2021-02-23 DIAGNOSIS — Z00.00 MEDICARE ANNUAL WELLNESS VISIT, SUBSEQUENT: ICD-10-CM

## 2021-02-23 PROCEDURE — G8420 CALC BMI NORM PARAMETERS: HCPCS | Performed by: INTERNAL MEDICINE

## 2021-02-23 PROCEDURE — 1090F PRES/ABSN URINE INCON ASSESS: CPT | Performed by: INTERNAL MEDICINE

## 2021-02-23 PROCEDURE — G8536 NO DOC ELDER MAL SCRN: HCPCS | Performed by: INTERNAL MEDICINE

## 2021-02-23 PROCEDURE — G8427 DOCREV CUR MEDS BY ELIG CLIN: HCPCS | Performed by: INTERNAL MEDICINE

## 2021-02-23 PROCEDURE — G8510 SCR DEP NEG, NO PLAN REQD: HCPCS | Performed by: INTERNAL MEDICINE

## 2021-02-23 PROCEDURE — G0439 PPPS, SUBSEQ VISIT: HCPCS | Performed by: INTERNAL MEDICINE

## 2021-02-23 PROCEDURE — 99204 OFFICE O/P NEW MOD 45 MIN: CPT | Performed by: INTERNAL MEDICINE

## 2021-02-23 PROCEDURE — 1101F PT FALLS ASSESS-DOCD LE1/YR: CPT | Performed by: INTERNAL MEDICINE

## 2021-02-23 NOTE — PROGRESS NOTES
Schedule of Personalized Health Plan  (Provide Copy to Patient)  The best way to stay healthy is to live a healthy lifestyle. A healthy lifestyle includes regular exercise, eating a well-balanced diet, keeping a healthy weight and not smoking. Regular physical exams and screening tests are another important way to take care of yourself. Preventive exams provided by health care providers can find health problems early when treatment works best and can keep you from getting certain diseases or illnesses. Preventive services include exams, lab tests, screenings, shots, monitoring and information to help you take care of your own health. All people over 65 should have a pneumonia shot. Pneumonia shots are usually only needed once in a lifetime unless your doctor decides differently. All people over 65 should have a yearly flu shot. People over 65 are at medium to high risk for Hepatitis B. Three shots are needed for complete protection. In addition to your physical exam, some screening tests are recommended:    Bone mass measurement (dexa scan) is recommended every two years  Diabetes Mellitus screening is recommended every year. Glaucoma is an eye disease caused by high pressure in the eye. An eye exam is recommended every year. Cardiovascular screening tests that check your cholesterol and other blood fat (lipid) levels are recommended every five years. Colorectal Cancer screening tests help to find pre-cancerous polyps (growths in the colon) so they can be removed before they turn into cancer. Tests ordered for screening depend on your personal and family history risk factors.     Screening for Breast Cancer is recommended yearly with a mammogram.    Screening for Cervical Cancer is recommended every two years (annually for certain risk factors, such as previous history of STD or abnormal PAP in past 7 years), with a Pelvic Exam with PAP    Here is a list of your current Health Maintenance items with a due date:  Health Maintenance   Topic Date Due    Shingrix Vaccine Age 49> (1 of 2) 07/03/1982    GLAUCOMA SCREENING Q2Y  12/31/2017    DTaP/Tdap/Td series (2 - Td) 05/27/2020    COVID-19 Vaccine (2 of 2 - Moderna series) 02/19/2021    Lipid Screen  02/11/2021    Medicare Yearly Exam  02/24/2022    Bone Densitometry (Dexa) Screening  Completed    Flu Vaccine  Completed    Pneumococcal 65+ years  Completed       This is the Subsequent Medicare Annual Wellness Exam, performed 12 months or more after the Initial AWV or the last Subsequent AWV    I have reviewed the patient's medical history in detail and updated the computerized patient record. Depression Risk Factor Screening:     3 most recent PHQ Screens 2/23/2021   Little interest or pleasure in doing things Not at all   Feeling down, depressed, irritable, or hopeless Not at all   Total Score PHQ 2 0       Alcohol Risk Screen    Do you average more than 1 drink per night or more than 7 drinks a week:  No    On any one occasion in the past three months have you have had more than 3 drinks containing alcohol:  No        Functional Ability and Level of Safety:    Hearing: Hearing is good. Activities of Daily Living: The home contains: handrails and grab bars  Patient needs help with:  transportation, shopping, preparing meals, laundry, housework, managing medications and managing money      Ambulation: with difficulty, uses a walker     Fall Risk:  Fall Risk Assessment, last 12 mths 2/23/2021   Able to walk? Yes   Fall in past 12 months? 1   Do you feel unsteady? 0   Are you worried about falling 0   Number of falls in past 12 months 1   Fall with injury?  1      Abuse Screen:  Patient is not abused       Cognitive Screening    Has your family/caregiver stated any concerns about your memory: no     Cognitive Screening: A+O x 3    Assessment/Plan   Education and counseling provided:  Are appropriate based on today's review and evaluation    Diagnoses and all orders for this visit:    1. Essential hypertension    2. Pure hypercholesterolemia    3. Age-related osteoporosis with current pathological fracture with routine healing    4. OAB (overactive bladder)    5. Gait instability    6. Memory impairment    7. Primary osteoarthritis involving multiple joints    8. History of right hip replacement    9.  Medicare annual wellness visit, subsequent        Health Maintenance Due     Health Maintenance Due   Topic Date Due    Shingrix Vaccine Age 49> (1 of 2) 07/03/1982    GLAUCOMA SCREENING Q2Y  12/31/2017    DTaP/Tdap/Td series (2 - Td) 05/27/2020    COVID-19 Vaccine (2 of 2 - Moderna series) 02/19/2021    Lipid Screen  02/11/2021       Patient Care Team   Patient Care Team:  Edwin Ricci NP as PCP - General (Nurse Practitioner)  Edwin Ricci NP as PCP - [facility]  Immanuel Otto MD as Physician (Orthopedic Surgery)    History     Patient Active Problem List   Diagnosis Code    HTN (hypertension) I10    Hyperlipidemia E78.5    OAB (overactive bladder) N32.81    Hypokalemia E87.6    Hypertension I10    Closed right hip fracture ([address] S72.001A    Bradycardia R00.1    Hip fracture ([address] S72.009A    Age-related osteoporosis with current pathological fracture with routine healing M80.00XD    Gait instability R26.81    Memory impairment R41.3    Primary osteoarthritis involving multiple joints M89.49    History of right hip replacement Z96.641    Medicare annual wellness visit, subsequent Z00.00     Past Medical History:   Diagnosis Date    Chronic kidney disease     Decreased kidney levels per daughter    Fracture 2018    Pt reports right hip fracture & surgery following a fall    Fracture 2020    left rib fractures; pt fell     HTN (hypertension) 5/27/2011    Hyperlipidemia 12/1/2011    Hypertension     OAB (overactive bladder) 2/15/2013    Other ill-defined conditions(799.89) hyperlipidemia    Other ill-defined conditions(799.89)     bladder problems    Other ill-defined conditions(799.89)     back pain      Past Surgical History:   Procedure Laterality Date    COLONOSCOPY N/A 3/12/2018    COLONOSCOPY performed by Gina Fischer MD at [facility] ENDOSCOPY    COLONOSCOPY,DIAGNOSTIC  3/12/2018         HX APPENDECTOMY  1948    HX CATARACT REMOVAL Left     HX CORNEAL TRANSPLANT Left     HX DILATION AND CURETTAGE      x 2    HX HIP REPLACEMENT Right 01/20/2019    HX TONSILLECTOMY  1938     Current Outpatient Medications   Medication Sig Dispense Refill    alendronate (FOSAMAX) 35 mg tablet Take 1 Tab by mouth every seven (7) days. 4 Tab 0    atorvastatin (LIPITOR) 20 mg tablet TAKE 1 TABLET BY MOUTH EVERY DAY IN THE EVENING Office visit and/or labs needed for future refills. 90 Tab 0    metoprolol tartrate (LOPRESSOR) 25 mg tablet Take 1 Tab by mouth two (2) times a day. HOLD FOR SBP LESS THAN 120 OR HR LESS THAN 65 180 Tab 0    memantine (NAMENDA) 5 mg tablet INCREASE BY 1 TABLET WEEKLY UNTIL TAKING 2 TABLETS TWICE DAILY ORALLY 30 DAY(S)      amLODIPine (NORVASC) 10 mg tablet Take 1 Tab by mouth daily. 90 Tab 1    calcium carbonate (CALTREX) 600 mg calcium (1,500 mg) tablet Take 600 mg by mouth two (2) times a day.  cholecalciferol (VITAMIN D3) 1,000 unit tablet Take 1,000 Units by mouth daily.  senna-docusate (PERICOLACE) 8.6-50 mg per tablet Take 1 Tab by mouth daily.  coenzyme Q-10 (CO Q-10) 200 mg capsule Take 1 Cap by mouth daily. 90 Cap 3    glucosamine-chondroit-vit c-mn (GLUCOSAMINE 1500 COMPLEX) 500-400 mg capsule Take 1 Cap by mouth daily. 90 Cap 1    aspirin 81 mg chewable tablet Take 1 Tab by mouth two (2) times a day. 60 Tab 0    polyethylene glycol (MIRALAX) 17 gram/dose powder Take 17 g by mouth daily as needed for Other (constipation).  multivit-minerals/folic acid (CENTRUM MULTIGUMMIES PO) Take 1 Tab by mouth daily.        Allergies   Allergen Reactions    Codeine Nausea and Vomiting    Demerol (Pf) [Meperidine (Pf)] Nausea and Vomiting    Meperidine Nausea and Vomiting       Family History   Problem Relation Age of Onset    Cancer Father         throat (smoker); mouth and gums    Heart Disease Mother         Age 52   [address] [facility] Clotting Disorder Mother     Arthritis-osteo Paternal Aunt      Social History     Tobacco Use    Smoking status: Never Smoker    Smokeless tobacco: Never Used   Substance Use Topics    Alcohol use:  No

## 2021-02-23 NOTE — PROGRESS NOTES
Results for orders placed or performed in visit on 02/04/20   CBC WITH AUTOMATED DIFF   Result Value Ref Range    WBC 7.4 3.4 - 10.8 x10E3/uL    RBC 4.62 3.77 - 5.28 x10E6/uL    HGB 14.5 11.1 - 15.9 g/dL    HCT 42.4 34.0 - 46.6 %    MCV 92 79 - 97 fL    MCH 31.4 26.6 - 33.0 pg    MCHC 34.2 31.5 - 35.7 g/dL    RDW 11.8 11.7 - 15.4 %    PLATELET 691 344 - 393 x10E3/uL    NEUTROPHILS 61 Not Estab. %    Lymphocytes 27 Not Estab. %    MONOCYTES 8 Not Estab. %    EOSINOPHILS 3 Not Estab. %    BASOPHILS 1 Not Estab. %    ABS. NEUTROPHILS 4.5 1.4 - 7.0 x10E3/uL    Abs Lymphocytes 2.0 0.7 - 3.1 x10E3/uL    ABS. MONOCYTES 0.6 0.1 - 0.9 x10E3/uL    ABS. EOSINOPHILS 0.2 0.0 - 0.4 x10E3/uL    ABS. BASOPHILS 0.1 0.0 - 0.2 x10E3/uL    IMMATURE GRANULOCYTES 0 Not Estab. %    ABS. IMM. GRANS. 0.0 0.0 - 0.1 M13U0/WF   METABOLIC PANEL, COMPREHENSIVE   Result Value Ref Range    Glucose 97 65 - 99 mg/dL    BUN 14 8 - 27 mg/dL    Creatinine 0.78 0.57 - 1.00 mg/dL    GFR est non-AA 69 >59 mL/min/1.73    GFR est AA 79 >59 mL/min/1.73    BUN/Creatinine ratio 18 12 - 28    Sodium 141 134 - 144 mmol/L    Potassium 4.5 3.5 - 5.2 mmol/L    Chloride 99 96 - 106 mmol/L    CO2 28 20 - 29 mmol/L    Calcium 10.3 8.7 - 10.3 mg/dL    Protein, total 7.4 6.0 - 8.5 g/dL    Albumin 4.7 (H) 3.6 - 4.6 g/dL    GLOBULIN, TOTAL 2.7 1.5 - 4.5 g/dL    A-G Ratio 1.7 1.2 - 2.2    Bilirubin, total 0.6 0.0 - 1.2 mg/dL    Alk.  phosphatase 91 39 - 117 IU/L    AST (SGOT) 22 0 - 40 IU/L    ALT (SGPT) 16 0 - 32 IU/L   UA WITH REFLEX MICRO AND CULTURE   Result Value Ref Range    Specific Gravity CANCELED     pH (UA) CANCELED     Protein CANCELED     Glucose CANCELED     Ketone CANCELED    LIPID PANEL   Result Value Ref Range    Cholesterol, total 151 100 - 199 mg/dL    Triglyceride 139 0 - 149 mg/dL    HDL Cholesterol 55 >39 mg/dL    VLDL, calculated 28 5 - 40 mg/dL    LDL, calculated 68 0 - 99 mg/dL   MICROALBUMIN, UR, RAND W/ MICROALB/CREAT RATIO   Result Value Ref Range    Creatinine, urine CANCELED mg/dL    Microalbumin, urine CANCELED    CVD REPORT   Result Value Ref Range    INTERPRETATION Note    HEMOGLOBIN A1C WITH EAG   Result Value Ref Range    Hemoglobin A1c 5.6 4.8 - 5.6 %    Estimated average glucose 114 mg/dL       Health Maintenance Due   Topic Date Due    Shingrix Vaccine Age 49> (1 of 2) 07/03/1982    GLAUCOMA SCREENING Q2Y  12/31/2017    DTaP/Tdap/Td series (2 - Td) 05/27/2020    COVID-19 Vaccine (2 of 2 - Moderna series) 02/19/2021    Medicare Yearly Exam  02/04/2021    Lipid Screen  02/11/2021       Chief Complaint   Patient presents with    New Patient    Hip Pain     Bilateral to past falls    Hypertension       1. Have you been to the ER, urgent care clinic since your last visit? Hospitalized since your last visit? Yes When: 11/1/20 and 12/8/20     2. Have you seen or consulted any other health care providers outside of the [address] [place] [address] since your last visit? Include any pap smears or colon screening. No    3) Do you have an Advance Directive on file? yes    4) Are you interested in receiving information on Advance Directives? YES      Patient is accompanied by daughter I have received verbal consent from Elliott Cadena to discuss any/all medical information while they are present in the room.

## 2021-02-24 ENCOUNTER — APPOINTMENT (OUTPATIENT)
Dept: PHYSICAL THERAPY | Age: 86
End: 2021-02-24
Payer: MEDICARE

## 2021-02-24 LAB
ALBUMIN SERPL-MCNC: 4.6 G/DL (ref 3.6–4.6)
ALBUMIN/GLOB SERPL: 1.6 {RATIO} (ref 1.2–2.2)
ALP SERPL-CCNC: 123 IU/L (ref 39–117)
ALT SERPL-CCNC: 19 IU/L (ref 0–32)
AST SERPL-CCNC: 28 IU/L (ref 0–40)
BILIRUB SERPL-MCNC: 0.3 MG/DL (ref 0–1.2)
BUN SERPL-MCNC: 22 MG/DL (ref 8–27)
BUN/CREAT SERPL: 22 (ref 12–28)
CALCIUM SERPL-MCNC: 10.4 MG/DL (ref 8.7–10.3)
CHLORIDE SERPL-SCNC: 100 MMOL/L (ref 96–106)
CHOLEST SERPL-MCNC: 165 MG/DL (ref 100–199)
CO2 SERPL-SCNC: 24 MMOL/L (ref 20–29)
CREAT SERPL-MCNC: 1.01 MG/DL (ref 0.57–1)
ERYTHROCYTE [DISTWIDTH] IN BLOOD BY AUTOMATED COUNT: 11.7 % (ref 11.7–15.4)
GLOBULIN SER CALC-MCNC: 2.9 G/DL (ref 1.5–4.5)
GLUCOSE SERPL-MCNC: 98 MG/DL (ref 65–99)
HCT VFR BLD AUTO: 43.1 % (ref 34–46.6)
HDLC SERPL-MCNC: 55 MG/DL
HGB BLD-MCNC: 14.9 G/DL (ref 11.1–15.9)
INTERPRETATION, 910389: NORMAL
INTERPRETATION: NORMAL
LDLC SERPL CALC-MCNC: 77 MG/DL (ref 0–99)
MCH RBC QN AUTO: 31.7 PG (ref 26.6–33)
MCHC RBC AUTO-ENTMCNC: 34.6 G/DL (ref 31.5–35.7)
MCV RBC AUTO: 92 FL (ref 79–97)
PDF IMAGE, 910387: NORMAL
PLATELET # BLD AUTO: 213 X10E3/UL (ref 150–450)
POTASSIUM SERPL-SCNC: 4.2 MMOL/L (ref 3.5–5.2)
PROT SERPL-MCNC: 7.5 G/DL (ref 6–8.5)
RBC # BLD AUTO: 4.7 X10E6/UL (ref 3.77–5.28)
SODIUM SERPL-SCNC: 140 MMOL/L (ref 134–144)
TRIGL SERPL-MCNC: 198 MG/DL (ref 0–149)
VLDLC SERPL CALC-MCNC: 33 MG/DL (ref 5–40)
WBC # BLD AUTO: 7.8 X10E3/UL (ref 3.4–10.8)

## 2021-02-24 NOTE — PROGRESS NOTES
Creatinine mildly elevated. Encourage oral water intake, as tolerated. Triglycerides elevated. Watch diet for foods high in sugar and carbohydrates. Otherwise, stable labs.

## 2021-02-28 NOTE — PROGRESS NOTES
HISTORY OF PRESENT ILLNESS  Nehemias Doss is a 80 y.o. female. New patient who comes in with her daughter to establish care. PMH includes HTN, HLD, osteoporosis, OAB, gait instability, memory impairment, DJD. History of right THR. Vital signs are stable. Has chronic arthritic pains especially in hips. Gait is slow and unsteady but no recent falls. Depends on family's for some ADLs. Lives with her elderly . Allergic to codeine and Demerol. Med list reviewed with patient. Denies any side effects. Tries to be active as tolerated and watch her diet. Weight has been stable. Denies any signs or symptoms of COVID-19. Recorded labs from 2/2020 were stable. Needs repeat labs. No other acute complaints today. HPI    Review of Systems   Constitutional: Negative. HENT: Negative. Eyes: Negative. Negative for blurred vision. Respiratory: Negative. Negative for shortness of breath. Cardiovascular: Negative. Negative for chest pain and leg swelling. Gastrointestinal: Positive for constipation. Negative for abdominal pain, blood in stool, heartburn and melena. Genitourinary: Negative. Negative for dysuria. Musculoskeletal: Positive for falls and joint pain. Skin: Negative. Neurological: Negative. Negative for dizziness, sensory change, focal weakness and headaches. Endo/Heme/Allergies: Negative. Negative for polydipsia. Psychiatric/Behavioral: Positive for memory loss. Negative for depression. The patient is not nervous/anxious and does not have insomnia. Physical Exam  Vitals signs and nursing note reviewed. Constitutional:       General: She is not in acute distress. Appearance: She is well-developed. Comments: Pleasant elderly lady   HENT:      Head: Normocephalic and atraumatic. Nose: Nose normal.      Mouth/Throat:      Mouth: Mucous membranes are moist.      Pharynx: Oropharynx is clear. Eyes:      General: No scleral icterus. Conjunctiva/sclera: Conjunctivae normal.      Pupils: Pupils are equal, round, and reactive to light. Neck:      Musculoskeletal: Normal range of motion and neck supple. Thyroid: No thyromegaly. Vascular: No JVD. Cardiovascular:      Rate and Rhythm: Normal rate and regular rhythm. Heart sounds: Normal heart sounds. No murmur. Pulmonary:      Effort: Pulmonary effort is normal. No respiratory distress. Breath sounds: Normal breath sounds. No wheezing or rales. Abdominal:      General: Bowel sounds are normal. There is no distension. Palpations: Abdomen is soft. Tenderness: There is no abdominal tenderness. Musculoskeletal:         General: No tenderness. Right lower leg: No edema. Left lower leg: No edema. Comments: DJD   Lymphadenopathy:      Cervical: No cervical adenopathy. Skin:     General: Skin is warm and dry. Findings: No erythema or rash. Neurological:      Mental Status: She is alert. Cranial Nerves: No cranial nerve deficit. Coordination: Coordination normal.      Gait: Gait abnormal.      Comments: Alert and oriented x2  Does not know the date   Psychiatric:         Behavior: Behavior normal.         ASSESSMENT and PLAN  Diagnoses and all orders for this visit:    1. Essential hypertension  -     METABOLIC PANEL, COMPREHENSIVE; Future  -     CBC W/O DIFF; Future    2. Pure hypercholesterolemia  -     LIPID PANEL; Future  -     METABOLIC PANEL, COMPREHENSIVE; Future  -     CBC W/O DIFF; Future    3. Age-related osteoporosis with current pathological fracture with routine healing    4. OAB (overactive bladder)    5. Gait instability    6. Memory impairment    7. Primary osteoarthritis involving multiple joints    8. History of right hip replacement    9.  Medicare annual wellness visit, subsequent    Other orders  -     METABOLIC PANEL, COMPREHENSIVE  -     CBC W/O DIFF  -     LIPID PANEL  -     CVD REPORT  -     CKD REPORT      Follow-up and Dispositions    · Return in about 4 months (around 6/23/2021).      lab results and schedule of future lab studies reviewed with patient  reviewed diet, exercise and weight control  reviewed medications and side effects in detail  Monitor BP at home with goal of 140/90 or less  Fall precautions discussed  F/u with other MD's as scheduled  COVID-19 precautions discussed with pt

## 2021-03-03 ENCOUNTER — HOSPITAL ENCOUNTER (OUTPATIENT)
Dept: PHYSICAL THERAPY | Age: 86
Discharge: HOME | End: 2021-03-03
Payer: MEDICARE

## 2021-03-03 PROCEDURE — 97110 THERAPEUTIC EXERCISES: CPT | Performed by: PHYSICAL THERAPIST

## 2021-03-03 PROCEDURE — 97112 NEUROMUSCULAR REEDUCATION: CPT | Performed by: PHYSICAL THERAPIST

## 2021-03-03 NOTE — PROGRESS NOTES
PT DAILY TREATMENT NOTE - [facility] 2-15    Patient Name: Kimberlyn Cancel  Date:3/3/2021  : 7/3/1932  [x]  Patient  Verified  Payor: [employer] MEDICARE COMPLETE / Plan: [facility] / Product Type: Managed Care Medicare /    In time: 551I  Out time: 1020  Total Treatment Time (min): 50  Total Timed Codes (min): 50  1:1 Treatment Time ( only): 45  Visit #:  20    Treatment Area: Low back pain [M54.5]  Right hip pain [M25.551]    SUBJECTIVE  Pain Level (0-10 scale): 0/10  Any medication changes, allergies to medications, adverse drug reactions, diagnosis change, or new procedure performed?: [x] No    [] Yes (see summary sheet for update)  Subjective functional status/changes:   [] No changes reported  Pt reports she is doing well. No pain in her hip and walking some in her apartment. OBJECTIVE      35 min Therapeutic Exercise:  [x]? ? See flow sheet : Manual stretching HS and piriformis   Rationale: increase ROM, increase strength and improve coordination to improve the patients ability to increase function and mobility     15 min Neuromuscular Re-education: NBOS balance on foam, rockerboard balance and rock   Rationale: increase ROM, increase strength and improve coordination to improve the patients ability to increase function and mobility        With   [] TE   [] TA   [] Neuro   [] SC   [] other: Patient Education: [x] Review HEP    [] Progressed/Changed HEP based on:   [] positioning   [] body mechanics   [] transfers   [] heat/ice application    [] other:      Other Objective/Functional Measures: -     Pain Level (0-10 scale) post treatment: 0/10    ASSESSMENT/Changes in Function:   Pt able to complete 30 minutes of standing exercises consistently with improvement in balance, endurance and strength. She has made great progress with PT and given detailed handout to complete independently.    Patient will continue to benefit from skilled PT services to modify and progress therapeutic interventions, address functional mobility deficits, address ROM deficits, address strength deficits, analyze and address soft tissue restrictions, analyze and cue movement patterns, analyze and modify body mechanics/ergonomics and assess and modify postural abnormalities to attain remaining goals. [x]  See Plan of Care  []  See progress note/recertification  []  See Discharge Summary       Progress towards goals / Updated goals:  Short Term Goals: To be accomplished in 4 weeks:              Patient will be able to perform 5x sit to  15 seconds with use of arms to promote functional mobility MET (Able to stand without UE support 25 seconds 5 reps)              Patient will demonstrate good safety awareness with assistive device while in the clinic MET  Long Term Goals: To be accomplished in 8 weeks:              Patient will be able to assist with putting away the groceries without any increase in s/s MET              Patient will be able to prepare her food standing without any increase in s/s MET  Frequency / Duration: Patient to be seen 2 times per week for 8 weeks. PLAN  []  Upgrade activities as tolerated     []  Continue plan of care  []  Update interventions per flow sheet       [x]  Discharge due to:MEt all goals.    []  Other:_      Steve Galdamez, PT, DPT,  3/3/2021

## 2021-03-22 DIAGNOSIS — I10 ESSENTIAL HYPERTENSION: ICD-10-CM

## 2021-03-22 RX ORDER — ATORVASTATIN CALCIUM 20 MG/1
20 TABLET, FILM COATED ORAL
Qty: 90 TAB | Refills: 0 | Status: SHIPPED | OUTPATIENT
Start: 2021-03-22 | End: 2021-05-04

## 2021-03-22 RX ORDER — METOPROLOL TARTRATE 25 MG/1
25 TABLET, FILM COATED ORAL 2 TIMES DAILY
Qty: 180 TAB | Refills: 0 | Status: SHIPPED | OUTPATIENT
Start: 2021-03-22 | End: 2021-06-03 | Stop reason: SDUPTHER

## 2021-03-22 NOTE — TELEPHONE ENCOUNTER
Pt was a patient of AZRA Neal     Last visit 02/23/2021 MD Addison Johnson   Next appointment 06/22/2021 MD Addison Johnson  Previous refill encounter(s)   12/29/2020 Lopressor #180 written by AZRA Stoll     Requested Prescriptions     Pending Prescriptions Disp Refills    metoprolol tartrate (LOPRESSOR) 25 mg tablet 180 Tab 0     Sig: Take 1 Tab by mouth two (2) times a day.  HOLD FOR SBP LESS THAN 120 OR HR LESS THAN 65

## 2021-03-22 NOTE — TELEPHONE ENCOUNTER
Pt was a patient of AZRA Aguilar    Last visit 02/23/2021 MD Shilpa Weaver   Next appointment 06/22/2021 MD Shilpa Weaver   Previous refill encounter(s)   12/29/2020 Lipitor #90 written by AZRA Stoll    Requested Prescriptions     Pending Prescriptions Disp Refills    atorvastatin (LIPITOR) 20 mg tablet 90 Tab 0     Sig: Take 1 Tab by mouth nightly.

## 2021-03-25 PROBLEM — Z00.00 MEDICARE ANNUAL WELLNESS VISIT, SUBSEQUENT: Status: RESOLVED | Noted: 2021-02-23 | Resolved: 2021-03-25

## 2021-04-02 RX ORDER — ALENDRONATE SODIUM 35 MG/1
35 TABLET ORAL
Qty: 4 TAB | Refills: 0 | Status: SHIPPED | OUTPATIENT
Start: 2021-04-02 | End: 2021-04-26

## 2021-04-14 NOTE — ANCILLARY DISCHARGE INSTRUCTIONS
Physical Therapy at [facility],  
a part of [address] 
[address] [address], [address] Phone: [phone] Fax: [phone] Discharge Summary 2-15 
 
  
Patient name: Dat Soliz                : 7/3/1932               Provider#: [id number] Referral source: Price Hernandez MD                                               
Medical/Treatment Diagnosis: Low back pain [M54.5] Right hip pain [M25.551] Prior Hospitalization: see medical history Comorbidities: Dementia, Osteoporosis, HTN Prior Level of Function: Walking with rolling walker Medications: Verified on Patient Summary List 
Start of Care: 2020                                                                            Onset Date: 2020 Visits from Start of Care: 21     Missed Visits: 3 Reporting Period : 20 to 3/3/21 Short Term Goals: To be accomplished in 4 weeks: 
            Patient will be able to perform 5x sit to  15 seconds with use of arms to promote functional mobility MET (Able to stand without UE support 25 seconds 5 reps) 
            Patient will demonstrate good safety awareness with assistive device while in the clinic MET Long Term Goals: To be accomplished in 8 weeks: 
            Patient will be able to assist with putting away the groceries without any increase in s/s MET 
            Patient will be able to prepare her food standing without any increase in s/s MET Frequency / Duration: Patient to be seen 2 times per week for 8 weeks. Assessment/Summary of care: Pt was seen 21 sessions and made significant gains in PT with improved strength, balance, endurance and ROM. She has decreased hip pain and able to perform more daily activities in standing than reported on initial evaluation.  Pt able to complete 30 minutes of standing exercises consistently with improvement in balance, endurance and strength. She has made great progress with PT and given detailed handout to complete independently. RECOMMENDATIONS: 
[x]Discontinue therapy: [x]Patient has reached or is progressing toward set goals []Patient is non-compliant or has abdicated 
   []Due to lack of appreciable progress towards set goals Angela Leal, PT, DPT 4/14/2021

## 2021-04-26 RX ORDER — ALENDRONATE SODIUM 35 MG/1
35 TABLET ORAL
Qty: 4 TAB | Refills: 0 | Status: SHIPPED | OUTPATIENT
Start: 2021-04-26 | End: 2021-05-23

## 2021-05-04 RX ORDER — ATORVASTATIN CALCIUM 20 MG/1
TABLET, FILM COATED ORAL
Qty: 90 TAB | Refills: 0 | Status: SHIPPED | OUTPATIENT
Start: 2021-05-04 | End: 2021-09-03

## 2021-05-17 DIAGNOSIS — I10 ESSENTIAL HYPERTENSION: Primary | ICD-10-CM

## 2021-05-17 RX ORDER — AMLODIPINE BESYLATE 10 MG/1
10 TABLET ORAL DAILY
Qty: 90 TAB | Refills: 1 | Status: SHIPPED | OUTPATIENT
Start: 2021-05-17

## 2021-05-17 NOTE — TELEPHONE ENCOUNTER
Requested Prescriptions     Pending Prescriptions Disp Refills    amLODIPine (NORVASC) 10 mg tablet 90 Tab 1     Sig: Take 1 Tab by mouth daily.    02/23/2021 06/22/2021  [facility] on file

## 2021-05-21 ENCOUNTER — APPOINTMENT (OUTPATIENT)
Dept: CT IMAGING | Age: 86
End: 2021-05-21
Attending: EMERGENCY MEDICINE
Payer: MEDICARE

## 2021-05-21 ENCOUNTER — HOSPITAL ENCOUNTER (EMERGENCY)
Age: 86
Discharge: HOME | End: 2021-05-21
Attending: EMERGENCY MEDICINE
Payer: MEDICARE

## 2021-05-21 VITALS
TEMPERATURE: 97.8 F | DIASTOLIC BLOOD PRESSURE: 63 MMHG | SYSTOLIC BLOOD PRESSURE: 154 MMHG | RESPIRATION RATE: 16 BRPM | HEART RATE: 55 BPM | OXYGEN SATURATION: 98 % | BODY MASS INDEX: 23.52 KG/M2 | HEIGHT: 68 IN | WEIGHT: 155.2 LBS

## 2021-05-21 DIAGNOSIS — K59.00 CONSTIPATION, UNSPECIFIED CONSTIPATION TYPE: ICD-10-CM

## 2021-05-21 DIAGNOSIS — R10.84 ABDOMINAL PAIN, GENERALIZED: Primary | ICD-10-CM

## 2021-05-21 LAB
ALBUMIN SERPL-MCNC: 3.7 G/DL (ref 3.5–5)
ALBUMIN/GLOB SERPL: 1.1 {RATIO} (ref 1.1–2.2)
ALP SERPL-CCNC: 77 U/L (ref 45–117)
ALT SERPL-CCNC: 26 U/L (ref 12–78)
ANION GAP SERPL CALC-SCNC: 9 MMOL/L (ref 5–15)
APPEARANCE UR: CLEAR
AST SERPL-CCNC: 25 U/L (ref 15–37)
BACTERIA URNS QL MICRO: NEGATIVE /HPF
BASOPHILS # BLD: 0 K/UL (ref 0–0.1)
BASOPHILS NFR BLD: 1 % (ref 0–1)
BILIRUB SERPL-MCNC: 0.3 MG/DL (ref 0.2–1)
BILIRUB UR QL: NEGATIVE
BUN SERPL-MCNC: 20 MG/DL (ref 6–20)
BUN/CREAT SERPL: 22 (ref 12–20)
CALCIUM SERPL-MCNC: 9.5 MG/DL (ref 8.5–10.1)
CHLORIDE SERPL-SCNC: 104 MMOL/L (ref 97–108)
CO2 SERPL-SCNC: 29 MMOL/L (ref 21–32)
COLOR UR: ABNORMAL
CREAT SERPL-MCNC: 0.9 MG/DL (ref 0.55–1.02)
DIFFERENTIAL METHOD BLD: NORMAL
EOSINOPHIL # BLD: 0.2 K/UL (ref 0–0.4)
EOSINOPHIL NFR BLD: 4 % (ref 0–7)
EPITH CASTS URNS QL MICRO: ABNORMAL /LPF
ERYTHROCYTE [DISTWIDTH] IN BLOOD BY AUTOMATED COUNT: 11.9 % (ref 11.5–14.5)
GLOBULIN SER CALC-MCNC: 3.5 G/DL (ref 2–4)
GLUCOSE SERPL-MCNC: 98 MG/DL (ref 65–100)
GLUCOSE UR STRIP.AUTO-MCNC: NEGATIVE MG/DL
HCT VFR BLD AUTO: 40.7 % (ref 35–47)
HGB BLD-MCNC: 13.4 G/DL (ref 11.5–16)
HGB UR QL STRIP: ABNORMAL
IMM GRANULOCYTES # BLD AUTO: 0 K/UL (ref 0–0.04)
IMM GRANULOCYTES NFR BLD AUTO: 0 % (ref 0–0.5)
KETONES UR QL STRIP.AUTO: NEGATIVE MG/DL
LEUKOCYTE ESTERASE UR QL STRIP.AUTO: NEGATIVE
LIPASE SERPL-CCNC: 127 U/L (ref 73–393)
LYMPHOCYTES # BLD: 1.8 K/UL (ref 0.8–3.5)
LYMPHOCYTES NFR BLD: 30 % (ref 12–49)
MCH RBC QN AUTO: 30.9 PG (ref 26–34)
MCHC RBC AUTO-ENTMCNC: 32.9 G/DL (ref 30–36.5)
MCV RBC AUTO: 93.8 FL (ref 80–99)
MONOCYTES # BLD: 0.6 K/UL (ref 0–1)
MONOCYTES NFR BLD: 11 % (ref 5–13)
NEUTS SEG # BLD: 3.1 K/UL (ref 1.8–8)
NEUTS SEG NFR BLD: 54 % (ref 32–75)
NITRITE UR QL STRIP.AUTO: NEGATIVE
NRBC # BLD: 0 K/UL (ref 0–0.01)
NRBC BLD-RTO: 0 PER 100 WBC
PH UR STRIP: 7 [PH] (ref 5–8)
PLATELET # BLD AUTO: 159 K/UL (ref 150–400)
PMV BLD AUTO: 10.1 FL (ref 8.9–12.9)
POTASSIUM SERPL-SCNC: 3.8 MMOL/L (ref 3.5–5.1)
PROT SERPL-MCNC: 7.2 G/DL (ref 6.4–8.2)
PROT UR STRIP-MCNC: NEGATIVE MG/DL
RBC # BLD AUTO: 4.34 M/UL (ref 3.8–5.2)
RBC #/AREA URNS HPF: ABNORMAL /HPF (ref 0–5)
SODIUM SERPL-SCNC: 142 MMOL/L (ref 136–145)
SP GR UR REFRACTOMETRY: 1.01 (ref 1–1.03)
UR CULT HOLD, URHOLD: NORMAL
UROBILINOGEN UR QL STRIP.AUTO: 0.2 EU/DL (ref 0.2–1)
WBC # BLD AUTO: 5.8 K/UL (ref 3.6–11)
WBC URNS QL MICRO: ABNORMAL /HPF (ref 0–4)

## 2021-05-21 PROCEDURE — 81001 URINALYSIS AUTO W/SCOPE: CPT

## 2021-05-21 PROCEDURE — 80053 COMPREHEN METABOLIC PANEL: CPT

## 2021-05-21 PROCEDURE — 99284 EMERGENCY DEPT VISIT MOD MDM: CPT

## 2021-05-21 PROCEDURE — 85025 COMPLETE CBC W/AUTO DIFF WBC: CPT

## 2021-05-21 PROCEDURE — 74177 CT ABD & PELVIS W/CONTRAST: CPT

## 2021-05-21 PROCEDURE — 83690 ASSAY OF LIPASE: CPT

## 2021-05-21 PROCEDURE — 74011000636 HC RX REV CODE- 636: Performed by: EMERGENCY MEDICINE

## 2021-05-21 RX ADMIN — IOPAMIDOL 100 ML: 755 INJECTION, SOLUTION INTRAVENOUS at 14:09

## 2021-05-21 NOTE — ED NOTES
Pt reports she used a lidocaine patch around 11am this morning and it helped her pain. Pt denies pain at this time.

## 2021-05-21 NOTE — ED TRIAGE NOTES
Pt arrived to ED accompanied by daughter/POA. Pt reports back pain, bilateral upper abdominal pain and R hip pain x 3-4 days. Pt reports \"she thinks the pain is from twisting to position in her bed. \" Pt denies CP or SOB, n/v/d. Pt also denies urinary symptoms.  Pt reports taking 500 mg tylenol around 9:30am.

## 2021-05-21 NOTE — ED PROVIDER NOTES
The history is provided by the patient and a relative. Abdominal Pain   This is a new problem. The current episode started more than 2 days ago. The problem occurs constantly. The problem has not changed since onset. The pain is located in the generalized abdominal region. The quality of the pain is aching. The pain is mild. Associated symptoms include constipation and back pain. Pertinent negatives include no anorexia, no fever, no belching, no diarrhea, no flatus, no hematochezia, no melena, no nausea, no vomiting, no dysuria, no frequency, no hematuria, no headaches, no arthralgias, no myalgias, no trauma and no chest pain. The pain is worsened by certain positions. The pain is relieved by certain positions. Past workup includes colonoscopy. The patient's surgical history includes appendectomy.        Past Medical History:   Diagnosis Date    Chronic kidney disease     Decreased kidney levels per daughter    Fracture 2018    Pt reports right hip fracture & surgery following a fall    Fracture 2020    left rib fractures; pt fell     HTN (hypertension) 5/27/2011    Hyperlipidemia 12/1/2011    Hypertension     OAB (overactive bladder) 2/15/2013    Other ill-defined conditions(799.89)     hyperlipidemia    Other ill-defined conditions(799.89)     bladder problems    Other ill-defined conditions(799.89)     back pain       Past Surgical History:   Procedure Laterality Date    COLONOSCOPY N/A 3/12/2018    COL[facility] Mesfin [facility]  3/12/2018         HX APPENDECTOMY  1948    HX CATARACT REMOVAL Left     HX CORNEAL TRANSPLANT Left     HX DILATION AND CURETTAGE      x 2    HX HIP REPLACEMENT Right 01/20/2019    HX TONSILLECTOMY  1938         Family History:   Problem Relation Age of Onset    Cancer Father         throat (smoker); mouth and gums    Heart Disease Mother         Age 52    Clotting Disorder Mother     Arthritis-osteo Paternal [de-identified] Social History     Socioeconomic History    Marital status:      Spouse name: Not on file    Number of children: Not on file    Years of education: Not on file    Highest education level: Not on file   Occupational History    Not on file   Tobacco Use    Smoking status: Never Smoker    Smokeless tobacco: Never Used   Substance and Sexual Activity    Alcohol use: No    Drug use: No    Sexual activity: Not Currently   Other Topics Concern    Not on file   Social History Narrative    Not on file     Social Determinants of Health     Financial Resource Strain:     Difficulty of Paying Living Expenses:    Food Insecurity:     Worried About Running Out of Food in the Last Year:     [address] in the Last Year:    Transportation Needs:     Lack of Transportation (Medical):  Lack of Transportation (Non-Medical):    Physical Activity:     Days of Exercise per Week:     Minutes of Exercise per Session:    Stress:     Feeling of Stress :    Social Connections:     Frequency of Communication with Friends and Family:     Frequency of Social Gatherings with Friends and Family:     Attends Restorationist Services:     Active Member of Clubs or Organizations:     Attends Club or Organization Meetings:     Marital Status:    Intimate Partner Violence:     Fear of Current or Ex-Partner:     Emotionally Abused:     Physically Abused:     Sexually Abused: ALLERGIES: Codeine, Demerol (pf) [meperidine (pf)], and Meperidine    Review of Systems   Constitutional: Negative for activity change, chills and fever. HENT: Negative for nosebleeds, sore throat, trouble swallowing and voice change. Eyes: Negative for visual disturbance. Respiratory: Negative for shortness of breath. Cardiovascular: Negative for chest pain and palpitations. Gastrointestinal: Positive for abdominal pain and constipation.  Negative for anorexia, diarrhea, flatus, hematochezia, melena, nausea and vomiting. Genitourinary: Negative for difficulty urinating, dysuria, frequency, hematuria and urgency. Musculoskeletal: Positive for back pain. Negative for arthralgias, myalgias, neck pain and neck stiffness. Skin: Negative for color change. Allergic/Immunologic: Negative for immunocompromised state. Neurological: Negative for dizziness, seizures, syncope, weakness, light-headedness, numbness and headaches. Psychiatric/Behavioral: Negative for behavioral problems, confusion, hallucinations, self-injury and suicidal ideas. Vitals:    05/21/21 1308 05/21/21 1313 05/21/21 1314 05/21/21 1315   BP: (!) 158/71      Pulse: (!) 56      Resp: 16      Temp: 98.2 °F (36.8 °C)      SpO2: 96% 96% 96% 97%   Weight: 70.4 kg (155 lb 3.3 oz)      Height: 5' 8\" (1.727 m)               Physical Exam  Vitals and nursing note reviewed. Constitutional:       General: She is not in acute distress. Appearance: She is well-developed. She is not diaphoretic. HENT:      Head: Normocephalic and atraumatic. Eyes:      Pupils: Pupils are equal, round, and reactive to light. Cardiovascular:      Rate and Rhythm: Normal rate and regular rhythm. Heart sounds: Normal heart sounds. No murmur heard. No friction rub. No gallop. Pulmonary:      Effort: Pulmonary effort is normal. No respiratory distress. Breath sounds: Normal breath sounds. No wheezing. Abdominal:      General: Bowel sounds are normal. There is no distension. Palpations: Abdomen is soft. Tenderness: There is no abdominal tenderness. There is no guarding or rebound. Musculoskeletal:         General: Normal range of motion. Cervical back: Normal range of motion and neck supple. Skin:     General: Skin is warm. Findings: No rash. Neurological:      Mental Status: She is alert and oriented to person, place, and time. Psychiatric:         Behavior: Behavior normal.         Thought Content:  Thought content normal. Judgment: Judgment normal.          MDM     This is a 25-year-old female with past medical history, review of systems, physical exam as above, presenting with several days of intermittent mild abdominal pain and back pain. Patient states pain is worse with movement, and certain positions, better than others. She denies fevers or chills, nausea or vomiting, chest pain or shortness of breath. She endorses sometimes pain seems to radiate to the back. She endorses alternating normal bowel movements with constipation, endorses previous appendectomy. She states she is having no difficulty eating or drinking. Physical exam is remarkable for a well-appearing elderly female, in no acute distress, noted to have soft abdomen, mild tenderness in the right upper quadrant, clear breath sounds, regular rate and rhythm without murmurs gallops or rubs. Differential includes biliary colic, constipation, musculoskeletal pain. Plan to obtain CMP, CBC, lipase, UA, CT scan of the abdomen and pelvis. We will reassess, and make a disposition.     Procedures

## 2021-05-21 NOTE — ED NOTES
Pt ambulatory with walker out of ED with discharge instructions in hand given by Dr. Chapo Devi; pt verbalized understanding of discharge paperwork and time allotted for questions. VSS. Pt alert and oriented. Pt accompanied by daughter.

## 2021-05-23 RX ORDER — ALENDRONATE SODIUM 35 MG/1
TABLET ORAL
Qty: 4 TABLET | Refills: 0 | Status: SHIPPED | OUTPATIENT
Start: 2021-05-23 | End: 2021-06-14 | Stop reason: SDUPTHER

## 2021-05-25 ENCOUNTER — APPOINTMENT (OUTPATIENT)
Dept: CT IMAGING | Age: 86
End: 2021-05-25
Attending: STUDENT IN AN ORGANIZED HEALTH CARE EDUCATION/TRAINING PROGRAM
Payer: MEDICARE

## 2021-05-25 ENCOUNTER — OFFICE VISIT (OUTPATIENT)
Dept: INTERNAL MEDICINE CLINIC | Age: 86
End: 2021-05-25
Payer: MEDICARE

## 2021-05-25 ENCOUNTER — HOSPITAL ENCOUNTER (EMERGENCY)
Age: 86
Discharge: HOME | End: 2021-05-25
Attending: STUDENT IN AN ORGANIZED HEALTH CARE EDUCATION/TRAINING PROGRAM
Payer: MEDICARE

## 2021-05-25 VITALS
OXYGEN SATURATION: 95 % | TEMPERATURE: 97.8 F | RESPIRATION RATE: 16 BRPM | WEIGHT: 152 LBS | SYSTOLIC BLOOD PRESSURE: 137 MMHG | DIASTOLIC BLOOD PRESSURE: 76 MMHG | HEIGHT: 68 IN | HEART RATE: 64 BPM | BODY MASS INDEX: 23.04 KG/M2

## 2021-05-25 VITALS
HEART RATE: 62 BPM | DIASTOLIC BLOOD PRESSURE: 72 MMHG | OXYGEN SATURATION: 98 % | SYSTOLIC BLOOD PRESSURE: 138 MMHG | RESPIRATION RATE: 18 BRPM | TEMPERATURE: 98 F

## 2021-05-25 DIAGNOSIS — S09.90XA CLOSED HEAD INJURY, INITIAL ENCOUNTER: ICD-10-CM

## 2021-05-25 DIAGNOSIS — M15.9 PRIMARY OSTEOARTHRITIS INVOLVING MULTIPLE JOINTS: ICD-10-CM

## 2021-05-25 DIAGNOSIS — M54.50 CHRONIC RIGHT-SIDED LOW BACK PAIN WITHOUT SCIATICA: Primary | ICD-10-CM

## 2021-05-25 DIAGNOSIS — R26.81 GAIT INSTABILITY: ICD-10-CM

## 2021-05-25 DIAGNOSIS — K59.09 CONSTIPATION, CHRONIC: ICD-10-CM
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
## 2021-05-25 DIAGNOSIS — G89.29 CHRONIC RIGHT-SIDED LOW BACK PAIN WITHOUT SCIATICA: Primary | ICD-10-CM

## 2021-05-25 DIAGNOSIS — S61.512A TEAR OF SKIN OF LEFT WRIST, INITIAL ENCOUNTER: ICD-10-CM

## 2021-05-25 DIAGNOSIS — I10 ESSENTIAL HYPERTENSION: ICD-10-CM

## 2021-05-25 DIAGNOSIS — M41.9 SCOLIOSIS OF THORACOLUMBAR SPINE, UNSPECIFIED SCOLIOSIS TYPE: ICD-10-CM

## 2021-05-25 PROCEDURE — 1101F PT FALLS ASSESS-DOCD LE1/YR: CPT | Performed by: INTERNAL MEDICINE

## 2021-05-25 PROCEDURE — 90471 IMMUNIZATION ADMIN: CPT

## 2021-05-25 PROCEDURE — G8420 CALC BMI NORM PARAMETERS: HCPCS | Performed by: INTERNAL MEDICINE

## 2021-05-25 PROCEDURE — G8510 SCR DEP NEG, NO PLAN REQD: HCPCS | Performed by: INTERNAL MEDICINE

## 2021-05-25 PROCEDURE — 74011000250 HC RX REV CODE- 250: Performed by: STUDENT IN AN ORGANIZED HEALTH CARE EDUCATION/TRAINING PROGRAM

## 2021-05-25 PROCEDURE — 90715 TDAP VACCINE 7 YRS/> IM: CPT | Performed by: STUDENT IN AN ORGANIZED HEALTH CARE EDUCATION/TRAINING PROGRAM

## 2021-05-25 PROCEDURE — G8536 NO DOC ELDER MAL SCRN: HCPCS | Performed by: INTERNAL MEDICINE

## 2021-05-25 PROCEDURE — 1090F PRES/ABSN URINE INCON ASSESS: CPT | Performed by: INTERNAL MEDICINE

## 2021-05-25 PROCEDURE — 99214 OFFICE O/P EST MOD 30 MIN: CPT | Performed by: INTERNAL MEDICINE

## 2021-05-25 PROCEDURE — 74011250636 HC RX REV CODE- 250/636: Performed by: STUDENT IN AN ORGANIZED HEALTH CARE EDUCATION/TRAINING PROGRAM

## 2021-05-25 PROCEDURE — G8427 DOCREV CUR MEDS BY ELIG CLIN: HCPCS | Performed by: INTERNAL MEDICINE

## 2021-05-25 PROCEDURE — 70450 CT HEAD/BRAIN W/O DYE: CPT

## 2021-05-25 PROCEDURE — 99282 EMERGENCY DEPT VISIT SF MDM: CPT

## 2021-05-25 RX ORDER — BACITRACIN 500 UNIT/G
1 PACKET (EA) TOPICAL
Status: COMPLETED | OUTPATIENT
Start: 2021-05-25 | End: 2021-05-25

## 2021-05-25 RX ADMIN — TETANUS TOXOID, REDUCED DIPHTHERIA TOXOID AND ACELLULAR PERTUSSIS VACCINE, ADSORBED 0.5 ML: 5; 2.5; 8; 8; 2.5 SUSPENSION INTRAMUSCULAR at 14:02

## 2021-05-25 RX ADMIN — BACITRACIN 1 PACKET: 500 OINTMENT TOPICAL at 15:37

## 2021-05-25 NOTE — ED TRIAGE NOTES
Pt lost her balance and fell pta, pt stuck her head and left arm, denies loc, denies neck pain, +abrasion/skin tear to left wrist, left side of nose and left forehead

## 2021-05-25 NOTE — PROGRESS NOTES
HISTORY OF PRESENT ILLNESS  Haleigh Pearson is a 80 y.o. female. Pt. comes in for f/u. Has a few chronic medical issues as documented. Vital signs are stable. Went to ER a few days ago with abdominal pain. I reviewed records. CT of abdomen showed constipation, hiatal hernia, and gallstones. Has chronic constipation issues. Has been using MiraLAX and senna with some results. Reports feeling better. Continues to have chronic back pain. Has a scoliosis. Has other arthritic pains as well. Gait is unsteady but no falls. Lives with her elderly  who has medical issues as well. Denies any signs or symptoms of COVID-19. PMH/PSH/Allergies/Social History/medication list and most recent studies reviewed with patient. Tobacco use: No  Alcohol use: No    Reports compliance with medications and diet. Trying to be active physically as tolerated. Reports no other new c/o. HPI    Review of Systems   Constitutional: Negative. HENT: Negative. Eyes: Negative. Negative for blurred vision. Respiratory: Negative. Negative for shortness of breath. Cardiovascular: Negative. Negative for chest pain and leg swelling. Gastrointestinal: Positive for constipation. Negative for abdominal pain, blood in stool, heartburn and melena. Genitourinary: Negative. Negative for dysuria. Musculoskeletal: Positive for back pain and joint pain. Negative for falls. Skin: Negative. Neurological: Negative. Negative for dizziness, sensory change, focal weakness and headaches. Endo/Heme/Allergies: Negative. Negative for polydipsia. Psychiatric/Behavioral: Positive for memory loss. Negative for depression. The patient is not nervous/anxious and does not have insomnia. Physical Exam  Vitals and nursing note reviewed. Constitutional:       General: She is not in acute distress. Appearance: She is well-developed.       Comments: Pleasant elderly lady   HENT:      Head: Normocephalic and atraumatic. Mouth/Throat:      Mouth: Mucous membranes are moist.      Pharynx: Oropharynx is clear. Eyes:      General: No scleral icterus. Conjunctiva/sclera: Conjunctivae normal.   Neck:      Thyroid: No thyromegaly. Vascular: No carotid bruit or JVD. Cardiovascular:      Rate and Rhythm: Normal rate and regular rhythm. Heart sounds: Normal heart sounds. No murmur heard. Pulmonary:      Effort: Pulmonary effort is normal. No respiratory distress. Breath sounds: Normal breath sounds. No wheezing or rales. Abdominal:      General: Bowel sounds are normal. There is no distension. Palpations: Abdomen is soft. Tenderness: There is no abdominal tenderness. There is no right CVA tenderness or left CVA tenderness. Musculoskeletal:         General: Tenderness (lumbars with scoliosis) present. Cervical back: Normal range of motion and neck supple. Right lower leg: No edema. Left lower leg: No edema. Comments: DJD   Lymphadenopathy:      Cervical: No cervical adenopathy. Skin:     General: Skin is warm and dry. Findings: No erythema or rash. Neurological:      Mental Status: She is alert. Cranial Nerves: No cranial nerve deficit. Coordination: Coordination abnormal.      Gait: Gait abnormal.      Comments: Alert and oriented x2  Does not know the date   Psychiatric:         Behavior: Behavior normal.         ASSESSMENT and PLAN  Diagnoses and all orders for this visit:    1. Chronic right-sided low back pain without sciatica    2. Scoliosis of thoracolumbar spine, unspecified scoliosis type    3. Gait instability    4. Essential hypertension    5. Primary osteoarthritis involving multiple joints  Tylenol 500 mg 1-2 TID for pain   6. Constipation  Miralax 1 to 2 times daily prn  Senna daily  Increase fluid and fiber intake  Increase physical activity      Follow-up and Dispositions    · Return in about 4 months (around 9/25/2021).      lab results and schedule of future lab studies reviewed with patient  reviewed diet, exercise and weight control  reviewed medications and side effects in detail  Fall precautions discussed  F/u with other MD's as scheduled  COVID-19 precautions discussed with pt  Reassurance

## 2021-05-25 NOTE — PROGRESS NOTES
Results for orders placed or performed during the hospital encounter of 05/21/21   URINE CULTURE HOLD SAMPLE    Specimen: Serum; Urine   Result Value Ref Range    Urine culture hold        Urine on hold in Microbiology dept for 2 days. If unpreserved urine is submitted, it cannot be used for addtional testing after 24 hours, recollection will be required. CBC WITH AUTOMATED DIFF   Result Value Ref Range    WBC 5.8 3.6 - 11.0 K/uL    RBC 4.34 3.80 - 5.20 M/uL    HGB 13.4 11.5 - 16.0 g/dL    HCT 40.7 35.0 - 47.0 %    MCV 93.8 80.0 - 99.0 FL    MCH 30.9 26.0 - 34.0 PG    MCHC 32.9 30.0 - 36.5 g/dL    RDW 11.9 11.5 - 14.5 %    PLATELET 298 559 - 332 K/uL    MPV 10.1 8.9 - 12.9 FL    NRBC 0.0 0  WBC    ABSOLUTE NRBC 0.00 0.00 - 0.01 K/uL    NEUTROPHILS 54 32 - 75 %    LYMPHOCYTES 30 12 - 49 %    MONOCYTES 11 5 - 13 %    EOSINOPHILS 4 0 - 7 %    BASOPHILS 1 0 - 1 %    IMMATURE GRANULOCYTES 0 0.0 - 0.5 %    ABS. NEUTROPHILS 3.1 1.8 - 8.0 K/UL    ABS. LYMPHOCYTES 1.8 0.8 - 3.5 K/UL    ABS. MONOCYTES 0.6 0.0 - 1.0 K/UL    ABS. EOSINOPHILS 0.2 0.0 - 0.4 K/UL    ABS. BASOPHILS 0.0 0.0 - 0.1 K/UL    ABS. IMM. GRANS. 0.0 0.00 - 0.04 K/UL    DF AUTOMATED     METABOLIC PANEL, COMPREHENSIVE   Result Value Ref Range    Sodium 142 136 - 145 mmol/L    Potassium 3.8 3.5 - 5.1 mmol/L    Chloride 104 97 - 108 mmol/L    CO2 29 21 - 32 mmol/L    Anion gap 9 5 - 15 mmol/L    Glucose 98 65 - 100 mg/dL    BUN 20 6 - 20 MG/DL    Creatinine 0.90 0.55 - 1.02 MG/DL    BUN/Creatinine ratio 22 (H) 12 - 20      GFR est AA >60 >60 ml/min/1.73m2    GFR est non-AA 59 (L) >60 ml/min/1.73m2    Calcium 9.5 8.5 - 10.1 MG/DL    Bilirubin, total 0.3 0.2 - 1.0 MG/DL    ALT (SGPT) 26 12 - 78 U/L    AST (SGOT) 25 15 - 37 U/L    Alk.  phosphatase 77 45 - 117 U/L    Protein, total 7.2 6.4 - 8.2 g/dL    Albumin 3.7 3.5 - 5.0 g/dL    Globulin 3.5 2.0 - 4.0 g/dL    A-G Ratio 1.1 1.1 - 2.2     LIPASE   Result Value Ref Range    Lipase 127 73 - 393 U/L URINALYSIS W/MICROSCOPIC   Result Value Ref Range    Color YELLOW/STRAW      Appearance CLEAR CLEAR      Specific gravity 1.008 1.003 - 1.030      pH (UA) 7.0 5.0 - 8.0      Protein Negative NEG mg/dL    Glucose Negative NEG mg/dL    Ketone Negative NEG mg/dL    Bilirubin Negative NEG      Blood TRACE (A) NEG      Urobilinogen 0.2 0.2 - 1.0 EU/dL    Nitrites Negative NEG      Leukocyte Esterase Negative NEG      WBC 0-4 0 - 4 /hpf    RBC 0-5 0 - 5 /hpf    Epithelial cells FEW FEW /lpf    Bacteria Negative NEG /hpf       Health Maintenance Due   Topic Date Due    Shingrix Vaccine Age 49> (1 of 2) Never done    DTaP/Tdap/Td series (2 - Td) 05/27/2020       Chief Complaint   Patient presents with   [facility] Follow Up    Constipation    Other     f/u       1. Have you been to the ER, urgent care clinic since your last visit? Hospitalized since your last visit? May 21st, [facility], Back pain, constipation      2. Have you seen or consulted any other health care providers outside of the [address] [place] [address] since your last visit? Include any pap smears or colon screening. No    3) Do you have an Advance Directive on file? no    4) Are you interested in receiving information on Advance Directives? NO      Patient is accompanied by daughter I have received verbal consent from Winnie Wang to discuss any/all medical information while they are present in the room.

## 2021-05-25 NOTE — ED NOTES
Patient (s)  given copy of dc instructions. Patient (s)  verbalized understanding of instructions. Patient given a current medication reconciliation form and verbalized understanding of their medications. Patient (s) verbalized understanding of the importance of discussing medications with  his or her physician or clinic they will be following up with. Patient alert and oriented and in no acute distress. Patient discharged home ambulatory with daughter and all belongings.

## 2021-05-25 NOTE — ED PROVIDER NOTES
Patient is an 68-year-old female history of CKD, hypertension, hyperlipidemia presented today secondary to a fall. She was using her walker when she stepped down a ramp and lost her balance, causing her to fall on her left side. She has an abrasion to her left nose, left eyebrow. Denies loss of consciousness or blood thinner use. Denies neck or back pain. She has a skin tear to her left wrist but denies any pain. She has some bruising to the left knee but again denies any pain and has been ambulatory since this occurred. Denies any other complaints at this time. No medications taken prior to arrival.  Uncertain of last tetanus shot however upon chart review her last one was in 2010.            Past Medical History:   Diagnosis Date    Chronic kidney disease     Decreased kidney levels per daughter    Fracture 2018    Pt reports right hip fracture & surgery following a fall    Fracture 2020    left rib fractures; pt fell     HTN (hypertension) 5/27/2011    Hyperlipidemia 12/1/2011    Hypertension     OAB (overactive bladder) 2/15/2013    Other ill-defined conditions(799.89)     hyperlipidemia    Other ill-defined conditions(799.89)     bladder problems    Other ill-defined conditions(799.89)     back pain       Past Surgical History:   Procedure Laterality Date    COLONOSCOPY N/A 3/12/2018    COLONOSCOPY [facility] Krzysztof Robin [facility] at [facility]  3/12/2018         HX APPENDECTOMY  1948    HX CATARACT REMOVAL Left     HX CORNEAL TRANSPLANT Left     HX DILATION AND CURETTAGE      x 2    HX HIP REPLACEMENT Right 01/20/2019    HX TONSILLECTOMY  1938         Family History:   Problem Relation Age of Onset    Cancer Father         throat (smoker); mouth and gums    Heart Disease Mother         Age 52   [facility] Clotting Disorder Mother     Arthritis-osteo Paternal Aunt        Social History     Socioeconomic History    Marital status:      Spouse name: Not on file  Number of children: Not on file    Years of education: Not on file    Highest education level: Not on file   Occupational History    Not on file   Tobacco Use    Smoking status: Never Smoker    Smokeless tobacco: Never Used   Substance and Sexual Activity    Alcohol use: No    Drug use: No    Sexual activity: Not Currently   Other Topics Concern    Not on file   Social History Narrative    Not on file     Social Determinants of Health     Financial Resource Strain:     Difficulty of Paying Living Expenses:    Food Insecurity:     Worried About Running Out of Food in the Last Year:     [address] in the Last Year:    Transportation Needs:     Lack of Transportation (Medical):  Lack of Transportation (Non-Medical):    Physical Activity:     Days of Exercise per Week:     Minutes of Exercise per Session:    Stress:     Feeling of Stress :    Social Connections:     Frequency of Communication with Friends and Family:     Frequency of Social Gatherings with Friends and Family:     Attends Yarsani Services:     Active Member of Clubs or Organizations:     Attends Club or Organization Meetings:     Marital Status:    Intimate Partner Violence:     Fear of Current or Ex-Partner:     Emotionally Abused:     Physically Abused:     Sexually Abused: ALLERGIES: Codeine, Demerol (pf) [meperidine (pf)], and Meperidine    Review of Systems   Constitutional: Negative for chills and fever. HENT: Negative for congestion and rhinorrhea. Eyes: Negative for redness and visual disturbance. Respiratory: Negative for cough and shortness of breath. Cardiovascular: Negative for chest pain and leg swelling. Gastrointestinal: Negative for abdominal pain, diarrhea, nausea and vomiting. Genitourinary: Negative for dysuria, flank pain, frequency, hematuria and urgency. Musculoskeletal: Negative for arthralgias, back pain, myalgias and neck pain. Skin: Positive for wound.  Negative for rash. Allergic/Immunologic: Negative for immunocompromised state. Neurological: Negative for dizziness and headaches. Vitals:    05/25/21 1214   BP: (!) 154/70   Pulse: (!) 57   Resp: 16   Temp: 98 °F (36.7 °C)   SpO2: 96%            Physical Exam  Vitals and nursing note reviewed. Constitutional:       General: She is not in acute distress. Appearance: She is well-developed. She is not diaphoretic. HENT:      Head: Normocephalic. Comments: No cephalohematoma  No pradhan sign or raccoon eyes   Small abrasion to left side of the bridge of nose, as well as left eyebrow without repairable wound     Mouth/Throat:      Pharynx: No oropharyngeal exudate. Eyes:      General:         Right eye: No discharge. Left eye: No discharge. Pupils: Pupils are equal, round, and reactive to light. Cardiovascular:      Rate and Rhythm: Normal rate and regular rhythm. Heart sounds: Normal heart sounds. No murmur heard. No friction rub. No gallop. Pulmonary:      Effort: Pulmonary effort is normal. No respiratory distress. Breath sounds: Normal breath sounds. No stridor. No wheezing or rales. Abdominal:      General: Bowel sounds are normal. There is no distension. Palpations: Abdomen is soft. Tenderness: There is no abdominal tenderness. There is no guarding or rebound. Musculoskeletal:         General: No deformity. Normal range of motion. Cervical back: Normal range of motion and neck supple. Comments: No C/T/L spine tenderness  No chest wall tenderness, no crepitus, no flail segment  Contusion to left knee with a skin tear to left wrist without underlying bony tenderness to either. Upper and lower extremities fully ranged, visualized, and palpated without tenderness or deformity. No snuff box tenderness or pain with axial loading of the thumb.   The hips are non-tender with bilateral compression  Pelvis is stable     Skin:     General: Skin is warm and dry. Capillary Refill: Capillary refill takes less than 2 seconds. Findings: No rash. Neurological:      Mental Status: She is alert and oriented to person, place, and time. Psychiatric:         Behavior: Behavior normal.          MDM    Patient is a very well-appearing 25-year-old female presenting today after mechanical fall. She has an abrasion to her left eyebrow on the nose as well as a skin tear to her wrist.  She has no bony tenderness to her extremities. CT of her head negative. No spinal tenderness. She is not on anticoagulation. She is neurovascularly intact. She is in no acute distress. She was discharged home. Tdap updated and wound care performed. ICD-10-CM ICD-9-CM    1. Fall, initial encounter  [address] XXXA E888.9    2. Closed head injury, initial encounter  S09.90XA 959.01    3.  Tear of skin of left wrist, initial encounter  S61.512A 881.02      [place]  Ron Richard, DO

## 2021-06-03 DIAGNOSIS — I10 ESSENTIAL HYPERTENSION: ICD-10-CM

## 2021-06-03 RX ORDER — METOPROLOL TARTRATE 25 MG/1
25 TABLET, FILM COATED ORAL 2 TIMES DAILY
Qty: 180 TABLET | Refills: 1 | Status: SHIPPED | OUTPATIENT
Start: 2021-06-03

## 2021-06-14 RX ORDER — ALENDRONATE SODIUM 35 MG/1
TABLET ORAL
Qty: 4 TABLET | Refills: 0 | Status: SHIPPED | OUTPATIENT
Start: 2021-06-14 | End: 2021-07-12 | Stop reason: SDUPTHER

## 2021-07-06 ENCOUNTER — OFFICE VISIT (OUTPATIENT)
Dept: INTERNAL MEDICINE CLINIC | Age: 86
End: 2021-07-06
Payer: MEDICARE

## 2021-07-06 ENCOUNTER — HOSPITAL ENCOUNTER (OUTPATIENT)
Dept: GENERAL RADIOLOGY | Age: 86
Discharge: HOME | End: 2021-07-06
Payer: MEDICARE

## 2021-07-06 VITALS
RESPIRATION RATE: 16 BRPM | HEART RATE: 56 BPM | TEMPERATURE: 97.5 F | SYSTOLIC BLOOD PRESSURE: 136 MMHG | BODY MASS INDEX: 22.88 KG/M2 | DIASTOLIC BLOOD PRESSURE: 72 MMHG | WEIGHT: 151 LBS | HEIGHT: 68 IN | OXYGEN SATURATION: 95 %

## 2021-07-06 DIAGNOSIS — K59.09 CONSTIPATION, CHRONIC: ICD-10-CM

## 2021-07-06 DIAGNOSIS — R26.81 GAIT INSTABILITY: ICD-10-CM

## 2021-07-06 DIAGNOSIS — Z11.1 SCREENING-PULMONARY TB: ICD-10-CM

## 2021-07-06 DIAGNOSIS — Z02.2 ENCOUNTER FOR EXAMINATION FOR ADMISSION TO ASSISTED LIVING FACILITY: Primary | ICD-10-CM

## 2021-07-06 DIAGNOSIS — I10 ESSENTIAL HYPERTENSION: ICD-10-CM

## 2021-07-06 PROCEDURE — 71046 X-RAY EXAM CHEST 2 VIEWS: CPT

## 2021-07-06 PROCEDURE — G8432 DEP SCR NOT DOC, RNG: HCPCS | Performed by: INTERNAL MEDICINE

## 2021-07-06 PROCEDURE — 1101F PT FALLS ASSESS-DOCD LE1/YR: CPT | Performed by: INTERNAL MEDICINE

## 2021-07-06 PROCEDURE — 1090F PRES/ABSN URINE INCON ASSESS: CPT | Performed by: INTERNAL MEDICINE

## 2021-07-06 PROCEDURE — G8420 CALC BMI NORM PARAMETERS: HCPCS | Performed by: INTERNAL MEDICINE

## 2021-07-06 PROCEDURE — G8427 DOCREV CUR MEDS BY ELIG CLIN: HCPCS | Performed by: INTERNAL MEDICINE

## 2021-07-06 PROCEDURE — 99214 OFFICE O/P EST MOD 30 MIN: CPT | Performed by: INTERNAL MEDICINE

## 2021-07-06 PROCEDURE — G8536 NO DOC ELDER MAL SCRN: HCPCS | Performed by: INTERNAL MEDICINE

## 2021-07-06 NOTE — PROGRESS NOTES
HISTORY OF PRESENT ILLNESS  Morgan Corley is a 80 y.o. female. HPI Pt here for physical exam for transition to assisted living. She came a accompanied with her daughter. She currently lives with her , in a handicap accessible housing with limited people interaction. The daughter hopes she will interact more with other and engage in the activities offered at the assisted living. She uses walker at baseline and has some gait instability but has had no falls in the past. Daughter states the notices that her mom gets winded quickly. Patient states that it is because her legs get tired easily. Endorses taking walks in corridors. Denies any trouble breathing or use of any assistive device to help her breathe. She reports no open wounds. She eats regular textured food and requires no assistance with feeding. Ut tries to maintain a low sodium diet   She is able to take her own medication after it has been put in her pill box by [facility]. States  sometimes reminds to take her medications. She has hypertension and denies complete abstinence from salt. States she like salt on her eggs. Denies exposure to TB and is agreeable to get a chest x-ray done.   Visit Vitals  /72 (BP 1 Location: Left upper arm, BP Patient Position: Sitting, BP Cuff Size: Adult)   Pulse (!) 56   Temp 97.5 °F (36.4 °C) (Oral)   Resp 16   Ht 5' 8\" (1.727 m)   Wt 151 lb (68.5 kg)   SpO2 95%   BMI 22.96 kg/m²     Past Medical History:   Diagnosis Date    Chronic kidney disease     Decreased kidney levels per daughter   [facility] Fracture 2018    Pt reports right hip fracture & surgery following a fall    Fracture 2020    left rib fractures; pt fell     HTN (hypertension) 5/27/2011    Hyperlipidemia 12/1/2011    Hypertension     OAB (overactive bladder) 2/15/2013    Other ill-defined conditions(799.89)     hyperlipidemia    Other ill-defined conditions(799.89)     bladder problems    Other ill-defined conditions(799.89) back pain     Past Surgical History:   Procedure Laterality Date    COLONOSCOPY N/A 3/12/2018    COLONOSCOPY performed by Effie Mcardle, MD at \[facility]\"" ENDOSCOPY    COLONOSCOPY,DIAGNOSTIC  3/12/2018         HX APPENDECTOMY  1948    HX CATARACT REMOVAL Left     HX CORNEAL TRANSPLANT Left     HX DILATION AND CURETTAGE      x 2    HX HIP REPLACEMENT Right 01/20/2019    HX TONSILLECTOMY  1938     family history includes Arthritis-osteo in her paternal aunt; Cancer in her father; Clotting Disorder in her mother; Heart Disease in her mother. I am having Barbi Leslie maintain her multivit-minerals/folic acid (CENTRUM MULTIGUMMIES PO), coenzyme Q-10, glucosamine-chondroit-vit c-mn, polyethylene glycol, cholecalciferol, senna-docusate, calcium carbonate, memantine, atorvastatin, amLODIPine, metoprolol tartrate, and alendronate. Review of Systems   Constitutional: Negative. HENT: Negative. Eyes: Negative. Respiratory: Negative. Cardiovascular: Negative. Gastrointestinal: Negative. Genitourinary: Negative. Musculoskeletal: Negative. Skin: Negative. Neurological: Negative. Endo/Heme/Allergies: Negative. Psychiatric/Behavioral: Negative. Physical Exam  Constitutional:       Appearance: Normal appearance. HENT:      Head: Normocephalic and atraumatic. Nose: Nose normal.      Mouth/Throat:      Mouth: Mucous membranes are moist.   Eyes:      Extraocular Movements: Extraocular movements intact. Cardiovascular:      Rate and Rhythm: Normal rate and regular rhythm. Pulses: Normal pulses. Heart sounds: Normal heart sounds. Pulmonary:      Effort: Pulmonary effort is normal.      Breath sounds: Normal breath sounds. Comments: Diminished lung sounds at bases  Abdominal:      General: Abdomen is flat. Palpations: Abdomen is soft. Musculoskeletal:         General: Normal range of motion. Cervical back: Normal range of motion and neck supple. Comments: BLE 1+ edema   Neurological:      Mental Status: She is alert and oriented to person, place, and time. Gait: Gait abnormal.      Comments: Has gait instability, uses walker at baseline   Psychiatric:         Mood and Affect: Mood normal.         ASSESSMENT and PLAN  Diagnoses and all orders for this visit:    1. Encounter for examination for admission to assisted living facility    2. Screening-pulmonary TB  -     XR CHEST PA LAT; Future    3. Essential hypertension    4. Gait instability    5.  Constipation, chronic      Follow-up and Dispositions    · Return in about 6 months (around 1/6/2022), or if symptoms worsen or fail to improve, for Follow up.       lab results and schedule of future lab studies reviewed with patient  reviewed diet, exercise and weight control  cardiovascular risk and specific lipid/LDL goals reviewed  reviewed medications and side effects in detail   Advised that patient would benefit from PT

## 2021-07-06 NOTE — PROGRESS NOTES
Feels better today. Less distended.    Current Facility-Administered Medications   Medication   • LORazepam (ATIVAN) injection 0.5 mg   • labetalol (NORMODYNE) injection 20 mg   • metoPROLOL (LOPRESSOR) injection 5 mg   • piperacillin-tazobactam (ZOSYN) 3.375 g in sodium chloride 0.9 % 100 mL IVPB   • metoCLOPramide (REGLAN) injection 10 mg   • sodium chloride (PF) 0.9 % injection 2 mL   • sodium chloride (PF) 0.9 % injection 2 mL   • sodium chloride 0.9% infusion   • diphenhydrAMINE (BENADRYL) capsule 25 mg   • ondansetron (ZOFRAN) injection 2 mg   • acetaminophen (TYLENOL) tablet 650 mg   • warfarin (COUMADIN) tablet 7.5 mg    And   • warfarin (COUMADIN) tablet 5 mg   • HYDROmorphone (DILAUDID) injection 1 mg     Vitals:    07/31/18 0247 07/31/18 0710 07/31/18 1130 07/31/18 1523   BP: 101/56 146/69 164/74 190/82   Pulse: 92 70 100 109   Resp: 16 18  20   Temp: 98.2 °F (36.8 °C) 99.2 °F (37.3 °C) 97.7 °F (36.5 °C) 98.8 °F (37.1 °C)   TempSrc: Oral Oral Oral Oral   SpO2: 92% 94% 94% 95%   Weight:       Height:         PE:  ANICTERIC  ABD w/minimal BS, less tender and less distended vs yesterday.    Component      Latest Ref Rng & Units 7/30/2018   WBC      4.2 - 11.0 K/mcL 18.4 (H)   RBC      4.50 - 5.90 mil/mcL 5.95 (H)   HGB      13.0 - 17.0 g/dL 17.6 (H)   HCT      39.0 - 51.0 % 52.8 (H)   MCV      78.0 - 100.0 fl 88.7   MCH      26.0 - 34.0 pg 29.6   MCHC      32.0 - 36.5 g/dL 33.3   RDW-CV      11.0 - 15.0 % 15.8 (H)   PLT      140 - 450 K/mcL 245   DIFFERENTIAL TYPE       AUTOMATED DIFFERENTIAL   Neutrophil      % 89   LYMPH      % 6   MONO      % 5   EOSIN      % 0   BASO      % 0   Absolute Neutrophil      1.8 - 7.7 K/mcL 16.4 (H)   Absolute Lymph      1.0 - 4.0 K/mcL 1.1   Absolute Mono      0.3 - 0.9 K/mcL 0.9   Absolute Eos      0.1 - 0.5 K/mcL 0.0 (L)   Absolute Baso      0.0 - 0.3 K/mcL 0.0           Sodium      135 - 145 mmol/L 134 (L)   Potassium      3.4 - 5.1 mmol/L 5.7 (H)   Chloride      98 - 107  Health Maintenance Due   Topic Date Due    Shingrix Vaccine Age 49> (1 of 2) Never done       Chief Complaint   Patient presents with    Complete Physical    Hip Pain    Back Pain       1. Have you been to the ER, urgent care clinic since your last visit? Hospitalized since your last visit? Yes When: 5/25/21, [facility], fall    2. Have you seen or consulted any other health care providers outside of the [address] [place] [address] since your last visit? Include any pap smears or colon screening. No    3) Do you have an Advance Directive on file? no    4) Are you interested in receiving information on Advance Directives? NO      Patient is accompanied by daughter ( POA)I have received verbal consent from Latoya Eldridge to discuss any/all medical information while they are present in the room. mmol/L 104   CO2      21 - 32 mmol/L 19 (L)   ANION GAP      10 - 20 mmol/L 17   Glucose      65 - 99 mg/dL 241 (H)   BUN      6 - 20 mg/dL 36 (H)   Creatinine      0.67 - 1.17 mg/dL 1.26 (H)   GFR Estimate,        72   GFR Estimate, Non African American       62   BUN/CREATININE RATIO      7 - 25 29 (H)   CALCIUM      8.4 - 10.2 mg/dL 6.4 (L)   TOTAL BILIRUBIN      0.2 - 1.0 mg/dL 0.7   AST/SGOT      <38 Units/L 29   ALT/SGPT      <79 Units/L 29   ALK PHOSPHATASE      45 - 117 Units/L 54   TOTAL PROTEIN      6.4 - 8.2 g/dL 6.9   Albumin      3.6 - 5.1 g/dL 2.9 (L)   GLOBULIN      2.0 - 4.0 g/dL 4.0   A/G Ratio, Serum      1.0 - 2.4 0.7 (L)   MAGNESIUM      1.7 - 2.4 mg/dL 1.8   Lactic Acid Venous      0 - 2.0 mmol/L 3.1 (HH)   Lipase      73 - 393 Units/L 4,688 (H)   TRIGLYCERIDE      <150 mg/dL    PROCALCITONIN      <0.10 ng/mL 0.57 (H)     Component      Latest Ref Rng & Units 7/31/2018   WBC      4.2 - 11.0 K/mcL 16.7 (H)   RBC      4.50 - 5.90 mil/mcL 5.19   HGB      13.0 - 17.0 g/dL 15.0   HCT      39.0 - 51.0 % 46.6   MCV      78.0 - 100.0 fl 89.8   MCH      26.0 - 34.0 pg 28.9   MCHC      32.0 - 36.5 g/dL 32.2   RDW-CV      11.0 - 15.0 % 16.7 (H)   PLT      140 - 450 K/mcL 208   DIFFERENTIAL TYPE       AUTOMATED DIFFERENTIAL   Neutrophil      % 89   LYMPH      % 7   MONO      % 4   EOSIN      % 0   BASO      % 0   Absolute Neutrophil      1.8 - 7.7 K/mcL 14.8 (H)   Absolute Lymph      1.0 - 4.0 K/mcL 1.2   Absolute Mono      0.3 - 0.9 K/mcL 0.7   Absolute Eos      0.1 - 0.5 K/mcL 0.0 (L)   Absolute Baso      0.0 - 0.3 K/mcL 0.0   PROTIME      9.7 - 11.8 sec 28.6 (H)   INR       2.9   Sodium      135 - 145 mmol/L 137   Potassium      3.4 - 5.1 mmol/L 5.6 (H)   Chloride      98 - 107 mmol/L 107   CO2      21 - 32 mmol/L 22   ANION GAP      10 - 20 mmol/L 14   Glucose      65 - 99 mg/dL 164 (H)   BUN      6 - 20 mg/dL 62 (H)   Creatinine      0.67 - 1.17 mg/dL 2.32 (H)   GFR Estimate, African  American       35   GFR Estimate, Non African American       30   BUN/CREATININE RATIO      7 - 25 27 (H)   CALCIUM      8.4 - 10.2 mg/dL 6.9 (L)                                       Lactic Acid Venous      0 - 2.0 mmol/L 2.0       TRIGLYCERIDE      <150 mg/dL 160 (H)     Acute pancreatitis w/ileus, ATN, abdominal signs improved.  Appreciate surgery input.  Trig 160.  Liver tests unremarkable, ?related to ETOH.  Continue IVFs, pain control.  Ice chips. May consider clears in AM if better.  F/u imaging pending clinical progress.  Multiple questions answered.  Patient is agreeable with above evaluation and plan.

## 2021-07-09 ENCOUNTER — TELEPHONE (OUTPATIENT)
Dept: INTERNAL MEDICINE CLINIC | Age: 86
End: 2021-07-09

## 2021-07-09 NOTE — TELEPHONE ENCOUNTER
lvm for t daughter to let her know that they left her mom vaccine card when they were in office the other day. Waiting on r/c.

## 2021-07-12 RX ORDER — ALENDRONATE SODIUM 35 MG/1
TABLET ORAL
Qty: 4 TABLET | Refills: 0 | Status: SHIPPED | OUTPATIENT
Start: 2021-07-12 | End: 2021-07-20 | Stop reason: SDUPTHER

## 2021-07-20 DIAGNOSIS — M80.00XD AGE-RELATED OSTEOPOROSIS WITH CURRENT PATHOLOGICAL FRACTURE WITH ROUTINE HEALING: Primary | ICD-10-CM

## 2021-07-20 DIAGNOSIS — M15.9 PRIMARY OSTEOARTHRITIS INVOLVING MULTIPLE JOINTS: ICD-10-CM

## 2021-07-21 RX ORDER — ALENDRONATE SODIUM 35 MG/1
TABLET ORAL
Qty: 10 TABLET | Refills: 0 | Status: SHIPPED | OUTPATIENT
Start: 2021-07-21

## 2021-07-22 ENCOUNTER — TELEPHONE (OUTPATIENT)
Dept: INTERNAL MEDICINE CLINIC | Age: 86
End: 2021-07-22

## 2021-07-22 DIAGNOSIS — G89.29 CHRONIC RIGHT-SIDED LOW BACK PAIN WITHOUT SCIATICA: ICD-10-CM

## 2021-07-22 DIAGNOSIS — R26.81 GAIT INSTABILITY: Primary | ICD-10-CM

## 2021-07-22 DIAGNOSIS — R41.3 MEMORY IMPAIRMENT: ICD-10-CM

## 2021-07-22 DIAGNOSIS — M41.9 SCOLIOSIS OF THORACOLUMBAR SPINE, UNSPECIFIED SCOLIOSIS TYPE: ICD-10-CM

## 2021-07-22 DIAGNOSIS — M54.50 CHRONIC RIGHT-SIDED LOW BACK PAIN WITHOUT SCIATICA: ICD-10-CM

## 2021-07-22 NOTE — TELEPHONE ENCOUNTER
S/w Rozina([facility])[phone])the pt: is moving in today please [phone]) order for [facility]

## 2021-08-02 ENCOUNTER — TELEPHONE (OUTPATIENT)
Dept: INTERNAL MEDICINE CLINIC | Age: 86
End: 2021-08-02

## 2021-08-03 ENCOUNTER — TELEPHONE (OUTPATIENT)
Dept: INTERNAL MEDICINE CLINIC | Age: 86
End: 2021-08-03

## 2021-08-03 NOTE — TELEPHONE ENCOUNTER
Pt to receive speech therapy and occupational therapy  2x per week for 2 wks  1x per wk for 2 more weeks

## 2021-08-03 NOTE — TELEPHONE ENCOUNTER
----- Message from Angelica Castro sent at 8/3/2021 12:50 PM EDT -----  Regarding: Dr. Marie Momin first and last name: Jody Mcburney w/ [facility]      Reason for call: Robbie Oshea requesting that PCP fax pt's last office visit note to [phone] required yes/no and why: yes, confirm note submission      Best contact number(s): [phone]      Details to clarify the request: n/a       Angelica Castro

## 2021-09-03 RX ORDER — ATORVASTATIN CALCIUM 20 MG/1
TABLET, FILM COATED ORAL
Qty: 90 TABLET | Refills: 0 | Status: SHIPPED | OUTPATIENT
Start: 2021-09-03

## 2021-09-08 ENCOUNTER — OFFICE VISIT (OUTPATIENT)
Dept: INTERNAL MEDICINE CLINIC | Age: 86
End: 2021-09-08
Payer: MEDICARE

## 2021-09-08 VITALS
WEIGHT: 152.6 LBS | RESPIRATION RATE: 16 BRPM | TEMPERATURE: 98 F | DIASTOLIC BLOOD PRESSURE: 72 MMHG | SYSTOLIC BLOOD PRESSURE: 122 MMHG | HEIGHT: 68 IN | HEART RATE: 67 BPM | OXYGEN SATURATION: 96 % | BODY MASS INDEX: 23.13 KG/M2

## 2021-09-08 DIAGNOSIS — E78.00 PURE HYPERCHOLESTEROLEMIA: ICD-10-CM

## 2021-09-08 DIAGNOSIS — K59.09 CONSTIPATION, CHRONIC: ICD-10-CM

## 2021-09-08 DIAGNOSIS — R26.81 GAIT INSTABILITY: ICD-10-CM

## 2021-09-08 DIAGNOSIS — R41.3 MEMORY IMPAIRMENT: ICD-10-CM

## 2021-09-08 DIAGNOSIS — I10 ESSENTIAL HYPERTENSION: Primary | ICD-10-CM

## 2021-09-08 DIAGNOSIS — M15.9 PRIMARY OSTEOARTHRITIS INVOLVING MULTIPLE JOINTS: ICD-10-CM

## 2021-09-08 DIAGNOSIS — F41.9 ANXIETY: ICD-10-CM

## 2021-09-08 PROCEDURE — 1090F PRES/ABSN URINE INCON ASSESS: CPT | Performed by: INTERNAL MEDICINE

## 2021-09-08 PROCEDURE — G8510 SCR DEP NEG, NO PLAN REQD: HCPCS | Performed by: INTERNAL MEDICINE

## 2021-09-08 PROCEDURE — G8536 NO DOC ELDER MAL SCRN: HCPCS | Performed by: INTERNAL MEDICINE

## 2021-09-08 PROCEDURE — 1101F PT FALLS ASSESS-DOCD LE1/YR: CPT | Performed by: INTERNAL MEDICINE

## 2021-09-08 PROCEDURE — 99214 OFFICE O/P EST MOD 30 MIN: CPT | Performed by: INTERNAL MEDICINE

## 2021-09-08 PROCEDURE — G8427 DOCREV CUR MEDS BY ELIG CLIN: HCPCS | Performed by: INTERNAL MEDICINE

## 2021-09-08 PROCEDURE — G8420 CALC BMI NORM PARAMETERS: HCPCS | Performed by: INTERNAL MEDICINE

## 2021-09-08 RX ORDER — SERTRALINE HYDROCHLORIDE 25 MG/1
25 TABLET, FILM COATED ORAL DAILY
Qty: 30 TABLET | Refills: 1 | Status: SHIPPED | OUTPATIENT
Start: 2021-09-08 | End: 2021-11-07

## 2021-09-08 RX ORDER — ASPIRIN 81 MG/1
81 TABLET ORAL DAILY
COMMUNITY

## 2021-09-08 NOTE — PROGRESS NOTES
HISTORY OF PRESENT ILLNESS  Deborrzen Spencer is a 80 y.o. female. She is here with her daughter for follow-up. PMH includes HTN, HLD, osteoporosis, OAB, gait instability, memory impairment, DJD. Vital signs are stable. Weight is stable. Continues to have issues with BMs. Tends to be constipated. MiraLAX helps but if she takes it daily then she will go more frequently. Denies melena or hematochezia. No other new GI or  issues. Has chronic arthritic pains especially in hips. Lives with her elderly  who has a lot of medical issues. Family to assisted living recently. Asking for home health. Daughter is concerned that she is anxious/depressed. Gait is slow and unsteady but no recent falls. Depends on family's for some ADLs. Has had Covid-19 vaccination. Reports taking proper precautions. Denies any related signs or symptoms. PMH/PSH/Allergies/Social History/medication list and most recent studies reviewed with patient. Tobacco use: No  Alcohol use: No    Reports compliance with medications and diet. Trying to be active physically as tolerated. Reports no other new c/o. HPI    Review of Systems   Constitutional: Negative. HENT: Negative. Eyes: Negative. Negative for blurred vision. Respiratory: Negative. Negative for shortness of breath. Cardiovascular: Negative. Negative for chest pain and leg swelling. Gastrointestinal: Positive for constipation. Negative for abdominal pain, blood in stool, heartburn and melena. Genitourinary: Negative. Negative for dysuria. Musculoskeletal: Positive for back pain and joint pain. Negative for falls. Skin: Negative. Neurological: Negative. Negative for dizziness, sensory change, focal weakness and headaches. Endo/Heme/Allergies: Negative. Negative for polydipsia. Psychiatric/Behavioral: Positive for depression and memory loss. Negative for suicidal ideas. The patient is nervous/anxious.  The patient does not have insomnia. Physical Exam  Vitals and nursing note reviewed. Constitutional:       General: She is not in acute distress. Appearance: She is well-developed. Comments: Pleasant elderly lady   HENT:      Head: Normocephalic and atraumatic. Mouth/Throat:      Mouth: Mucous membranes are moist.   Eyes:      General: No scleral icterus. Conjunctiva/sclera: Conjunctivae normal.   Neck:      Thyroid: No thyromegaly. Vascular: No carotid bruit or JVD. Cardiovascular:      Rate and Rhythm: Normal rate and regular rhythm. Heart sounds: Normal heart sounds. No murmur heard. Pulmonary:      Effort: Pulmonary effort is normal. No respiratory distress. Breath sounds: Normal breath sounds. No wheezing or rales. Abdominal:      General: Bowel sounds are normal. There is no distension. Palpations: Abdomen is soft. Tenderness: There is no abdominal tenderness. There is no right CVA tenderness or left CVA tenderness. Musculoskeletal:         General: Tenderness (lumbars with scoliosis) present. Cervical back: Normal range of motion and neck supple. Right lower leg: No edema. Left lower leg: No edema. Comments: DJD   Lymphadenopathy:      Cervical: No cervical adenopathy. Skin:     General: Skin is warm and dry. Findings: No erythema or rash. Neurological:      Mental Status: She is alert. Cranial Nerves: No cranial nerve deficit. Coordination: Coordination abnormal.      Gait: Gait abnormal.      Comments: Alert and oriented x2  Does not know the date   Psychiatric:         Behavior: Behavior normal.      Comments: Seems depressed/anxious         ASSESSMENT and PLAN deficient ventral right  Diagnoses and all orders for this visit:    1. Essential hypertension  Stable. Continue metoprolol and amlodipine. Monitor BP at home with goal of 140/90 or less    2. Gait instability  Fall precautions discussed  Refer to home health  3.  Primary osteoarthritis involving multiple joints  Stable. Continue Tylenol as needed  4. Constipation, chronic  Stable. Continue MiraLAX and senna. Take MiraLAX if leaving house  Keep up with fluids and fiber intake  5. Pure hypercholesterolemia  Stable. Continue Lipitor  6. Anxiety  Discussed diagnosis etiology and treatment of depression and anxiety in detail with patient  Nonpharmacological ways of dealing with depression anxiety discussed  Start sertraline (ZOLOFT) 25 mg tablet; Take 1 Tablet by mouth daily for 60 days. Potential side effects discussed    Follow-up and Dispositions    · Return in about 4 weeks (around 10/6/2021). All chronic medical problems are stable  Continue with current medical management and plan  lab results and schedule of future lab studies reviewed with patient  reviewed diet, exercise and weight control  reviewed medications and side effects in detail  COVID-19 precautions discussed with pt  Fall precautions discussed  An After Visit Summary was printed and given to the patient.

## 2021-09-08 NOTE — PROGRESS NOTES
Health Maintenance Due   Topic Date Due    Shingrix Vaccine Age 49> (1 of 2) Never done    Flu Vaccine (1) 09/01/2021       Chief Complaint   Patient presents with    Constipation     Miralax not working, when taken everday she cannot make it to RR    Discuss Medications    Anxiety      has been saying \"He's going out\" and makes her nervous        1. Have you been to the ER, urgent care clinic since your last visit? Hospitalized since your last visit? No  2. Have you seen or consulted any other health care providers outside of the [address] [place] [address] since your last visit? Include any pap smears or colon screening. No    3) Do you have an Advance Directive on file? no    4) Are you interested in receiving information on Advance Directives? NO      Patient is accompanied by self I have received verbal consent from Noble Bills to discuss any/all medical information while they are present in the room.

## 2021-09-28 ENCOUNTER — DOCUMENTATION ONLY (OUTPATIENT)
Dept: INTERNAL MEDICINE CLINIC | Age: 86
End: 2021-09-28

## 2022-03-18 PROBLEM — K59.09 CONSTIPATION, CHRONIC: Status: ACTIVE | Noted: 2021-05-25

## 2022-03-18 PROBLEM — S72.001A CLOSED RIGHT HIP FRACTURE (HCC): Status: ACTIVE | Noted: 2018-07-12

## 2022-03-18 PROBLEM — M41.9 SCOLIOSIS OF THORACOLUMBAR SPINE, UNSPECIFIED SCOLIOSIS TYPE: Status: ACTIVE | Noted: 2021-05-25

## 2022-03-18 PROBLEM — M15.9 PRIMARY OSTEOARTHRITIS INVOLVING MULTIPLE JOINTS: Status: ACTIVE | Noted: 2021-02-23

## 2022-03-19 PROBLEM — R00.1 BRADYCARDIA: Status: ACTIVE | Noted: 2018-07-13

## 2022-03-19 PROBLEM — M80.00XD AGE-RELATED OSTEOPOROSIS WITH CURRENT PATHOLOGICAL FRACTURE WITH ROUTINE HEALING: Status: ACTIVE | Noted: 2020-02-24

## 2022-03-19 PROBLEM — I10 ESSENTIAL HYPERTENSION: Status: ACTIVE | Noted: 2018-07-12

## 2022-03-19 PROBLEM — Z96.641 HISTORY OF RIGHT HIP REPLACEMENT: Status: ACTIVE | Noted: 2021-02-23

## 2022-03-19 PROBLEM — E78.00 PURE HYPERCHOLESTEROLEMIA: Status: ACTIVE | Noted: 2021-09-08

## 2022-03-19 PROBLEM — S72.009A HIP FRACTURE (HCC): Status: ACTIVE | Noted: 2019-01-20

## 2022-03-20 PROBLEM — M54.50 CHRONIC RIGHT-SIDED LOW BACK PAIN WITHOUT SCIATICA: Status: ACTIVE | Noted: 2021-05-25

## 2022-03-20 PROBLEM — R41.3 MEMORY IMPAIRMENT: Status: ACTIVE | Noted: 2021-02-23

## 2022-03-20 PROBLEM — G89.29 CHRONIC RIGHT-SIDED LOW BACK PAIN WITHOUT SCIATICA: Status: ACTIVE | Noted: 2021-05-25

## 2022-03-20 PROBLEM — R26.81 GAIT INSTABILITY: Status: ACTIVE | Noted: 2021-02-23

## 2022-03-20 PROBLEM — F41.9 ANXIETY: Status: ACTIVE | Noted: 2021-09-08

## 2022-08-03 ENCOUNTER — TELEPHONE (OUTPATIENT)
Dept: INTERNAL MEDICINE CLINIC | Age: 87
End: 2022-08-03

## 2022-08-03 NOTE — TELEPHONE ENCOUNTER
We received order from home health for  to sign.  I called and spoke with pts daughter, pt no longer sees Francisco Monson, she will call to pts assisted living and have them send to the correct pcp

## 2022-09-21 ENCOUNTER — APPOINTMENT (OUTPATIENT)
Dept: CT IMAGING | Age: 87
End: 2022-09-21
Attending: EMERGENCY MEDICINE
Payer: MEDICARE

## 2022-09-21 ENCOUNTER — HOSPITAL ENCOUNTER (EMERGENCY)
Age: 87
Discharge: HOME | End: 2022-09-21
Attending: EMERGENCY MEDICINE
Payer: MEDICARE

## 2022-09-21 VITALS
HEART RATE: 53 BPM | OXYGEN SATURATION: 95 % | HEIGHT: 67 IN | DIASTOLIC BLOOD PRESSURE: 69 MMHG | SYSTOLIC BLOOD PRESSURE: 177 MMHG | WEIGHT: 145 LBS | TEMPERATURE: 97.5 F | BODY MASS INDEX: 22.76 KG/M2 | RESPIRATION RATE: 18 BRPM

## 2022-09-21 DIAGNOSIS — S00.03XA HEMATOMA OF OCCIPITAL REGION OF SCALP: ICD-10-CM

## 2022-09-21 DIAGNOSIS — S09.90XA INJURY OF HEAD, INITIAL ENCOUNTER: Primary | ICD-10-CM

## 2022-09-21 PROCEDURE — 72125 CT NECK SPINE W/O DYE: CPT

## 2022-09-21 PROCEDURE — 70450 CT HEAD/BRAIN W/O DYE: CPT

## 2022-09-21 PROCEDURE — 99284 EMERGENCY DEPT VISIT MOD MDM: CPT

## 2022-09-21 NOTE — ED NOTES
I have reviewed discharge instructions with the patient. The patient verbalized understanding. She left via wheelchair w/ daughters in no acute distress.

## 2022-09-21 NOTE — ED PROVIDER NOTES
Date: 9/21/2022  Patient Name: Kehinde Parekh    History of Presenting Illness     Chief Complaint   Patient presents with    Fall       History Provided By: Patient, patient's daughter    HPI: Kehinde Parekh, [address] y.o. female  presents to the ED with cc of head injury. Patient is in the memory care unit in a nursing facility. She states that she tripped on the tile with a rug and fell and hit her head. She denies any loss of consciousness. Daughter states that staff found her on the bathroom floor around 3 am and her fall was unwitnessed. Patient has had a history of recurrent falls in the past and has broken her hip and injured her shoulder. Patient is only complaining of pain in the back of her head. She is not on any blood thinners. She denies any preceding chest pain or shortness of breath prior to the fall. She has not had any dizziness and is not complaining of any weakness in her extremities at this time. There are no other complaints, changes, or physical findings at this time. PCP: No primary care provider on file. No current facility-administered medications on file prior to encounter. Current Outpatient Medications on File Prior to Encounter   Medication Sig Dispense Refill    aspirin delayed-release 81 mg tablet Take 81 mg by mouth daily. (Patient not taking: Reported on 9/8/2021)      atorvastatin (LIPITOR) 20 mg tablet TAKE 1 TABLET BY MOUTH EVERY DAY AT NIGHT 90 Tablet 0    alendronate (FOSAMAX) 35 mg tablet TAKE 1 TABLET BY MOUTH ONE TIME PER WEEK 10 Tablet 0    metoprolol tartrate (LOPRESSOR) 25 mg tablet Take 1 Tablet by mouth two (2) times a day. HOLD FOR SBP LESS THAN 120 OR HR LESS THAN 65 180 Tablet 1    amLODIPine (NORVASC) 10 mg tablet Take 1 Tab by mouth daily. 90 Tab 1    memantine (NAMENDA) 5 mg tablet 10 mg. Twice a day      calcium carbonate (CALTREX) 600 mg calcium (1,500 mg) tablet Take 600 mg by mouth two (2) times a day.       cholecalciferol (VITAMIN D3) 1,000 unit tablet Take 1,000 Units by mouth daily. senna-docusate (PERICOLACE) 8.6-50 mg per tablet Take 1 Tab by mouth daily. polyethylene glycol (MIRALAX) 17 gram/dose powder Take 17 g by mouth daily as needed for Other (constipation). coenzyme Q-10 (CO Q-10) 200 mg capsule Take 1 Cap by mouth daily. 90 Cap 3    glucosamine-chondroit-vit c-mn (GLUCOSAMINE 1500 COMPLEX) 500-400 mg capsule Take 1 Cap by mouth daily. 90 Cap 1    multivit-minerals/folic acid (CENTRUM MULTIGUMMIES PO) Take 1 Tab by mouth daily. Past History     Past Medical History:  Past Medical History:   Diagnosis Date    Chronic kidney disease     Decreased kidney levels per daughter    Fracture 2018    Pt reports right hip fracture & surgery following a fall    Fracture 2020    left rib fractures; pt fell     HTN (hypertension) 5/27/2011    Hyperlipidemia 12/1/2011    Hypertension     OAB (overactive bladder) 2/15/2013    Other ill-defined conditions(799.89)     hyperlipidemia    Other ill-defined conditions(799.89)     bladder problems    Other ill-defined conditions(799.89)     back pain       Past Surgical History:  Past Surgical History:   Procedure Laterality Date    COLONOSCOPY N/A 3/12/2018    COLONOSCOPY performed by Merlin Coop, MD at [facility] ENDOSCOPY    COLONOSCOPY,DIAGNOSTIC  3/12/2018         HX APPENDECTOMY  1948    HX CATARACT REMOVAL Left     HX CORNEAL TRANSPLANT Left     HX DILATION AND CURETTAGE      x 2    HX HIP REPLACEMENT Right 01/20/2019    HX TONSILLECTOMY  1938       Family History:  Family History   Problem Relation Age of Onset    Cancer Father         throat (smoker); mouth and gums    Heart Disease Mother         Age 52    Clotting Disorder Mother     OSTEOARTHRITIS Paternal Aunt        Social History:  Social History     Tobacco Use    Smoking status: Never    Smokeless tobacco: Never   Substance Use Topics    Alcohol use: No    Drug use: No       Allergies:   Allergies   Allergen Reactions    Codeine Nausea and Vomiting    Demerol (Pf) [Meperidine (Pf)] Nausea and Vomiting    Meperidine Nausea and Vomiting         Review of Systems   Review of Systems   All other systems reviewed and are negative. Physical Exam   Physical Exam  Vitals and nursing note reviewed. Constitutional:       General: She is not in acute distress. Appearance: Normal appearance. She is well-developed. HENT:      Head: Normocephalic. Comments: Hematoma over the occiput  Eyes:      Extraocular Movements: Extraocular movements intact. Conjunctiva/sclera: Conjunctivae normal.   Neck:      Vascular: No JVD. Trachea: No tracheal deviation. Comments: No stepoffs, no midline ttp  Cardiovascular:      Rate and Rhythm: Normal rate and regular rhythm. Heart sounds: No murmur heard. No friction rub. No gallop. Pulmonary:      Effort: Pulmonary effort is normal. No respiratory distress. Breath sounds: Normal breath sounds. No stridor. No wheezing. Abdominal:      General: Bowel sounds are normal. There is no distension. Palpations: Abdomen is soft. There is no mass. Tenderness: There is no abdominal tenderness. There is no guarding. Musculoskeletal:         General: No tenderness. Normal range of motion. Cervical back: Normal range of motion and neck supple. No tenderness. Comments: No deformity   Skin:     General: Skin is warm and dry. Findings: No rash. Neurological:      General: No focal deficit present. Mental Status: She is alert and oriented to person, place, and time. Mental status is at baseline. Motor: No weakness. Comments: No focal deficits, following commands appropriately   Psychiatric:         Behavior: Behavior normal.         Thought Content: Thought content normal.         Judgment: Judgment normal.       Diagnostic Study Results     Labs -  No results found for this or any previous visit (from the past 72 hour(s)).     Radiologic Studies - CT SPINE CERV WO CONT   Final Result   No acute fracture or subluxation. Multilevel degenerative changes. CT HEAD WO CONT   Final Result   No acute intracranial process. Large right parieto-occipital scalp hematoma. CT Results  (Last 48 hours)      None          CXR Results  (Last 48 hours)      None            Medical Decision Making   I am the first provider for this patient. I reviewed the vital signs, available nursing notes, past medical history, past surgical history, family history and social history. Vital Signs-Reviewed the patient's vital signs. Patient Vitals for the past 12 hrs:   Temp Pulse Resp BP SpO2   09/21/22 0515 97.6 °F (36.4 °C) (!) 49 20 (!) 213/84 90 %         Records Reviewed: Nursing Notes and Old Medical Records    Provider Notes (Medical Decision Making):   Pt s/p GLF earlier today. Pt is at her mental baseline, has no focal deficits on exam. Will get head and c-spine ct as patient is a poor historian. Pt is in NAD and is hemodynamically stable    ED Course:   Initial assessment performed. The patients presenting problems have been discussed, and they are in agreement with the care plan formulated and outlined with them. I have encouraged them to ask questions as they arise throughout their visit. Disposition:      The patient's results have been reviewed with Her. She has been counseled regarding her diagnosis. She verbally conveys understanding and agreement of the signs, symptoms, diagnosis, treatment, and prognosis and additionally agrees to follow up as recommended with Dr. Palumbo primary care provider on file. in 24-48 hours. She also agrees with the care-plan and conveys that all of Her questions have been answered.  I have also put together some discharge instructions for Her that include: 1) educational information regarding their diagnosis, 2) how to care for their diagnosis at home, as well a 3) list of reasons why they would want to return to the ED prior to their follow-up appointment, should their condition change. DISCHARGE PLAN:  1. Current Discharge Medication List        2. Follow-up Information       Follow up With Specialties Details Why Contact Jeanette    [address] DEP Emergency Medicine  As needed, If symptoms worsen [address]  [phone]    Your PCP              3.  Return to ED if worse     Diagnosis     Clinical Impression:   1. Injury of head, initial encounter    2. Hematoma of occipital region of scalp        Attestations:    Priscilla Pierce, DO    Please note that this dictation was completed with [employer], the computer voice recognition software. Quite often unanticipated grammatical, syntax, homophones, and other interpretive errors are inadvertently transcribed by the computer software. Please disregard these errors. Please excuse any errors that have escaped final proofreading. Thank you.

## 2022-09-21 NOTE — ED TRIAGE NOTES
Pt arrives via EMS from [address] [place] [address] for GLF. EMS reports pt falling when using restroom, hitting head on tile floor, but did not lose LOC. Laceration to posterior scalp, bleeding controlled. Pt is not on blood thinners. GCS 14 baseline. VSS.

## 2022-09-30 ENCOUNTER — APPOINTMENT (OUTPATIENT)
Dept: CT IMAGING | Age: 87
End: 2022-09-30
Attending: EMERGENCY MEDICINE
Payer: MEDICARE

## 2022-09-30 ENCOUNTER — APPOINTMENT (OUTPATIENT)
Dept: GENERAL RADIOLOGY | Age: 87
End: 2022-09-30
Attending: EMERGENCY MEDICINE
Payer: MEDICARE

## 2022-09-30 ENCOUNTER — HOSPITAL ENCOUNTER (EMERGENCY)
Age: 87
Discharge: HOME | End: 2022-09-30
Attending: EMERGENCY MEDICINE
Payer: MEDICARE

## 2022-09-30 VITALS
SYSTOLIC BLOOD PRESSURE: 170 MMHG | RESPIRATION RATE: 16 BRPM | TEMPERATURE: 98.2 F | BODY MASS INDEX: 21.47 KG/M2 | HEIGHT: 70 IN | DIASTOLIC BLOOD PRESSURE: 68 MMHG | OXYGEN SATURATION: 96 % | WEIGHT: 150 LBS | HEART RATE: 54 BPM
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
## 2022-09-30 DIAGNOSIS — S00.03XA CONTUSION OF SCALP, INITIAL ENCOUNTER: Primary | ICD-10-CM

## 2022-09-30 DIAGNOSIS — S20.229A CONTUSION OF BACK, UNSPECIFIED LATERALITY, INITIAL ENCOUNTER: ICD-10-CM

## 2022-09-30 LAB
ALBUMIN SERPL-MCNC: 3.5 G/DL (ref 3.5–5)
ALBUMIN/GLOB SERPL: 0.9 {RATIO} (ref 1.1–2.2)
ALP SERPL-CCNC: 126 U/L (ref 45–117)
ALT SERPL-CCNC: 28 U/L (ref 12–78)
ANION GAP SERPL CALC-SCNC: 2 MMOL/L (ref 5–15)
APPEARANCE UR: CLEAR
AST SERPL-CCNC: 23 U/L (ref 15–37)
BACTERIA URNS QL MICRO: NEGATIVE /HPF
BASOPHILS # BLD: 0 K/UL (ref 0–0.1)
BASOPHILS NFR BLD: 0 % (ref 0–1)
BILIRUB SERPL-MCNC: 0.4 MG/DL (ref 0.2–1)
BILIRUB UR QL: NEGATIVE
BUN SERPL-MCNC: 20 MG/DL (ref 6–20)
BUN/CREAT SERPL: 24 (ref 12–20)
CALCIUM SERPL-MCNC: 9.9 MG/DL (ref 8.5–10.1)
CHLORIDE SERPL-SCNC: 101 MMOL/L (ref 97–108)
CO2 SERPL-SCNC: 35 MMOL/L (ref 21–32)
COLOR UR: ABNORMAL
COMMENT, HOLDF: NORMAL
CREAT SERPL-MCNC: 0.84 MG/DL (ref 0.55–1.02)
DIFFERENTIAL METHOD BLD: NORMAL
EOSINOPHIL # BLD: 0.3 K/UL (ref 0–0.4)
EOSINOPHIL NFR BLD: 3 % (ref 0–7)
EPITH CASTS URNS QL MICRO: ABNORMAL /LPF
ERYTHROCYTE [DISTWIDTH] IN BLOOD BY AUTOMATED COUNT: 11.9 % (ref 11.5–14.5)
GLOBULIN SER CALC-MCNC: 3.9 G/DL (ref 2–4)
GLUCOSE SERPL-MCNC: 108 MG/DL (ref 65–100)
GLUCOSE UR STRIP.AUTO-MCNC: NEGATIVE MG/DL
HCT VFR BLD AUTO: 38.5 % (ref 35–47)
HGB BLD-MCNC: 13.1 G/DL (ref 11.5–16)
HGB UR QL STRIP: NEGATIVE
HYALINE CASTS URNS QL MICRO: ABNORMAL /LPF (ref 0–5)
IMM GRANULOCYTES # BLD AUTO: 0 K/UL (ref 0–0.04)
IMM GRANULOCYTES NFR BLD AUTO: 0 % (ref 0–0.5)
KETONES UR QL STRIP.AUTO: NEGATIVE MG/DL
LEUKOCYTE ESTERASE UR QL STRIP.AUTO: ABNORMAL
LYMPHOCYTES # BLD: 1.8 K/UL (ref 0.8–3.5)
LYMPHOCYTES NFR BLD: 21 % (ref 12–49)
MCH RBC QN AUTO: 32.8 PG (ref 26–34)
MCHC RBC AUTO-ENTMCNC: 34 G/DL (ref 30–36.5)
MCV RBC AUTO: 96.5 FL (ref 80–99)
MONOCYTES # BLD: 0.8 K/UL (ref 0–1)
MONOCYTES NFR BLD: 9 % (ref 5–13)
NEUTS SEG # BLD: 5.6 K/UL (ref 1.8–8)
NEUTS SEG NFR BLD: 67 % (ref 32–75)
NITRITE UR QL STRIP.AUTO: NEGATIVE
NRBC # BLD: 0 K/UL (ref 0–0.01)
NRBC BLD-RTO: 0 PER 100 WBC
PH UR STRIP: 7.5 [PH] (ref 5–8)
PLATELET # BLD AUTO: 172 K/UL (ref 150–400)
PMV BLD AUTO: 10.1 FL (ref 8.9–12.9)
POTASSIUM SERPL-SCNC: 3.2 MMOL/L (ref 3.5–5.1)
PROT SERPL-MCNC: 7.4 G/DL (ref 6.4–8.2)
PROT UR STRIP-MCNC: NEGATIVE MG/DL
RBC # BLD AUTO: 3.99 M/UL (ref 3.8–5.2)
RBC #/AREA URNS HPF: ABNORMAL /HPF (ref 0–5)
SAMPLES BEING HELD,HOLD: NORMAL
SODIUM SERPL-SCNC: 138 MMOL/L (ref 136–145)
SP GR UR REFRACTOMETRY: 1.01 (ref 1–1.03)
UROBILINOGEN UR QL STRIP.AUTO: 0.2 EU/DL (ref 0.2–1)
WBC # BLD AUTO: 8.4 K/UL (ref 3.6–11)
WBC URNS QL MICRO: ABNORMAL /HPF (ref 0–4)

## 2022-09-30 PROCEDURE — 99284 EMERGENCY DEPT VISIT MOD MDM: CPT

## 2022-09-30 PROCEDURE — 80053 COMPREHEN METABOLIC PANEL: CPT

## 2022-09-30 PROCEDURE — 81001 URINALYSIS AUTO W/SCOPE: CPT

## 2022-09-30 PROCEDURE — 72050 X-RAY EXAM NECK SPINE 4/5VWS: CPT

## 2022-09-30 PROCEDURE — 70450 CT HEAD/BRAIN W/O DYE: CPT

## 2022-09-30 PROCEDURE — 72072 X-RAY EXAM THORAC SPINE 3VWS: CPT

## 2022-09-30 PROCEDURE — 85025 COMPLETE CBC W/AUTO DIFF WBC: CPT

## 2022-09-30 PROCEDURE — 36415 COLL VENOUS BLD VENIPUNCTURE: CPT

## 2022-09-30 NOTE — ED NOTES
Verbal shift change report given to Gina Dangelo RN by [place], RN. Report included the following information SBAR and ED Summary.

## 2022-09-30 NOTE — SENIOR SERVICES NOTE
SSED consult received and appreciated. Chart Reviewed. Patient out of room for imaging. Daughter, Asim Ndiaye greeted in room. -Patient lives at [facility], paperwork located and provided to ED  to be scanned into the chart. Included documentation includes DNR.   -Patient was living with her current  whom still lives at the facility initially, then due to increased needs they were moved into different rooms.   -Patient currently resides in [facility], with the 4th tier of care, there are 5 residents with 3 caregivers. Most falls occur in bathroom, Asim Ndiaye states that they have communicated with the staff to always be present in the bathroom due to increased falls. Asim Ndiaye states that they have been very happy with the staff and support.   -Patient was seen by her Neurologist, Dr. Carly Lamb last week for continued gait instability. Discussed and offered PT at facility, however patient has continued to refuse to participate.   -All needed DME is available for the patient, she primarily uses a rolling walker with a seat. -Overall increased falls are contributed to decline in Dementia, Asim Ndiaye states that today the patient is  at her cognitive baseline. Asim Ndiaye is expressing no needs at the facility at this time. She states that if everything comes back ok today and patient is discharged that she can assist with driving patient back to facility.   -We discussed ambulating the patient prior to discharge with rolling walker for safety to return to the [facility] with support, Asim Ndiaye agrees. I have collaborated findings with Dr. Nyasia Briggs and Alicia Mckenzie CM. Thank you for the opportunity to participate in this patients care.      Payal Sutherland [address], NP  SSED  4:34 PM  [phone]

## 2022-09-30 NOTE — ED TRIAGE NOTES
Per EMS pt was found on floor after falling. Pt has frequent falls at facility. -LOC, -thinners. C/o lumbar pain.

## 2022-09-30 NOTE — ED PROVIDER NOTES
This is a 59-year-old female who was seen here a week or 2 ago for a fall. She had a wound to the back of her head and some swelling. She had a CT done of her head and neck. She has done well since then with wound healing. She has developed some hematoma down on the right side of her neck which is dependent subcutaneous blood drainage from the hematoma. She had another episode today where she slipped and fell backwards and hit her head on the floor again. There was no loss of consciousness and staff said that she was complaining initially of a little pain in her neck and pain in the upper back. She voices those complaints now but they are mild symptoms. The daughter was concerned because the patient seems to be having a little more difficulty getting around in the nursing facility. She does have a Rollator that she uses from time to time. There is been no fever or chill, nausea or vomiting or other acute symptomatology noted. She has had no urinary symptoms.        Past Medical History:   Diagnosis Date    Chronic kidney disease     Decreased kidney levels per daughter    Fracture 2018    Pt reports right hip fracture & surgery following a fall    Fracture 2020    left rib fractures; pt fell     HTN (hypertension) 5/27/2011    Hyperlipidemia 12/1/2011    Hypertension     OAB (overactive bladder) 2/15/2013    Other ill-defined conditions(799.89)     hyperlipidemia    Other ill-defined conditions(799.89)     bladder problems    Other ill-defined conditions(799.89)     back pain       Past Surgical History:   Procedure Laterality Date    COLONOSCOPY N/A 3/12/2018    COLONOSCOPY performed by Brooks Raymond MD at [address]  3/12/2018         HX APPENDECTOMY  1948    HX CATARACT REMOVAL Left     HX CORNEAL TRANSPLANT Left     HX DILATION AND CURETTAGE      x 2    HX HIP REPLACEMENT Right 01/20/2019    HX TONSILLECTOMY  1938         Family History:   Problem Relation Age of Onset Cancer Father         throat (smoker); mouth and gums    Heart Disease Mother         Age 52    Clotting Disorder Mother     OSTEOARTHRITIS Paternal Aunt        Social History     Socioeconomic History    Marital status:      Spouse name: Not on file    Number of children: Not on file    Years of education: Not on file    Highest education level: Not on file   Occupational History    Not on file   Tobacco Use    Smoking status: Never    Smokeless tobacco: Never   Substance and Sexual Activity    Alcohol use: No    Drug use: No    Sexual activity: Not Currently   Other Topics Concern    Not on file   Social History Narrative    Not on file     Social Determinants of Health     Financial Resource Strain: Not on file   Food Insecurity: Not on file   Transportation Needs: Not on file   Physical Activity: Not on file   Stress: Not on file   Social Connections: Not on file   Intimate Partner Violence: Not on file   Housing Stability: Not on file         ALLERGIES: Codeine, Demerol (pf) [meperidine (pf)], and Meperidine    Review of Systems   Constitutional:  Negative for activity change, appetite change, chills, fatigue and fever. HENT:  Negative for ear pain, facial swelling, sore throat and trouble swallowing. Yayo with contusion of the scalp posteriorly. Eyes:  Negative for pain, discharge and visual disturbance. Respiratory:  Negative for chest tightness, shortness of breath and wheezing. Cardiovascular:  Negative for chest pain and palpitations. Gastrointestinal:  Negative for abdominal pain, blood in stool, nausea and vomiting. Genitourinary:  Negative for difficulty urinating, flank pain and hematuria. Musculoskeletal:  Negative for arthralgias, joint swelling, myalgias and neck pain. Patient complains of some mild neck and back discomfort from the fall earlier today. Skin:  Negative for color change and rash.    Neurological:  Negative for dizziness, weakness, numbness and headaches. Hematological:  Negative for adenopathy. Does not bruise/bleed easily. Psychiatric/Behavioral:  Negative for behavioral problems, confusion and sleep disturbance. All other systems reviewed and are negative. Vitals:    09/30/22 1417 09/30/22 1432   BP: (!) 202/65 (!) 162/68   Pulse: (!) 53    Resp: 18    Temp: 98.3 °F (36.8 °C)    SpO2: 96% 94%   Weight: 68 kg (150 lb)    Height: 5' 10\" (1.778 m)             Physical Exam  Vitals and nursing note reviewed. Constitutional:       General: She is not in acute distress. Appearance: She is well-developed. She is not ill-appearing or diaphoretic. Comments: This is a 42-year-old female in no acute distress who is alert little confused but at baseline. Vital signs are as noted. HENT:      Head: Normocephalic. Comments: There is a healing hematoma on the right occiput from the last fall. There is also some bruising on the lateral side of the right neck which looks like dependent blood. Nose: Nose normal.      Mouth/Throat:      Mouth: Mucous membranes are moist.   Eyes:      General: No scleral icterus. Conjunctiva/sclera: Conjunctivae normal.      Pupils: Pupils are equal, round, and reactive to light. Neck:      Thyroid: No thyromegaly. Vascular: No JVD. Trachea: No tracheal deviation. Comments: No carotid bruits noted. Cardiovascular:      Rate and Rhythm: Normal rate and regular rhythm. Heart sounds: Normal heart sounds. No murmur heard. No friction rub. No gallop. Pulmonary:      Effort: Pulmonary effort is normal. No respiratory distress. Breath sounds: Normal breath sounds. No wheezing or rales. Comments: No pain to palpation of the shoulder girdle or the ribs. Chest:      Chest wall: No tenderness. Abdominal:      General: Bowel sounds are normal. There is no distension. Palpations: Abdomen is soft. There is no mass. Tenderness: There is no abdominal tenderness.  There is no guarding or rebound. Musculoskeletal:         General: No tenderness. Normal range of motion. Cervical back: Normal range of motion and neck supple. Comments: Mild tenderness is noted over the posterior neck and upper back. Hips and lower extremities are normal to exam.   Lymphadenopathy:      Cervical: No cervical adenopathy. Skin:     General: Skin is warm and dry. Capillary Refill: Capillary refill takes 2 to 3 seconds. Findings: No erythema or rash. Neurological:      Mental Status: She is alert and oriented to person, place, and time. Cranial Nerves: No cranial nerve deficit. Coordination: Coordination normal.      Deep Tendon Reflexes: Reflexes are normal and symmetric. Psychiatric:         Behavior: Behavior normal.         Thought Content: Thought content normal.         Judgment: Judgment normal.        MDM     Amount and/or Complexity of Data Reviewed  Clinical lab tests: ordered and reviewed  Tests in the radiology section of CPT®: ordered and reviewed  Decide to obtain previous medical records or to obtain history from someone other than the patient: yes  Review and summarize past medical records: yes  Discuss the patient with other providers: yes           Procedures  A 80-year-old female who presents after having a fall at home earlier this morning. This is a nursing facility where she lives. No loss of consciousness. She slipped and fell backwards. Her head did hit the floor but there was no loss of consciousness again. She complained of some neck pain and upper back pain. The daughter was concerned about her increasing instability and getting around. Will evaluate with labs x-ray and CT. Consult has been placed with [facility] case management physical therapy to help in disposition of this patient. All labs and films have been reviewed and discussed with the patient and family.   Will discharge home to continue your regular medications and to be careful and sure that she call someone to help her before she gets up to move around. She states that she will do so and will follow-up with her own physician.

## 2022-09-30 NOTE — ED NOTES
Patient given discharge instructions. No questions or concerns at this time. Patients VSS and in no acute distress. Patient wheeled  out of unit at discharge.

## 2022-09-30 NOTE — ED NOTES
RN to beside to assess patient ambulating with walker. Patient walked with steady gait and no complaints of pain. Jill Vidales MD notified.

## 2023-01-25 ENCOUNTER — APPOINTMENT (OUTPATIENT)
Dept: CT IMAGING | Age: 88
End: 2023-01-25
Attending: STUDENT IN AN ORGANIZED HEALTH CARE EDUCATION/TRAINING PROGRAM
Payer: MEDICARE

## 2023-01-25 ENCOUNTER — HOSPITAL ENCOUNTER (INPATIENT)
Age: 88
LOS: 2 days | Discharge: NURSING, REHAB OR LONG-TERM CARE | End: 2023-01-27
Attending: STUDENT IN AN ORGANIZED HEALTH CARE EDUCATION/TRAINING PROGRAM | Admitting: INTERNAL MEDICINE
Payer: MEDICARE

## 2023-01-25 ENCOUNTER — APPOINTMENT (OUTPATIENT)
Dept: GENERAL RADIOLOGY | Age: 88
End: 2023-01-25
Attending: STUDENT IN AN ORGANIZED HEALTH CARE EDUCATION/TRAINING PROGRAM
Payer: MEDICARE

## 2023-01-25 DIAGNOSIS — I10 ESSENTIAL HYPERTENSION: ICD-10-CM

## 2023-01-25 DIAGNOSIS — S32.592A PUBIC RAMUS FRACTURE, LEFT, CLOSED, INITIAL ENCOUNTER (HCC): Primary | ICD-10-CM

## 2023-01-25 DIAGNOSIS — S32.592A CLOSED FRACTURE OF LEFT INFERIOR PUBIC RAMUS, INITIAL ENCOUNTER (HCC): ICD-10-CM

## 2023-01-25 LAB
ALBUMIN SERPL-MCNC: 3.5 G/DL (ref 3.5–5)
ALBUMIN/GLOB SERPL: 0.9 (ref 1.1–2.2)
ALP SERPL-CCNC: 142 U/L (ref 45–117)
ALT SERPL-CCNC: 31 U/L (ref 12–78)
ANION GAP SERPL CALC-SCNC: 7 MMOL/L (ref 5–15)
APPEARANCE UR: CLEAR
AST SERPL-CCNC: 27 U/L (ref 15–37)
BASOPHILS # BLD: 0 K/UL (ref 0–0.1)
BASOPHILS NFR BLD: 0 % (ref 0–1)
BILIRUB SERPL-MCNC: 0.9 MG/DL (ref 0.2–1)
BILIRUB UR QL: NEGATIVE
BUN SERPL-MCNC: 15 MG/DL (ref 6–20)
BUN/CREAT SERPL: 19 (ref 12–20)
CALCIUM SERPL-MCNC: 9.4 MG/DL (ref 8.5–10.1)
CHLORIDE SERPL-SCNC: 98 MMOL/L (ref 97–108)
CO2 SERPL-SCNC: 33 MMOL/L (ref 21–32)
COLOR UR: NORMAL
CREAT SERPL-MCNC: 0.77 MG/DL (ref 0.55–1.02)
DIFFERENTIAL METHOD BLD: NORMAL
EOSINOPHIL # BLD: 0.3 K/UL (ref 0–0.4)
EOSINOPHIL NFR BLD: 4 % (ref 0–7)
ERYTHROCYTE [DISTWIDTH] IN BLOOD BY AUTOMATED COUNT: 11.9 % (ref 11.5–14.5)
GLOBULIN SER CALC-MCNC: 3.8 G/DL (ref 2–4)
GLUCOSE SERPL-MCNC: 94 MG/DL (ref 65–100)
GLUCOSE UR STRIP.AUTO-MCNC: NEGATIVE MG/DL
HCT VFR BLD AUTO: 39.6 % (ref 35–47)
HGB BLD-MCNC: 13.1 G/DL (ref 11.5–16)
HGB UR QL STRIP: NEGATIVE
IMM GRANULOCYTES # BLD AUTO: 0 K/UL (ref 0–0.04)
IMM GRANULOCYTES NFR BLD AUTO: 0 % (ref 0–0.5)
KETONES UR QL STRIP.AUTO: NEGATIVE MG/DL
LEUKOCYTE ESTERASE UR QL STRIP.AUTO: NEGATIVE
LYMPHOCYTES # BLD: 1.8 K/UL (ref 0.8–3.5)
LYMPHOCYTES NFR BLD: 20 % (ref 12–49)
MCH RBC QN AUTO: 31.8 PG (ref 26–34)
MCHC RBC AUTO-ENTMCNC: 33.1 G/DL (ref 30–36.5)
MCV RBC AUTO: 96.1 FL (ref 80–99)
MONOCYTES # BLD: 0.5 K/UL (ref 0–1)
MONOCYTES NFR BLD: 6 % (ref 5–13)
NEUTS SEG # BLD: 6.3 K/UL (ref 1.8–8)
NEUTS SEG NFR BLD: 70 % (ref 32–75)
NITRITE UR QL STRIP.AUTO: NEGATIVE
NRBC # BLD: 0 K/UL (ref 0–0.01)
NRBC BLD-RTO: 0 PER 100 WBC
PH UR STRIP: 8 (ref 5–8)
PLATELET # BLD AUTO: 154 K/UL (ref 150–400)
PMV BLD AUTO: 10.6 FL (ref 8.9–12.9)
POTASSIUM SERPL-SCNC: 3.2 MMOL/L (ref 3.5–5.1)
PROT SERPL-MCNC: 7.3 G/DL (ref 6.4–8.2)
PROT UR STRIP-MCNC: NEGATIVE MG/DL
RBC # BLD AUTO: 4.12 M/UL (ref 3.8–5.2)
SODIUM SERPL-SCNC: 138 MMOL/L (ref 136–145)
SP GR UR REFRACTOMETRY: 1.01
UROBILINOGEN UR QL STRIP.AUTO: 0.2 EU/DL (ref 0.2–1)
WBC # BLD AUTO: 9 K/UL (ref 3.6–11)

## 2023-01-25 PROCEDURE — 74011250637 HC RX REV CODE- 250/637: Performed by: NURSE PRACTITIONER

## 2023-01-25 PROCEDURE — 72192 CT PELVIS W/O DYE: CPT

## 2023-01-25 PROCEDURE — 74011250637 HC RX REV CODE- 250/637: Performed by: STUDENT IN AN ORGANIZED HEALTH CARE EDUCATION/TRAINING PROGRAM

## 2023-01-25 PROCEDURE — 65270000029 HC RM PRIVATE

## 2023-01-25 PROCEDURE — 36415 COLL VENOUS BLD VENIPUNCTURE: CPT

## 2023-01-25 PROCEDURE — G0378 HOSPITAL OBSERVATION PER HR: HCPCS

## 2023-01-25 PROCEDURE — 70450 CT HEAD/BRAIN W/O DYE: CPT

## 2023-01-25 PROCEDURE — 73502 X-RAY EXAM HIP UNI 2-3 VIEWS: CPT

## 2023-01-25 PROCEDURE — 81003 URINALYSIS AUTO W/O SCOPE: CPT

## 2023-01-25 PROCEDURE — 99285 EMERGENCY DEPT VISIT HI MDM: CPT

## 2023-01-25 PROCEDURE — 80053 COMPREHEN METABOLIC PANEL: CPT

## 2023-01-25 PROCEDURE — 85025 COMPLETE CBC W/AUTO DIFF WBC: CPT

## 2023-01-25 RX ORDER — LOSARTAN POTASSIUM 50 MG/1
100 TABLET ORAL DAILY
Status: DISCONTINUED | OUTPATIENT
Start: 2023-01-26 | End: 2023-01-27 | Stop reason: HOSPADM

## 2023-01-25 RX ORDER — MEMANTINE HYDROCHLORIDE 10 MG/1
10 TABLET ORAL 2 TIMES DAILY
Status: DISCONTINUED | OUTPATIENT
Start: 2023-01-25 | End: 2023-01-27 | Stop reason: HOSPADM

## 2023-01-25 RX ORDER — ONDANSETRON 2 MG/ML
4 INJECTION INTRAMUSCULAR; INTRAVENOUS
Status: DISCONTINUED | OUTPATIENT
Start: 2023-01-25 | End: 2023-01-27 | Stop reason: HOSPADM

## 2023-01-25 RX ORDER — ACETAMINOPHEN 650 MG/1
650 SUPPOSITORY RECTAL
Status: DISCONTINUED | OUTPATIENT
Start: 2023-01-25 | End: 2023-01-27 | Stop reason: HOSPADM

## 2023-01-25 RX ORDER — OXYCODONE HYDROCHLORIDE 5 MG/1
5 TABLET ORAL
Status: ACTIVE | OUTPATIENT
Start: 2023-01-25 | End: 2023-01-26

## 2023-01-25 RX ORDER — ENOXAPARIN SODIUM 100 MG/ML
40 INJECTION SUBCUTANEOUS EVERY 24 HOURS
Status: DISCONTINUED | OUTPATIENT
Start: 2023-01-26 | End: 2023-01-27 | Stop reason: HOSPADM

## 2023-01-25 RX ORDER — NITROFURANTOIN 25; 75 MG/1; MG/1
100 CAPSULE ORAL 2 TIMES DAILY
Status: DISCONTINUED | OUTPATIENT
Start: 2023-01-25 | End: 2023-01-27 | Stop reason: HOSPADM

## 2023-01-25 RX ORDER — ESCITALOPRAM OXALATE 10 MG/1
20 TABLET ORAL DAILY
Status: DISCONTINUED | OUTPATIENT
Start: 2023-01-26 | End: 2023-01-27 | Stop reason: HOSPADM

## 2023-01-25 RX ORDER — ONDANSETRON 4 MG/1
4 TABLET, ORALLY DISINTEGRATING ORAL
Status: DISCONTINUED | OUTPATIENT
Start: 2023-01-25 | End: 2023-01-27 | Stop reason: HOSPADM

## 2023-01-25 RX ORDER — ASPIRIN 325 MG
200 TABLET, DELAYED RELEASE (ENTERIC COATED) ORAL DAILY
Status: DISCONTINUED | OUTPATIENT
Start: 2023-01-26 | End: 2023-01-27 | Stop reason: HOSPADM

## 2023-01-25 RX ORDER — POTASSIUM CHLORIDE 750 MG/1
40 TABLET, FILM COATED, EXTENDED RELEASE ORAL
Status: ACTIVE | OUTPATIENT
Start: 2023-01-25 | End: 2023-01-26

## 2023-01-25 RX ORDER — METOPROLOL SUCCINATE 25 MG/1
25 TABLET, EXTENDED RELEASE ORAL DAILY
Status: DISCONTINUED | OUTPATIENT
Start: 2023-01-26 | End: 2023-01-27 | Stop reason: HOSPADM

## 2023-01-25 RX ORDER — ACETAMINOPHEN 325 MG/1
650 TABLET ORAL
Status: DISCONTINUED | OUTPATIENT
Start: 2023-01-25 | End: 2023-01-27 | Stop reason: HOSPADM

## 2023-01-25 RX ORDER — ACETAMINOPHEN 325 MG/1
650 TABLET ORAL ONCE
Status: COMPLETED | OUTPATIENT
Start: 2023-01-25 | End: 2023-01-25

## 2023-01-25 RX ORDER — POTASSIUM CHLORIDE 20 MEQ/1
20 TABLET, EXTENDED RELEASE ORAL DAILY
Status: DISCONTINUED | OUTPATIENT
Start: 2023-01-26 | End: 2023-01-26

## 2023-01-25 RX ORDER — ATORVASTATIN CALCIUM 20 MG/1
20 TABLET, FILM COATED ORAL
Status: DISCONTINUED | OUTPATIENT
Start: 2023-01-25 | End: 2023-01-27 | Stop reason: HOSPADM

## 2023-01-25 RX ORDER — POLYETHYLENE GLYCOL 3350 17 G/17G
17 POWDER, FOR SOLUTION ORAL
Status: DISCONTINUED | OUTPATIENT
Start: 2023-01-25 | End: 2023-01-27 | Stop reason: HOSPADM

## 2023-01-25 RX ORDER — TRAMADOL HYDROCHLORIDE 50 MG/1
50 TABLET ORAL
Status: DISCONTINUED | OUTPATIENT
Start: 2023-01-25 | End: 2023-01-27 | Stop reason: HOSPADM

## 2023-01-25 RX ORDER — AMOXICILLIN 250 MG
1 CAPSULE ORAL DAILY
Status: DISCONTINUED | OUTPATIENT
Start: 2023-01-26 | End: 2023-01-27 | Stop reason: HOSPADM

## 2023-01-25 RX ORDER — METOPROLOL SUCCINATE 25 MG/1
25 TABLET, EXTENDED RELEASE ORAL 2 TIMES DAILY
Status: DISCONTINUED | OUTPATIENT
Start: 2023-01-25 | End: 2023-01-25

## 2023-01-25 RX ORDER — FUROSEMIDE 20 MG/1
40 TABLET ORAL DAILY
Status: DISCONTINUED | OUTPATIENT
Start: 2023-01-26 | End: 2023-01-27 | Stop reason: HOSPADM

## 2023-01-25 RX ADMIN — ACETAMINOPHEN 650 MG: 325 TABLET ORAL at 21:53

## 2023-01-25 RX ADMIN — ACETAMINOPHEN 650 MG: 325 TABLET ORAL at 12:34

## 2023-01-25 RX ADMIN — MEMANTINE 10 MG: 10 TABLET ORAL at 21:53

## 2023-01-25 RX ADMIN — ATORVASTATIN CALCIUM 20 MG: 20 TABLET, FILM COATED ORAL at 21:53

## 2023-01-25 RX ADMIN — NITROFURANTOIN MONOHYDRATE/MACROCRYSTALLINE 100 MG: 25; 75 CAPSULE ORAL at 21:53

## 2023-01-25 RX ADMIN — POTASSIUM BICARBONATE 40 MEQ: 782 TABLET, EFFERVESCENT ORAL at 22:30

## 2023-01-25 NOTE — H&P
History and Physical    Patient: Thresa Collet MRN: [id number]  SSN: [id number]    YOB: 1932  Age: 80 y.o. Sex: female      Subjective:      Thresa Collet is a 80 y.o. female who presents to the emergency room status post fall on yesterday evening from [address] [place] [address]. Daughter at bedside to provide history as patient is a poor historian. Patient has a past medical history of CKD, dementia/sundowning, right hip fracture, HTN, hyperlipidemia, over active bladder, and urinary tract infection. Patient was being actively treated for urinary tract infection with Macrobid at facility. Patient denies chest pain, shortness of breath, palpitations, dizziness or distress. Complains of left hip pain.       Past Medical History:   Diagnosis Date    Chronic kidney disease     Decreased kidney levels per daughter    Fracture 2018    Pt reports right hip fracture & surgery following a fall    Fracture 2020    left rib fractures; pt fell     HTN (hypertension) 5/27/2011    Hyperlipidemia 12/1/2011    Hypertension     OAB (overactive bladder) 2/15/2013    Other ill-defined conditions(799.89)     hyperlipidemia    Other ill-defined conditions(799.89)     bladder problems    Other ill-defined conditions(799.89)     back pain     Past Surgical History:   Procedure Laterality Date    COLONOSCOPY N/A 3/12/2018    COLONOSCOPY performed by Faustina Corona MD at [facility] ENDOSCOPY    COLONOSCOPY,DIAGNOSTIC  3/12/2018         HX APPENDECTOMY  1948    HX CATARACT REMOVAL Left     HX CORNEAL TRANSPLANT Left     HX DILATION AND CURETTAGE      x 2    HX HIP REPLACEMENT Right 01/20/2019    HX TONSILLECTOMY  1938      Family History   Problem Relation Age of Onset    Cancer Father         throat (smoker); mouth and gums    Heart Disease Mother         Age 52    Clotting Disorder Mother     OSTEOARTHRITIS Paternal Aunt      Social History     Tobacco Use    Smoking status: Never    Smokeless tobacco: Never   Substance Use Topics    Alcohol use: No      Prior to Admission medications    Medication Sig Start Date End Date Taking? Authorizing Provider   aspirin delayed-release 81 mg tablet Take 81 mg by mouth daily. Patient not taking: Reported on 9/8/2021    Provider, Historical   atorvastatin (LIPITOR) 20 mg tablet TAKE 1 TABLET BY MOUTH EVERY DAY AT NIGHT 9/3/21   Ananth Jeff NP   alendronate (FOSAMAX) 35 mg tablet TAKE 1 TABLET BY MOUTH ONE TIME PER WEEK 7/21/21   Yumiko Prado NP   metoprolol tartrate (LOPRESSOR) 25 mg tablet Take 1 Tablet by mouth two (2) times a day. HOLD FOR SBP LESS THAN 120 OR HR LESS THAN 65 6/3/21   Axel Brown DO   amLODIPine (NORVASC) 10 mg tablet Take 1 Tab by mouth daily. 5/17/21   Edvin Corona DO   memantine (NAMENDA) 5 mg tablet 10 mg. Twice a day 10/19/20   Provider, Historical   calcium carbonate (CALTREX) 600 mg calcium (1,500 mg) tablet Take 600 mg by mouth two (2) times a day. 2/15/19   Provider, Historical   cholecalciferol (VITAMIN D3) 1,000 unit tablet Take 1,000 Units by mouth daily. Provider, Historical   senna-docusate (PERICOLACE) 8.6-50 mg per tablet Take 1 Tab by mouth daily. Provider, Historical   polyethylene glycol (MIRALAX) 17 gram/dose powder Take 17 g by mouth daily as needed for Other (constipation). Other, MD Dl   coenzyme Q-10 (CO Q-10) 200 mg capsule Take 1 Cap by mouth daily. 11/8/18   Beverley Harris NP   glucosamine-chondroit-vit c-mn (GLUCOSAMINE 1500 COMPLEX) 500-400 mg capsule Take 1 Cap by mouth daily. 11/8/18   Beverley Harris NP   multivit-minerals/folic acid (CENTRUM MULTIGUMMIES PO) Take 1 Tab by mouth daily.     Provider, Historical        Allergies   Allergen Reactions    Codeine Nausea and Vomiting    Demerol (Pf) [Meperidine (Pf)] Nausea and Vomiting    Meperidine Nausea and Vomiting       Review of Systems: unable to obtain secondary to mentation      Objective:     Vitals:    01/25/23 1009 01/25/23 1026 01/25/23 1053 01/25/23 1206   BP: (!) 181/71 (!) 173/65 (!) 181/78 (!) 184/61   Pulse: (!) 54      Resp: 17      Temp: 98.6 °F (37 °C)      SpO2: 96% 96% 94% 91%   Weight:       Height:            Physical Exam:  Physical Exam  Vitals reviewed. Constitutional:       Comments: Oriented to person. HENT:      Head: Normocephalic and atraumatic. Right Ear: External ear normal.      Left Ear: External ear normal.      Nose: Nose normal.      Mouth/Throat:      Pharynx: Oropharynx is clear. Eyes:      Conjunctiva/sclera: Conjunctivae normal.   Cardiovascular:      Rate and Rhythm: Normal rate and regular rhythm. Pulses: Normal pulses. Heart sounds: Murmur heard. Pulmonary:      Effort: Pulmonary effort is normal.      Breath sounds: Normal breath sounds. Abdominal:      General: Bowel sounds are normal.   Musculoskeletal:      Cervical back: Normal range of motion. Comments: Limited ROM to left hip and lower extremity. Non-pitting edema noted to left hip. No warmth or erythema present. Skin:     General: Skin is warm and dry. Capillary Refill: Capillary refill takes 2 to 3 seconds. Neurological:      Comments: Oriented to person   Psychiatric:      Comments: Pleasantly confused. Recent Results (from the past 24 hour(s))   CBC WITH AUTOMATED DIFF    Collection Time: 01/25/23 11:07 AM   Result Value Ref Range    WBC 9.0 3.6 - 11.0 K/uL    RBC 4.12 3.80 - 5.20 M/uL    HGB 13.1 11.5 - 16.0 g/dL    HCT 39.6 35.0 - 47.0 %    MCV 96.1 80.0 - 99.0 FL    MCH 31.8 26.0 - 34.0 PG    MCHC 33.1 30.0 - 36.5 g/dL    RDW 11.9 11.5 - 14.5 %    PLATELET 000 954 - 457 K/uL    MPV 10.6 8.9 - 12.9 FL    NRBC 0.0 0  WBC    ABSOLUTE NRBC 0.00 0.00 - 0.01 K/uL    NEUTROPHILS 70 32 - 75 %    LYMPHOCYTES 20 12 - 49 %    MONOCYTES 6 5 - 13 %    EOSINOPHILS 4 0 - 7 %    BASOPHILS 0 0 - 1 %    IMMATURE GRANULOCYTES 0 0.0 - 0.5 %    ABS. NEUTROPHILS 6.3 1.8 - 8.0 K/UL    ABS.  LYMPHOCYTES 1.8 0.8 - 3.5 K/UL    ABS. MONOCYTES 0.5 0.0 - 1.0 K/UL    ABS. EOSINOPHILS 0.3 0.0 - 0.4 K/UL    ABS. BASOPHILS 0.0 0.0 - 0.1 K/UL    ABS. IMM. GRANS. 0.0 0.00 - 0.04 K/UL    DF AUTOMATED     METABOLIC PANEL, COMPREHENSIVE    Collection Time: 01/25/23 11:07 AM   Result Value Ref Range    Sodium 138 136 - 145 mmol/L    Potassium 3.2 (L) 3.5 - 5.1 mmol/L    Chloride 98 97 - 108 mmol/L    CO2 33 (H) 21 - 32 mmol/L    Anion gap 7 5 - 15 mmol/L    Glucose 94 65 - 100 mg/dL    BUN 15 6 - 20 MG/DL    Creatinine 0.77 0.55 - 1.02 MG/DL    BUN/Creatinine ratio 19 12 - 20      eGFR >60 >60 ml/min/1.73m2    Calcium 9.4 8.5 - 10.1 MG/DL    Bilirubin, total 0.9 0.2 - 1.0 MG/DL    ALT (SGPT) 31 12 - 78 U/L    AST (SGOT) 27 15 - 37 U/L    Alk. phosphatase 142 (H) 45 - 117 U/L    Protein, total 7.3 6.4 - 8.2 g/dL    Albumin 3.5 3.5 - 5.0 g/dL    Globulin 3.8 2.0 - 4.0 g/dL    A-G Ratio 0.9 (L) 1.1 - 2.2         XR Results (maximum last 3): Results from Hospital Encounter encounter on 01/25/23    XR HIP LT W OR WO PELV 2-3 VWS    Narrative  EXAM: XR HIP LT W OR WO PELV 2-3 VWS    INDICATION: Left hip pain sp fall. COMPARISON: 11/11/2018. FINDINGS: AP view of the pelvis and a frogleg lateral view of the left hip  demonstrate some irregularity involving the left inferior pubic ramus concerning  for nondisplaced fracture. Degenerative changes are seen in the left hip and  visualized lumbar spine. The patient is status post right total hip replacement. No hardware complication. Moderate fecal stasis is noted. Impression  Possible nondisplaced fracture left inferior pubic ramus. Results from [place] encounter on 09/30/22    XR SPINE CERV 4 OR 5 V    Narrative  EXAM: XR SPINE CERV 4 OR 5 V    INDICATION: fall    COMPARISON: CT 9/21/2022. FINDINGS: AP, lateral, bilateral oblique and open mouth odontoid views  of the  cervical spine were obtained. The bones are osteopenic.  There is mild disc space  narrowing at C2-3 and C3-4. There is moderate to severe disc space narrowing  C4-5, C5-6, and C6-7. There is multilevel bilateral neural foraminal narrowing. No acute fracture or subluxation. Impression  Significant multilevel spondylosis. No acute abnormality. .      XR SPINE THORAC 3 V    Narrative  EXAM:  XR SPINE THORAC 3 V    INDICATION: fall    COMPARISON: Radiographs 7/6/2021. FINDINGS: AP, lateral and swimmers lateral views of the thoracic spine. The bones are osteopenic. There is levoconvex scoliosis of the lumbar spine. There is increased kyphosis in the thoracolumbar junction. Multilevel  spondylosis is seen throughout the spine. Several compression deformities are  again seen at the thoracolumbar junction. Calcified lymph nodes are seen in the  mediastinum likely related to prior granulomatous disease. Impression  Multilevel spondylosis with levoconvex sclerosis of the lumbar spine. Several  compression deformities are again seen at the thoracolumbar junction which  appear relatively unchanged. CT Results (maximum last 3): Results from [place] encounter on 01/25/23    CT PELV WO CONT    Narrative  INDICATION:  Eval for hip fracture    EXAM: CT pelvis. Comparison 5/21/2021. Thin section axial images were obtained. From these sagittal and coronal  reformats were performed. CT dose reduction was achieved through use of a  standardized protocol tailored for this examination and automatic exposure  control for dose modulation. FINDINGS: Patient is post right hip hemiarthroplasty. No dislocation. Mild  degenerative changes of the left hip. There is sclerosis of the bilateral sacral ala and S3 sacral segment compatible  with insufficiency fracturing. Acute fracture of the inferior left pubic ramus  as well as the junction of the left superior pubic ramus and anterior left  acetabulum. Fat containing 1.8 cm right adnexal mass with fat soft tissue fluid level likely  a dermoid. Diverticulosis of the left colon. Atrophy of the right gluteal  musculature    Impression  1. Sacral insufficiency fractures with fractures of the left superior and  inferior pubic rami  2. Right ovarian dermoid      CT HEAD WO CONT    Narrative  EXAM: CT HEAD WO CONT    INDICATION: Ground-level fall, acute mental status change    COMPARISON: 9/30/2022. CONTRAST: None. TECHNIQUE: Unenhanced CT of the head was performed using 5 mm images. Brain and  bone windows were generated. Coronal and sagittal reformats. CT dose reduction  was achieved through use of a standardized protocol tailored for this  examination and automatic exposure control for dose modulation. FINDINGS:  The ventricles and sulci are prominent but in slightly asymmetric but stable in  size, shape and configuration. There is moderate severe periventricular white  matter hypodensity. There is no intracranial hemorrhage, extra-axial collection,  or mass effect. The basilar cisterns are open. No CT evidence of acute infarct. Mild bilateral vertebral artery calcification is noted. The bone windows demonstrate no abnormalities. The visualized portions of the  paranasal sinuses and mastoid air cells are clear. Impression  No acute intracranial process seen  Stable moderate severe underlying white matter disease and central atrophy      Results from Hospital Encounter encounter on 09/30/22    CT HEAD WO CONT    Narrative  EXAM: CT HEAD WO CONT    INDICATION: fall    COMPARISON: 9/21/2022. CONTRAST: None. TECHNIQUE: Unenhanced CT of the head was performed using 5 mm images. Brain and  bone windows were generated. Coronal and sagittal reformats. CT dose reduction  was achieved through use of a standardized protocol tailored for this  examination and automatic exposure control for dose modulation. FINDINGS:  There is moderate atrophy with compensatory dilatation of the ventricles. . There  is mild white matter disease include chronic small vessel ischemic disease. There is no intracranial hemorrhage, extra-axial collection, or mass effect. The  basilar cisterns are open. No CT evidence of acute infarct. The bone windows demonstrate no abnormalities. The visualized portions of the  paranasal sinuses and mastoid air cells are clear. The patient is status post  left lens surgery. There is a right posterior parietal scalp hematoma. Impression  Decreased right posterior parietal scalp hematoma. No evidence of acute  intracranial process. MRI Results (maximum last 3): Results from [place] encounter on 01/29/20    MRI BRAIN WO CONT    Narrative  EXAM: MRI BRAIN WO CONT    INDICATION: Dementia without behavioral disturbance, unspecified dementia type  [facility])    COMPARISON: February 2006. CONTRAST: None. TECHNIQUE:  Multiplanar multisequence acquisition without contrast of the brain. FINDINGS:  Diffusion imaging does not show acute ischemic changes her. There is no extra-axial fluid collection hemorrhage or shift. There is moderate atrophy and moderate nonspecific white matter changes. Flow voids in major vessels at the base of the brain are present. Scattered areas of hemosiderin deposition. No mass. Impression  IMPRESSION: Progression of atrophy and white matter disease, nonspecific. No  acute findings no mass. Nuclear Medicine Results (maximum last 3): No results found for this or any previous visit. US Results (maximum last 3): No results found for this or any previous visit. Active Problems:    Closed fracture of left inferior pubic ramus (HCC) (1/25/2023)        Assessment/Plan:   Closed fracture of left inferior pubic ramus- POA status post GLF   - CT of pelvis shows sacral insufficiency fractures with fractures of the left superior and inferior pubic rami. Incidental finding of right ovarian dermoid noted.   - left hip xray shows possible nondisplaced fracture of left interior pubic ramus.  - continue pain medications  - bed rest until evaluated by ortho  - ortho to follow    2. History of UTI - POA   - urinalysis shows no nitrites, leukocytes,    - patient already actively being treated with Macrobid    3. Dementia/Sundowning  - CT of head shows no acute intracranial process seen. Stable moderate severe underlying white matter disease and central atrophy noted. - continue namendaand lexapro    4. HTN      Hyperlipidemia   - continue cozaar, metoprolol    5. Mild hypokalemia    - will replete and monitor lab work    Plan of care discussed with patient and daughter    Discharge disposition: pending hospital course and ortho work-up. DVT Prophylaxis: Lovenox  Code Status: DNR  POA: Mine Li, daughter [phone]  Total Time: 40 minutes, medication reconciliation completed using list from [employer]. Nursing to enter into system. Signed By: Nils Ward NP     January 25, 2023          The patient was seen by an advanced practice provider such as a physician assistant or nurse practitioner trained to practice medicine in hospital medicine and part of for OUR patient care team.  I Dr. Markus Burns MD was available in the hospital medicine department for collaboration and consultation if needed pursuant to the code of [place]. The patient was seen, evaluated, examined, treated including performance of procedures and disposition by the RAFAL independently including the issuance of any prescriptions and follow-up instructions.     The RAFAL has asked for my advice and consultation regarding: None

## 2023-01-25 NOTE — ED TRIAGE NOTES
Pt arrives to ed w via ems from [facility] w reports of GLF last night w no LOC. Reports hip pain w movement this AM. Unable to rate pain, hx dementia. Hit head w fall. Denies blood thinners.

## 2023-01-25 NOTE — ED PROVIDER NOTES
[facility] EMERGENCY DEPT  EMERGENCY DEPARTMENT ENCOUNTER       Pt Name: Kin Tran  MRN: [id number]  [place] 7/3/1932  Date of evaluation: 1/25/2023  Provider: Leonides Valverde MD   PCP: Uriah Newell MD  Note Started: 11:00 AM 1/25/23     CHIEF COMPLAINT       Chief Complaint   Patient presents with    Fall    Hip Pain        HISTORY OF PRESENT ILLNESS: 1 or more elements    History From: EMS  HPI Limitations : Altered Mental Status     Kin Tran is a 80 y.o. female with Pmhx listed below     Patient is a 66-year-old male presenting from nursing facility due to concern of ground-level fall last night, per report it was unwitnessed, but reportedly no loss of consciousness patient is now complaining of hip pain mostly on the left with movement, typically walks with a Rollator but has been unable to do so since, possibly hit her head with fall. Patient baseline is not oriented per report. Patient states that she is not in any discomfort while at rest but does complain of pain and tenderness with palpation of the left hip as well as movement. No other complaints today. History is limited secondary to patient mental status and lack of additional historians. Nursing Notes were all reviewed and agreed with or any disagreements were addressed in the HPI. REVIEW OF SYSTEMS      Positives and Pertinent negatives as per HPI.     PAST HISTORY     Past Medical History:  Past Medical History:   Diagnosis Date    Chronic kidney disease     Decreased kidney levels per daughter    Fracture 2018    Pt reports right hip fracture & surgery following a fall    Fracture 2020    left rib fractures; pt fell     HTN (hypertension) 5/27/2011    Hyperlipidemia 12/1/2011    Hypertension     OAB (overactive bladder) 2/15/2013    Other ill-defined conditions(799.89)     hyperlipidemia    Other ill-defined conditions(799.89)     bladder problems    Other ill-defined conditions(799.89)     back pain     Previous Medications ALENDRONATE (FOSAMAX) 35 MG TABLET    TAKE 1 TABLET BY MOUTH ONE TIME PER WEEK    AMLODIPINE (NORVASC) 10 MG TABLET    Take 1 Tab by mouth daily. ASPIRIN DELAYED-RELEASE 81 MG TABLET    Take 81 mg by mouth daily. ATORVASTATIN (LIPITOR) 20 MG TABLET    TAKE 1 TABLET BY MOUTH EVERY DAY AT NIGHT    CALCIUM CARBONATE (CALTREX) 600 MG CALCIUM (1,500 MG) TABLET    Take 600 mg by mouth two (2) times a day. CHOLECALCIFEROL (VITAMIN D3) 1,000 UNIT TABLET    Take 1,000 Units by mouth daily. COENZYME Q-10 (CO Q-10) 200 MG CAPSULE    Take 1 Cap by mouth daily. GLUCOSAMINE-CHONDROIT-VIT C-MN (GLUCOSAMINE 1500 COMPLEX) 500-400 MG CAPSULE    Take 1 Cap by mouth daily. MEMANTINE (NAMENDA) 5 MG TABLET    10 mg. Twice a day    METOPROLOL TARTRATE (LOPRESSOR) 25 MG TABLET    Take 1 Tablet by mouth two (2) times a day. HOLD FOR SBP LESS THAN 120 OR HR LESS THAN 65    MULTIVIT-MINERALS/FOLIC ACID (CENTRUM MULTIGUMMIES PO)    Take 1 Tab by mouth daily. POLYETHYLENE GLYCOL (MIRALAX) 17 GRAM/DOSE POWDER    Take 17 g by mouth daily as needed for Other (constipation). SENNA-DOCUSATE (PERICOLACE) 8.6-50 MG PER TABLET    Take 1 Tab by mouth daily.        Past Surgical History:  Past Surgical History:   Procedure Laterality Date    COLONOSCOPY N/A 3/12/2018    COLONOSCOPY performed by Wei Yanes MD at [facility] ENDOSCOPY    COLONOSCOPY,DIAGNOSTIC  3/12/2018         HX APPENDECTOMY  1948    HX CATARACT REMOVAL Left     HX CORNEAL TRANSPLANT Left     HX DILATION AND CURETTAGE      x 2    HX HIP REPLACEMENT Right 01/20/2019    HX TONSILLECTOMY  1938       Family History:  Family History   Problem Relation Age of Onset    Cancer Father         throat (smoker); mouth and gums    Heart Disease Mother         Age 52    Clotting Disorder Mother     OSTEOARTHRITIS Paternal Aunt        Social History:  Social History     Tobacco Use    Smoking status: Never    Smokeless tobacco: Never   Substance Use Topics    Alcohol use: No    Drug use: No       Allergies: Allergies   Allergen Reactions    Codeine Nausea and Vomiting    Demerol (Pf) [Meperidine (Pf)] Nausea and Vomiting    Meperidine Nausea and Vomiting       PHYSICAL EXAM      ED Triage Vitals [01/25/23 0958]   ED Encounter Vitals Group      BP (!) 166/71      Pulse (Heart Rate) (!) 52      Resp Rate 16      Temp 97.9 °F (36.6 °C)      Temp src       O2 Sat (%) 97 %      Weight 160 lb      Height 5' 9\"        Physical Exam  Vitals reviewed. Constitutional:       General: She is not in acute distress. Appearance: Normal appearance. She is not ill-appearing, toxic-appearing or diaphoretic. HENT:      Head: Normocephalic. Mouth/Throat:      Mouth: Mucous membranes are moist.   Eyes:      Conjunctiva/sclera: Conjunctivae normal.   Cardiovascular:      Rate and Rhythm: Normal rate. Pulmonary:      Effort: Pulmonary effort is normal. No respiratory distress. Chest:      Chest wall: No tenderness. Abdominal:      General: Abdomen is flat. Tenderness: There is no abdominal tenderness. There is no guarding or rebound. Musculoskeletal:         General: Tenderness present. No swelling, deformity or signs of injury. Cervical back: Neck supple. Comments: Not any obvious traumatic injuries, has no tenderness palpation of the cervical spine, no tenderness palpation over chest wall or abdomen, able to fully range all joints in upper and lower extremities although tender to palpation in left lower extremity over left hip, also has pain with internal extra rotation, able to bend at the knee on the right but unable to flex hip on the right to evaluate fully. Warm and well-perfused, no obvious deformities. Skin:     General: Skin is warm and dry. Neurological:      General: No focal deficit present. Mental Status: She is alert.    Psychiatric:         Mood and Affect: Mood normal.        EMERGENCY DEPARTMENT COURSE and DIFFERENTIAL DIAGNOSIS/MDM   CC/HPI Summary, DDx, ED Course, and Reassessment:     MDM  Number of Diagnoses or Management Options  Pubic ramus fracture, left, closed, initial encounter [facility])  Diagnosis management comments: Is well-appearing 75-year-old female with history of dementia, currently residing in a nursing facility presenting with concern of ground-level fall, unwitnessed but may have hit her head, is oriented x1 at baseline, patient is oriented to times when here, does not appear to have any focal neurologic deficits, complains of pain when palpation of the left hip is performed as well as internal and internal rotation, unable to flex at the hip on the left. Concern of possible fracture, also concern of possible head injury consideration of ICH, although no focal neurologic deficits have been found, patient arrived stable, no apparent distress, vital signs within normal limits, will proceed with imaging reevaluation for disposition        ED Course as of 01/25/23 1555   Wed Jan 25, 2023   1227 XR with possible ramus fracture, will proceed with CT for further characterization [RN]   1310 Discussed case with LIZA Moe with Ortho and states typically not op, patient will need admission for gait instability and PT, OT evaluation.  [RN]      ED Course User Index  [RN] Bettye Councilman, MD       Vitals:    01/25/23 1009 01/25/23 1026 01/25/23 1053 01/25/23 1206   BP: (!) 181/71 (!) 173/65 (!) 181/78 (!) 184/61   Pulse: (!) 54      Resp: 17      Temp: 98.6 °F (37 °C)      SpO2: 96% 96% 94% 91%   Weight:       Height:            Patient was given the following medications:  Medications   oxyCODONE IR (ROXICODONE) tablet 5 mg (has no administration in time range)   acetaminophen (TYLENOL) tablet 650 mg (650 mg Oral Given 1/25/23 1234)       CONSULTS:  IP CONSULT TO ORTHOPEDIC SURGERY    Social Determinants affecting Dx or Tx: None    Records Reviewed (source and summary of external notes): Nursing Notes  DIAGNOSTIC RESULTS   LABS:     Recent Results (from the past 12 hour(s))   CBC WITH AUTOMATED DIFF    Collection Time: 01/25/23 11:07 AM   Result Value Ref Range    WBC 9.0 3.6 - 11.0 K/uL    RBC 4.12 3.80 - 5.20 M/uL    HGB 13.1 11.5 - 16.0 g/dL    HCT 39.6 35.0 - 47.0 %    MCV 96.1 80.0 - 99.0 FL    MCH 31.8 26.0 - 34.0 PG    MCHC 33.1 30.0 - 36.5 g/dL    RDW 11.9 11.5 - 14.5 %    PLATELET 788 160 - 652 K/uL    MPV 10.6 8.9 - 12.9 FL    NRBC 0.0 0  WBC    ABSOLUTE NRBC 0.00 0.00 - 0.01 K/uL    NEUTROPHILS 70 32 - 75 %    LYMPHOCYTES 20 12 - 49 %    MONOCYTES 6 5 - 13 %    EOSINOPHILS 4 0 - 7 %    BASOPHILS 0 0 - 1 %    IMMATURE GRANULOCYTES 0 0.0 - 0.5 %    ABS. NEUTROPHILS 6.3 1.8 - 8.0 K/UL    ABS. LYMPHOCYTES 1.8 0.8 - 3.5 K/UL    ABS. MONOCYTES 0.5 0.0 - 1.0 K/UL    ABS. EOSINOPHILS 0.3 0.0 - 0.4 K/UL    ABS. BASOPHILS 0.0 0.0 - 0.1 K/UL    ABS. IMM. GRANS. 0.0 0.00 - 0.04 K/UL    DF AUTOMATED     METABOLIC PANEL, COMPREHENSIVE    Collection Time: 01/25/23 11:07 AM   Result Value Ref Range    Sodium 138 136 - 145 mmol/L    Potassium 3.2 (L) 3.5 - 5.1 mmol/L    Chloride 98 97 - 108 mmol/L    CO2 33 (H) 21 - 32 mmol/L    Anion gap 7 5 - 15 mmol/L    Glucose 94 65 - 100 mg/dL    BUN 15 6 - 20 MG/DL    Creatinine 0.77 0.55 - 1.02 MG/DL    BUN/Creatinine ratio 19 12 - 20      eGFR >60 >60 ml/min/1.73m2    Calcium 9.4 8.5 - 10.1 MG/DL    Bilirubin, total 0.9 0.2 - 1.0 MG/DL    ALT (SGPT) 31 12 - 78 U/L    AST (SGOT) 27 15 - 37 U/L    Alk.  phosphatase 142 (H) 45 - 117 U/L    Protein, total 7.3 6.4 - 8.2 g/dL    Albumin 3.5 3.5 - 5.0 g/dL    Globulin 3.8 2.0 - 4.0 g/dL    A-G Ratio 0.9 (L) 1.1 - 2.2          EKG:      RADIOLOGY:  Non-plain film images such as CT, Ultrasound and MRI are read by the radiologist.   Plain radiographic images are often visualized and preliminarily interpreted by the ED, if an interpretation was completed the findings will be listed below:   Possible pubic rami fracture seen on plain film interpreted by me, confirmed by radiology     Interpretation per the Radiologist below, if available at the time of this note:     XR HIP LT W OR WO PELV 2-3 VWS    Result Date: 1/25/2023  EXAM: XR HIP LT W OR WO PELV 2-3 VWS INDICATION: Left hip pain sp fall. COMPARISON: 11/11/2018. FINDINGS: AP view of the pelvis and a frogleg lateral view of the left hip demonstrate some irregularity involving the left inferior pubic ramus concerning for nondisplaced fracture. Degenerative changes are seen in the left hip and visualized lumbar spine. The patient is status post right total hip replacement. No hardware complication. Moderate fecal stasis is noted. Possible nondisplaced fracture left inferior pubic ramus. CT HEAD WO CONT    Result Date: 1/25/2023  EXAM: CT HEAD WO CONT INDICATION: Ground-level fall, acute mental status change COMPARISON: 9/30/2022. CONTRAST: None. TECHNIQUE: Unenhanced CT of the head was performed using 5 mm images. Brain and bone windows were generated. Coronal and sagittal reformats. CT dose reduction was achieved through use of a standardized protocol tailored for this examination and automatic exposure control for dose modulation. FINDINGS: The ventricles and sulci are prominent but in slightly asymmetric but stable in size, shape and configuration. There is moderate severe periventricular white matter hypodensity. There is no intracranial hemorrhage, extra-axial collection, or mass effect. The basilar cisterns are open. No CT evidence of acute infarct. Mild bilateral vertebral artery calcification is noted. The bone windows demonstrate no abnormalities. The visualized portions of the paranasal sinuses and mastoid air cells are clear. No acute intracranial process seen Stable moderate severe underlying white matter disease and central atrophy    CT PELV WO CONT    Result Date: 1/25/2023  INDICATION:  Eval for hip fracture EXAM: CT pelvis. Comparison 5/21/2021. Thin section axial images were obtained.  From these sagittal and coronal reformats were performed. CT dose reduction was achieved through use of a standardized protocol tailored for this examination and automatic exposure control for dose modulation. FINDINGS: Patient is post right hip hemiarthroplasty. No dislocation. Mild degenerative changes of the left hip. There is sclerosis of the bilateral sacral ala and S3 sacral segment compatible with insufficiency fracturing. Acute fracture of the inferior left pubic ramus as well as the junction of the left superior pubic ramus and anterior left acetabulum. Fat containing 1.8 cm right adnexal mass with fat soft tissue fluid level likely a dermoid. Diverticulosis of the left colon. Atrophy of the right gluteal musculature     1. Sacral insufficiency fractures with fractures of the left superior and inferior pubic rami 2. Right ovarian dermoid        PROCEDURES   Unless otherwise noted below, none  Procedures     CRITICAL CARE TIME       FINAL IMPRESSION     1. Pubic ramus fracture, left, closed, initial encounter [facility])          DISPOSITION/PLAN   Admitted    Admit Note: Pt is being admitted by  . The results of their tests and reason(s) for their admission have been discussed with pt and/or available family. They convey agreement and understanding for the need to be admitted and for the admission diagnosis. PATIENT REFERRED TO:  Follow-up Information       Follow up With Specialties Details Why Contact Varun Valiente MD Internal Medicine Physician   [address]  [facility] [address] [place] [address]  [phone]                DISCHARGE MEDICATIONS:  Current Discharge Medication List            DISCONTINUED MEDICATIONS:  Current Discharge Medication List          I am the Primary Clinician of Record. Signed By: Armaan Amos MD     January 25, 2023      (Please note that parts of this dictation were completed with voice recognition software.  Quite often unanticipated grammatical, syntax, homophones, and other interpretive errors are inadvertently transcribed by the computer software. Please disregards these errors.  Please excuse any errors that have escaped final proofreading.)

## 2023-01-25 NOTE — ED NOTES
ADMISSION SBAR NOTE    IP UNIT CALLED NOTE IS READY: Yes Spoke to [employer]  IF there are questions Call Houston Araujo RN at phone # [phone]    SITUATION/BACKGROUND:    Patient is being transferred to ortho Room# 120    Patient's Chief Complaint was left hip pain/fall and is admitted for left hip fracture. CODE STATUS: Prior    ISOLATION/PRECAUTIONS: No       Called outstanding consults: Yes     Are there still sign and held orders that need to be released? No     STAT labs collected: Yes      All STAT orders are complete: Yes    The following personal items will be sent with the patient during transfer to the floor: All valuables:   ITEM:    ITEM:    ITEM:    ITEM:    ITEM:         ASSESSMENT:    CIWA Assessment: No  Last     NEURO:     NIH SCORE:    OUSMANE SCREENING:      NEURO ASSESSMENT: Neuro  Neurologic State: Alert (01/25/23 1007)  Orientation Level: Disoriented to place, Disoriented to situation, Disoriented to time, Oriented to person (01/25/23 1007)  Cognition: Follows commands (01/25/23 1007)    Is patient impulsive? No   Is patient oriented? No    Do they follow commands? Yes  Is the patient ambulatory? No      FALL RISK? Yes       RESPIRATORY: Is patient on Oxygen? No    OXYGEN: Oxygen Therapy  O2 Device: None (Room air) (01/25/23 1654)    CARDIAC: Is cardiac monitoring ordered? Yes Last Rhythm: NSR  Patient to transfer with tele box on? Yes  Is patient using a LIFE VEST? No     LINE ACCESS:   Peripheral IV 01/25/23 Right Antecubital (Active)   Site Assessment Clean, dry, & intact 01/25/23 1110   Phlebitis Assessment 0 01/25/23 1110   Infiltration Assessment 0 01/25/23 1110   Dressing Status Clean, dry, & intact 01/25/23 1110        /GI: CONTINENT BOWEL/BLADDER? No   URINARY OUTPUT: incontinent  CHRONIC OR ACUTE? chronic     INTEGUMENTARY:   IS THE PATIENT UNDRESSED? ARE Yes  THERE WOUNDS PRESENT?  No       RESTRAINTS IN USE: No      IS DOCUMENTATION COMPLETE: Yes      Vital Signs  Level of Consciousness: Alert (0) (01/25/23 1654)  Temp: 98 °F (36.7 °C) (01/25/23 1654)  Temp Source: Oral (01/25/23 1654)  Pulse (Heart Rate): (!) 58 (01/25/23 1654)  Heart Rate Source: Monitor (01/25/23 1654)  Cardiac Rhythm: Sinus Serg (01/25/23 1654)  Resp Rate: 15 (01/25/23 1654)  BP: (!) 163/53 (01/25/23 1654)  MAP (Monitor): 85 (01/25/23 1654)  MAP (Calculated): 90 (01/25/23 1654)  BP 1 Location: Right arm (01/25/23 1654)  BP 1 Method: Automatic (01/25/23 1654)  BP Patient Position: At rest (01/25/23 1654)  MEWS Score: 1 (01/25/23 1654)  Pain 1  Pain Scale 1: Visual (01/25/23 0958)  Pain Intensity 1: 3 (01/25/23 0958)  Pain Location 1: Hip (01/25/23 0958)  Pain Orientation 1: Left (01/25/23 4173)      REVIEW:

## 2023-01-25 NOTE — PROGRESS NOTES
Herbal or dietary supplement products    [facility] does not stock herbal or dietary supplement products. The use of such is strongly discouraged due to the lack of regulated consistency of products. These products do not meet FDA or USP standards.     I removed Glucosamine 1500 complex from the patient's MAR    Thanks  PEDRO Young

## 2023-01-26 LAB
ANION GAP SERPL CALC-SCNC: 7 MMOL/L (ref 5–15)
BASOPHILS # BLD: 0 K/UL (ref 0–0.1)
BASOPHILS NFR BLD: 0 % (ref 0–1)
BUN SERPL-MCNC: 12 MG/DL (ref 6–20)
BUN/CREAT SERPL: 16 (ref 12–20)
CALCIUM SERPL-MCNC: 9.3 MG/DL (ref 8.5–10.1)
CHLORIDE SERPL-SCNC: 98 MMOL/L (ref 97–108)
CO2 SERPL-SCNC: 30 MMOL/L (ref 21–32)
COVID-19 RAPID TEST, COVR: NOT DETECTED
CREAT SERPL-MCNC: 0.73 MG/DL (ref 0.55–1.02)
DIFFERENTIAL METHOD BLD: ABNORMAL
EOSINOPHIL # BLD: 0.5 K/UL (ref 0–0.4)
EOSINOPHIL NFR BLD: 5 % (ref 0–7)
ERYTHROCYTE [DISTWIDTH] IN BLOOD BY AUTOMATED COUNT: 11.7 % (ref 11.5–14.5)
GLUCOSE SERPL-MCNC: 107 MG/DL (ref 65–100)
HCT VFR BLD AUTO: 40.5 % (ref 35–47)
HGB BLD-MCNC: 13.6 G/DL (ref 11.5–16)
IMM GRANULOCYTES # BLD AUTO: 0 K/UL (ref 0–0.04)
IMM GRANULOCYTES NFR BLD AUTO: 0 % (ref 0–0.5)
LYMPHOCYTES # BLD: 1.8 K/UL (ref 0.8–3.5)
LYMPHOCYTES NFR BLD: 19 % (ref 12–49)
MCH RBC QN AUTO: 32.1 PG (ref 26–34)
MCHC RBC AUTO-ENTMCNC: 33.6 G/DL (ref 30–36.5)
MCV RBC AUTO: 95.5 FL (ref 80–99)
MONOCYTES # BLD: 0.7 K/UL (ref 0–1)
MONOCYTES NFR BLD: 8 % (ref 5–13)
NEUTS SEG # BLD: 6.3 K/UL (ref 1.8–8)
NEUTS SEG NFR BLD: 68 % (ref 32–75)
NRBC # BLD: 0 K/UL (ref 0–0.01)
NRBC BLD-RTO: 0 PER 100 WBC
PLATELET # BLD AUTO: 147 K/UL (ref 150–400)
PMV BLD AUTO: 10.3 FL (ref 8.9–12.9)
POTASSIUM SERPL-SCNC: 3.3 MMOL/L (ref 3.5–5.1)
RBC # BLD AUTO: 4.24 M/UL (ref 3.8–5.2)
SODIUM SERPL-SCNC: 135 MMOL/L (ref 136–145)
SOURCE, COVRS: NORMAL
WBC # BLD AUTO: 9.3 K/UL (ref 3.6–11)

## 2023-01-26 PROCEDURE — 96374 THER/PROPH/DIAG INJ IV PUSH: CPT

## 2023-01-26 PROCEDURE — 65270000029 HC RM PRIVATE

## 2023-01-26 PROCEDURE — 85025 COMPLETE CBC W/AUTO DIFF WBC: CPT

## 2023-01-26 PROCEDURE — 97165 OT EVAL LOW COMPLEX 30 MIN: CPT | Performed by: OCCUPATIONAL THERAPIST

## 2023-01-26 PROCEDURE — 87635 SARS-COV-2 COVID-19 AMP PRB: CPT

## 2023-01-26 PROCEDURE — 96372 THER/PROPH/DIAG INJ SC/IM: CPT

## 2023-01-26 PROCEDURE — 80048 BASIC METABOLIC PNL TOTAL CA: CPT

## 2023-01-26 PROCEDURE — 36415 COLL VENOUS BLD VENIPUNCTURE: CPT

## 2023-01-26 PROCEDURE — 97530 THERAPEUTIC ACTIVITIES: CPT | Performed by: OCCUPATIONAL THERAPIST

## 2023-01-26 PROCEDURE — 97535 SELF CARE MNGMENT TRAINING: CPT | Performed by: OCCUPATIONAL THERAPIST

## 2023-01-26 PROCEDURE — 74011250636 HC RX REV CODE- 250/636: Performed by: NURSE PRACTITIONER

## 2023-01-26 PROCEDURE — 97530 THERAPEUTIC ACTIVITIES: CPT

## 2023-01-26 PROCEDURE — G0378 HOSPITAL OBSERVATION PER HR: HCPCS

## 2023-01-26 PROCEDURE — 74011250637 HC RX REV CODE- 250/637: Performed by: NURSE PRACTITIONER

## 2023-01-26 PROCEDURE — 97162 PT EVAL MOD COMPLEX 30 MIN: CPT

## 2023-01-26 RX ORDER — AMLODIPINE BESYLATE 5 MG/1
10 TABLET ORAL DAILY
Status: DISCONTINUED | OUTPATIENT
Start: 2023-01-26 | End: 2023-01-26

## 2023-01-26 RX ORDER — ACETAMINOPHEN 325 MG/1
650 TABLET ORAL
Qty: 30 TABLET | Refills: 0 | Status: SHIPPED | OUTPATIENT
Start: 2023-01-26

## 2023-01-26 RX ORDER — FUROSEMIDE 40 MG/1
TABLET ORAL DAILY
COMMUNITY

## 2023-01-26 RX ORDER — HYDRALAZINE HYDROCHLORIDE 20 MG/ML
10 INJECTION INTRAMUSCULAR; INTRAVENOUS
Status: DISCONTINUED | OUTPATIENT
Start: 2023-01-26 | End: 2023-01-27 | Stop reason: HOSPADM

## 2023-01-26 RX ORDER — POTASSIUM CHLORIDE 750 MG/1
40 TABLET, FILM COATED, EXTENDED RELEASE ORAL
Status: ON HOLD | COMMUNITY
End: 2023-01-26 | Stop reason: SDUPTHER

## 2023-01-26 RX ORDER — METOLAZONE 5 MG/1
5 TABLET ORAL
COMMUNITY

## 2023-01-26 RX ORDER — NITROFURANTOIN 25; 75 MG/1; MG/1
100 CAPSULE ORAL 2 TIMES DAILY
COMMUNITY

## 2023-01-26 RX ORDER — LOSARTAN POTASSIUM 100 MG/1
100 TABLET ORAL DAILY
COMMUNITY

## 2023-01-26 RX ORDER — LANOLIN ALCOHOL/MO/W.PET/CERES
1 CREAM (GRAM) TOPICAL DAILY
COMMUNITY
End: 2023-01-27

## 2023-01-26 RX ORDER — METOPROLOL SUCCINATE 100 MG/1
25 TABLET, EXTENDED RELEASE ORAL DAILY
COMMUNITY
End: 2023-01-27

## 2023-01-26 RX ORDER — ESCITALOPRAM OXALATE 20 MG/1
20 TABLET ORAL DAILY
COMMUNITY

## 2023-01-26 RX ORDER — POTASSIUM CHLORIDE 750 MG/1
20 TABLET, FILM COATED, EXTENDED RELEASE ORAL DAILY
Qty: 30 TABLET | Refills: 0 | Status: SHIPPED | OUTPATIENT
Start: 2023-01-26

## 2023-01-26 RX ORDER — SODIUM,POTASSIUM PHOSPHATES 280-250MG
1 POWDER IN PACKET (EA) ORAL DAILY
Status: DISCONTINUED | OUTPATIENT
Start: 2023-01-26 | End: 2023-01-26

## 2023-01-26 RX ADMIN — HYDRALAZINE HYDROCHLORIDE 10 MG: 20 INJECTION INTRAMUSCULAR; INTRAVENOUS at 08:16

## 2023-01-26 RX ADMIN — Medication 200 MG: at 08:18

## 2023-01-26 RX ADMIN — TRAMADOL HYDROCHLORIDE 50 MG: 50 TABLET ORAL at 11:47

## 2023-01-26 RX ADMIN — ENOXAPARIN SODIUM 40 MG: 100 INJECTION SUBCUTANEOUS at 08:18

## 2023-01-26 RX ADMIN — ESCITALOPRAM OXALATE 20 MG: 10 TABLET ORAL at 08:19

## 2023-01-26 RX ADMIN — NITROFURANTOIN MONOHYDRATE/MACROCRYSTALLINE 100 MG: 25; 75 CAPSULE ORAL at 21:35

## 2023-01-26 RX ADMIN — FUROSEMIDE 40 MG: 20 TABLET ORAL at 08:19

## 2023-01-26 RX ADMIN — LOSARTAN POTASSIUM 100 MG: 50 TABLET, FILM COATED ORAL at 08:18

## 2023-01-26 RX ADMIN — MEMANTINE 10 MG: 10 TABLET ORAL at 08:18

## 2023-01-26 RX ADMIN — NITROFURANTOIN MONOHYDRATE/MACROCRYSTALLINE 100 MG: 25; 75 CAPSULE ORAL at 08:46

## 2023-01-26 RX ADMIN — SENNOSIDES AND DOCUSATE SODIUM 1 TABLET: 50; 8.6 TABLET ORAL at 08:19

## 2023-01-26 RX ADMIN — MEMANTINE 10 MG: 10 TABLET ORAL at 21:35

## 2023-01-26 RX ADMIN — ATORVASTATIN CALCIUM 20 MG: 20 TABLET, FILM COATED ORAL at 21:35

## 2023-01-26 RX ADMIN — POTASSIUM BICARBONATE 40 MEQ: 782 TABLET, EFFERVESCENT ORAL at 08:18

## 2023-01-26 RX ADMIN — Medication 1 TABLET: at 08:18

## 2023-01-26 NOTE — PROGRESS NOTES
This RN got report from ED for progression of care. This RN gave report to Yudith Herman, oncoming RN. All questions answered. Pt arrived at [address] [facility]. This RN attempted to give pt her K+ pills mixed with apple sauce. Pt refused the K+ pills. This RN notified NP Kimberly Hernandez. At handoff, pt awake, in bed, confused. Daughter at bedside, concerned.

## 2023-01-26 NOTE — DISCHARGE SUMMARY
Hospitalist Discharge Summary     Patient ID:    Devyn Cleaning  [id number]  54 y.o.  7/3/1932    Admit date: 1/25/2023    Discharge date : 1/26/2023    Chronic Diagnoses:    Problem List as of 1/26/2023 Date Reviewed: 9/8/2021            Codes Class Noted - Resolved    Closed fracture of left inferior pubic ramus ([address] ICD-10-CM: L15.414I  ICD-9-CM: 808.2  1/25/2023 - Present        Anxiety ICD-10-CM: F41.9  ICD-9-CM: 300.00  9/8/2021 - Present        Pure hypercholesterolemia ICD-10-CM: E78.00  ICD-9-CM: 272.0  9/8/2021 - Present        Scoliosis of thoracolumbar spine, unspecified scoliosis type ICD-10-CM: M41.9  ICD-9-CM: 737.30  5/25/2021 - Present        Chronic right-sided low back pain without sciatica ICD-10-CM: M54.50, G89.29  ICD-9-CM: 724.2, 338.29  5/25/2021 - Present        Constipation, chronic ICD-10-CM: K59.09  ICD-9-CM: 564.00  5/25/2021 - Present        Gait instability ICD-10-CM: R26.81  ICD-9-CM: 781.2  2/23/2021 - Present        Memory impairment ICD-10-CM: R41.3  ICD-9-CM: 780.93  2/23/2021 - Present        Primary osteoarthritis involving multiple joints ICD-10-CM: M15.9  ICD-9-CM: 715.98  2/23/2021 - Present        History of right hip replacement ICD-10-CM: Z96.641  ICD-9-CM: V43.64  2/23/2021 - Present        Age-related osteoporosis with current pathological fracture with routine healing ICD-10-CM: M80.00XD  ICD-9-CM: V54.29, 733.01  2/24/2020 - Present        Hip fracture ([address] ICD-10-CM: S72.009A  ICD-9-CM: 820.8  1/20/2019 - Present        Bradycardia ICD-10-CM: R00.1  ICD-9-CM: 427.89  7/13/2018 - Present        Essential hypertension ICD-10-CM: I10  ICD-9-CM: 401.9  7/12/2018 - Present        Closed right hip fracture ([facility] ICD-10-CM: S72.001A  ICD-9-CM: 820.8  7/12/2018 - Present        Hypokalemia ICD-10-CM: E87.6  ICD-9-CM: 276.8  2/28/2015 - Present        OAB (overactive bladder) ICD-10-CM: N32.81  ICD-9-CM: 596.51  2/15/2013 - Present Hyperlipidemia ICD-10-CM: E78.5  ICD-9-CM: 272.4  12/1/2011 - Present        RESOLVED: Medicare annual wellness visit, subsequent ICD-10-CM: Z00.00  ICD-9-CM: V70.0  2/23/2021 - 3/25/2021        RESOLVED: AGE (acute gastroenteritis) ICD-10-CM: K52.9  ICD-9-CM: 558.9  2/28/2015 - 9/4/2015        RESOLVED: HTN (hypertension) ICD-10-CM: I10  ICD-9-CM: 401.9  5/27/2011 - 8/3/2021        RESOLVED: Chest pain, unspecified ICD-10-CM: R07.9  ICD-9-CM: 786.50  4/16/2011 - 12/1/2011       22    Final Diagnoses: Active Problems:    Closed fracture of left inferior pubic ramus ([address] (1/25/2023)        Reason for Hospitalization: Closed fracture of left inferior pubic ramus      Hospital Course: Darcie Dukes is a 80 y.o. female who presents to the emergency room status post fall on yesterday evening from the [facility]. CT of pelvis shows sacral insufficiency fractures with fractures of the left superior and inferior pubic rami. Patient was evaluated by orthopedics and no surgical intervention needed. Weight bearing as tolerated with walker. Patient was deemed clear for discharge on today with outpatient follow up after PT/OT evaluation. Discharge Medications:   Current Discharge Medication List        START taking these medications    Details   acetaminophen (TYLENOL) 325 mg tablet Take 2 Tablets by mouth every four (4) hours as needed for Fever or Pain. Qty: 30 Tablet, Refills: 0  Start date: 1/26/2023           CONTINUE these medications which have CHANGED    Details   potassium chloride SR (KLOR-CON 10) 10 mEq tablet Take 2 Tablets by mouth daily. Qty: 30 Tablet, Refills: 0  Start date: 1/26/2023           CONTINUE these medications which have NOT CHANGED    Details   escitalopram oxalate (Lexapro) 20 mg tablet Take 20 mg by mouth daily. furosemide (Lasix) 40 mg tablet Take  by mouth daily. nitrofurantoin, macrocrystal-monohydrate, (Macrobid) 100 mg capsule Take 100 mg by mouth two (2) times a day. losartan (COZAAR) 100 mg tablet Take 100 mg by mouth daily. metOLazone (ZAROXOLYN) 5 mg tablet Take 5 mg by mouth every Tuesday. cholecalciferol (VITAMIN D3) (1000 Units /25 mcg) tablet Take 1,000 Units by mouth daily. atorvastatin (LIPITOR) 20 mg tablet TAKE 1 TABLET BY MOUTH EVERY DAY AT NIGHT  Qty: 90 Tablet, Refills: 0      metoprolol tartrate (LOPRESSOR) 25 mg tablet Take 1 Tablet by mouth two (2) times a day. HOLD FOR SBP LESS THAN 120 OR HR LESS THAN 65  Qty: 180 Tablet, Refills: 1    Associated Diagnoses: Essential hypertension      memantine (NAMENDA) 5 mg tablet 10 mg. Twice a day      senna-docusate (PERICOLACE) 8.6-50 mg per tablet Take 1 Tab by mouth daily. polyethylene glycol (MIRALAX) 17 gram/dose powder Take 17 g by mouth daily as needed for Other (constipation). coenzyme Q-10 (CO Q-10) 200 mg capsule Take 1 Cap by mouth daily. Qty: 90 Cap, Refills: 3      glucosamine-chondroit-vit c-mn (GLUCOSAMINE 1500 COMPLEX) 500-400 mg capsule Take 1 Cap by mouth daily. Qty: 90 Cap, Refills: 1      multivit-minerals/folic acid (CENTRUM MULTIGUMMIES PO) Take 1 Tab by mouth daily. STOP taking these medications       glucosamine-chondroitin (ARTHX) 500-400 mg cap Comments:   Reason for Stopping:         metoprolol succinate (Toprol XL) 100 mg tablet Comments:   Reason for Stopping:         aspirin delayed-release 81 mg tablet Comments:   Reason for Stopping:         alendronate (FOSAMAX) 35 mg tablet Comments:   Reason for Stopping:         amLODIPine (NORVASC) 10 mg tablet Comments:   Reason for Stopping:         calcium carbonate (CALTREX) 600 mg calcium (1,500 mg) tablet Comments:   Reason for Stopping: Follow up Care:    1. Maura Bean MD in 1-2 weeks.       Follow-up Information       Follow up With Specialties Details Why Felicity Feliz MD Internal Medicine Physician   [address]  Nini Stains [address] [place] [address] Pkwy      Edgar Eisenberg DO Internal Medicine Physician   [address] [facility]  [address] [place] [address]      North Ritter MD Orthopedic Surgery, Hand Surgery Physician Schedule an appointment as soon as possible for a visit in 2 week(s)  Richard DILL [phone]                * Follow-up Care/Patient Instructions: Activity: Activity as tolerated with walker and assistance. Diet: Regular Diet  Wound Care: None needed      Condition at Discharge:  Stable  __________________________________________________________________    Disposition  Other Healthcare  ____________________________________________________________________    Code Status:  DNR  ___________________________________________________________________    Discharge Exam:  Patient seen and examined by me on discharge day. Physical Exam     CONSULTATIONS: Orthopedic Surgery    Significant Diagnostic Studies:   Recent Results (from the past 24 hour(s))   CBC WITH AUTOMATED DIFF    Collection Time: 01/25/23 11:07 AM   Result Value Ref Range    WBC 9.0 3.6 - 11.0 K/uL    RBC 4.12 3.80 - 5.20 M/uL    HGB 13.1 11.5 - 16.0 g/dL    HCT 39.6 35.0 - 47.0 %    MCV 96.1 80.0 - 99.0 FL    MCH 31.8 26.0 - 34.0 PG    MCHC 33.1 30.0 - 36.5 g/dL    RDW 11.9 11.5 - 14.5 %    PLATELET 628 906 - 697 K/uL    MPV 10.6 8.9 - 12.9 FL    NRBC 0.0 0  WBC    ABSOLUTE NRBC 0.00 0.00 - 0.01 K/uL    NEUTROPHILS 70 32 - 75 %    LYMPHOCYTES 20 12 - 49 %    MONOCYTES 6 5 - 13 %    EOSINOPHILS 4 0 - 7 %    BASOPHILS 0 0 - 1 %    IMMATURE GRANULOCYTES 0 0.0 - 0.5 %    ABS. NEUTROPHILS 6.3 1.8 - 8.0 K/UL    ABS. LYMPHOCYTES 1.8 0.8 - 3.5 K/UL    ABS. MONOCYTES 0.5 0.0 - 1.0 K/UL    ABS. EOSINOPHILS 0.3 0.0 - 0.4 K/UL    ABS. BASOPHILS 0.0 0.0 - 0.1 K/UL    ABS. IMM.  GRANS. 0.0 0.00 - 0.04 K/UL    DF AUTOMATED     METABOLIC PANEL, COMPREHENSIVE    Collection Time: 01/25/23 11:07 AM   Result Value Ref Range    Sodium 138 136 - 145 mmol/L    Potassium 3.2 (L) 3.5 - 5.1 mmol/L    Chloride 98 97 - 108 mmol/L    CO2 33 (H) 21 - 32 mmol/L    Anion gap 7 5 - 15 mmol/L    Glucose 94 65 - 100 mg/dL    BUN 15 6 - 20 MG/DL    Creatinine 0.77 0.55 - 1.02 MG/DL    BUN/Creatinine ratio 19 12 - 20      eGFR >60 >60 ml/min/1.73m2    Calcium 9.4 8.5 - 10.1 MG/DL    Bilirubin, total 0.9 0.2 - 1.0 MG/DL    ALT (SGPT) 31 12 - 78 U/L    AST (SGOT) 27 15 - 37 U/L    Alk. phosphatase 142 (H) 45 - 117 U/L    Protein, total 7.3 6.4 - 8.2 g/dL    Albumin 3.5 3.5 - 5.0 g/dL    Globulin 3.8 2.0 - 4.0 g/dL    A-G Ratio 0.9 (L) 1.1 - 2.2     URINALYSIS W/ RFLX MICROSCOPIC    Collection Time: 01/25/23  4:37 PM   Result Value Ref Range    Color YELLOW/STRAW      Appearance CLEAR CLEAR      Specific gravity 1.011      pH (UA) 8.0 5.0 - 8.0      Protein Negative NEG mg/dL    Glucose Negative NEG mg/dL    Ketone Negative NEG mg/dL    Bilirubin Negative NEG      Blood Negative NEG      Urobilinogen 0.2 0.2 - 1.0 EU/dL    Nitrites Negative NEG      Leukocyte Esterase Negative NEG     CBC WITH AUTOMATED DIFF    Collection Time: 01/26/23  5:30 AM   Result Value Ref Range    WBC 9.3 3.6 - 11.0 K/uL    RBC 4.24 3.80 - 5.20 M/uL    HGB 13.6 11.5 - 16.0 g/dL    HCT 40.5 35.0 - 47.0 %    MCV 95.5 80.0 - 99.0 FL    MCH 32.1 26.0 - 34.0 PG    MCHC 33.6 30.0 - 36.5 g/dL    RDW 11.7 11.5 - 14.5 %    PLATELET 659 (L) 679 - 400 K/uL    MPV 10.3 8.9 - 12.9 FL    NRBC 0.0 0  WBC    ABSOLUTE NRBC 0.00 0.00 - 0.01 K/uL    NEUTROPHILS 68 32 - 75 %    LYMPHOCYTES 19 12 - 49 %    MONOCYTES 8 5 - 13 %    EOSINOPHILS 5 0 - 7 %    BASOPHILS 0 0 - 1 %    IMMATURE GRANULOCYTES 0 0.0 - 0.5 %    ABS. NEUTROPHILS 6.3 1.8 - 8.0 K/UL    ABS. LYMPHOCYTES 1.8 0.8 - 3.5 K/UL    ABS. MONOCYTES 0.7 0.0 - 1.0 K/UL    ABS. EOSINOPHILS 0.5 (H) 0.0 - 0.4 K/UL    ABS. BASOPHILS 0.0 0.0 - 0.1 K/UL    ABS. IMM.  GRANS. 0.0 0.00 - 0.04 K/UL    DF AUTOMATED     METABOLIC PANEL, BASIC    Collection Time: 01/26/23  5:30 AM   Result Value Ref Range    Sodium 135 (L) 136 - 145 mmol/L    Potassium 3.3 (L) 3.5 - 5.1 mmol/L    Chloride 98 97 - 108 mmol/L    CO2 30 21 - 32 mmol/L    Anion gap 7 5 - 15 mmol/L    Glucose 107 (H) 65 - 100 mg/dL    BUN 12 6 - 20 MG/DL    Creatinine 0.73 0.55 - 1.02 MG/DL    BUN/Creatinine ratio 16 12 - 20      eGFR >60 >60 ml/min/1.73m2    Calcium 9.3 8.5 - 10.1 MG/DL     CT PELV WO CONT   Final Result   1. Sacral insufficiency fractures with fractures of the left superior and   inferior pubic rami   2. Right ovarian dermoid         XR HIP LT W OR WO PELV 2-3 VWS   Final Result   Possible nondisplaced fracture left inferior pubic ramus. CT HEAD WO CONT   Final Result   No acute intracranial process seen   Stable moderate severe underlying white matter disease and central atrophy          Discharge: time spent less than 30 minutes in discharge  Education and counseling. Signed:  Gillian Delgado NP  1/26/2023  10:03 AM     The patient was seen by an advanced practice provider such as a physician assistant or nurse practitioner trained to practice medicine in hospital medicine and part of for OUR patient care team.  I Dr. Kj Black MD was available in the hospital medicine department for collaboration and consultation if needed pursuant to the code of [place]. The patient was seen, evaluated, examined, treated including performance of procedures and disposition by the RAFAL independently including the issuance of any prescriptions and follow-up instructions.     The RAFAL has asked for my advice and consultation regarding: None

## 2023-01-26 NOTE — PROGRESS NOTES
Problem: Mobility Impaired (Adult and Pediatric)  Goal: *Acute Goals and Plan of Care (Insert Text)  Description: FUNCTIONAL STATUS PRIOR TO ADMISSION: patient was residing at [facility], ambulating mod I with rollator. Recently staff have kept her in the wheelchair, limiting mobility. Patient with recent history of falls. HOME SUPPORT PRIOR TO ADMISSION: patient resides at [facility] with her . Physical Therapy Goals  Initiated 1/26/2023  1. Patient will move from supine to sit and sit to supine , scoot up and down, and roll side to side in bed with minimal assistance/contact guard assist within 7 day(s). 2.  Patient will transfer from bed to chair and chair to bed with minimal assistance/contact guard assist using the least restrictive device within 7 day(s). 3.  Patient will perform sit to stand with minimal assistance/contact guard assist within 7 day(s). 4.  Patient will ambulate with minimal assistance/contact guard assist for 10 feet with the least restrictive device within 7 day(s). Outcome: Not Met   PHYSICAL THERAPY EVALUATION  Patient: Dilcia Acosta (89 y.o. female)  Date: 1/26/2023  Primary Diagnosis: Closed fracture of left inferior pubic ramus ([facility]) [S32.592A]       Precautions:   WBAT, Skin, Fall, Bed Alarm    ASSESSMENT  Based on the objective data described below, the patient presents with decreased strength, decreased functional mobility, impaired balance, increased pain, and unsteady gait following admission for fracture of left inferior pubic rami fracture. Patient received resting in bed, reports increased pain and maintaining eyes closed during conversation. Patient education on keeping legs together to prevent/lessen pain with mobility. Patient required max A x 2 for bed mobility. Once sitting, patient with good-fair sitting balance although became dizzy and nauseous. VSS on RA. Patient able to stand with mod A and RW.  She was able to take shuffled steps to the bedside chair with mod A and RW. Patient will likely need rehab at discharge. Current Level of Function Impacting Discharge (mobility/balance): mod-max A x 2 with RW    Functional Outcome Measure: The patient scored 20/100 on the Barthel outcome measure which is indicative of totally dependent for ADLS. Other factors to consider for discharge: confusion, dementia, pain from fracture     Patient will benefit from skilled therapy intervention to address the above noted impairments. PLAN :  Recommendations and Planned Interventions: bed mobility training, transfer training, gait training, therapeutic exercises, patient and family training/education, and therapeutic activities      Frequency/Duration: Patient will be followed by physical therapy:  4 times a week to address goals. Recommendation for discharge: (in order for the patient to meet his/her long term goals)  Therapy up to 5 days/week in SNF setting    This discharge recommendation:  A follow-up discussion with the attending provider and/or case management is planned    IF patient discharges home will need the following DME: to be determined (TBD)         SUBJECTIVE:   Patient stated It hurts.     OBJECTIVE DATA SUMMARY:   HISTORY:    Past Medical History:   Diagnosis Date    Chronic kidney disease     Decreased kidney levels per daughter    Fracture 2018    Pt reports right hip fracture & surgery following a fall    Fracture 2020    left rib fractures; pt fell     HTN (hypertension) 5/27/2011    Hyperlipidemia 12/1/2011    Hypertension     OAB (overactive bladder) 2/15/2013    Other ill-defined conditions(799.89)     hyperlipidemia    Other ill-defined conditions(799.89)     bladder problems    Other ill-defined conditions(799.89)     back pain     Past Surgical History:   Procedure Laterality Date    COLONOSCOPY N/A 3/12/2018    COLONOSCOPY performed by Merlin Coop, MD at [address] [facility] 3/12/2018         HX APPENDECTOMY  1948    HX CATARACT REMOVAL Left     HX CORNEAL TRANSPLANT Left     HX DILATION AND CURETTAGE      x 2    HX HIP REPLACEMENT Right 01/20/2019    HX TONSILLECTOMY  1938       Personal factors and/or comorbidities impacting plan of care: pain, dementia    Home Situation  Home Environment: Assisted living  [address] [facility] Name: [facility] Bower  One/Two Story Residence: One story  Living Alone: No  Support Systems: Child(charisse), Assisted Living  Patient Expects to be Discharged to[de-identified] Assisted Living  Current DME Used/Available at Home: None    EXAMINATION/PRESENTATION/DECISION MAKING:   Critical Behavior:  Neurologic State: Alert, Confused  Orientation Level: Oriented to place, Disoriented to situation  Cognition: Decreased attention/concentration, Memory loss, Decreased command following  Safety/Judgement: Fall prevention, Decreased awareness of environment  Hearing: Auditory  Auditory Impairment: Hard of hearing, bilateral  Skin:  bruising on L hip  Edema: none  Range Of Motion:  AROM: Generally decreased, functional                       Strength:    Strength: Generally decreased, functional                    Tone & Sensation:   Tone: Normal              Sensation:  (not assessed)               Coordination:  Coordination: Within functional limits  Vision:      Functional Mobility:  Bed Mobility:  Rolling: Maximum assistance;Assist x1 (educated on LE support for rolling)  Supine to Sit: Maximum assistance;Assist x2; Additional time     Scooting: Total assistance  Transfers:  Sit to Stand: Moderate assistance;Assist x2  Stand to Sit: Maximum assistance;Assist x2        Bed to Chair: Maximum assistance;Assist x2;Adaptive equipment              Balance:   Sitting: Impaired; With support  Sitting - Static: Poor (constant support)  Sitting - Dynamic: Poor (constant support)  Standing: Impaired; With support  Standing - Static: Constant support;Poor  Standing - Dynamic : Poor  Ambulation/Gait Training:              Gait Description (WDL):  (pivoted to the bedside chair with mod A x 2 and RW)                              Functional Measure:  Barthel Index:    Bathin  Bladder: 0  Bowels: 5  Groomin  Dressin  Feedin  Mobility: 0  Stairs: 0  Toilet Use: 0  Transfer (Bed to Chair and Back): 5  Total: 15/100       The Barthel ADL Index: Guidelines  1. The index should be used as a record of what a patient does, not as a record of what a patient could do. 2. The main aim is to establish degree of independence from any help, physical or verbal, however minor and for whatever reason. 3. The need for supervision renders the patient not independent. 4. A patient's performance should be established using the best available evidence. Asking the patient, friends/relatives and nurses are the usual sources, but direct observation and common sense are also important. However direct testing is not needed. 5. Usually the patient's performance over the preceding 24-48 hours is important, but occasionally longer periods will be relevant. 6. Middle categories imply that the patient supplies over 50 per cent of the effort. 7. Use of aids to be independent is allowed. Score Interpretation (from [address] [address])    Independent   60-79 Minimally independent   40-59 Partially dependent   20-39 Very dependent   <20 Totally dependent     -Nelly Juárez., Barthel, D.W. (1965). Functional evaluation: the Barthel Index. [address] [facility] ([address] [facility] (1997). The Barthel activities of daily living index: self-reporting versus actual performance in the old (> or = 75 years). Journal of [address] [place] [address] 45(7), [address] [facility], .KALLIE, Sheela Bhakta., Adams Peng. (1999). Measuring the change in disability after inpatient rehabilitation; comparison of the responsiveness of the Barthel Index and Functional Love Measure.  Journal of Neurology, Neurosurgery, and Psychiatry, 66(6), 360-564. NAYELI Grajeda, PRASAD Ruano, & Dariana Amaya M.A. (2004) Assessment of post-stroke quality of life in cost-effectiveness studies: The usefulness of the Barthel Index and the EuroQoL-5D. Quality of Life Research, 15, 014-02            Physical Therapy Evaluation Charge Determination   History Examination Presentation Decision-Making   MEDIUM  Complexity : 1-2 comorbidities / personal factors will impact the outcome/ POC  MEDIUM Complexity : 3 Standardized tests and measures addressing body structure, function, activity limitation and / or participation in recreation  MEDIUM Complexity : Evolving with changing characteristics  Other outcome measures Barthel  MEDIUM      Based on the above components, the patient evaluation is determined to be of the following complexity level: MEDIUM    Pain Rating: Moderate in pelvis    Activity Tolerance:   Fair, Poor, and SpO2 stable on RA    After treatment patient left in no apparent distress:   Sitting in chair, Call bell within reach, Bed / chair alarm activated, and Caregiver / family present    COMMUNICATION/EDUCATION:   The patients plan of care was discussed with: Occupational therapist, Registered nurse, and Rehabilitation technician. Fall prevention education was provided and the patient/caregiver indicated understanding., Patient/family have participated as able in goal setting and plan of care. , and Patient/family agree to work toward stated goals and plan of care.     Thank you for this referral.  Gopi Pollock, PT, DPT   Time Calculation: 35 mins

## 2023-01-26 NOTE — CONSULTS
Ortho:    Consult for pelvic fx    Imaging reviewed--  INDICATION:  Eval for hip fracture    EXAM: CT pelvis. Comparison 5/21/2021. FINDINGS: Patient is post right hip hemiarthroplasty. No dislocation. Mild  degenerative changes of the left hip. There is sclerosis of the bilateral sacral ala and S3 sacral segment compatible  with insufficiency fracturing. Acute fracture of the inferior left pubic ramus  as well as the junction of the left superior pubic ramus and anterior left  acetabulum. Fat containing 1.8 cm right adnexal mass with fat soft tissue fluid level likely  a dermoid. Diverticulosis of the left colon. Atrophy of the right gluteal  musculature  IMPRESSION  1. Sacral insufficiency fractures with fractures of the left superior and inferior pubic rami  2.  Right ovarian dermoid      Non-surgical treatment  WBAT with walker  Office FU in 3 weeks as needed  PT/OT  Pain mgt per primary team    Plan per Dr Janie Townsend PA-C

## 2023-01-26 NOTE — DISCHARGE INSTRUCTIONS
Weight bearing as tolerated with walker  PT/OT  Fall precautions. May apply icepack to left hip for 10-15 minute intervals three times daily. Clean skin tear with NS and pat dry. Apply antibacterial ointment to site bid and cover with non-adherent dressing bid and prn.

## 2023-01-26 NOTE — PROGRESS NOTES
Problem: Self Care Deficits Care Plan (Adult)  Goal: *Acute Goals and Plan of Care (Insert Text)  Description: FUNCTIONAL STATUS PRIOR TO ADMISSION: Per patient's daughter patient was ambulatory with a rottator until ~1 month ago as she's been in a wheelchair at facility as it is \"easier\" to keep track of her, she did use her UE and LE's to push wheelchiar independently    HOME SUPPORT: lived in assisted living with assist from staff prn    Occupational Therapy Goals  Initiated 1/26/2023  1. Patient will sit edge of bed with supervision  to perform grooming with supervision/set-up within 7 day(s). 2.  Patient will perform upper body dressing with minimal assistance/contact guard assist within 7 day(s). 3.  Patient will stand with bilateral UE support > or = 2 minutes with moderate assistance  within 7 day(s). 4.  Patient will perform toilet transfers with moderate assistance  for stand pivot transfer to bedside commode within 7 day(s). Outcome: Not Met    OCCUPATIONAL THERAPY EVALUATION  Patient: Velasquez Gallo (56 y.o. female)  Date: 1/26/2023  Primary Diagnosis: Closed fracture of left inferior pubic ramus (HCC) [S32.592A]       Precautions:   WBAT, Skin, Fall, Bed Alarm    ASSESSMENT  Based on the objective data described below, the patient presents with increased pain with all movement, cooperative but pain throughout. Limited by urinary incontinence and need to roll multiple times in the bed. Demonstrated weight shift to right to off load left side seated edge of bed and initially required physical assist to maintain balance however improved to CGA with increased time. Was able to pivot to chair with max assist of 2 and incontinent again seated in chair. Recommend pur-wick for skin protection, pain management and due to decreased activity tolerance at this time.      Current Level of Function Impacting Discharge (ADLs/self-care): total assist toileting, max assist of 2 stand pivot transfers, max assist of 1 rolling    Functional Outcome Measure: The patient scored 15/100 on the Barthel Index outcome measure which is indicative of total dependence. Other factors to consider for discharge: supportive daughter present throughout     Patient will benefit from skilled therapy intervention to address the above noted impairments. PLAN :  Recommendations and Planned Interventions: self care training, functional mobility training, therapeutic exercise, balance training, therapeutic activities, endurance activities, patient education, home safety training, and family training/education    Frequency/Duration: Patient will be followed by occupational therapy 4 times a week to address goals. Recommendation for discharge: (in order for the patient to meet his/her long term goals)  Therapy up to 5 days/week in SNF setting    This discharge recommendation:  Has been made in collaboration with the attending provider and/or case management    IF patient discharges home will need the following DME:        SUBJECTIVE:   Patient stated I'm peeing again.  once seated in chair    OBJECTIVE DATA SUMMARY:   HISTORY:   Past Medical History:   Diagnosis Date    Chronic kidney disease     Decreased kidney levels per daughter    Fracture 2018    Pt reports right hip fracture & surgery following a fall    Fracture 2020    left rib fractures; pt fell     HTN (hypertension) 5/27/2011    Hyperlipidemia 12/1/2011    Hypertension     OAB (overactive bladder) 2/15/2013    Other ill-defined conditions(799.89)     hyperlipidemia    Other ill-defined conditions(799.89)     bladder problems    Other ill-defined conditions(799.89)     back pain     Past Surgical History:   Procedure Laterality Date    COLONOSCOPY N/A 3/12/2018    COLONOSCOPY performed by Merlin Coop, MD at [facility] ENDOSCOPY    COLONOSCOPY,DIAGNOSTIC  3/12/2018         HX APPENDECTOMY  1948    HX CATARACT REMOVAL Left     HX CORNEAL TRANSPLANT Left     HX DILATION AND CURETTAGE x 2    HX HIP REPLACEMENT Right 01/20/2019    HX TONSILLECTOMY  1938       Expanded or extensive additional review of patient history:     Home Situation  Home Environment: Assisted living  [address] Name: Eloy Diez  One/Two Story Residence: One story  Living Alone: No  Support Systems: Child(charisse), Assisted Living  Patient Expects to be Discharged to[de-identified] Assisted Living  Current DME Used/Available at Home: None    Hand dominance: Right    EXAMINATION OF PERFORMANCE DEFICITS:  Cognitive/Behavioral Status:  Neurologic State: Alert;Confused  Orientation Level: Oriented to place; Disoriented to situation  Cognition: Decreased attention/concentration;Memory loss;Decreased command following  Perception: Appears intact  Perseveration: No perseveration noted  Safety/Judgement: Fall prevention;Decreased awareness of environment    Skin: noted bruise left hip    Edema: minimal LE's    Hearing: Auditory  Auditory Impairment: Hard of hearing, bilateral    Vision/Perceptual:             Not formally assessed, required max cues to open eyes throughout    Range of Motion:  AROM: Generally decreased, functional        Strength:  Strength: Generally decreased, functional                Coordination:  Coordination: Within functional limits            Tone & Sensation:  Tone: Normal  Sensation:  (not assessed)           Balance:  Sitting: Impaired; With support  Sitting - Static: Poor (constant support)  Sitting - Dynamic: Poor (constant support)  Standing: Impaired; With support  Standing - Static: Constant support;Poor  Standing - Dynamic : Poor    Functional Mobility and Transfers for ADLs:  Bed Mobility:  Rolling: Maximum assistance;Assist x1 (educated on LE support for rolling)  Supine to Sit: Maximum assistance;Assist x2; Additional time  Scooting: Total assistance    Transfers:  Sit to Stand:  Moderate assistance;Assist x2  Stand to Sit: Maximum assistance;Assist x2  Bed to Chair: Maximum assistance;Assist x2;Adaptive equipment  Bathroom Mobility: Dependent/total assistance  Toilet Transfer : Maximum assistance;Assist x2;Adaptive equipment (stand pivot)    ADL Assessment:  Feeding: Supervision; Additional time (increased chewing)    Oral Facial Hygiene/Grooming: Minimum assistance         Type of Bath: Chlorhexidine (CHG); Bath Pack; Full    Upper Body Dressing: Maximum assistance    Lower Body Dressing: Total assistance    Toileting: Total assistance                  ADL Intervention and task modifications:     Educated on log roll, use of pillow between LE's to facilitate neutral alignment    On arrival pur-wick not working and patient saturated to feet, required to roll to left and right 2 x's with max cues and max assist, total assist LE bathing, perla-care and UE dressing    Seated edge of bed had increased nausea and vomiting, assist to maintain sitting balance and encouragement to hold emesis bag however attempting to off-load hip due to pain resulted in need for assist    Educated on use of rolling walker to off-load weight from her feet. Educated on optimal height of walker to increase ability to weight bear through [employer] on relaxation techniques due to pain and guarding bilateral UE's, provided increased UE support to facilitate relaxation at shoulder       Discussed with RN, weight bear status, pain left LE, vomiting after transfer to edge of bed and into chair, use of walker, best to pivot to right with assist of 2, need for pur-wick due to incontinence and immobility at this time    Cognitive Retraining  Safety/Judgement: Fall prevention;Decreased awareness of environment         Functional Measure:    Barthel Index:  Bathin  Bladder: 0  Bowels: 5  Groomin  Dressin  Feedin  Mobility: 0  Stairs: 0  Toilet Use: 0  Transfer (Bed to Chair and Back): 5  Total: 15/100      The Barthel ADL Index: Guidelines  1.  The index should be used as a record of what a patient does, not as a record of what a patient could do. 2. The main aim is to establish degree of independence from any help, physical or verbal, however minor and for whatever reason. 3. The need for supervision renders the patient not independent. 4. A patient's performance should be established using the best available evidence. Asking the patient, friends/relatives and nurses are the usual sources, but direct observation and common sense are also important. However direct testing is not needed. 5. Usually the patient's performance over the preceding 24-48 hours is important, but occasionally longer periods will be relevant. 6. Middle categories imply that the patient supplies over 50 per cent of the effort. 7. Use of aids to be independent is allowed. Score Interpretation (from [address] [facility] 83)    Independent   60-79 Minimally independent   40-59 Partially dependent   20-39 Very dependent   <20 Totally dependent     -Nelly Juárez., Barthel, DSukumarW. (1965). Functional evaluation: the Barthel Index. [facility] ([address] [place] [address] (1997). The Barthel activities of daily living index: self-reporting versus actual performance in the old (> or = 75 years). Journal of [address] [place] [address] 45(7), [facility], .SukumarMSukumarF, Hector Lauren., Mabel Felty. (1999). Measuring the change in disability after inpatient rehabilitation; comparison of the responsiveness of the Barthel Index and Functional [place] Measure. Journal of Neurology, Neurosurgery, and Psychiatry, 66(4), 319-363. Katlin Celis, N.J.A, SARAH Ruano.J.BENI, & Salas Mondragon M.A. (2004) Assessment of post-stroke quality of life in cost-effectiveness studies: The usefulness of the Barthel Index and the EuroQoL-5D.  Quality of Life Research, 15, 739-80         Occupational Therapy Evaluation Charge Determination   History Examination Decision-Making   MEDIUM Complexity : Expanded review of history including physical, cognitive and psychosocial  history  MEDIUM Complexity : 3-5 performance deficits relating to physical, cognitive , or psychosocial skils that result in activity limitations and / or participation restrictions MEDIUM Complexity : Patient may present with comorbidities that affect occupational performnce. Miniml to moderate modification of tasks or assistance (eg, physical or verbal ) with assesment(s) is necessary to enable patient to complete evaluation       Based on the above components, the patient evaluation is determined to be of the following complexity level: MEDIUM  Pain Rating:  Not rated, moaning and whincing with movement of left LE and mobility, pain meds prior to tx    Activity Tolerance:   Fair, Poor, requires frequent rest breaks, and nausea and vomiting and incontinence    After treatment patient left in no apparent distress:    Sitting in chair, Call bell within reach, Caregiver / family present, and LE's elevated in recliner    COMMUNICATION/EDUCATION:   The patients plan of care was discussed with: Physical therapist and Registered nurse. Home safety education was provided and the patient/caregiver indicated understanding. and Patient/family have participated as able in goal setting and plan of care. This patients plan of care is appropriate for delegation to [facility].     Thank you for this referral.  Concetta Rouse, OTR/L  Time Calculation: 49 mins

## 2023-01-26 NOTE — PROGRESS NOTES
Hospitalist Progress Note    Subjective:   Daily Progress Note: 1/26/2023 9:25 AM    Hospital Course: Bishop Nash is a 80 y.o. female who presents to the emergency room status post fall on yesterday evening from the [facility]. Patient has a past medical history of CKD, dementia, sundowning, right hip fracture, HTN, hyperlipidemia, over active bladder, and urinary tract infection. Patient was being actively treated for urinary tract infection with Macrobid at facility. Subjective: Patient observed resting in bed with eyes opened. Oriented to person. Denies complaints. No facial grimacing or moaning heard. No acute distress noted.     Current Facility-Administered Medications   Medication Dose Route Frequency    hydrALAZINE (APRESOLINE) 20 mg/mL injection 10 mg  10 mg IntraVENous Q6H PRN    atorvastatin (LIPITOR) tablet 20 mg  20 mg Oral QHS    co-enzyme Q-10 (CO Q-10) capsule 200 mg  200 mg Oral DAILY    memantine (NAMENDA) tablet 10 mg  10 mg Oral BID    multivitamin, tx-iron-ca-min (THERA-M w/ IRON) tablet 1 Tablet  1 Tablet Oral DAILY    polyethylene glycol (MIRALAX) packet 17 g  17 g Oral DAILY PRN    escitalopram oxalate (LEXAPRO) tablet 20 mg  20 mg Oral DAILY    furosemide (LASIX) tablet 40 mg  40 mg Oral DAILY    losartan (COZAAR) tablet 100 mg  100 mg Oral DAILY    senna-docusate (PERICOLACE) 8.6-50 mg per tablet 1 Tablet  1 Tablet Oral DAILY    ondansetron (ZOFRAN ODT) tablet 4 mg  4 mg Oral Q8H PRN    ondansetron (ZOFRAN) injection 4 mg  4 mg IntraVENous Q4H PRN    acetaminophen (TYLENOL) tablet 650 mg  650 mg Oral Q4H PRN    acetaminophen (TYLENOL) suppository 650 mg  650 mg Rectal Q4H PRN    nitrofurantoin (macrocrystal-monohydrate) (MACROBID) capsule 100 mg  100 mg Oral BID    enoxaparin (LOVENOX) injection 40 mg  40 mg SubCUTAneous Q24H    metoprolol succinate (TOPROL-XL) XL tablet 25 mg  25 mg Oral DAILY    traMADoL (ULTRAM) tablet 50 mg  50 mg Oral Q6H PRN        Review of Systems: unable to obtain secondary to mentation        Objective:     Visit Vitals  BP (!) 152/90   Pulse (!) 55   Temp 98.8 °F (37.1 °C)   Resp 18   Ht 5' 9\" (1.753 m)   Wt 72.6 kg (160 lb)   SpO2 97%   BMI 23.63 kg/m²      O2 Device: None (Room air)    Temp (24hrs), Av.2 °F (36.8 °C), Min:97.9 °F (36.6 °C), Max:98.8 °F (37.1 °C)      No intake/output data recorded.  1901 -  0700  In: -   Out: 100 [Urine:100]    PHYSICAL EXAM:  Physical Exam  Vitals reviewed. Constitutional:       Comments: Oriented to person. HENT:      Head: Normocephalic and atraumatic. Right Ear: External ear normal.      Left Ear: External ear normal.      Nose: Nose normal.      Mouth/Throat:      Pharynx: Oropharynx is clear. Eyes:      Conjunctiva/sclera: Conjunctivae normal.   Cardiovascular: telemetry: sinus bradycardia asymptomatic     Rate and Rhythm: Normal rate and regular rhythm. Pulses: Normal pulses. Heart sounds: Murmur heard. Pulmonary:      Effort: Pulmonary effort is normal.      Breath sounds: Normal breath sounds. Abdominal:      General: Bowel sounds are normal.   Musculoskeletal:      Cervical back: Normal range of motion. Comments: Limited ROM to left hip and lower extremity. Non-pitting edema noted to left hip. No warmth or erythema present. Skin: Dressing is clean, dry and intact to right arm. General: Skin is warm and dry. Capillary Refill: Capillary refill takes 2 to 3 seconds. Neurological:      Comments: Oriented to person   Psychiatric:      Comments: Pleasantly confused.    Data Review    Recent Results (from the past 24 hour(s))   CBC WITH AUTOMATED DIFF    Collection Time: 23 11:07 AM   Result Value Ref Range    WBC 9.0 3.6 - 11.0 K/uL    RBC 4.12 3.80 - 5.20 M/uL    HGB 13.1 11.5 - 16.0 g/dL    HCT 39.6 35.0 - 47.0 %    MCV 96.1 80.0 - 99.0 FL    MCH 31.8 26.0 - 34.0 PG    MCHC 33.1 30.0 - 36.5 g/dL    RDW 11.9 11.5 - 14.5 %    PLATELET 985 765 - 980 K/uL    MPV 10.6 8.9 - 12.9 FL    NRBC 0.0 0  WBC    ABSOLUTE NRBC 0.00 0.00 - 0.01 K/uL    NEUTROPHILS 70 32 - 75 %    LYMPHOCYTES 20 12 - 49 %    MONOCYTES 6 5 - 13 %    EOSINOPHILS 4 0 - 7 %    BASOPHILS 0 0 - 1 %    IMMATURE GRANULOCYTES 0 0.0 - 0.5 %    ABS. NEUTROPHILS 6.3 1.8 - 8.0 K/UL    ABS. LYMPHOCYTES 1.8 0.8 - 3.5 K/UL    ABS. MONOCYTES 0.5 0.0 - 1.0 K/UL    ABS. EOSINOPHILS 0.3 0.0 - 0.4 K/UL    ABS. BASOPHILS 0.0 0.0 - 0.1 K/UL    ABS. IMM. GRANS. 0.0 0.00 - 0.04 K/UL    DF AUTOMATED     METABOLIC PANEL, COMPREHENSIVE    Collection Time: 01/25/23 11:07 AM   Result Value Ref Range    Sodium 138 136 - 145 mmol/L    Potassium 3.2 (L) 3.5 - 5.1 mmol/L    Chloride 98 97 - 108 mmol/L    CO2 33 (H) 21 - 32 mmol/L    Anion gap 7 5 - 15 mmol/L    Glucose 94 65 - 100 mg/dL    BUN 15 6 - 20 MG/DL    Creatinine 0.77 0.55 - 1.02 MG/DL    BUN/Creatinine ratio 19 12 - 20      eGFR >60 >60 ml/min/1.73m2    Calcium 9.4 8.5 - 10.1 MG/DL    Bilirubin, total 0.9 0.2 - 1.0 MG/DL    ALT (SGPT) 31 12 - 78 U/L    AST (SGOT) 27 15 - 37 U/L    Alk.  phosphatase 142 (H) 45 - 117 U/L    Protein, total 7.3 6.4 - 8.2 g/dL    Albumin 3.5 3.5 - 5.0 g/dL    Globulin 3.8 2.0 - 4.0 g/dL    A-G Ratio 0.9 (L) 1.1 - 2.2     URINALYSIS W/ RFLX MICROSCOPIC    Collection Time: 01/25/23  4:37 PM   Result Value Ref Range    Color YELLOW/STRAW      Appearance CLEAR CLEAR      Specific gravity 1.011      pH (UA) 8.0 5.0 - 8.0      Protein Negative NEG mg/dL    Glucose Negative NEG mg/dL    Ketone Negative NEG mg/dL    Bilirubin Negative NEG      Blood Negative NEG      Urobilinogen 0.2 0.2 - 1.0 EU/dL    Nitrites Negative NEG      Leukocyte Esterase Negative NEG     CBC WITH AUTOMATED DIFF    Collection Time: 01/26/23  5:30 AM   Result Value Ref Range    WBC 9.3 3.6 - 11.0 K/uL    RBC 4.24 3.80 - 5.20 M/uL    HGB 13.6 11.5 - 16.0 g/dL    HCT 40.5 35.0 - 47.0 %    MCV 95.5 80.0 - 99.0 FL    MCH 32.1 26.0 - 34.0 PG    MCHC 33.6 30.0 - 36.5 g/dL    RDW 11.7 11.5 - 14.5 %    PLATELET 660 (L) 879 - 400 K/uL    MPV 10.3 8.9 - 12.9 FL    NRBC 0.0 0  WBC    ABSOLUTE NRBC 0.00 0.00 - 0.01 K/uL    NEUTROPHILS 68 32 - 75 %    LYMPHOCYTES 19 12 - 49 %    MONOCYTES 8 5 - 13 %    EOSINOPHILS 5 0 - 7 %    BASOPHILS 0 0 - 1 %    IMMATURE GRANULOCYTES 0 0.0 - 0.5 %    ABS. NEUTROPHILS 6.3 1.8 - 8.0 K/UL    ABS. LYMPHOCYTES 1.8 0.8 - 3.5 K/UL    ABS. MONOCYTES 0.7 0.0 - 1.0 K/UL    ABS. EOSINOPHILS 0.5 (H) 0.0 - 0.4 K/UL    ABS. BASOPHILS 0.0 0.0 - 0.1 K/UL    ABS. IMM. GRANS. 0.0 0.00 - 0.04 K/UL    DF AUTOMATED     METABOLIC PANEL, BASIC    Collection Time: 01/26/23  5:30 AM   Result Value Ref Range    Sodium 135 (L) 136 - 145 mmol/L    Potassium 3.3 (L) 3.5 - 5.1 mmol/L    Chloride 98 97 - 108 mmol/L    CO2 30 21 - 32 mmol/L    Anion gap 7 5 - 15 mmol/L    Glucose 107 (H) 65 - 100 mg/dL    BUN 12 6 - 20 MG/DL    Creatinine 0.73 0.55 - 1.02 MG/DL    BUN/Creatinine ratio 16 12 - 20      eGFR >60 >60 ml/min/1.73m2    Calcium 9.3 8.5 - 10.1 MG/DL       CT PELV WO CONT   Final Result   1. Sacral insufficiency fractures with fractures of the left superior and   inferior pubic rami   2. Right ovarian dermoid         XR HIP LT W OR WO PELV 2-3 VWS   Final Result   Possible nondisplaced fracture left inferior pubic ramus. CT HEAD WO CONT   Final Result   No acute intracranial process seen   Stable moderate severe underlying white matter disease and central atrophy          Active Problems:    Closed fracture of left inferior pubic ramus (HCC) (1/25/2023)        Assessment/Plan:   Closed fracture of left inferior pubic ramus   - CT of pelvis shows sacral insufficiency fractures with fractures of the left superior and inferior pubic rami. Incidental finding of right ovarian dermoid noted. - left hip xray shows possible non-displaced fracture of left interior pubic ramus.   - continue current pain medications  - no surgical interventions  - WBAT with walker  - PT/OT  - appreciate orthopedic input, has signed off. Patient to follow up with orthopedics in 3 weeks. 2. History of UTI - POA    - urinalysis shows no nitrites, leukocytes    - patient being treated with macrobid    3. Dementia/Sundowning    - CT of head shows no acute intracranial process seen. Stable moderate severe underlying white matter disease and central atrophy noted. - continue namenda and lexapro  - fall precautions    4. HTN/Hyperlipidemia    - continue cozaar and metoprolol with parameters    5. Mild hypokalemia    - will replete    PT/OT recommends SNF. Discharge disposition: 24 hours pending rapid COVID test. [facility] can fulfill PT/OT. CM to follow for assistance with placement, patient can be discharged tomorrow back to the [facility].     DVT Prophylaxis: Lovenox  Code Status: DNR  DNR  Samson Garcia [phone]    Care Plan discussed with: patient, daughter, nursing, CM  _______________________________________________________________    Isi Jesus NP

## 2023-01-26 NOTE — PROGRESS NOTES
End of Shift Note    Bedside shift change report given to Yasmin (oncoming nurse) by Severiano Pickler, RN (offgoing nurse). Report included the following information SBAR, Kardex, Intake/Output, MAR, and Recent Results    Shift worked:  Day     Shift summary and any significant changes: AxOx1, max assist, incontinent, tramadol PRN, meds crushed with applesauce     Concerns for physician to address:       Zone phone for oncoming shift:   [phone]       Activity:  Activity Level: Up with Assistance  Number times ambulated in hallways past shift: 0  Number of times OOB to chair past shift: 1    Cardiac:   Cardiac Monitoring: Yes      Cardiac Rhythm: Sinus CAMERON Singletary    Access:  Current line(s): PIV     Genitourinary:   Urinary status: incontinent    Respiratory:   O2 Device: None (Room air)  Chronic home O2 use?: NO  Incentive spirometer at bedside: N/A       GI:  Last Bowel Movement Date:  (pt poor historian)  Current diet:  ADULT DIET Easy to Chew  ADULT ORAL NUTRITION SUPPLEMENT Breakfast, Lunch, Dinner; Standard High Calorie/High Protein  Passing flatus: YES  Tolerating current diet: YES       Pain Management:   Patient states pain is manageable on current regimen: YES    Skin:  Lee Score: 16  Interventions: increase time out of bed, PT/OT consult, limit briefs, internal/external urinary devices, and nutritional support     Patient Safety:  Fall Score:  Total Score: 4  Interventions: bed/chair alarm, assistive device (walker, cane, etc), gripper socks, pt to call before getting OOB, and stay with me (per policy)  High Fall Risk: Yes    Length of Stay:  Expected LOS: 2d 16h  Actual LOS: 800 Vince Matute RN

## 2023-01-26 NOTE — PROGRESS NOTES
This RN notified NP Chente Calzada that this RN attempted to try to feed pt K+ pills with apple sauce. Pt refused. This RN suggested that pt might benefit from K+ powder. Response pending.

## 2023-01-26 NOTE — PROGRESS NOTES
Transition of Care Plan:    RUR: 10%  Disposition:  Return to assisted living ([facility]) for Rehab  If SNF or IPR: Date FOC offered:  Date FOC received:  Date authorization started with reference number:  Date authorization received and expires:  Accepting facility:  Follow up appointments: PCP at facility  DME needed:  facility will provide  Transportation at Discharge:  BLS 11:00 AM  [place] or means to access home:   n/a     IM Medicare Letter:  Received  Is patient a  and connected with the [place]? If yes, was [place] transfer form completed and VA notified? Caregiver Contact:  Discharge Caregiver contacted prior to discharge? Care Conference needed?:        Reason for Admission:  GLF at assisted living - hip fracture. RUR Score:     10%                Plan for utilizing home health:    n/a      PCP: First and Last name:  Sue Contreras MD     Name of Practice:    Are you a current patient: Yes/No:    Approximate date of last visit:    Can you participate in a virtual visit with your PCP:                     Current Advanced Directive/Advance Care Plan: DNR      Healthcare Decision Maker:   Click here to complete [address] including selection of the Healthcare Decision Maker Relationship (ie \"Primary\")             Primary Decision Maker (Active): Dina Yap - Daughter - [phone]    Primary Decision Maker: Lakia Spicer - Daughter - [phone]                  Transition of Care Plan:        Pt lives at [facility] on [address]. In [place] where she receives assisted living level of care. Pt has nondisplaced fx and decision was for no surgical intervention. WBAT    CM spoke w/ Uday from [facility] to inquire if PT/OT svcs could be offered to pt and if they can handle pt in current condition. He stated that they can offer rehab if ordered in DC instructions. Will need Rapid COvid - ordered. Updates sent to [facility] via [address] [place] [address],[employer].   PONCHO spoke w/ coral Cook   and she is in agreement w/ all above. She will come in AM for pt dc and will sign 2nd IM letter. PLAN: 11:00 AM BLS dc going to [facility]. PCS on chart. Transition of Care Plan to SNF/Rehab    SNF/Rehab Transition:  Patient has been accepted to [facility] and meets criteria for admission. Patient will transported by [facility] and expected to leave at 11:00 AM.    Communication to Patient/Family:  Met with patient and Ivonne Whiting, (identified care giver) and they are agreeable to the transition plan. Communication to SNF/Rehab:  Bedside RN, Denis Allen, has been notified to update the transition plan to the facility and call report (phone number [phone]). Discharge information has been updated on the AVS.     Discharge instructions to be fax'd to facility at [facility] # [phone]). [] BCPI-A  Patient has been identified as part of the BCPI-A Program.  For Care Coordination associated with that Bundle Program, please contact   Bundle information has been communication to   Nursing Please include all hard scripts for controlled substances, med rec and dc summary, and AVS in packet. Reviewed and confirmed with facility, [facility], can manage the patient care needs for the following:     Concepcion Ventura with (X) only those applicable:    Medication:  [x]  Medications will be available at the facility  []  IV Antibiotics   []  Controlled Substance - hard copy to be sent with patient   []  Weekly Labs   Documents:  [] Hard RX  [x] MAR  [] Kardex  [x] AVS  []Transfer Summary  [x]Discharge   Equipment:  []  CPAP/BiPAP  []  Wound Vacuum  []  Hyman or Urinary Device  []  PICC/Central Line  []  Nebulizer  []  Ventilator   Treatment:  []Isolation (for MRSA, VRE, etc.)  []Surgical Drain Management  []Tracheostomy Care  []Dressing Changes  []Dialysis with transportation and chair time .   []PEG Care  []Oxygen  []Daily Weights for Heart Failure   Dietary:  []Any diet limitations  []Tube Feedings   []Total Parenteral Management (TPN)   Eligible for Medicaid Long Term Services and Supports  Yes:  [] Eligible for medical assistance or will become eligible within 180 days and UAI completed. [] Provider/Patient and/or support system has requested screening. [] UAI copy provided to patient or responsible party, .  [] UAI unavailable at discharge will send once processed to SNF provider. [] UAI unavailable at discharged mailed to patient  No:   [x] Private pay and is not financially eligible for Medicaid within the next 180 days. [] Reside out-of-state.   [] A residents of a state owned/operated facility that is licensed  by [employer] [address] [employer] [facility] [employer] or [facility]  [] Enrollment in [facility] hospice services  [] [address]  [] Patient /Family declines to have screening completed or provide financial information for screening     Financial Resources:  Medicaid    [] Initiated and application pending   [] Full coverage     Advanced Care Plan:  []Surrogate Decision Maker of Care  []POA  [x]Communicated Code Status DNR(DDNR\", \"Full\")    Other          Litzy JACOB Christopher

## 2023-01-27 VITALS
WEIGHT: 160 LBS | OXYGEN SATURATION: 95 % | HEART RATE: 63 BPM | DIASTOLIC BLOOD PRESSURE: 67 MMHG | RESPIRATION RATE: 16 BRPM | BODY MASS INDEX: 23.7 KG/M2 | TEMPERATURE: 98.5 F | SYSTOLIC BLOOD PRESSURE: 176 MMHG | HEIGHT: 69 IN

## 2023-01-27 PROCEDURE — G0378 HOSPITAL OBSERVATION PER HR: HCPCS

## 2023-01-27 PROCEDURE — 96375 TX/PRO/DX INJ NEW DRUG ADDON: CPT

## 2023-01-27 PROCEDURE — 74011250637 HC RX REV CODE- 250/637: Performed by: NURSE PRACTITIONER

## 2023-01-27 PROCEDURE — 74011250636 HC RX REV CODE- 250/636: Performed by: NURSE PRACTITIONER

## 2023-01-27 PROCEDURE — 96372 THER/PROPH/DIAG INJ SC/IM: CPT

## 2023-01-27 PROCEDURE — 77030038269 HC DRN EXT URIN PURWCK BARD -A

## 2023-01-27 PROCEDURE — 74011000250 HC RX REV CODE- 250: Performed by: NURSE PRACTITIONER

## 2023-01-27 RX ORDER — TRAMADOL HYDROCHLORIDE 50 MG/1
50 TABLET ORAL
Qty: 10 TABLET | Refills: 0 | Status: SHIPPED | OUTPATIENT
Start: 2023-01-27 | End: 2023-01-30

## 2023-01-27 RX ORDER — TRAMADOL HYDROCHLORIDE 50 MG/1
50 TABLET ORAL
Qty: 10 TABLET | Refills: 0 | Status: SHIPPED | OUTPATIENT
Start: 2023-01-27 | End: 2023-01-27 | Stop reason: SDUPTHER

## 2023-01-27 RX ADMIN — NITROFURANTOIN MONOHYDRATE/MACROCRYSTALLINE 100 MG: 25; 75 CAPSULE ORAL at 08:49

## 2023-01-27 RX ADMIN — MEMANTINE 10 MG: 10 TABLET ORAL at 08:49

## 2023-01-27 RX ADMIN — METOPROLOL SUCCINATE 25 MG: 25 TABLET, EXTENDED RELEASE ORAL at 08:49

## 2023-01-27 RX ADMIN — TRAMADOL HYDROCHLORIDE 50 MG: 50 TABLET ORAL at 11:09

## 2023-01-27 RX ADMIN — ESCITALOPRAM OXALATE 20 MG: 10 TABLET ORAL at 08:49

## 2023-01-27 RX ADMIN — Medication 1 TABLET: at 08:49

## 2023-01-27 RX ADMIN — ENOXAPARIN SODIUM 40 MG: 100 INJECTION SUBCUTANEOUS at 08:49

## 2023-01-27 RX ADMIN — BACITRACIN ZINC, NEOMYCIN, POLYMYXIN B 1 PACKET: 400; 3.5; 5 OINTMENT TOPICAL at 08:48

## 2023-01-27 RX ADMIN — LOSARTAN POTASSIUM 100 MG: 50 TABLET, FILM COATED ORAL at 08:49

## 2023-01-27 RX ADMIN — ONDANSETRON 4 MG: 2 INJECTION INTRAMUSCULAR; INTRAVENOUS at 10:51

## 2023-01-27 RX ADMIN — SENNOSIDES AND DOCUSATE SODIUM 1 TABLET: 50; 8.6 TABLET ORAL at 08:49

## 2023-01-27 RX ADMIN — Medication 200 MG: at 08:49

## 2023-01-27 NOTE — PROGRESS NOTES
Hospitalist Progress Note    Subjective:   Daily Progress Note: 1/27/2023 7:10 AM    Hospital Course: Bishop Nash is a 80 y.o. female who presents to the emergency room status post fall on yesterday evening from the [facility]. Patient has a past medical history of CKD, dementia, sundowning, right hip fracture, HTN, hyperlipidemia, over active bladder, and urinary tract infection. Patient was being actively treated for urinary tract infection with Macrobid at facility. Subjective: Patient observed resting in bed with eyes opened. Pleasantly confused. Oriented to person only. No facial grimacing or moaning heard. No acute distress noted.     Current Facility-Administered Medications   Medication Dose Route Frequency    hydrALAZINE (APRESOLINE) 20 mg/mL injection 10 mg  10 mg IntraVENous Q6H PRN    atorvastatin (LIPITOR) tablet 20 mg  20 mg Oral QHS    co-enzyme Q-10 (CO Q-10) capsule 200 mg  200 mg Oral DAILY    memantine (NAMENDA) tablet 10 mg  10 mg Oral BID    multivitamin, tx-iron-ca-min (THERA-M w/ IRON) tablet 1 Tablet  1 Tablet Oral DAILY    polyethylene glycol (MIRALAX) packet 17 g  17 g Oral DAILY PRN    escitalopram oxalate (LEXAPRO) tablet 20 mg  20 mg Oral DAILY    [Held by provider] furosemide (LASIX) tablet 40 mg  40 mg Oral DAILY    losartan (COZAAR) tablet 100 mg  100 mg Oral DAILY    senna-docusate (PERICOLACE) 8.6-50 mg per tablet 1 Tablet  1 Tablet Oral DAILY    ondansetron (ZOFRAN ODT) tablet 4 mg  4 mg Oral Q8H PRN    ondansetron (ZOFRAN) injection 4 mg  4 mg IntraVENous Q4H PRN    acetaminophen (TYLENOL) tablet 650 mg  650 mg Oral Q4H PRN    acetaminophen (TYLENOL) suppository 650 mg  650 mg Rectal Q4H PRN    nitrofurantoin (macrocrystal-monohydrate) (MACROBID) capsule 100 mg  100 mg Oral BID    enoxaparin (LOVENOX) injection 40 mg  40 mg SubCUTAneous Q24H    metoprolol succinate (TOPROL-XL) XL tablet 25 mg  25 mg Oral DAILY    traMADoL (ULTRAM) tablet 50 mg  50 mg Oral Q6H PRN Review of Systems: unable to obtain secondary to mentation    Objective:     Visit Vitals  BP (!) 143/78 (BP 1 Location: Left arm, BP Patient Position: At rest)   Pulse 67   Temp 97.9 °F (36.6 °C)   Resp 16   Ht 5' 9\" (1.753 m)   Wt 72.6 kg (160 lb)   SpO2 92%   BMI 23.63 kg/m²      O2 Device: None (Room air)    Temp (24hrs), Av.2 °F (36.8 °C), Min:97.9 °F (36.6 °C), Max:98.8 °F (37.1 °C)      No intake/output data recorded.  1901 -  0700  In: -   Out: 350 [Urine:350]    PHYSICAL EXAM:  Physical Exam  Vitals reviewed. Constitutional:       Comments: Oriented to person. HENT:      Head: Normocephalic and atraumatic. Right Ear: External ear normal.      Left Ear: External ear normal.      Nose: Nose normal.      Mouth/Throat:      Pharynx: Oropharynx is clear. Eyes:      Conjunctiva/sclera: Conjunctivae normal.   Cardiovascular: telemetry: sinus bradycardia asymptomatic     Rate and Rhythm: Normal rate and regular rhythm. Pulses: Normal pulses. Heart sounds: Murmur heard. Pulmonary:      Effort: Pulmonary effort is normal.      Breath sounds: Normal breath sounds. Abdominal:      General: Bowel sounds are normal.   Musculoskeletal:      Cervical back: Normal range of motion. Comments: Limited ROM to left hip and lower extremity. Non-pitting edema noted to left hip. No warmth or erythema present. Ecchymosis present to left hip. Skin: skin tear to right arm, dressing is clean, dry and intact. General: Skin is warm and dry. Capillary Refill: Capillary refill takes 2 to 3 seconds. Neurological:      Comments: Oriented to person   Psychiatric:      Comments: Pleasantly confused. Data Review    Recent Results (from the past 24 hour(s))   COVID-19 RAPID TEST    Collection Time: 23  3:52 PM   Result Value Ref Range    Specimen source Nasopharyngeal      COVID-19 rapid test Not detected NOTD         CT PELV WO CONT   Final Result   1.  Sacral insufficiency fractures with fractures of the left superior and   inferior pubic rami   2. Right ovarian dermoid         XR HIP LT W OR WO PELV 2-3 VWS   Final Result   Possible nondisplaced fracture left inferior pubic ramus. CT HEAD WO CONT   Final Result   No acute intracranial process seen   Stable moderate severe underlying white matter disease and central atrophy          Active Problems:    Closed fracture of left inferior pubic ramus (HCC) (1/25/2023)        Assessment/Plan:   Closed fracture of left inferior pubic ramus   - CT of pelvis shows sacral insufficiency fractures with fractures of the left superior and inferior pubic rami. Incidental finding of right ovarian dermoid noted. - left hip xray shows possible non-displaced fracture of left interior pubic ramus. - continue current pain medications  - no surgical interventions  - WBAT with walker  - PT/OT  - appreciate orthopedic input, has signed off. Patient to follow up with orthopedics in 3 weeks. 2. History of UTI - POA    - urinalysis shows no nitrites, leukocytes    - patient being treated with macrobid     3. Dementia/Sundowning    - CT of head shows no acute intracranial process seen. Stable moderate severe underlying white matter disease and central atrophy noted. - continue namenda and lexapro  - fall precautions     4. HTN/Hyperlipidemia    - continue cozaar and metoprolol with parameters       PT/OT recommends SNF. Discharge disposition: Patient is stable for discharge back to the [facility] on today with PT/OT. COVID test negative. CM following for discharge planning.      DVT Prophylaxis: Lovenox  Code Status: DNR  DNR  Roberto Hatch [phone]     Care Plan discussed with: patient, daughter, nursing, CM    Connee Cheadle, NP

## 2023-01-27 NOTE — ROUTINE PROCESS
TRANSFER - OUT REPORT:    Verbal report given to JANNETH Andre(name) on Mika Hunt  being transferred to [place](unit) for routine progression of care       Report consisted of patients Situation, Background, Assessment and   Recommendations(SBAR). Information from the following report(s) SBAR, Kardex, Intake/Output, MAR, and Accordion was reviewed with the receiving nurse. Opportunity for questions and clarification was provided.

## 2023-01-27 NOTE — DISCHARGE SUMMARY
Hospitalist Discharge Summary     Patient ID:    Velasquez Gallo  [id number]  32 y.o.  7/3/1932    Admit date: 1/25/2023    Discharge date : 1/27/2023    Chronic Diagnoses:    Problem List as of 1/27/2023 Date Reviewed: 9/8/2021            Codes Class Noted - Resolved    Closed fracture of left inferior pubic ramus ([facility] ICD-10-CM: S57.679H  ICD-9-CM: 808.2  1/25/2023 - Present        Anxiety ICD-10-CM: F41.9  ICD-9-CM: 300.00  9/8/2021 - Present        Pure hypercholesterolemia ICD-10-CM: E78.00  ICD-9-CM: 272.0  9/8/2021 - Present        Scoliosis of thoracolumbar spine, unspecified scoliosis type ICD-10-CM: M41.9  ICD-9-CM: 737.30  5/25/2021 - Present        Chronic right-sided low back pain without sciatica ICD-10-CM: M54.50, G89.29  ICD-9-CM: 724.2, 338.29  5/25/2021 - Present        Constipation, chronic ICD-10-CM: K59.09  ICD-9-CM: 564.00  5/25/2021 - Present        Gait instability ICD-10-CM: R26.81  ICD-9-CM: 781.2  2/23/2021 - Present        Memory impairment ICD-10-CM: R41.3  ICD-9-CM: 780.93  2/23/2021 - Present        Primary osteoarthritis involving multiple joints ICD-10-CM: M15.9  ICD-9-CM: 715.98  2/23/2021 - Present        History of right hip replacement ICD-10-CM: Z96.641  ICD-9-CM: V43.64  2/23/2021 - Present        Age-related osteoporosis with current pathological fracture with routine healing ICD-10-CM: M80.00XD  ICD-9-CM: V54.29, 733.01  2/24/2020 - Present        Hip fracture ([facility] ICD-10-CM: S72.009A  ICD-9-CM: 820.8  1/20/2019 - Present        Bradycardia ICD-10-CM: R00.1  ICD-9-CM: 427.89  7/13/2018 - Present        Essential hypertension ICD-10-CM: I10  ICD-9-CM: 401.9  7/12/2018 - Present        Closed right hip fracture ([facility] ICD-10-CM: S72.001A  ICD-9-CM: 820.8  7/12/2018 - Present        Hypokalemia ICD-10-CM: E87.6  ICD-9-CM: 276.8  2/28/2015 - Present        OAB (overactive bladder) ICD-10-CM: N32.81  ICD-9-CM: 596.51  2/15/2013 - Present Hyperlipidemia ICD-10-CM: E78.5  ICD-9-CM: 272.4  12/1/2011 - Present        RESOLVED: Medicare annual wellness visit, subsequent ICD-10-CM: Z00.00  ICD-9-CM: V70.0  2/23/2021 - 3/25/2021        RESOLVED: AGE (acute gastroenteritis) ICD-10-CM: K52.9  ICD-9-CM: 558.9  2/28/2015 - 9/4/2015        RESOLVED: HTN (hypertension) ICD-10-CM: I10  ICD-9-CM: 401.9  5/27/2011 - 8/3/2021        RESOLVED: Chest pain, unspecified ICD-10-CM: R07.9  ICD-9-CM: 786.50  4/16/2011 - 12/1/2011       22    Final Diagnoses: Active Problems:    Closed fracture of left inferior pubic ramus ([address] (1/25/2023)        Reason for Hospitalization: Closed fracture of left inferior pubic ramus. Hospital Course: Kin Tran is a 80 y.o. female who presents to the emergency room status post fall on yesterday evening from the [facility]. CT of pelvis shows sacral insufficiency fractures with fractures of the left superior and inferior pubic rami. Patient was evaluated by orthopedics and no surgical intervention needed. Weight bearing as tolerated with walker. Patient was deemed clear for discharge on today with outpatient follow up after PT/OT evaluation. Discharge Medications:   Current Discharge Medication List        START taking these medications    Details   acetaminophen (TYLENOL) 325 mg tablet Take 2 Tablets by mouth every four (4) hours as needed for Fever or Pain. Qty: 30 Tablet, Refills: 0  Start date: 1/26/2023      Tramadol 50mg tablet                 Take 1 tablet by mouth every 6 hours as need for pain. CONTINUE these medications which have CHANGED    Details   potassium chloride SR (KLOR-CON 10) 10 mEq tablet Take 2 Tablets by mouth daily. Qty: 30 Tablet, Refills: 0  Start date: 1/26/2023      Neosporin                                      apply 1 packet bid to skin tear     CONTINUE these medications which have NOT CHANGED    Details   escitalopram oxalate (Lexapro) 20 mg tablet Take 20 mg by mouth daily. furosemide (Lasix) 40 mg tablet Take  by mouth daily. nitrofurantoin, macrocrystal-monohydrate, (Macrobid) 100 mg capsule Take 100 mg by mouth two (2) times a day. losartan (COZAAR) 100 mg tablet Take 100 mg by mouth daily. metOLazone (ZAROXOLYN) 5 mg tablet Take 5 mg by mouth every Tuesday. cholecalciferol (VITAMIN D3) (1000 Units /25 mcg) tablet Take 1,000 Units by mouth daily. atorvastatin (LIPITOR) 20 mg tablet TAKE 1 TABLET BY MOUTH EVERY DAY AT NIGHT  Qty: 90 Tablet, Refills: 0      metoprolol tartrate (LOPRESSOR) 25 mg tablet Take 1 Tablet by mouth two (2) times a day. HOLD FOR SBP LESS THAN 120 OR HR LESS THAN 65  Qty: 180 Tablet, Refills: 1    Associated Diagnoses: Essential hypertension      memantine (NAMENDA) 5 mg tablet 10 mg. Twice a day      senna-docusate (PERICOLACE) 8.6-50 mg per tablet Take 1 Tab by mouth daily. polyethylene glycol (MIRALAX) 17 gram/dose powder Take 17 g by mouth daily as needed for Other (constipation). coenzyme Q-10 (CO Q-10) 200 mg capsule Take 1 Cap by mouth daily. Qty: 90 Cap, Refills: 3      glucosamine-chondroit-vit c-mn (GLUCOSAMINE 1500 COMPLEX) 500-400 mg capsule Take 1 Cap by mouth daily. Qty: 90 Cap, Refills: 1      multivit-minerals/folic acid (CENTRUM MULTIGUMMIES PO) Take 1 Tab by mouth daily. STOP taking these medications       glucosamine-chondroitin (ARTHX) 500-400 mg cap Comments:   Reason for Stopping:         metoprolol succinate (Toprol XL) 100 mg tablet Comments:   Reason for Stopping:         aspirin delayed-release 81 mg tablet Comments:   Reason for Stopping:         alendronate (FOSAMAX) 35 mg tablet Comments:   Reason for Stopping:         amLODIPine (NORVASC) 10 mg tablet Comments:   Reason for Stopping:         calcium carbonate (CALTREX) 600 mg calcium (1,500 mg) tablet Comments:   Reason for Stopping: Follow up Care:    1. Phoenix Zurita MD in 1-2 weeks.       Follow-up Information Follow up With Specialties Details Why Contact Info    Devonte Soliz MD Internal Medicine Physician   [address]  [facility], [address] [facility] Internal Medicine Physician   [address]  [address] [facility]      Shannon Sheridan MD Orthopedic Surgery, Hand Surgery Physician Schedule an appointment as soon as possible for a visit in 2 week(s)  [address]  [address] [facility]. [id number] [phone]                * Follow-up Care/Patient Instructions: Activity: Activity as tolerated with walker and assistance  Diet: Easy to chew  Wound Care: Clean skin tear with NS and pat dry. Apply antibacterial ointment to site and apply non-adherent dressing bid and prn. Condition at Discharge:  Stable  __________________________________________________________________    Disposition  Other Healthcare  ____________________________________________________________________    Code Status:  DNR  ___________________________________________________________________    Discharge Exam:  Patient seen and examined by me on discharge day. Physical Exam     CONSULTATIONS: Orthopedic Surgery    Significant Diagnostic Studies:   Recent Results (from the past 24 hour(s))   COVID-19 RAPID TEST    Collection Time: 01/26/23  3:52 PM   Result Value Ref Range    Specimen source Nasopharyngeal      COVID-19 rapid test Not detected NOTD       CT PELV WO CONT   Final Result   1. Sacral insufficiency fractures with fractures of the left superior and   inferior pubic rami   2. Right ovarian dermoid         XR HIP LT W OR WO PELV 2-3 VWS   Final Result   Possible nondisplaced fracture left inferior pubic ramus. CT HEAD WO CONT   Final Result   No acute intracranial process seen   Stable moderate severe underlying white matter disease and central atrophy          Discharge: time spent less than 30 minutes in discharge  Education and counseling. Signed:  Preeti Villanueva NP  1/27/2023  7:58 AM     The patient was seen by an advanced practice provider such as a physician assistant or nurse practitioner trained to practice medicine in hospital medicine and part of for OUR patient care team.  I Dr. Renata Kowalski MD was available in the hospital medicine department for collaboration and consultation if needed pursuant to the code of [place]. The patient was seen, evaluated, examined, treated including performance of procedures and disposition by the RAFAL independently including the issuance of any prescriptions and follow-up instructions.     The RAFAL has asked for my advice and consultation regarding: None

## 2023-01-27 NOTE — ANESTHESIA PREPROCEDURE EVALUATION
Anesthetic History   No history of anesthetic complications            Review of Systems / Medical History  Patient summary reviewed, nursing notes reviewed and pertinent labs reviewed    Pulmonary  Within defined limits                 Neuro/Psych   Within defined limits           Cardiovascular    Hypertension              Exercise tolerance: >4 METS     GI/Hepatic/Renal  Within defined limits              Endo/Other  Within defined limits           Other Findings   Comments: Right Hip fracture           Physical Exam    Airway  Mallampati: II  TM Distance: 4 - 6 cm  Neck ROM: normal range of motion   Mouth opening: Normal     Cardiovascular  Regular rate and rhythm,  S1 and S2 normal,  no murmur, click, rub, or gallop             Dental  No notable dental hx    Comments: Several missing teeth, none loose.    Pulmonary  Breath sounds clear to auscultation               Abdominal  GI exam deferred       Other Findings            Anesthetic Plan    ASA: 2  Anesthesia type: general    Monitoring Plan: BIS      Induction: Intravenous  Anesthetic plan and risks discussed with: Patient Island Pedicle Flap-Requiring Vessel Identification Text: The defect edges were debeveled with a #15 scalpel blade.  Given the location of the defect, shape of the defect and the proximity to free margins an island pedicle advancement flap was deemed most appropriate.  Using a sterile surgical marker, an appropriate advancement flap was drawn, based on the axial vessel mentioned above, incorporating the defect, outlining the appropriate donor tissue and placing the expected incisions within the relaxed skin tension lines where possible.    The area thus outlined was incised deep to adipose tissue with a #15 scalpel blade.  The skin margins were undermined to an appropriate distance in all directions around the primary defect and laterally outward around the island pedicle utilizing iris scissors.  There was minimal undermining beneath the pedicle flap.

## 2023-02-05 ENCOUNTER — APPOINTMENT (OUTPATIENT)
Dept: CT IMAGING | Age: 88
End: 2023-02-05
Attending: EMERGENCY MEDICINE
Payer: MEDICARE

## 2023-02-05 ENCOUNTER — HOSPITAL ENCOUNTER (EMERGENCY)
Age: 88
Discharge: OTHER INSTITUTION | End: 2023-02-05
Attending: EMERGENCY MEDICINE
Payer: MEDICARE

## 2023-02-05 VITALS
BODY MASS INDEX: 23.85 KG/M2 | HEART RATE: 73 BPM | OXYGEN SATURATION: 97 % | TEMPERATURE: 97.8 F | HEIGHT: 69 IN | SYSTOLIC BLOOD PRESSURE: 154 MMHG | RESPIRATION RATE: 20 BRPM | WEIGHT: 161 LBS | DIASTOLIC BLOOD PRESSURE: 77 MMHG

## 2023-02-05 DIAGNOSIS — K62.5 RECTAL BLEEDING: Primary | ICD-10-CM

## 2023-02-05 LAB
ABO + RH BLD: NORMAL
ALBUMIN SERPL-MCNC: 2.9 G/DL (ref 3.5–5)
ALBUMIN/GLOB SERPL: 0.5 (ref 1.1–2.2)
ALP SERPL-CCNC: 141 U/L (ref 45–117)
ALT SERPL-CCNC: 30 U/L (ref 12–78)
ANION GAP SERPL CALC-SCNC: 7 MMOL/L (ref 5–15)
APPEARANCE UR: CLEAR
APTT PPP: 27.7 SEC (ref 22.1–31)
AST SERPL-CCNC: 35 U/L (ref 15–37)
BACTERIA URNS QL MICRO: NEGATIVE /HPF
BASOPHILS # BLD: 0.1 K/UL (ref 0–0.1)
BASOPHILS NFR BLD: 1 % (ref 0–1)
BILIRUB SERPL-MCNC: 0.7 MG/DL (ref 0.2–1)
BILIRUB UR QL: NEGATIVE
BLOOD GROUP ANTIBODIES SERPL: NORMAL
BUN SERPL-MCNC: 58 MG/DL (ref 6–20)
BUN/CREAT SERPL: 51 (ref 12–20)
CALCIUM SERPL-MCNC: 9.7 MG/DL (ref 8.5–10.1)
CHLORIDE SERPL-SCNC: 104 MMOL/L (ref 97–108)
CO2 SERPL-SCNC: 31 MMOL/L (ref 21–32)
COLOR UR: ABNORMAL
COMMENT, HOLDF: NORMAL
COMMENT, HOLDF: NORMAL
CREAT SERPL-MCNC: 1.14 MG/DL (ref 0.55–1.02)
DIFFERENTIAL METHOD BLD: ABNORMAL
EOSINOPHIL # BLD: 0.4 K/UL (ref 0–0.4)
EOSINOPHIL NFR BLD: 2 % (ref 0–7)
EPITH CASTS URNS QL MICRO: ABNORMAL /LPF
ERYTHROCYTE [DISTWIDTH] IN BLOOD BY AUTOMATED COUNT: 12 % (ref 11.5–14.5)
GLOBULIN SER CALC-MCNC: 5.4 G/DL (ref 2–4)
GLUCOSE SERPL-MCNC: 144 MG/DL (ref 65–100)
GLUCOSE UR STRIP.AUTO-MCNC: NEGATIVE MG/DL
HCT VFR BLD AUTO: 44.1 % (ref 35–47)
HEMOCCULT STL QL: POSITIVE
HGB BLD-MCNC: 14.5 G/DL (ref 11.5–16)
HGB UR QL STRIP: ABNORMAL
IMM GRANULOCYTES # BLD AUTO: 0.2 K/UL (ref 0–0.04)
IMM GRANULOCYTES NFR BLD AUTO: 1 % (ref 0–0.5)
INR PPP: 1.1 (ref 0.9–1.1)
KETONES UR QL STRIP.AUTO: NEGATIVE MG/DL
LEUKOCYTE ESTERASE UR QL STRIP.AUTO: ABNORMAL
LYMPHOCYTES # BLD: 2.6 K/UL (ref 0.8–3.5)
LYMPHOCYTES NFR BLD: 16 % (ref 12–49)
MCH RBC QN AUTO: 32.1 PG (ref 26–34)
MCHC RBC AUTO-ENTMCNC: 32.9 G/DL (ref 30–36.5)
MCV RBC AUTO: 97.6 FL (ref 80–99)
MONOCYTES # BLD: 1.3 K/UL (ref 0–1)
MONOCYTES NFR BLD: 7 % (ref 5–13)
NEUTS SEG # BLD: 12.4 K/UL (ref 1.8–8)
NEUTS SEG NFR BLD: 73 % (ref 32–75)
NITRITE UR QL STRIP.AUTO: NEGATIVE
NRBC # BLD: 0 K/UL (ref 0–0.01)
NRBC BLD-RTO: 0 PER 100 WBC
PH UR STRIP: 6.5 (ref 5–8)
PLATELET # BLD AUTO: 357 K/UL (ref 150–400)
PMV BLD AUTO: 10.2 FL (ref 8.9–12.9)
POTASSIUM SERPL-SCNC: 3.1 MMOL/L (ref 3.5–5.1)
PROT SERPL-MCNC: 8.3 G/DL (ref 6.4–8.2)
PROT UR STRIP-MCNC: NEGATIVE MG/DL
PROTHROMBIN TIME: 11.6 SEC (ref 9–11.1)
RBC # BLD AUTO: 4.52 M/UL (ref 3.8–5.2)
RBC #/AREA URNS HPF: ABNORMAL /HPF (ref 0–5)
SAMPLES BEING HELD,HOLD: NORMAL
SAMPLES BEING HELD,HOLD: NORMAL
SODIUM SERPL-SCNC: 142 MMOL/L (ref 136–145)
SP GR UR REFRACTOMETRY: <1.005 (ref 1–1.03)
SPECIMEN EXP DATE BLD: NORMAL
THERAPEUTIC RANGE,PTTT: NORMAL SECS (ref 58–77)
UA: UC IF INDICATED,UAUC: ABNORMAL
UROBILINOGEN UR QL STRIP.AUTO: 1 EU/DL (ref 0.2–1)
WBC # BLD AUTO: 17 K/UL (ref 3.6–11)
WBC URNS QL MICRO: ABNORMAL /HPF (ref 0–4)

## 2023-02-05 PROCEDURE — 85730 THROMBOPLASTIN TIME PARTIAL: CPT

## 2023-02-05 PROCEDURE — 86900 BLOOD TYPING SEROLOGIC ABO: CPT

## 2023-02-05 PROCEDURE — 82272 OCCULT BLD FECES 1-3 TESTS: CPT

## 2023-02-05 PROCEDURE — 74178 CT ABD&PLV WO CNTR FLWD CNTR: CPT

## 2023-02-05 PROCEDURE — 36415 COLL VENOUS BLD VENIPUNCTURE: CPT

## 2023-02-05 PROCEDURE — 85025 COMPLETE CBC W/AUTO DIFF WBC: CPT

## 2023-02-05 PROCEDURE — 81001 URINALYSIS AUTO W/SCOPE: CPT

## 2023-02-05 PROCEDURE — 85610 PROTHROMBIN TIME: CPT

## 2023-02-05 PROCEDURE — 74011000636 HC RX REV CODE- 636: Performed by: EMERGENCY MEDICINE

## 2023-02-05 PROCEDURE — 80053 COMPREHEN METABOLIC PANEL: CPT

## 2023-02-05 PROCEDURE — 99285 EMERGENCY DEPT VISIT HI MDM: CPT

## 2023-02-05 PROCEDURE — 74011250636 HC RX REV CODE- 250/636

## 2023-02-05 RX ADMIN — IOPAMIDOL 100 ML: 755 INJECTION, SOLUTION INTRAVENOUS at 04:01

## 2023-02-05 RX ADMIN — SODIUM CHLORIDE 1000 ML: 9 INJECTION, SOLUTION INTRAVENOUS at 04:37

## 2023-02-05 NOTE — ED NOTES
TRANSFER - IN REPORT:    Verbal report received from [facility]) on [address]. Report consisted of patients Situation, Background, Assessment and   Recommendations(SBAR). Information from the following report(s) SBAR and ED Summary was reviewed with the receiving nurse. Opportunity for questions and clarification was provided. [facility] to transport patient back to facility.

## 2023-02-05 NOTE — DISCHARGE INSTRUCTIONS
Ms. Rafael Mcnair was seen in the ER for blood in stool. Her CT did not show any abnormalities to explain her symptoms. It's possible that the blood may be due to hemorrhoids, or a viral illness. She should continue to receive plenty of fluids. We recommend following up with her primary care doctor. Return to the ER if she has worsening bleeding, abdominal pain, fevers, appears lethargic or dehydrated.

## 2023-02-05 NOTE — ED NOTES
Pt had a bowel movement. New brief applied. Adjusted pt up in bed. Call light within reach. Family at bedside/updated.

## 2023-02-05 NOTE — ED PROVIDER NOTES
Face-To-Face Encounter with a Resident's Patient:       The patient presents with report of bloody stools from her facility    My findings: Patient is a demented 61-year-old resting comfortably on stretcher in no acute distress. Has soft nondistended abdomen. Plan: Labs, UA, CT scan abdomen pelvis    Impression: Dementia, leukocytosis    I have personally seen and examined the patient, reviewed the Resident's note and agree with findings and plan. Elena Rojas MD        EMERGENCY DEPARTMENT HISTORY AND PHYSICAL EXAM        Date: 2/5/2023  Patient Name: Brian House  Attending of Record: Katherine Otoole    History of Presenting Illness     Chief Complaint   Patient presents with    Rectal Bleeding     Pt arrives via EMS from [facility]. Staff went to change pt's briefs and noticed that there was dark/bloody stools with odor. Pt is a/o x 0 at baseline. Pt reports no abdominal pain. Staff states pt has hemorrhoids and 3-4 weeks ago had a hip fracture. History Provided By: EMS    HPI: Brian House is a 80 y.o. female, pmhx as noted below, who presents to the ED c/o bloody stools. Per EMS, patient is coming from nursing home, staff noted black/bloody stools this evening. Vitals stable per EMS. Pt is at her neurological baseline, does not appear to be in any pain. Pt is at the nursing facility due to a hip fracture. No blood thinners listed in medication list.      PCP: Welch Place, MD    There are no other complaints, changes, or physical findings at this time. Current Outpatient Medications   Medication Sig Dispense Refill    escitalopram oxalate (Lexapro) 20 mg tablet Take 20 mg by mouth daily. furosemide (Lasix) 40 mg tablet Take  by mouth daily. nitrofurantoin, macrocrystal-monohydrate, (Macrobid) 100 mg capsule Take 100 mg by mouth two (2) times a day. losartan (COZAAR) 100 mg tablet Take 100 mg by mouth daily.       potassium chloride SR (KLOR-CON 10) 10 mEq tablet Take 2 Tablets by mouth daily. 30 Tablet 0    acetaminophen (TYLENOL) 325 mg tablet Take 2 Tablets by mouth every four (4) hours as needed for Fever or Pain. 30 Tablet 0    metOLazone (ZAROXOLYN) 5 mg tablet Take 5 mg by mouth every Tuesday. atorvastatin (LIPITOR) 20 mg tablet TAKE 1 TABLET BY MOUTH EVERY DAY AT NIGHT 90 Tablet 0    metoprolol tartrate (LOPRESSOR) 25 mg tablet Take 1 Tablet by mouth two (2) times a day. HOLD FOR SBP LESS THAN 120 OR HR LESS THAN 65 180 Tablet 1    memantine (NAMENDA) 5 mg tablet 10 mg. Twice a day      cholecalciferol (VITAMIN D3) (1000 Units /25 mcg) tablet Take 1,000 Units by mouth daily. senna-docusate (PERICOLACE) 8.6-50 mg per tablet Take 1 Tab by mouth daily. polyethylene glycol (MIRALAX) 17 gram/dose powder Take 17 g by mouth daily as needed for Other (constipation). coenzyme Q-10 (CO Q-10) 200 mg capsule Take 1 Cap by mouth daily. 90 Cap 3    glucosamine-chondroit-vit c-mn (GLUCOSAMINE 1500 COMPLEX) 500-400 mg capsule Take 1 Cap by mouth daily. 90 Cap 1    multivit-minerals/folic acid (CENTRUM MULTIGUMMIES PO) Take 1 Tab by mouth daily.          Past History     Past Medical History:  Past Medical History:   Diagnosis Date    Chronic kidney disease     Decreased kidney levels per daughter    Fracture 2018    Pt reports right hip fracture & surgery following a fall    Fracture 2020    left rib fractures; pt fell     HTN (hypertension) 5/27/2011    Hyperlipidemia 12/1/2011    Hypertension     OAB (overactive bladder) 2/15/2013    Other ill-defined conditions(799.89)     hyperlipidemia    Other ill-defined conditions(799.89)     bladder problems    Other ill-defined conditions(799.89)     back pain       Past Surgical History:  Past Surgical History:   Procedure Laterality Date    COLONOSCOPY N/A 3/12/2018    COLONOSCOPY performed by Kory Pruett MD at \[facility]\"" ENDOSCOPY    COLONOSCOPY,DIAGNOSTIC  3/12/2018         HX APPENDECTOMY  1948    HX CATARACT REMOVAL Left HX CORNEAL TRANSPLANT Left     HX DILATION AND CURETTAGE      x 2    HX HIP REPLACEMENT Right 01/20/2019    HX TONSILLECTOMY  1938       Family History:  Family History   Problem Relation Age of Onset    Cancer Father         throat (smoker); mouth and gums    Heart Disease Mother         Age 52    Clotting Disorder Mother     OSTEOARTHRITIS Paternal Aunt        Social History:  Social History     Tobacco Use    Smoking status: Never    Smokeless tobacco: Never   Substance Use Topics    Alcohol use: No    Drug use: No       Allergies: Allergies   Allergen Reactions    Codeine Nausea and Vomiting    Demerol (Pf) [Meperidine (Pf)] Nausea and Vomiting    Meperidine Nausea and Vomiting         Review of Systems   Review of Systems   Unable to perform ROS: Dementia       Physical Exam   Physical Exam  Constitutional:       General: She is not in acute distress. HENT:      Mouth/Throat:      Mouth: Mucous membranes are dry. Eyes:      Extraocular Movements: Extraocular movements intact. Cardiovascular:      Rate and Rhythm: Normal rate and regular rhythm. Pulmonary:      Effort: Pulmonary effort is normal. No respiratory distress. Breath sounds: Normal breath sounds. Abdominal:      Palpations: Abdomen is soft. Tenderness: There is no abdominal tenderness. Skin:     General: Skin is warm and dry. Neurological:      General: No focal deficit present. Mental Status: She is alert.        Diagnostic Study Results     Labs -     Recent Results (from the past 12 hour(s))   OCCULT BLOOD, STOOL    Collection Time: 02/05/23  3:07 AM   Result Value Ref Range    Occult blood, stool Positive (A) NEG     CBC WITH AUTOMATED DIFF    Collection Time: 02/05/23  3:08 AM   Result Value Ref Range    WBC 17.0 (H) 3.6 - 11.0 K/uL    RBC 4.52 3.80 - 5.20 M/uL    HGB 14.5 11.5 - 16.0 g/dL    HCT 44.1 35.0 - 47.0 %    MCV 97.6 80.0 - 99.0 FL    MCH 32.1 26.0 - 34.0 PG    MCHC 32.9 30.0 - 36.5 g/dL    RDW 12.0 11.5 - 14.5 %    PLATELET 148 445 - 517 K/uL    MPV 10.2 8.9 - 12.9 FL    NRBC 0.0 0  WBC    ABSOLUTE NRBC 0.00 0.00 - 0.01 K/uL    NEUTROPHILS 73 32 - 75 %    LYMPHOCYTES 16 12 - 49 %    MONOCYTES 7 5 - 13 %    EOSINOPHILS 2 0 - 7 %    BASOPHILS 1 0 - 1 %    IMMATURE GRANULOCYTES 1 (H) 0.0 - 0.5 %    ABS. NEUTROPHILS 12.4 (H) 1.8 - 8.0 K/UL    ABS. LYMPHOCYTES 2.6 0.8 - 3.5 K/UL    ABS. MONOCYTES 1.3 (H) 0.0 - 1.0 K/UL    ABS. EOSINOPHILS 0.4 0.0 - 0.4 K/UL    ABS. BASOPHILS 0.1 0.0 - 0.1 K/UL    ABS. IMM. GRANS. 0.2 (H) 0.00 - 0.04 K/UL    DF AUTOMATED     TYPE & SCREEN    Collection Time: 02/05/23  3:08 AM   Result Value Ref Range    Crossmatch Expiration 02/08/2023,2359     ABO/Rh(D) [facility] NEGATIVE     Antibody screen NEG    METABOLIC PANEL, COMPREHENSIVE    Collection Time: 02/05/23  3:08 AM   Result Value Ref Range    Sodium 142 136 - 145 mmol/L    Potassium 3.1 (L) 3.5 - 5.1 mmol/L    Chloride 104 97 - 108 mmol/L    CO2 31 21 - 32 mmol/L    Anion gap 7 5 - 15 mmol/L    Glucose 144 (H) 65 - 100 mg/dL    BUN 58 (H) 6 - 20 MG/DL    Creatinine 1.14 (H) 0.55 - 1.02 MG/DL    BUN/Creatinine ratio 51 (H) 12 - 20      eGFR 46 (L) >60 ml/min/1.73m2    Calcium 9.7 8.5 - 10.1 MG/DL    Bilirubin, total 0.7 0.2 - 1.0 MG/DL    ALT (SGPT) 30 12 - 78 U/L    AST (SGOT) 35 15 - 37 U/L    Alk.  phosphatase 141 (H) 45 - 117 U/L    Protein, total 8.3 (H) 6.4 - 8.2 g/dL    Albumin 2.9 (L) 3.5 - 5.0 g/dL    Globulin 5.4 (H) 2.0 - 4.0 g/dL    A-G Ratio 0.5 (L) 1.1 - 2.2     PROTHROMBIN TIME + INR    Collection Time: 02/05/23  3:08 AM   Result Value Ref Range    INR 1.1 0.9 - 1.1      Prothrombin time 11.6 (H) 9.0 - 11.1 sec   PTT    Collection Time: 02/05/23  3:08 AM   Result Value Ref Range    aPTT 27.7 22.1 - 31.0 sec    aPTT, therapeutic range     58.0 - 77.0 SECS   SAMPLES BEING HELD    Collection Time: 02/05/23  3:08 AM   Result Value Ref Range    SAMPLES BEING HELD BL     COMMENT        Add-on orders for these samples will be processed based on acceptable specimen integrity and analyte stability, which may vary by analyte. SAMPLES BEING HELD    Collection Time: 02/05/23  3:08 AM   Result Value Ref Range    SAMPLES BEING HELD PST     COMMENT        Add-on orders for these samples will be processed based on acceptable specimen integrity and analyte stability, which may vary by analyte. URINALYSIS W/ REFLEX CULTURE    Collection Time: 02/05/23  5:31 AM    Specimen: Urine   Result Value Ref Range    Color YELLOW/STRAW      Appearance CLEAR CLEAR      Specific gravity <1.005 1.003 - 1.030    pH (UA) 6.5 5.0 - 8.0      Protein Negative NEG mg/dL    Glucose Negative NEG mg/dL    Ketone Negative NEG mg/dL    Bilirubin Negative NEG      Blood SMALL (A) NEG      Urobilinogen 1.0 0.2 - 1.0 EU/dL    Nitrites Negative NEG      Leukocyte Esterase SMALL (A) NEG      WBC 0-4 0 - 4 /hpf    RBC 0-5 0 - 5 /hpf    Epithelial cells MODERATE (A) FEW /lpf    Bacteria Negative NEG /hpf    UA:UC IF INDICATED CULTURE NOT INDICATED BY UA RESULT CNI         Radiologic Studies -   CT ABD PELV W WO CONT   Final Result   No evidence of active GI bleed. Stable incidental findings as detailed above. CT Results  (Last 48 hours)                 02/05/23 0401  CT ABD PELV W WO CONT Final result    Impression:  No evidence of active GI bleed. Stable incidental findings as detailed above. Narrative:  INDICATION: GI bleed       COMPARISON: 5/21/2021       TECHNIQUE:    Thin axial images are obtained through the abdomen and pelvis. Then, following   the uneventful intravenous administration of 100 cc Isovue-370, thin axial   images were obtained through the abdomen and pelvis in hepatic arterial, portal   venous, and delayed phases. Coronal and sagittal reconstructions were generated. Oral contrast was not administered.  CT dose reduction was achieved through use   of a standardized protocol tailored for this examination and automatic exposure control for dose modulation. FINDINGS:    LUNG BASES: Bibasilar atelectasis. INCIDENTALLY IMAGED HEART AND MEDIASTINUM: Unremarkable. LIVER: No mass or biliary dilatation. GALLBLADDER: Again contains a single large calculus but demonstrates no wall   thickening or pericholecystic fluid. SPLEEN: No mass. PANCREAS: No mass or ductal dilatation. ADRENALS: Unremarkable. KIDNEYS: No hydronephrosis. Small nonobstructing right renal calculi. Subcentimeter bilateral renal hypodensities do not require imaging follow-up. STOMACH: Moderately large hiatal hernia. SMALL BOWEL: No dilatation or wall thickening. No evidence of active hemorrhage. COLON: No dilatation or wall thickening. No evidence of active hemorrhage. APPENDIX: Surgically absent. PERITONEUM: No ascites or pneumoperitoneum. RETROPERITONEUM: No lymphadenopathy or aortic aneurysm. REPRODUCTIVE ORGANS: Unremarkable. URINARY BLADDER: No mass or calculus. BONES: No destructive bone lesion. Right hip prosthesis appears intact. Degenerative changes in the thoracolumbar spine. ADDITIONAL COMMENTS: N/A                 CXR Results  (Last 48 hours)      None              Medical Decision Making   I am the first provider for this patient. I reviewed the vital signs, available nursing notes, past medical history, past surgical history, family history and social history. Vital Signs-Reviewed the patient's vital signs.   Patient Vitals for the past 12 hrs:   Temp Pulse Resp BP SpO2   02/05/23 0732 -- 73 -- (!) 154/77 97 %   02/05/23 0628 -- 71 -- -- 94 %   02/05/23 0613 -- 71 -- (!) 152/83 93 %   02/05/23 0558 -- 69 -- (!) 163/73 --   02/05/23 0543 -- 68 -- (!) 168/92 93 %   02/05/23 0525 -- 65 20 (!) 175/73 95 %   02/05/23 0508 -- 63 18 (!) 169/64 93 %   02/05/23 0453 -- 66 14 -- 94 %   02/05/23 0438 -- 72 16 -- 95 %   02/05/23 0423 -- 69 15 -- 97 %   02/05/23 0417 -- 67 13 -- 98 %   02/05/23 0347 -- 70 22 (!) 144/79 95 % 02/05/23 0332 -- 72 18 135/73 91 %   02/05/23 0317 -- 82 14 (!) 157/72 93 %   02/05/23 0259 -- -- -- -- 97 %   02/05/23 0255 97.5 °F (36.4 °C) 63 20 (!) 163/83 100 %         Patient was given the following medications:  Medications   iopamidoL (ISOVUE-370) 370 mg iodine /mL (76 %) injection 100 mL (100 mL IntraVENous Given 2/5/23 0401)   sodium chloride 0.9 % bolus infusion 1,000 mL (0 mL IntraVENous IV Completed 2/5/23 0539)         Independent History: An independent history was obtained from pt's family. They state pt has been more or less bedbound since going back to the nursing facility, as they have not yet set up PT for her. She has no prior history of a GI bleed. The staff did call them earlier this week about BRBPR, and the family has also noted that pt has been intermittently complaining of abd pain. Social Determinants affecting Dx or Tx:   dementia    Records Reviewed (source and summary of external notes):   Chart reviewed    EKG: n/a    Imaging Interpretation by ED provider: n/a, Final radiologist interpretation reviewed. CC/HPI Summary, DDx, ED Course, and Reassessment:   79 yo F presenting with concern for GI bleed. Vitals are stable. Pt unable to give any information. Abd is soft, nontender. There is light brown stool noted in pt's diaper. Will obtain labs, hemoccult. ED Course as of 02/05/23 0740   Sun Feb 05, 2023   0654 UA negative for UTI. Leukocytosis may be due to viral GI illness. Pt had a BM while in the ED which was light colored without gross blood. Discussed results and likely diagnosis with pt's family, as well as return precautions.   [DM]      ED Course User Index  [DM] Sergo Mccracken MD       CONSULTS:     Disposition Considerations (Tests not done, Shared Decision Making, Pt Expectation of Test or Tx.): Considered admission for observation with reported GI bleed however, patient had a bowel movement here in emergency department that was nonbloody and none tarry appearing. Patient is at her baseline mental status per family at bedside. Will discharge back to her facility for further management. Diagnosis     Clinical Impression:   1. Rectal bleeding        PLAN:  1. Current Discharge Medication List        2. Follow-up Information       Follow up With Specialties Details Why Contact Info    Dena Lema MD Internal Medicine Physician Schedule an appointment as soon as possible for a visit   [address] [facility]  [phone]      [facility] EMERGENCY DEPT Emergency Medicine Go to  As needed [address] [place] [address]  [address] [facility]  [phone]          Return to ED if worse       Discharge Note: The patient is stable for discharge home. The signs, symptoms, diagnosis, and discharge instructions have been discussed, understanding conveyed, and agreed upon. The patient is to follow up as recommended or return to ER should their symptoms worsen.

## 2023-08-24 NOTE — DISCHARGE INSTRUCTIONS
Cm Nava  [id number]  7/3/1932    COLON DISCHARGE INSTRUCTIONS  Discomfort:  Redness at IV site- apply warm compress to area; if redness or soreness persist- contact your physician  There may be a slight amount of blood passed from the rectum  Gaseous discomfort- walking, belching will help relieve any discomfort  You may not operate a vehicle for 12 hours  You may not engage in an occupation involving machinery or appliances for rest of today  You may not drink alcoholic beverages for at least 12 hours  Avoid making any critical decisions for at least 24 hour  DIET:   High fiber diet. - however -  remember your colon is empty and a heavy meal will produce gas. Avoid these foods:  vegetables, fried / greasy foods, carbonated drinks for today  MEDICATION:         ACTIVITY:  You may not resume your normal daily activities until tomorrow AM; it is recommended that you spend the remainder of the day resting -  avoid any strenuous activity. CALL M.D. ANY SIGN OF:   Increasing pain, nausea, vomiting  Abdominal distension (swelling)  New increased bleeding (oral or rectal)  Fever (chills)  IMPRESSION:  -Sigmoid diverticulosis  -Small internal hemorrhoids  -No masses, polyps, or inflammation noted    Follow-up Instructions:   Call Dr. Kael Perez if questions arise regarding your procedure  Telephone # [phone]  No further colonoscopy indicated    Amrik Navarro MD    [employer] Activation    Thank you for requesting access to [employer]. Please follow the instructions below to securely access and download your online medical record. [employer] allows you to send messages to your doctor, view your test results, renew your prescriptions, schedule appointments, and more. How Do I Sign Up? 1. In your internet browser, go to [email or web address]  2. Click on the First Time User? Click Here link in the Sign In box. You will be redirect to the New Member Sign Up page.   3. Enter your [employer] Access Code exactly as it appears below. You will not need to use this code after youve completed the sign-up process. If you do not sign up before the expiration date, you must request a new code. [employer] Access Code: Activation code not generated  Current [employer] Status: Active (This is the date your [employer] access code will )    4. Enter the last four digits of your Social Security Number (xxxx) and Date of Birth (mm/dd/yyyy) as indicated and click Submit. You will be taken to the next sign-up page. 5. Create a [employer] ID. This will be your [employer] login ID and cannot be changed, so think of one that is secure and easy to remember. 6. Create a [employer] password. You can change your password at any time. 7. Enter your Password Reset Question and Answer. This can be used at a later time if you forget your password. 8. Enter your e-mail address. You will receive e-mail notification when new information is available in [address]. 9. Click Sign Up. You can now view and download portions of your medical record. 10. Click the Download Summary menu link to download a portable copy of your medical information. Additional Information    If you have questions, please visit the Frequently Asked Questions section of the [employer] website at [email or web address] "[email or web address] [email or web address] Remember, [employer] is NOT to be used for urgent needs. For medical emergencies, dial 911. No action was needed

## (undated) DEVICE — KENDALL SCD EXPRESS SLEEVES, KNEE LENGTH, MEDIUM: Brand: KENDALL SCD

## (undated) DEVICE — 2108 SERIES SAGITTAL BLADE (24.8 X 0.88 X 73.8MM)

## (undated) DEVICE — HANDLE LT SNAP ON ULT DURABLE LENS FOR TRUMPF ALC DISPOSABLE

## (undated) DEVICE — Z DISCONTINUED USE 2717541 SUTURE STRATAFIX SZ 3-0 L30CM NONABSORBABLE UD L26MM FS 3/8

## (undated) DEVICE — SUTURE VCRL SZ 1 L18IN ABSRB VLT CT-1 L36MM 1/2 CIR J741D

## (undated) DEVICE — WATERPROOF, BACTERIA PROOF DRESSING WITH ABSORBENT SEE THROUGH PAD: Brand: OPSITE POST-OP VISIBLE 20X10CM CTN 20

## (undated) DEVICE — SOLUTION IRRIG 3000ML 0.9% SOD CHL FLX CONT 0797208] ICU MEDICAL INC]

## (undated) DEVICE — SET ADMIN 16ML TBNG L100IN 2 Y INJ SITE IV PIGGY BK DISP

## (undated) DEVICE — KENDALL RADIOLUCENT FOAM MONITORING ELECTRODE RECTANGULAR SHAPE: Brand: KENDALL

## (undated) DEVICE — STERILE POLYISOPRENE POWDER-FREE SURGICAL GLOVES WITH EMOLLIENT COATING: Brand: PROTEXIS

## (undated) DEVICE — CATH IV AUTOGRD BC PNK 20GA 25 -- INSYTE

## (undated) DEVICE — Device

## (undated) DEVICE — Z DISCONTINUED PER MEDLINE LINE GAS SAMPLING O2/CO2 LNG AD 13 FT NSL W/ TBNG FILTERLINE

## (undated) DEVICE — 3M™ IOBAN™ 2 ANTIMICROBIAL INCISE DRAPE 6651EZ: Brand: IOBAN™ 2

## (undated) DEVICE — 3000CC GUARDIAN II: Brand: GUARDIAN

## (undated) DEVICE — BLADE ELECTRODE: Brand: EDGE

## (undated) DEVICE — ELECTRODE BLDE L4IN NONINSULATED EDGE

## (undated) DEVICE — REM POLYHESIVE ADULT PATIENT RETURN ELECTRODE: Brand: VALLEYLAB

## (undated) DEVICE — INFECTION CONTROL KIT SYS

## (undated) DEVICE — SUTURE STRATAFIX SPRL SZ 1 L5IN ABSRB VLT CT-1 L36MM 1/2 SXPD2B401

## (undated) DEVICE — SUTURE ETHBND EXCEL SZ 5 L30IN NONABSORBABLE GRN L40MM V-37 MB66G

## (undated) DEVICE — (D)PREP SKN CHLRAPRP APPL 26ML -- CONVERT TO ITEM 371833

## (undated) DEVICE — DEVON™ KNEE AND BODY STRAP 60" X 3" (1.5 M X 7.6 CM): Brand: DEVON

## (undated) DEVICE — DRAPE,REIN 53X77,STERILE: Brand: MEDLINE

## (undated) DEVICE — BIT DRL TWST JCBS CHK REPROC

## (undated) DEVICE — TRAP FLUID BUFFALO FLTR

## (undated) DEVICE — NEEDLE HYPO 18GA L1.5IN PNK S STL HUB POLYPR SHLD REG BVL

## (undated) DEVICE — SUTURE STRATAFIX SYMMETRIC SZ 1 L18IN ABSRB VLT CT1 L36CM SXPP1A404

## (undated) DEVICE — HANDPIECE SET WITH COAXIAL HIGH FLOW TIP AND SUCTION TUBE: Brand: INTERPULSE

## (undated) DEVICE — BASIN EMSIS 16OZ GRAPHITE PLAS KID SHP MOLD GRAD FOR ORAL

## (undated) DEVICE — TOWEL SURG W17XL27IN STD BLU COT NONFENESTRATED PREWASHED

## (undated) DEVICE — TOWEL 4 PLY TISS 19X30 SUE WHT

## (undated) DEVICE — SUTURE ABSORBABLE MONOFILAMENT 2-0 WND CLOSURE GRN V-LOC 180 VLOCL0315

## (undated) DEVICE — SMOKE EVACUATION PENCIL: Brand: VALLEYLAB

## (undated) DEVICE — SUTURE VCRL SZ 2-0 L18IN ABSRB UD CT-1 L36MM 1/2 CIR J839D

## (undated) DEVICE — GAUZE SPONGES,12 PLY: Brand: CURITY

## (undated) DEVICE — SUTURE VCRL SZ 0 L36IN ABSRB UD L36MM CT-1 1/2 CIR J946H

## (undated) DEVICE — PREP KIT PEEL PTCH POVIDONE IOD

## (undated) DEVICE — 1200 GUARD II KIT W/5MM TUBE W/O VAC TUBE: Brand: GUARDIAN

## (undated) DEVICE — ABDUCTION PILLOW FOAM POSITIONER: Brand: CARDINAL HEALTH

## (undated) DEVICE — 4-PORT MANIFOLD: Brand: NEPTUNE 2

## (undated) DEVICE — SOLUTION IRRIG 1000ML H2O STRL BLT

## (undated) DEVICE — SYR 50ML LR LCK 1ML GRAD NSAF --

## (undated) DEVICE — STERILE POLYISOPRENE POWDER-FREE SURGICAL GLOVES: Brand: PROTEXIS

## (undated) DEVICE — NEONATAL-ADULT SPO2 SENSOR: Brand: NELLCOR

## (undated) DEVICE — SOLUTION IV 1000ML 0.9% SOD CHL

## (undated) DEVICE — SYR 10ML LUER LOK 1/5ML GRAD --

## (undated) DEVICE — SLIM BODY SKIN STAPLER: Brand: APPOSE ULC

## (undated) DEVICE — NDL SPNE QNCKE 18GX3.5IN LF --

## (undated) DEVICE — SYR 3ML LL TIP 1/10ML GRAD --

## (undated) DEVICE — SOLIDIFIER MEDC 1200ML -- CONVERT TO 356117

## (undated) DEVICE — T4 HOOD